# Patient Record
Sex: FEMALE | Race: WHITE | NOT HISPANIC OR LATINO | Employment: OTHER | URBAN - METROPOLITAN AREA
[De-identification: names, ages, dates, MRNs, and addresses within clinical notes are randomized per-mention and may not be internally consistent; named-entity substitution may affect disease eponyms.]

---

## 2019-09-04 ENCOUNTER — TELEPHONE (OUTPATIENT)
Dept: NEUROLOGY | Facility: CLINIC | Age: 64
End: 2019-09-04

## 2020-02-04 ENCOUNTER — TELEPHONE (OUTPATIENT)
Dept: NEUROLOGY | Facility: CLINIC | Age: 65
End: 2020-02-04

## 2020-02-04 ENCOUNTER — OFFICE VISIT (OUTPATIENT)
Dept: NEUROLOGY | Facility: CLINIC | Age: 65
End: 2020-02-04
Payer: MEDICARE

## 2020-02-04 VITALS
DIASTOLIC BLOOD PRESSURE: 63 MMHG | HEART RATE: 92 BPM | HEIGHT: 65 IN | SYSTOLIC BLOOD PRESSURE: 129 MMHG | BODY MASS INDEX: 29.85 KG/M2 | WEIGHT: 179.2 LBS

## 2020-02-04 DIAGNOSIS — E55.9 VITAMIN D DEFICIENCY: ICD-10-CM

## 2020-02-04 DIAGNOSIS — M54.50 CHRONIC MIDLINE LOW BACK PAIN, UNSPECIFIED WHETHER SCIATICA PRESENT: ICD-10-CM

## 2020-02-04 DIAGNOSIS — G43.709 CHRONIC MIGRAINE WITHOUT AURA WITHOUT STATUS MIGRAINOSUS, NOT INTRACTABLE: Primary | ICD-10-CM

## 2020-02-04 DIAGNOSIS — G24.3 CERVICAL DYSTONIA: ICD-10-CM

## 2020-02-04 DIAGNOSIS — G89.29 CHRONIC MIDLINE LOW BACK PAIN, UNSPECIFIED WHETHER SCIATICA PRESENT: ICD-10-CM

## 2020-02-04 DIAGNOSIS — G44.40 MEDICATION OVERUSE HEADACHE: ICD-10-CM

## 2020-02-04 DIAGNOSIS — M54.2 CERVICALGIA: ICD-10-CM

## 2020-02-04 PROBLEM — F41.8 DEPRESSION WITH ANXIETY: Status: ACTIVE | Noted: 2020-02-04

## 2020-02-04 PROBLEM — M54.81 BILATERAL OCCIPITAL NEURALGIA: Status: ACTIVE | Noted: 2020-02-04

## 2020-02-04 PROCEDURE — 99205 OFFICE O/P NEW HI 60 MIN: CPT | Performed by: PSYCHIATRY & NEUROLOGY

## 2020-02-04 RX ORDER — METFORMIN HYDROCHLORIDE 500 MG/1
500 TABLET, EXTENDED RELEASE ORAL 2 TIMES DAILY
COMMUNITY
Start: 2019-12-27 | End: 2021-10-25

## 2020-02-04 RX ORDER — DOCUSATE SODIUM 100 MG/1
100 CAPSULE, LIQUID FILLED ORAL 2 TIMES DAILY
COMMUNITY

## 2020-02-04 RX ORDER — POLYETHYLENE GLYCOL 3350 17 G/17G
17 POWDER, FOR SOLUTION ORAL
COMMUNITY
Start: 2018-01-31

## 2020-02-04 RX ORDER — POTASSIUM CHLORIDE 20 MEQ/1
1 TABLET, EXTENDED RELEASE ORAL DAILY
COMMUNITY
Start: 2020-02-04

## 2020-02-04 RX ORDER — CHOLECALCIFEROL (VITAMIN D3) 125 MCG
5 CAPSULE ORAL
COMMUNITY

## 2020-02-04 RX ORDER — NAPROXEN SODIUM 220 MG
220 TABLET ORAL AS NEEDED
COMMUNITY

## 2020-02-04 RX ORDER — BUSPIRONE HYDROCHLORIDE 15 MG/1
15 TABLET ORAL 2 TIMES DAILY
COMMUNITY
Start: 2019-12-27

## 2020-02-04 RX ORDER — BUTALBITAL/ASPIRIN/CAFFEINE 50-325-40
1 CAPSULE ORAL EVERY 4 HOURS PRN
COMMUNITY
Start: 2020-01-27 | End: 2020-10-16 | Stop reason: ALTCHOICE

## 2020-02-04 RX ORDER — TIZANIDINE 4 MG/1
.5-1 TABLET ORAL
COMMUNITY
Start: 2018-01-31

## 2020-02-04 RX ORDER — ACETAMINOPHEN 500 MG
500 TABLET ORAL AS NEEDED
COMMUNITY

## 2020-02-04 RX ORDER — DULOXETIN HYDROCHLORIDE 30 MG/1
30 CAPSULE, DELAYED RELEASE ORAL EVERY MORNING
COMMUNITY
Start: 2019-08-15 | End: 2020-10-16

## 2020-02-04 RX ORDER — DIPHENOXYLATE HYDROCHLORIDE AND ATROPINE SULFATE 2.5; .025 MG/1; MG/1
1 TABLET ORAL DAILY
COMMUNITY

## 2020-02-04 RX ORDER — CLONAZEPAM 0.5 MG/1
1 TABLET ORAL 3 TIMES DAILY
COMMUNITY
Start: 2019-12-22

## 2020-02-04 RX ORDER — ROSUVASTATIN CALCIUM 20 MG/1
20 TABLET, COATED ORAL DAILY
COMMUNITY
Start: 2019-12-27

## 2020-02-04 RX ORDER — PHENOL 1.4 %
600 AEROSOL, SPRAY (ML) MUCOUS MEMBRANE 2 TIMES DAILY
COMMUNITY

## 2020-02-04 RX ORDER — LAMOTRIGINE 200 MG/1
200 TABLET ORAL DAILY
COMMUNITY
Start: 2019-07-30

## 2020-02-04 RX ORDER — VALSARTAN AND HYDROCHLOROTHIAZIDE 160; 25 MG/1; MG/1
1 TABLET ORAL DAILY
COMMUNITY
Start: 2019-12-05

## 2020-02-04 RX ORDER — OXYCODONE HYDROCHLORIDE 10 MG/1
1 TABLET ORAL 2 TIMES DAILY
COMMUNITY
Start: 2020-01-22

## 2020-02-04 RX ORDER — ASPIRIN 81 MG/1
81 TABLET ORAL DAILY
COMMUNITY

## 2020-02-04 NOTE — TELEPHONE ENCOUNTER
Please schedule new start Botox  This will be stock  Please let me know once the apt is scheduled so I can attach the referral  Thank you!     Lore

## 2020-02-04 NOTE — PROGRESS NOTES
[facility]  PATIENT:  Ilana Tian  MRN:  [id number]  :  1955  DATE OF SERVICE:  2020      Assessment/Plan:        Problem List Items Addressed This Visit        Cardiovascular and Mediastinum    Headache, chronic migraine without aura - Primary    Relevant Medications    DULoxetine (CYMBALTA) 30 mg delayed release capsule    DULoxetine HCl 60 MG CSDR    lamoTRIgine (LaMICtal) 200 MG tablet    clonazePAM (KlonoPIN) 0 5 mg tablet    aspirin (ECOTRIN LOW STRENGTH) 81 mg EC tablet    butalbital-acetaminophen-caffeine-codeine (FIORICET WITH CODEINE) -42-30 MG per capsule    oxyCODONE (ROXICODONE) 10 MG TABS    naproxen sodium (ALEVE) 220 MG tablet    tiZANidine (ZANAFLEX) 4 mg tablet    acetaminophen (TYLENOL) 500 mg tablet    magnesium oxide (MAG-OX) 400 mg    Riboflavin (VITAMIN B-2) 100 MG TABS    Erenumab-aooe 140 MG/ML SOAJ    Other Relevant Orders    Vitamin B12    ECG 12 lead    MRI brain with and without contrast    BUN    Creatinine, serum       Nervous and Auditory    Cervical dystonia    Relevant Medications    lamoTRIgine (LaMICtal) 200 MG tablet    clonazePAM (KlonoPIN) 0 5 mg tablet    tiZANidine (ZANAFLEX) 4 mg tablet       Other    Cervicalgia    Medication overuse headache    Lumbago      Other Visit Diagnoses     Vitamin D deficiency        Relevant Orders    Vitamin D 25 hydroxy         MI/ stroke prevention: Aspirin 81 mg     Anxiety /depression: BuSpar 15 mg twice a day   -Klonopin 20 mEq 90 tabs   -Lamictal 200 mg 1 tab daily     Insomnia: Melatonin 5 mg at bedtime daily     Chronic pain/ fibromyalgia:  -  oxycodone 10 mg 3 times a day as needed     Arthritis: Naproxen 220 mg 2 times a day with meals     GERD:  Prilosec 20 mg daily    Cervicalgia with cervical dystonia:   Will apply for Botox  Continue tizanidine 4 mg at bedtime daily  Consider physical therapy    Chronic migraine headaches/Occipital Neurology   Medication overuse headaches:   - Medication overuse headache [facility]) and analgesic overuse can negate the effectiveness of headache preventive measures  Avoid medications with narcotics, barbiturates, or caffeine in them as these can cause rebound headaches after very few doses and can interfere with other headache medicine efficacy  Taking  any acute/abortive over the counter medication or prescription drugs for more than 2-3 days a week can cause medication overuse headache  Preventive therapy for headaches:   -Over-the-counter supplements: to decrease intensity and frequency of migraines  - Magnesium Oxide 400mg a day  If any diarrhea or upset stomach, decrease dose  as tolerated  (oral magnesium oxide may be an effective preventive strategy for people with migraine  Some theories about how it works include the idea that magnesium can help to prevent waves of cortical spreading depression and aura  Magnesium, in theory, also reduces the release of inflammatory or activating chemicals that can cause migraine)  - Vitamin B2 200 mg twice a day  May cause the urine to turn yellow which is normal for B 2 to do and is not a sign that you are dehydrated  (may be an effective preventive medication in some people with migraine)  - neck pain/back pain:Tizanidine 4 mg at bedtime  -   Hypertension:   Valsartan 160/hydrochlorothiazide 25 mg 1 tablet daily  - will send out aimovig 140mg monthly to use  Abortive therapy for headaches:   -  Patient gets Fioricet with codeine 60 capsules every 2 months -  Patient is going to try to slowly wean off of this  Work up:   - EKG  - lab:  B12, vitamin-D,   - MRI of the head with and without contrast      Headache management instructions  - When patient has a moderate to severe headache, they should seek rest, initiate relaxation and apply cold compresses to the head  - Maintain regular sleep schedule  Adults need at least 7-8 hours of uninterrupted a night     - Limit over the counter medications such as Tylenol, Ibuprofen, Aleve, Excedrin  (No more than 2- 3 times a week or max 10 a month)  - Maintain headache diary  Free KEYSHA for a smart phone, which can be used is "Migraine krishna"  - Limit caffeine to 1-2 cups 8 to 16 oz a day or less  - Avoid dietary trigger  (aged cheese, peanuts, MSG, aspartame and nitrates)  - Patient is to have regular frequent meals to prevent headache onset  - Please drink at least 64 ounces of water a day to help remain hydrated  Please call with any questions or concerns   Office number is [phone] Prescription Drug Monitoring Program report was reviewed and was appropriate   01/27/2020  1   01/27/2020  Gshyii-Tozm-Qojagommoik-Codein  60 00 10 Ca Mye   [id number]   Pen (0300)   0  27 00 [place]   01/22/2020  1   01/22/2020  Oxycodone Hcl 10 MG Tablet  90 00 30 Da Boz   [id number]   Pen (0617)   0  45 00 [place]   12/23/2019  1   12/18/2019  Oxycodone Hcl 10 MG Tablet  90 00 30 Da Boz   [id number]   Pen (7595)   0  45 00 [place]   12/22/2019  1   11/12/2019  Clonazepam 0 5 MG Tablet  90 00 30 Ca Mye   [id number]   Pen (5779)   1   [place]   12/05/2019  1   10/09/2019  Xlhwdf-Haiq-Eetzjyejeab-Codein  60 00 10 Ca Mye   [id number]   Pen (5779)   1  27 00 [place]   11/24/2019  1   11/21/2019  Oxycodone Hcl 10 MG Tablet  90 00 30 Co Koc   [id number]   Pen (5779)   0  45 00 [place]   11/12/2019  1   11/12/2019  Clonazepam 0 5 MG Tablet  90 00 30 Ca Mye   [id number]   Pen (5779)   0   [place]   10/24/2019  1   10/22/2019  Oxycodone Hcl 10 MG Tablet  90 00 30 Co Koc   [id number]   Pen (2630)   0  45 00 [place]   10/09/2019  1   10/09/2019  Pqhklk-Mpxz-Tvlzcnvdlzi-Codein  60 00 10 Ca Mye   [id number]   Pen (5779)   0  27 00 [place]   09/27/2019  1   08/07/2019  Clonazepam 0 5 MG Tablet  90 00 30 Mi [facility]   [id number]   Pen (8594)   1   [place]   09/24/2019  1   09/24/2019  Oxycodone Hcl 10 MG Tablet  90 00 30 Co Ko   [id number]   Pen (6496)   0  45 00 [place]   08/27/2019  1   08/20/2019  Oxycodone Hcl 10 MG Tablet  90 00 30 Co [facility]   [id number]   Pen (5648)   0  45 00 [place]   08/09/2019  1   06/06/2019  Rwwqhw-Qbze-Jqrahmxiilx-Codein  60 00 10 Ca Mye   [id number]   Pen (5779)   1  27 00 [place]   08/07/2019  1   08/07/2019  Clonazepam 0 5 MG Tablet  90 00 30 Mi Za   [id number]   Pen (5779)   0   [place]   07/29/2019  1   07/24/2019  Oxycodone Hcl 10 MG Tablet  90 00 30 Co Ko   [id number]   Pen (5779)   0  45 00 [place]   07/01/2019  1   05/30/2019  Clonazepam 0 5 MG Tablet  90 00 30 Ca Mye   [id number]   Pen (5779)   1   [place]   06/27/2019  1   06/25/2019  Oxycodone Hcl 10 MG Tablet  90 00 30 [facility]   [id number]   Pen (5779)   0  45 00 [place]   06/06/2019  1   06/06/2019  Uyhbwt-Ukxm-Vcwbedjdrjw-Codein  60 00 10 Ca Mye   [id number]   Pen (5779)   0  27 00 [place]   05/30/2019  1   05/30/2019  Clonazepam 0 5 MG Tablet  90 00 30 Ca Mye   [id number]   Pen (5779)   0   [place]   05/29/2019  1   05/29/2019  Oxycodone Hcl 10 MG Tablet  90 00 30 Co Ko   [id number]   Thr (0477)   0  45 00 [place]   05/01/2019  1   05/01/2019  Oxycodone Hcl 10 MG Tablet  90 00 30 Co Koc   [id number]   Thr (0477)   0  45 00 [place]   04/13/2019  1   03/09/2019  Clonazepam 0 5 MG Tablet  90 00 30 Ca Mye   [id number]   Pen (5779)   0   [place]   04/10/2019  1   04/10/2019  Ulaxvr-Fvga-Bwxewlwoyta-Codein  60 00 15 Ca Mye   [id number]   Pen (5779)   0  18 00 [place]   03/28/2019  1   03/28/2019  Oxycodone Hcl 10 MG Tablet  90 00 30 Do Gug   [id number]   Pen (7902)   0  45 00 [place]   03/09/2019  1   03/09/2019  Clonazepam 0 5 MG Tablet  90 00 30 Ca Mye   [id number]   Pen (8102)   0   [place]   03/01/2019  1   03/01/2019  Oxycodone Hcl 10 MG Tablet  90 00 30 Co [facility]   [id number]   Thr (0846)   0 45 00 [place]   02/10/2019  1   12/13/2018  Wejnzz-Qzek-Dayoaqfqovc-Codein  60 00 10 Mi Liliamg   [id number]   Pen (9876)   1  27 00 [place]   01/31/2019  1   01/31/2019  Oxycodone Hcl 10 MG Tablet  90 00 23 Co Ko   [id number]   Pen (9127)   0  58 70 [place]   01/15/2019  1   12/07/2018  Clonazepam 0 5 MG Tablet  90 00 30 Ca Mye   [id number]   Pen (5779)   1   [place]   01/03/2019  1   01/02/2019  Oxycodone Hcl 10 MG Tablet  120 00 30 Co Koc   [id number]   Thr (0477)   0  60 00 [place]         History of Present Illness: We had the pleasure of evaluating Reggie Johnston in neurological consultation today for headaches  As you know,  she is a 71 y o  right handed  female  Patient's typically seen at [facility] for most of her care  She is here today for evaluation of her headaches  Medical history review:  Qtc:  Will order today  Last time pt had colonoscopy: 2008  Tobacco use:   Quit in 1986   - controlled type 2 diabetes   - hypertension  - hyperlipidemia  -on Crestor  - Vitamin-D deficiency  -  Congenital prolapsed rectum  -  arthritis  - chronic neck and back pain - Dr Tirso Brasher and [address] [place] [address] in [facility]   -  Fibromyalgia  -  Skin cancer ( squamous cell on the face)    Mood:    nervous breakdown in 2006  Depression:  Yes, moderate major depression disorder  Anxiety:  Yes generalized anxiety   Seeing a psychiatrist/ How often? [place]    Seeing at therapist/ how often? Has seen a therapist since 89  Cervicalgia /cervical dystonia:   Patient has had neck pain for many years now  States that she has not noticed decrease in range-of-motion along with pain in bilateral occipital region that radiates up her head  She has been told that she has occipital neuralgia  She has had Botox for this with no benefit  But has not had Botox for cervical dystonia        -Currently on tizanidine 4 mg at bedtime and has tried cyclobenzaprine in the past     Pain intensity:2/10 neck pain   This time   Frequency: Daily neck pain   Intensity:  2 to 7/10 varies throughout the day    Chronic migraine headache/medication overuse headaches:  Any family history of migraines? Mother, sister  Any family history of aneurysms? No     Have you seen someone else for headaches or pain? Currently follows with [address] and [facility]       Headaches started at what age? 14 y/o with onset of menses  Worsened in 1988 after whiplash injury, fell while rollerskating  2006 father passed, worsened anxiety and depression and headaches     What is your current pain level? 0/10 headache       How often do the headaches occur? Mild headaches: few times a month  Moderate to severe headaches: 25 x's/month     Are you ever headache free? Right now, which is unusual for her  Sometimes can be headache free for 2 days      Aura/Warning and how long does it last? None       What time of the day do the headaches start? Mild headaches: any time   Moderate to severe headaches: AM  Wakes up with them     How long do the headaches last?   Mild headaches: 1 hour   Moderate to severe headaches: 4-8 hours     Where is your headache located? Mild headaches: bilateral occipital region, shoots to frontal and temporal region  Pressure pain behind bilateral eyes   Moderate to severe headaches: bilateral occipital region, shoots to frontal and temporal region  Pressure pain behind bilateral eyes and into maxillary sinuses     Describe your usual headache? Mild headaches: tight band  Moderate to severe headaches: shooting, stabbing, electrical, pressure, throbbing pain     What is the intensity of pain?    Mild headaches: 4/10  Moderate to severe headaches: 7-10/10      Associated symptoms:   - Photophobia     Phonophobia      Osmophobia  - Nasal congestion/rhinorrhea    - Stiff or sore neck   - Puffy eyes  - tingling from occipital region radiating towards front  - Insomnia  - Prefer to be in a cool, quiet, dark room     Number of days missed per month because of headaches:  Work (or school) days: Stopped working in 2006  Social or Family activities: at least 4 times per month     Headache are worse if the patient: looking down worsened neck pain and then headaches start  Headache triggers: Cigarette smoke, fumes, fireplace smoke  What time of the year do headaches occur more frequently? Unsure  Cloudy and rainy weather worsens headaches     Have you had trigger point injection performed and how often? No   Have you had Botox injection performed and how often? Yes, once in 2014  Worsened her tingling so stopped  Have you had epidural injections or transforaminal injections performed? Cervical, lumbar steroid injections      Alternative therapies used in the past for headaches? physical therapy, acupuncture  Have you used CBD or THC for your headaches and how often? No   How many caffeine products to drink a day? 8oz coffee a day   How much water to drink a day? Eight 8oz glasses     Are you current pregnant or planning on getting pregnant? No      What medications do you take or have you taken for your headaches? Preventive therapy:  ( patient lived in [place] and saw a neurologist there medications tried their)  -  Vitamin-D, melatonin,  -  Botox x1   - Aimovig 140 mg injection  -  Topiramate, Depakote, gabapentin ( tremors), Lyrica (tremor)  -  Tizanidine 4 mg, cyclobenzaprine  -  Valsartan her in 60 mg, metoprolol, propanolol  -  Klonopin 0 5 mg,  -  BuSpar 15 mg twice a day, Cymbalta 30 mg, amitriptyline  Abortive Therapy:   -  Fioricet with codeine (takes daily  Takes twice a day 3-4 x's per month), oxycodone,   - Sumatriptan   -             Sleep Habit:  Is your sleep restful? Yes     Do you wake up with headaches? Yes      How many hours do you actually sleep? 8-10  What time do you go to bed at night?  11pm  What time do you wake up in am? 9am  How often do you get up at night? Few times      Do you snore while asleep? Yes  Was unable to do sleep study in past due to migraine  Have you been told that you stop breathing during sleeping? No   Do you wake up tired in the morning? Sometimes   Do you take frequent naps during the day? No   Do you have jaw pain? No   Do you grind/clench your teeth at night? No   Do you have restless leg syndrome? No   Do you have nightmare or sleep walk? No     Brain images: Yes they have been done [facility] last 1 was in 2013  MRI brain:  - No acute ischemia or hemorrhage  Ventricles and sulci are mildly prominent consistent with parenchymal volume loss  Hyperintense T2 signal is noted in the mara, stable from the prior study  Few scattered nonspecific foci of hyperintensity T2 signal is seen in the periventricular subcortical white matter    No evidence of mass    Past Medical History:   Diagnosis Date    Anxiety     Arthritis     Cancer ([facility] [address] )     Skin    Depression     Diabetes [facility]     GERD (gastroesophageal reflux disease)     Hyperlipidemia     Hypertension     Migraine        Patient Active Problem List   Diagnosis    Headache, chronic migraine without aura    Cervicalgia    Cervical dystonia    Medication overuse headache    Depression with anxiety    Lumbago       Medications:      Current Outpatient Medications   Medication Sig Dispense Refill    acetaminophen (TYLENOL) 500 mg tablet Take 500 mg by mouth as needed for mild pain      aspirin (ECOTRIN LOW STRENGTH) 81 mg EC tablet Take 81 mg by mouth daily      B COMPLEX VITAMINS PO Take 1 tablet by mouth daily      bisacodyl (DULCOLAX) 5 mg EC tablet Take 5 mg by mouth as needed      busPIRone (BUSPAR) 15 mg tablet Take 15 mg by mouth 2 (two) times a day      butalbital-acetaminophen-caffeine-codeine (FIORICET WITH CODEINE) -04-30 MG per capsule Take 1 capsule by mouth every 4 (four) hours as needed      calcium carbonate (OS-MIKE) 600 MG tablet Take 600 mg by mouth 2 (two) times a day      Cholecalciferol 50 MCG (2000 UT) CAPS Take 1 capsule by mouth daily      clonazePAM (KlonoPIN) 0 5 mg tablet Take 1 tablet by mouth 3 (three) times a day      docusate sodium (COLACE) 100 mg capsule Take 100 mg by mouth 2 (two) times a day      DULoxetine (CYMBALTA) 30 mg delayed release capsule Take 30 mg by mouth every morning      DULoxetine HCl 60 MG CSDR Take 60 mg by mouth daily at bedtime      esomeprazole (NexIUM) 20 mg capsule Take 20 mg by mouth 2 (two) times a day      lamoTRIgine (LaMICtal) 200 MG tablet Take 200 mg by mouth daily      Melatonin 5 MG TABS Take 5 mg by mouth daily at bedtime      metFORMIN (GLUCOPHAGE-XR) 500 mg 24 hr tablet Take 500 mg by mouth 2 (two) times a day      multivitamin (THERAGRAN) TABS Take 1 tablet by mouth daily      naproxen sodium (ALEVE) 220 MG tablet Take 220 mg by mouth as needed      oxyCODONE (ROXICODONE) 10 MG TABS Take 1 tablet by mouth 3 (three) times a day      polyethylene glycol (MIRALAX) 17 g packet Take 17 g by mouth daily at bedtime      potassium chloride (KLOR-CON M20) 20 mEq tablet Take 1 tablet by mouth daily      rosuvastatin (CRESTOR) 20 MG tablet Take 20 mg by mouth daily      tiZANidine (ZANAFLEX) 4 mg tablet Take 0 5-1 tablets by mouth daily at bedtime as needed      valsartan-hydrochlorothiazide (DIOVAN-HCT) 160-25 MG per tablet Take 1 tablet by mouth daily      Erenumab-aooe 140 MG/ML SOAJ 1 injection subcutaneously use monthly 1 pen 11    magnesium oxide (MAG-OX) 400 mg Take 1 tablet (400 mg total) by mouth 2 (two) times a day 60 tablet 3    Riboflavin (VITAMIN B-2) 100 MG TABS 2 in the morning and 2 at bedtime 120 tablet 3     No current facility-administered medications for this visit           Allergies:    No Known Allergies    Family History:     Family History   Problem Relation Age of Onset    Allergies Mother     Migraines Mother     Anxiety disorder Mother    [facility] Cancer Father         Bladder    Migraines Sister     Multiple sclerosis Brother     Colon cancer Maternal Grandmother     Heart attack Maternal Grandfather     Peripheral vascular disease Paternal Grandmother     Diabetes Paternal Grandmother     Heart attack Paternal Grandfather    [facility] Migraines Brother         Rare   [facility] Migraines Son         Rare       Social History:     Social History     Socioeconomic History    Marital status: /Civil Union     Spouse name: Not on file    Number of children: Not on file    Years of education: Not on file    Highest education level: Not on file   Occupational History    Not on file   Social Needs    Financial resource strain: Not on file    Food insecurity:     Worry: Not on file     Inability: Not on file    Transportation needs:     Medical: Not on file     Non-medical: Not on file   Tobacco Use    Smoking status: Former Smoker     Last attempt to quit:      Years since quittin 1    Smokeless tobacco: Never Used   Substance and Sexual Activity    Alcohol use: Not Currently    Drug use: Never    Sexual activity: Not on file   Lifestyle    Physical activity:     Days per week: Not on file     Minutes per session: Not on file    Stress: Not on file   Relationships    Social connections:     Talks on phone: Not on file     Gets together: Not on file     Attends Hinduism service: Not on file     Active member of club or organization: Not on file     Attends meetings of clubs or organizations: Not on file     Relationship status: Not on file    Intimate partner violence:     Fear of current or ex partner: Not on file     Emotionally abused: Not on file     Physically abused: Not on file     Forced sexual activity: Not on file   Other Topics Concern    Not on file   Social History Narrative    Not on file         Objective:   Physical Exam:                                                                   Vitals:               /63 (BP Location: Left arm, Patient Position: Sitting, Cuff Size: Standard)   Pulse 92   Ht 5' 5" (1 651 m)   Wt 81 3 kg (179 lb 3 2 oz)   BMI 29 82 kg/m²   BP Readings from Last 3 Encounters:   02/04/20 129/63     Pulse Readings from Last 3 Encounters:   02/04/20 92              CONSTITUTIONAL: Well developed, well nourished, well groomed  No dysmorphic features  Eyes:  PERRLA, EOM normal      Neck:   decreased range of motion, head tilt to the left     HEENT:  Normocephalic atraumatic  No meningismus  Oropharynx is clear and moist  No oral mucosal lesions  Chest:  Respirations regular and unlabored  Cardiovascular:  Distal extremities warm without palpable edema or tenderness, no observed significant swelling  Musculoskeletal:  Full range of motion  Skin:  warm and dry   Psychiatric:  Normal behavior and appropriate affect        Neurological Examination:     Mental status/cognitive function: Orientated to time, place and person  Cranial Nerves: 2 to 12 intact    Motor Exam:    5/5 right upper extremity  5/5 left upper extremity  5/5 right lower extremity  5/5 left lower extremity    Sensory:   - grossly intact light touch in all extremities  Reflexes:   2/4 right upper extremity  2/4 left upper extremity   right knee 1/4, right ankle 0/4   Left knee 2/4, left ankle 2/4  No clonus noted    Coordination:   - Finger to nose intact bilaterally  - No tremor noted    Gait:  Wide-based gait, difficulty with tandem gait, positive Romberg      Review of Systems:   Review of Systems  Review of Systems   Constitutional: Negative  HENT: Positive for sinus pressure  Eyes: Negative  Respiratory: Negative  Cardiovascular: Negative  Gastrointestinal: Negative  Endocrine: Negative  Genitourinary: Negative  Musculoskeletal: Positive for back pain, myalgias and neck pain  Joint Pain   Pain While Walking    Skin: Negative  Allergic/Immunologic: Negative      Neurological: Positive for headaches  Snoring  Memory Problems   Tingling   Balance Problems/Clumsiness (Occasionally)   Hematological: Negative  Psychiatric/Behavioral: Positive for sleep disturbance (Trouble falling asleep )  The patient is nervous/anxious  Depression       I have spent 60 minutes with Patient  today in which greater than 50% of this time was spent in counseling/coordination of care regarding Diagnostic results, Prognosis, Risks and benefits of tx options, Intructions for management, Patient and family education, Importance of tx compliance, Risk factor reductions, Impressions and Plan of care as above        Author:  Ezio Kraft MD 2/4/2020 4:22 PM

## 2020-02-04 NOTE — PATIENT INSTRUCTIONS
MI/ stroke prevention: Aspirin 81 mg     Anxiety /depression: BuSpar 15 mg twice a day   -Klonopin 20 mEq 90 tabs   -Lamictal 200 mg 1 tab daily     Insomnia: Melatonin 5 mg at bedtime daily     Chronic pain/ fibromyalgia:  -  oxycodone 10 mg 3 times a day as needed     Arthritis: Naproxen 220 mg 2 times a day with meals     GERD:  Prilosec 20 mg daily    Cervicalgia with cervical dystonia: Will apply for Botox  Continue tizanidine 4 mg at bedtime daily  Consider physical therapy    Chronic migraine headaches/Occipital Neurology   Medication overuse headaches:   - Medication overuse headache [facility]) and analgesic overuse can negate the effectiveness of headache preventive measures  Avoid medications with narcotics, barbiturates, or caffeine in them as these can cause rebound headaches after very few doses and can interfere with other headache medicine efficacy  Taking  any acute/abortive over the counter medication or prescription drugs for more than 2-3 days a week can cause medication overuse headache  Preventive therapy for headaches:   -Over-the-counter supplements: to decrease intensity and frequency of migraines  - Magnesium Oxide 400mg a day  If any diarrhea or upset stomach, decrease dose  as tolerated  (oral magnesium oxide may be an effective preventive strategy for people with migraine  Some theories about how it works include the idea that magnesium can help to prevent waves of cortical spreading depression and aura  Magnesium, in theory, also reduces the release of inflammatory or activating chemicals that can cause migraine)  - Vitamin B2 200 mg twice a day  May cause the urine to turn yellow which is normal for B 2 to do and is not a sign that you are dehydrated   (may be an effective preventive medication in some people with migraine)  - neck pain/back pain:Tizanidine 4 mg at bedtime  -   Hypertension:   Valsartan 160/hydrochlorothiazide 25 mg 1 tablet daily  - will send out aimovig 140mg monthly to use  Abortive therapy for headaches:   -  Patient gets Fioricet with codeine 60 capsules every 2 months -  Patient is going to try to slowly wean off of this  Work up:   - EKG  - lab:  B12, vitamin-D,   - MRI of the head with and without contrast      Headache management instructions  - When patient has a moderate to severe headache, they should seek rest, initiate relaxation and apply cold compresses to the head  - Maintain regular sleep schedule  Adults need at least 7-8 hours of uninterrupted a night  - Limit over the counter medications such as Tylenol, Ibuprofen, Aleve, Excedrin  (No more than 2- 3 times a week or max 10 a month)  - Maintain headache diary  Free TJ for a smart phone, which can be used is "Migraine krishna"  - Limit caffeine to 1-2 cups 8 to 16 oz a day or less  - Avoid dietary trigger  (aged cheese, peanuts, MSG, aspartame and nitrates)  - Patient is to have regular frequent meals to prevent headache onset  - Please drink at least 64 ounces of water a day to help remain hydrated  Cognitive behavioral therapy (CBT) is a common type of talk therapy (psychotherapy) were you work with a psychotherapist or therapist   CBT helps you become aware of inaccurate or negative thinking so you can view challenging situations more clearly and respond to them in a more effective way  CBT can be a very helpful tool ? either alone or in combination with other therapies ? in treating mental health disorders (depression, post-traumatic stress disorder (PTSD) or an eating disorder) and chronic pain  CBT can be an effective tool to help anyone learn how to better manage stressful life situations and pain  In some cases, CBT is most effective when it's combined with other treatments, such as antidepressants or other medications    You can start yourself by down loading the tj: [employer]      Mindfulness/Meditation for Treating Migraine  Many people believe that stress is a major trigger for their migraine  This is where mindfulness and meditation can come into play, as they have been known to help reduce migraine severity, duration and acute pain medication use  It may also help to relieve stress and anxiety while improving feelings of well being  Biofeedback involves becoming more aware of the changes that occur in the body and learning how to exert control over generally involuntary functions  Biofeedback allows you to see your vitals in real-time and learn how to stabilize them on your own  There is great evidence that biofeedback can reduce the frequency, intensity, and duration of migraine and tension-type headache  When you're stressed, you may notice elevated heart rates, tightened muscles, and sweating  During biofeedback, you can see these changes on a monitor, then a therapist teaches you exercises to help manage these changes  Shanon Frost for Treating Migraine  The kind of mind/body therapy that yoga can provide may help create relief from migraine  Keeping up with yoga consistently can reduce headache frequency, intensity and duration, so it's important to practice regularly if you plan to use it as a complementary migraine treatment  However, certain types of yoga such as hot yoga may be uncomfortable for people with migraine  Others, such as restorative yoga, may be tolerable even for a patient with chronic migraine  Mary Socks can also have a similar benefit for patients with migraine  Specifically, it can help improve balance, which can be very useful for those with vestibular symptoms or vestibular migraine  Acupuncture for Treating Migraine  A traditional [facility] medicine, acupuncture is reported to increase the release of serotonin, dopamine and other chemicals that may help to treat chronic pain, and can be helpful in preventing episodic migraine  There are, however, conflicting results on studies in acupuncture as a treatment for migraine      Basic neck exercises for daily use:    - Neck pathology and poor posture, with straightening of the normal cervical lordosis, can cause headaches  Tightening of the neck muscles can irritate the nerves in the occipital region of the head and cause or worsen head pain  Thus neck strengthening and relaxation exercises, can help improve this particular pain  It is importance to have good posture for improving shoulder, neck, and head pain  - Here are some exercises which should take 5 minutes:     1  Standing, drop your head to one side while continuing to look ahead  Hold for 10 seconds then swap sides  Repeat twice more each side  To increase the stretch, drop the opposite shoulder  2  Standing again, lower your chin to your chest, hold for 10 and then look up to the ceiling and hold for 10  Repeat twice more  3  Next, standing straight again, look over your right shoulder and hold firm for 10 seconds, then over your left shoulder for 10  Repeat this 3 times  4  Finally, while sitting upright, bring your head forward and hold for 10, then all the way back and hold for 10  If this simple exercise does not help improve the posture, we will consider formal physical therapy in the future  Importance of Healthy Sleep:  Behavioral sleep changes can promote restful, regular sleep and reduce headache  Simple changes like establishing consistent sleep and wake-up times, as well as getting between 7 and 8 hours of sleep a day, can make a world of difference  Experts also recommend avoiding substances that impair sleep, like caffeine, nicotine and alcohol, and also suggest winding down before bed to prevent sleep problems  To read more go to https://americanmigrainefoundation  org/resource-library/sleep/    Exercising for migraineurs:  Regular exercise can reduce the frequency and intensity of headaches and migraines  When one exercises, the body releases endorphins, which are the bodys natural painkillers   Exercise reduces stress and helps individuals to sleep at night  Exercising at least 30 to 40 minutes 3 times a week is sufficient for most patients  When exercising, follow this plan to prevent headaches:  - First, stay hydrated before, during, and after exercise  - Second part of the exercise plan is to eat sufficient food about an hour and a half before you exercise  Exercise causes ones blood sugar level to decrease, and it is important to have a source of energy    - Final part of the exercise plan is to warm-up  Do not jump into sudden, vigorous exercise if that triggers a headache or migraine  To read more go to https://americanmigrainefoundation  org/resource-library/effects-of-exercise-on-headaches-and-migraines/     Please call with any questions or concerns   Office number is [phone]

## 2020-02-04 NOTE — TELEPHONE ENCOUNTER
----- Message from Irish Peters MD sent at 2/4/2020  4:02 PM EST -----  200 Units needed for Cervical dystonia   thx

## 2020-02-04 NOTE — PROGRESS NOTES
Headaches:   Any family history of migraines? Mother, sister  Any family history of aneurysms? No     Have you seen someone else for headaches or pain? Currently follows with [facility]       Headaches started at what age? 14 y/o with onset of menses  Worsened in 1988 after whiplash injury, fell while rollerskating  2006 father passed, worsened anxiety and depression and headaches     What is your current pain level? 0/10 headache  2/10 neck pain      How often do the headaches occur? Mild headaches: few times a month  Moderate to severe headaches: 25 x's/month     Are you ever headache free? Right now, which is unusual for her  Sometimes can be headache free for 2 days      Aura/Warning and how long does it last? None       What time of the day do the headaches start? Mild headaches: any time   Moderate to severe headaches: AM  Wakes up with them     How long do the headaches last?   Mild headaches: 1 hour   Moderate to severe headaches: 4-8 hours     Where is your headache located? Mild headaches: bilateral occipital region, shoots to frontal and temporal region  Pressure pain behind bilateral eyes   Moderate to severe headaches: bilateral occipital region, shoots to frontal and temporal region  Pressure pain behind bilateral eyes and into maxillary sinuses     Describe your usual headache? Mild headaches: tight band  Moderate to severe headaches: shooting, stabbing, electrical, pressure, throbbing pain     What is the intensity of pain?    Mild headaches: 4/10  Moderate to severe headaches: 7-10/10      Associated symptoms:   - Photophobia     Phonophobia      Osmophobia  - Nasal congestion/rhinorrhea    - Stiff or sore neck   - Puffy eyes  - tingling from occipital region radiating towards front  - Insomnia  - Prefer to be in a cool, quiet, dark room     Number of days missed per month because of headaches:  Work (or school) days: Stopped working in 2006  Social or Family activities: at least 4 times per month     Headache are worse if the patient: looking down worsened neck pain and then headaches start  Headache triggers: Cigarette smoke, fumes, fireplace smoke  What time of the year do headaches occur more frequently? Unsure  Cloudy and rainy weather worsens headaches     Have you had trigger point injection performed and how often? No   Have you had Botox injection performed and how often? Yes, once in 2014  Worsened her tingling so stopped  Have you had epidural injections or transforaminal injections performed? Cervical, lumbar steroid injections      Alternative therapies used in the past for headaches? physical therapy, acupuncture  Have you used CBD or THC for your headaches and how often? No   How many caffeine products to drink a day? 8oz coffee a day   How much water to drink a day? Eight 8oz glasses     Are you current pregnant or planning on getting pregnant? No      What medications do you take or have you taken for your headaches? Preventive therapy:   -  Vitamin-D, melatonin,     -  Tizanidine 4 mg,  -  Valsartan her in 60 mg  -  Klonopin 0 5 mg,  -  BuSpar 15 mg twice a day, Cymbalta 30 mg    Abortive Therapy:   -  Fioricet with codeine (takes daily  Takes twice a day 3-4 x's per month), oxycodone, Sumatriptan   -                Sleep Habit:  Is your sleep restful? Yes     Do you wake up with headaches? Yes      How many hours do you actually sleep? 8-10  What time do you go to bed at night? 11pm  What time do you wake up in am? 9am  How often do you get up at night? Few times      Do you snore while asleep? Yes  Was unable to do sleep study in past due to migraine  Have you been told that you stop breathing during sleeping? No   Do you wake up tired in the morning? Sometimes   Do you take frequent naps during the day? No   Do you have jaw pain? No   Do you grind/clench your teeth at night? No   Do you have restless leg syndrome? No   Do you have nightmare or sleep walk?  No

## 2020-02-04 NOTE — PROGRESS NOTES
Review of Systems   Constitutional: Negative  HENT: Positive for sinus pressure  Eyes: Negative  Respiratory: Negative  Cardiovascular: Negative  Gastrointestinal: Negative  Endocrine: Negative  Genitourinary: Negative  Musculoskeletal: Positive for back pain, myalgias and neck pain  Joint Pain   Pain While Walking    Skin: Negative  Allergic/Immunologic: Negative  Neurological: Positive for headaches  Snoring  Memory Problems   Tingling   Balance Problems/Clumsiness (Occasionally)   Hematological: Negative  Psychiatric/Behavioral: Positive for sleep disturbance (Trouble falling asleep )  The patient is nervous/anxious           Depression

## 2020-02-06 ENCOUNTER — TELEPHONE (OUTPATIENT)
Dept: NEUROLOGY | Facility: CLINIC | Age: 65
End: 2020-02-06

## 2020-02-06 NOTE — TELEPHONE ENCOUNTER
Patient called in and I scheduled her for 04/22/20 at 08:15am with Dr Yash Esparza in the [facility] Location

## 2020-02-06 NOTE — TELEPHONE ENCOUNTER
Patient called back and I scheduled her for 03/30/20 at 02:30pm with Lucia De Santiago in the    I also scheduled her for 05/27/20 at 02:00pm with Dr Yeimy Gong

## 2020-02-06 NOTE — TELEPHONE ENCOUNTER
----- Message from Gisell Jaimes sent at 2/4/2020  4:32 PM EST -----  Patient needs 6 week follow up with Cruz Hammond (prefers afternoon) and then 3 months with Dr Elsie Meza

## 2020-02-06 NOTE — TELEPHONE ENCOUNTER
Voicemail left for patient requesting call back to schedule 6 week follow up with Nain Rocha, new start Botox appt and then 3 month follow up with Dr Katie Corea

## 2020-02-11 ENCOUNTER — TELEPHONE (OUTPATIENT)
Dept: NEUROLOGY | Facility: CLINIC | Age: 65
End: 2020-02-11

## 2020-02-11 NOTE — TELEPHONE ENCOUNTER
Patient calling to let us know that Jose Swanson requires a PA  PA submitted and approved on [facility]  Pharmacy and patient notified

## 2020-03-13 ENCOUNTER — TELEPHONE (OUTPATIENT)
Dept: NEUROLOGY | Facility: CLINIC | Age: 65
End: 2020-03-13

## 2020-03-13 ENCOUNTER — CLINICAL SUPPORT (OUTPATIENT)
Dept: URGENT CARE | Facility: CLINIC | Age: 65
End: 2020-03-13
Payer: MEDICARE

## 2020-03-13 DIAGNOSIS — G43.709 CHRONIC MIGRAINE WITHOUT AURA WITHOUT STATUS MIGRAINOSUS, NOT INTRACTABLE: ICD-10-CM

## 2020-03-13 PROCEDURE — 93005 ELECTROCARDIOGRAM TRACING: CPT

## 2020-03-13 NOTE — TELEPHONE ENCOUNTER
Patient stopped into [facility] office to fill out EDUAR requesting info from [employer] in [place]  Requesting all records, MRI/CT/MRA's sent on disc to [facility] office    Scanned copy of request into media, attached to this encounter and faxed to [facility] [employer] [phone]

## 2020-03-16 LAB
ATRIAL RATE: 85 BPM
P AXIS: 1 DEGREES
PR INTERVAL: 144 MS
QRS AXIS: 5 DEGREES
QRSD INTERVAL: 74 MS
QT INTERVAL: 398 MS
QTC INTERVAL: 473 MS
T WAVE AXIS: -4 DEGREES
VENTRICULAR RATE: 85 BPM

## 2020-03-16 PROCEDURE — 93010 ELECTROCARDIOGRAM REPORT: CPT

## 2020-03-19 ENCOUNTER — TELEPHONE (OUTPATIENT)
Dept: NEUROLOGY | Facility: CLINIC | Age: 65
End: 2020-03-19

## 2020-03-19 ENCOUNTER — DOCUMENTATION (OUTPATIENT)
Dept: NEUROLOGY | Facility: CLINIC | Age: 65
End: 2020-03-19

## 2020-03-19 NOTE — TELEPHONE ENCOUNTER
Called and spoke with the patient- I confirmed that her Medicare is primary and [facility] is secondary  [facility]- spoke with Flora CABRERA I advised I was calling to see if prior authorization is required for Botox  She states they only handle hospital authorizations  I would need to contact [facility] at [phone] to check the out patient side  Called [employer] program- spoke with Lianet Chappell  I advised her I was calling to see if prior authorization is required for Botox  I provided her with the following codes: 61717,   She states no authorization is required for either code       Call reference #: [id number]

## 2020-03-19 NOTE — PROGRESS NOTES
Type Date User Summary Attachment   General 03/19/2020  4:09 PM Jerad Hdez care coordination  -   Note    Botox- no authorization needed   Please use our stock      Thank you,     Juanito Callaway

## 2020-03-26 ENCOUNTER — TELEPHONE (OUTPATIENT)
Dept: NEUROLOGY | Facility: CLINIC | Age: 65
End: 2020-03-26

## 2020-03-26 NOTE — TELEPHONE ENCOUNTER
Voicemail left for patient offering Virtual visit with Cameron Ferrari on 3/30/2020  If patient calls back please send call to receptionists at [facility] to assist patient, thank you

## 2020-03-26 NOTE — TELEPHONE ENCOUNTER
Received disk and reports from [facility] with patient's previous studies dated from 8/2008 until 5/12/2016  These were uploaded into [facility] on 3/20/2020

## 2020-03-27 NOTE — TELEPHONE ENCOUNTER
2nd attempt  Voicemail left for patient requesting call back ASAP to discuss virtual video visit on 3/30/2020

## 2020-03-27 NOTE — TELEPHONE ENCOUNTER
Patient did call back and cannot do video visit but is agreeable to do phone visit with Earline Jimenez

## 2020-03-30 ENCOUNTER — TELEMEDICINE (OUTPATIENT)
Dept: NEUROLOGY | Facility: CLINIC | Age: 65
End: 2020-03-30
Payer: MEDICARE

## 2020-03-30 DIAGNOSIS — G43.709 CHRONIC MIGRAINE WITHOUT AURA WITHOUT STATUS MIGRAINOSUS, NOT INTRACTABLE: Primary | ICD-10-CM

## 2020-03-30 PROCEDURE — 99443 PR PHYS/QHP TELEPHONE EVALUATION 21-30 MIN: CPT | Performed by: PHYSICIAN ASSISTANT

## 2020-03-30 NOTE — PROGRESS NOTES
Virtual Brief Visit    Problem List Items Addressed This Visit        Cardiovascular and Mediastinum    Headache, chronic migraine without aura - Primary     Over-the-counter supplements: to decrease intensity and frequency of migraines  - Magnesium Oxide 400mg a day  If any diarrhea or upset stomach, decrease dose  as tolerated  (oral magnesium oxide may be an effective preventive strategy for people with migraine  Some theories about how it works include the idea that magnesium can help to prevent waves of cortical spreading depression and aura  Magnesium, in theory, also reduces the release of inflammatory or activating chemicals that can cause migraine)  - Vitamin B2 200 mg twice a day  May cause the urine to turn yellow which is normal for B 2 to do and is not a sign that you are dehydrated  (may be an effective preventive medication in some people with migraine)  - neck pain/back pain: Tizanidine 4 mg at bedtime  - Hypertension:   Valsartan 160/hydrochlorothiazide 25 mg 1 tablet daily  - Will start Emgality 2 injections the first month then 1 injection every 30 days  Abortive therapy for headaches:   -  Patient gets Fioricet with codeine 60 capsules every 2 months -  Patient is going to try to slowly wean off of this                 Relevant Medications    Galcanezumab-gnlm 120 MG/ML SOAJ    Galcanezumab-gnlm 120 MG/ML SOAJ                Reason for visit is migraine    Encounter provider Homer Barnard PA-C    Provider located at [address] [place] [address] RD  [address] [address]      Recent Visits  Date Type Provider Dept   03/26/20 Telephone Elyssa   Showing recent visits within past 7 days and meeting all other requirements     Today's Visits  Date Type Provider Dept   03/30/20 Telemedicine Homer Barnard PA-C  [facility]   Showing today's visits and meeting all other requirements     Future Appointments  No visits were found meeting these conditions  Showing future appointments within next 150 days and meeting all other requirements        After connecting through telephone, the patient was identified by name and date of birth  Tiff Cazares was informed that this is a telemedicine visit and that the visit is being conducted through telephone  My office door was closed  No one else was in the room  She acknowledged consent and understanding of privacy and security of the video platform  The patient has agreed to participate and understands they can discontinue the visit at any time  Patient is aware this is a billable service  Subjective  Tiff Cazares is a 71 y  o right handed female  She is here today for evaluation of her headaches       Medical history review:  Qtc: 3/13/2020 473 ms  Last time pt had colonoscopy: 2008  Tobacco use:   Quit in 1986   - controlled type 2 diabetes   - hypertension  - hyperlipidemia  -on Crestor  - Vitamin-D deficiency  -  Congenital prolapsed rectum  -  arthritis  - chronic neck and back pain - Dr Cheri López and [address] [place] [address] in [facility]   -  Fibromyalgia  -  Skin cancer (squamous cell on the face)     Mood:   nervous breakdown in 2006  Depression:  Yes, moderate major depression disorder  Anxiety:  Yes generalized anxiety  Seeing a psychiatrist/ How often? [facility]   Seeing at therapist/ how often? Has seen a therapist since 89  Cervicalgia /cervical dystonia:   Patient has had neck pain for many years now  States that she has not noticed decrease in range-of-motion along with pain in bilateral occipital region that radiates up her head  She has been told that she has occipital neuralgia  She has had Botox for this with no benefit  But has not had Botox for cervical dystonia      -Currently on tizanidine 4 mg at bedtime and has tried cyclobenzaprine in the past      Pain intensity: 2/10 neck pain   This time   Frequency: Daily neck pain   Intensity:  2 to 7/10 varies throughout the day     Chronic migraine headache/medication overuse headaches:  What medications do you take or have you taken for your headaches? Patient did not start Jose Bile as she has chronic constipation and has had multiple episodes of fecal impaction  Therefore, she did not want to add the Aimovig due to this  Preventive therapy:  ( patient lived in [place] and saw a neurologist there medications tried their)  - Vitamin-D, melatonin,  - Botox x1   -Topiramate, Depakote, gabapentin (tremors), Lyrica (tremor)  - Tizanidine 4 mg, cyclobenzaprine  - Valsartan 60 mg, metoprolol, propanolol  - Klonopin 0 5 mg  - BuSpar 15 mg twice a day, Cymbalta 30 mg, amitriptyline  Abortive Therapy:   - Fioricet with codeine (takes daily  Takes twice a day 3-4 x's per month), oxycodone,   - Sumatriptan   Headache are worse if the patient: looking down worsened neck pain and then headaches start  Headache triggers: Cigarette smoke, fumes, fireplace smoke    Have you seen someone else for headaches or pain? Currently follows with [facility]       Headaches started at what age? 14 y/o with onset of menses  Worsened in 1988 after whiplash injury, fell while rollerskating  2006 father passed, worsened anxiety and depression and headaches     What is your current pain level? 4/10 headache was a 6/10 around 1:00 pm       How often do the headaches occur? Mild headaches:   Feb 2020- 5 March 2020- 9  Moderate to severe headaches:   Feb 2020- 14 March 2020- 9  Are you ever headache free? 5 days in Feb and 12 days March     Aura/Warning and how long does it last? None       What time of the day do the headaches start? Mild headaches: any time   Moderate to severe headaches: AM  Wakes up with them     How long do the headaches last?   Mild headaches: 3 hour-all day   Moderate to severe headaches: 4-8 hours     Where is your headache located?    Mild headaches: bilateral occipital region, shoots to frontal and temporal region  Pressure pain behind bilateral eyes   Moderate to severe headaches: bilateral occipital region, shoots to frontal and temporal region  Pressure pain behind bilateral eyes and into maxillary sinuses     Describe your usual headache? Mild headaches: tight band  Moderate to severe headaches: shooting, stabbing, electrical, pressure, throbbing pain     What is the intensity of pain? Mild headaches: 4/10  Moderate to severe headaches: 7-10/10      Associated symptoms:   - Photophobia     Phonophobia      Osmophobia  - Nasal congestion/rhinorrhea    - Stiff or sore neck   - Puffy eyes  - tingling from occipital region radiating towards front  - Insomnia  - Prefer to be in a cool, quiet, dark room     Number of days missed per month because of headaches:  Work (or school) days: Stopped working in 2006  Social or Family activities: misses a lot     What time of the year do headaches occur more frequently? Unsure  Cloudy and rainy weather worsens headaches  Have you had trigger point injection performed and how often? No   Have you had Botox injection performed and how often? Yes, once in 2014  Worsened her tingling so stopped  Have you had epidural injections or transforaminal injections performed? Cervical, lumbar steroid injections      Alternative therapies used in the past for headaches? physical therapy, acupuncture  Have you used CBD or THC for your headaches and how often? No   How many caffeine products to drink a day? 8oz coffee a day   How much water to drink a day? Eight 8oz glasses     Are you current pregnant or planning on getting pregnant? No       Sleep Habit:  Is your sleep restful? Yes     Do you wake up with headaches? Yes      How many hours do you actually sleep? 8-10  What time do you go to bed at night? 11pm  What time do you wake up in am? 9am  How often do you get up at night? Few times      Do you snore while asleep? Yes  Was unable to do sleep study in past due to migraine  Have you been told that you stop breathing during sleeping? No   Do you wake up tired in the morning? Sometimes   Do you take frequent naps during the day? No   Do you have jaw pain? No   Do you grind/clench your teeth at night? No   Do you have restless leg syndrome? No   Do you have nightmare or sleep walk? No      Brain images: Yes,  [facility], last 1 was in 2013  MRI brain:  - No acute ischemia or hemorrhage  Ventricles and sulci are mildly prominent consistent with parenchymal volume loss  Hyperintense T2 signal is noted in the mara, stable from the prior study  Few scattered nonspecific foci of hyperintensity T2 signal is seen in the periventricular subcortical white matter  No evidence of mass        Past Medical History:   Diagnosis Date    Anxiety     Arthritis     Cancer ([facility] [address] [facility] )     Skin    Depression     [facility]     GERD (gastroesophageal reflux disease)     Hyperlipidemia     Hypertension     Migraine        Past Surgical History:   Procedure Laterality Date    ANKLE SURGERY      CARPAL TUNNEL RELEASE Right     ROTATOR CUFF REPAIR         Current Outpatient Medications   Medication Sig Dispense Refill    acetaminophen (TYLENOL) 500 mg tablet Take 500 mg by mouth as needed for mild pain      aspirin (ECOTRIN LOW STRENGTH) 81 mg EC tablet Take 81 mg by mouth daily      B COMPLEX VITAMINS PO Take 1 tablet by mouth daily      bisacodyl (DULCOLAX) 5 mg EC tablet Take 5 mg by mouth as needed      busPIRone (BUSPAR) 15 mg tablet Take 15 mg by mouth 2 (two) times a day      butalbital-acetaminophen-caffeine-codeine (FIORICET WITH CODEINE) -69-30 MG per capsule Take 1 capsule by mouth every 4 (four) hours as needed      calcium carbonate (OS-MIKE) 600 MG tablet Take 600 mg by mouth 2 (two) times a day      Cholecalciferol 50 MCG (2000 UT) CAPS Take 1 capsule by mouth daily      clonazePAM (KlonoPIN) 0 5 mg tablet Take 1 tablet by mouth 3 (three) times a day      docusate sodium (COLACE) 100 mg capsule Take 100 mg by mouth 2 (two) times a day      DULoxetine (CYMBALTA) 30 mg delayed release capsule Take 30 mg by mouth every morning      DULoxetine HCl 60 MG CSDR Take 60 mg by mouth daily at bedtime      Erenumab-aooe 140 MG/ML SOAJ 1 injection subcutaneously use monthly 1 pen 11    esomeprazole (NexIUM) 20 mg capsule Take 20 mg by mouth 2 (two) times a day      Galcanezumab-gnlm 120 MG/ML SOAJ Inject 120 mg under the skin every 30 (thirty) days 1 pen 11    Galcanezumab-gnlm 120 MG/ML SOAJ Inject 240 mg under the skin once for 1 dose 2 pen 0    lamoTRIgine (LaMICtal) 200 MG tablet Take 200 mg by mouth daily      magnesium oxide (MAG-OX) 400 mg Take 1 tablet (400 mg total) by mouth 2 (two) times a day 60 tablet 3    Melatonin 5 MG TABS Take 5 mg by mouth daily at bedtime      metFORMIN (GLUCOPHAGE-XR) 500 mg 24 hr tablet Take 500 mg by mouth 2 (two) times a day      multivitamin (THERAGRAN) TABS Take 1 tablet by mouth daily      naproxen sodium (ALEVE) 220 MG tablet Take 220 mg by mouth as needed      oxyCODONE (ROXICODONE) 10 MG TABS Take 1 tablet by mouth 3 (three) times a day      polyethylene glycol (MIRALAX) 17 g packet Take 17 g by mouth daily at bedtime      potassium chloride (KLOR-CON M20) 20 mEq tablet Take 1 tablet by mouth daily      Riboflavin (VITAMIN B-2) 100 MG TABS 2 in the morning and 2 at bedtime 120 tablet 3    rosuvastatin (CRESTOR) 20 MG tablet Take 20 mg by mouth daily      tiZANidine (ZANAFLEX) 4 mg tablet Take 0 5-1 tablets by mouth daily at bedtime as needed      valsartan-hydrochlorothiazide (DIOVAN-HCT) 160-25 MG per tablet Take 1 tablet by mouth daily       No current facility-administered medications for this visit  No Known Allergies    Review of Systems   Constitutional: Negative  HENT: Negative  Eyes: Negative  Respiratory: Negative  Cardiovascular: Negative  Gastrointestinal: Positive for constipation  Endocrine: Negative  Genitourinary: Negative  Musculoskeletal: Negative  Skin: Negative  Allergic/Immunologic: Negative  Neurological: Positive for headaches  Hematological: Negative  Psychiatric/Behavioral: Positive for dysphoric mood  The patient is nervous/anxious  I spent 34 minutes with the patient during this visit

## 2020-03-30 NOTE — ASSESSMENT & PLAN NOTE
Over-the-counter supplements: to decrease intensity and frequency of migraines  - Magnesium Oxide 400mg a day  If any diarrhea or upset stomach, decrease dose  as tolerated  (oral magnesium oxide may be an effective preventive strategy for people with migraine  Some theories about how it works include the idea that magnesium can help to prevent waves of cortical spreading depression and aura  Magnesium, in theory, also reduces the release of inflammatory or activating chemicals that can cause migraine)  - Vitamin B2 200 mg twice a day  May cause the urine to turn yellow which is normal for B 2 to do and is not a sign that you are dehydrated  (may be an effective preventive medication in some people with migraine)  - neck pain/back pain: Tizanidine 4 mg at bedtime  - Hypertension:   Valsartan 160/hydrochlorothiazide 25 mg 1 tablet daily  - Will start Emgality 2 injections the first month then 1 injection every 30 days  Abortive therapy for headaches:   -  Patient gets Fioricet with codeine 60 capsules every 2 months -  Patient is going to try to slowly wean off of this

## 2020-03-30 NOTE — PATIENT INSTRUCTIONS
Medication overuse headaches:   - Medication overuse headache [facility]) and analgesic overuse can negate the effectiveness of headache preventive measures  Avoid medications with narcotics, barbiturates, or caffeine in them as these can cause rebound headaches after very few doses and can interfere with other headache medicine efficacy  Taking  any acute/abortive over the counter medication or prescription drugs for more than 2-3 days a week can cause medication overuse headache  Preventive therapy for headaches:   -Over-the-counter supplements: to decrease intensity and frequency of migraines  - Magnesium Oxide 400mg a day  If any diarrhea or upset stomach, decrease dose  as tolerated  (oral magnesium oxide may be an effective preventive strategy for people with migraine  Some theories about how it works include the idea that magnesium can help to prevent waves of cortical spreading depression and aura  Magnesium, in theory, also reduces the release of inflammatory or activating chemicals that can cause migraine)  - Vitamin B2 200 mg twice a day  May cause the urine to turn yellow which is normal for B 2 to do and is not a sign that you are dehydrated  (may be an effective preventive medication in some people with migraine)  - neck pain/back pain: Tizanidine 4 mg at bedtime  - Hypertension:   Valsartan 160/hydrochlorothiazide 25 mg 1 tablet daily  - Will start Emgality 2 injections the first month then 1 injection every 30 days  Abortive therapy for headaches:   -  Patient gets Fioricet with codeine 60 capsules every 2 months -  Patient is going to try to slowly wean off of this  Headache management instructions  - When patient has a moderate to severe headache, they should seek rest, initiate relaxation and apply cold compresses to the head  - Maintain regular sleep schedule  Adults need at least 7-8 hours of uninterrupted a night     - Limit over the counter medications such as Tylenol, Ibuprofen, Aleve, Excedrin  (No more than 2- 3 times a week or max 10 a month)  - Maintain headache diary  Free TJ for a smart phone, which can be used is "Migraine krishna"  - Limit caffeine to 1-2 cups 8 to 16 oz a day or less  - Avoid dietary trigger  (aged cheese, peanuts, MSG, aspartame and nitrates)  - Patient is to have regular frequent meals to prevent headache onset  - Please drink at least 64 ounces of water a day to help remain hydrated  Cognitive behavioral therapy (CBT) is a common type of talk therapy (psychotherapy) were you work with a psychotherapist or therapist   CBT helps you become aware of inaccurate or negative thinking so you can view challenging situations more clearly and respond to them in a more effective way  CBT can be a very helpful tool ? either alone or in combination with other therapies ? in treating mental health disorders (depression, post-traumatic stress disorder (PTSD) or an eating disorder) and chronic pain  CBT can be an effective tool to help anyone learn how to better manage stressful life situations and pain  In some cases, CBT is most effective when it's combined with other treatments, such as antidepressants or other medications  You can start yourself by down loading the tj: [employer]      Mindfulness/Meditation for Treating Migraine  Many people believe that stress is a major trigger for their migraine  This is where mindfulness and meditation can come into play, as they have been known to help reduce migraine severity, duration and acute pain medication use  It may also help to relieve stress and anxiety while improving feelings of well being  Biofeedback involves becoming more aware of the changes that occur in the body and learning how to exert control over generally involuntary functions  Biofeedback allows you to see your vitals in real-time and learn how to stabilize them on your own   There is great evidence that biofeedback can reduce the frequency, intensity, and duration of migraine and tension-type headache  When you're stressed, you may notice elevated heart rates, tightened muscles, and sweating  During biofeedback, you can see these changes on a monitor, then a therapist teaches you exercises to help manage these changes  [place] for Treating Migraine  The kind of mind/body therapy that yoga can provide may help create relief from migraine  Keeping up with yoga consistently can reduce headache frequency, intensity and duration, so it's important to practice regularly if you plan to use it as a complementary migraine treatment  However, certain types of yoga such as hot yoga may be uncomfortable for people with migraine  Others, such as restorative yoga, may be tolerable even for a patient with chronic migraine  Raenebossman [place] can also have a similar benefit for patients with migraine  Specifically, it can help improve balance, which can be very useful for those with vestibular symptoms or vestibular migraine  Acupuncture for Treating Migraine  A traditional [facility] medicine, acupuncture is reported to increase the release of serotonin, dopamine and other chemicals that may help to treat chronic pain, and can be helpful in preventing episodic migraine  There are, however, conflicting results on studies in acupuncture as a treatment for migraine  Basic neck exercises for daily use:    - Neck pathology and poor posture, with straightening of the normal cervical lordosis, can cause headaches  Tightening of the neck muscles can irritate the nerves in the occipital region of the head and cause or worsen head pain  Thus neck strengthening and relaxation exercises, can help improve this particular pain  It is importance to have good posture for improving shoulder, neck, and head pain  - Here are some exercises which should take 5 minutes:     1  Standing, drop your head to one side while continuing to look ahead  Hold for 10 seconds then swap sides   Repeat twice more each side  To increase the stretch, drop the opposite shoulder  2  Standing again, lower your chin to your chest, hold for 10 and then look up to the ceiling and hold for 10  Repeat twice more  3  Next, standing straight again, look over your right shoulder and hold firm for 10 seconds, then over your left shoulder for 10  Repeat this 3 times  4  Finally, while sitting upright, bring your head forward and hold for 10, then all the way back and hold for 10  If this simple exercise does not help improve the posture, we will consider formal physical therapy in the future  Importance of Healthy Sleep:  Behavioral sleep changes can promote restful, regular sleep and reduce headache  Simple changes like establishing consistent sleep and wake-up times, as well as getting between 7 and 8 hours of sleep a day, can make a world of difference  Experts also recommend avoiding substances that impair sleep, like caffeine, nicotine and alcohol, and also suggest winding down before bed to prevent sleep problems  To read more go to https://americanmigrainefoundation  org/resource-library/sleep/    Exercising for migraineurs:  Regular exercise can reduce the frequency and intensity of headaches and migraines  When one exercises, the body releases endorphins, which are the bodys natural painkillers  Exercise reduces stress and helps individuals to sleep at night  Exercising at least 30 to 40 minutes 3 times a week is sufficient for most patients  When exercising, follow this plan to prevent headaches:  - First, stay hydrated before, during, and after exercise  - Second part of the exercise plan is to eat sufficient food about an hour and a half before you exercise  Exercise causes ones blood sugar level to decrease, and it is important to have a source of energy    - Final part of the exercise plan is to warm-up  Do not jump into sudden, vigorous exercise if that triggers a headache or migraine  To read more go to https://americanmigrainefoundation  org/resource-library/effects-of-exercise-on-headaches-and-migraines/     Please call with any questions or concerns   Office number is [phone]

## 2020-04-01 ENCOUNTER — TELEPHONE (OUTPATIENT)
Dept: NEUROLOGY | Facility: CLINIC | Age: 65
End: 2020-04-01

## 2020-04-22 ENCOUNTER — TELEPHONE (OUTPATIENT)
Dept: NEUROLOGY | Facility: CLINIC | Age: 65
End: 2020-04-22

## 2020-04-28 DIAGNOSIS — G43.709 CHRONIC MIGRAINE WITHOUT AURA WITHOUT STATUS MIGRAINOSUS, NOT INTRACTABLE: ICD-10-CM

## 2020-04-28 RX ORDER — MAGNESIUM OXIDE 400 MG/1
TABLET ORAL
Qty: 180 TABLET | Refills: 1 | Status: SHIPPED | OUTPATIENT
Start: 2020-04-28 | End: 2020-10-16 | Stop reason: SDUPTHER

## 2020-05-14 ENCOUNTER — HOSPITAL ENCOUNTER (OUTPATIENT)
Dept: MRI IMAGING | Facility: HOSPITAL | Age: 65
Discharge: HOME | End: 2020-05-14
Attending: PSYCHIATRY & NEUROLOGY
Payer: MEDICARE

## 2020-05-14 DIAGNOSIS — G43.709 CHRONIC MIGRAINE WITHOUT AURA WITHOUT STATUS MIGRAINOSUS, NOT INTRACTABLE: ICD-10-CM

## 2020-05-14 PROCEDURE — A9585 GADOBUTROL INJECTION: HCPCS | Performed by: PSYCHIATRY & NEUROLOGY

## 2020-05-14 PROCEDURE — 70553 MRI BRAIN STEM W/O & W/DYE: CPT

## 2020-05-14 RX ADMIN — GADOBUTROL 8 ML: 604.72 INJECTION INTRAVENOUS at 15:51

## 2020-05-27 ENCOUNTER — PROCEDURE VISIT (OUTPATIENT)
Dept: NEUROLOGY | Facility: CLINIC | Age: 65
End: 2020-05-27
Payer: MEDICARE

## 2020-05-27 VITALS — DIASTOLIC BLOOD PRESSURE: 62 MMHG | TEMPERATURE: 97.2 F | HEART RATE: 83 BPM | SYSTOLIC BLOOD PRESSURE: 127 MMHG

## 2020-05-27 DIAGNOSIS — G24.3 CERVICAL DYSTONIA: Primary | ICD-10-CM

## 2020-05-27 PROCEDURE — 64616 CHEMODENERV MUSC NECK DYSTON: CPT | Performed by: PSYCHIATRY & NEUROLOGY

## 2020-06-22 ENCOUNTER — TELEPHONE (OUTPATIENT)
Dept: NEUROLOGY | Facility: CLINIC | Age: 65
End: 2020-06-22

## 2020-07-17 ENCOUNTER — DOCUMENTATION (OUTPATIENT)
Dept: NEUROLOGY | Facility: CLINIC | Age: 65
End: 2020-07-17

## 2020-07-17 NOTE — PROGRESS NOTES
Type Date User Summary Attachment   General 07/16/2020  3:08 PM Burnie Shauna care coordination  -   Note    Botox- no authorization needed   Please use our stock      Thank you,     Peyton Roberson

## 2020-07-17 NOTE — PROGRESS NOTES
Patient is scheduled with Dr Desean Chang on 8/26/2020 in the [facility] location

## 2020-08-03 LAB — HBA1C MFR BLD HPLC: 6 %

## 2020-08-26 ENCOUNTER — PROCEDURE VISIT (OUTPATIENT)
Dept: NEUROLOGY | Facility: CLINIC | Age: 65
End: 2020-08-26
Payer: MEDICARE

## 2020-08-26 VITALS — DIASTOLIC BLOOD PRESSURE: 72 MMHG | SYSTOLIC BLOOD PRESSURE: 116 MMHG | HEART RATE: 81 BPM | TEMPERATURE: 97.2 F

## 2020-08-26 DIAGNOSIS — G43.709 CHRONIC MIGRAINE WITHOUT AURA WITHOUT STATUS MIGRAINOSUS, NOT INTRACTABLE: ICD-10-CM

## 2020-08-26 DIAGNOSIS — G24.3 CERVICAL DYSTONIA: Primary | ICD-10-CM

## 2020-08-26 PROCEDURE — 64616 CHEMODENERV MUSC NECK DYSTON: CPT | Performed by: PSYCHIATRY & NEUROLOGY

## 2020-08-26 RX ORDER — RIMEGEPANT SULFATE 75 MG/75MG
TABLET, ORALLY DISINTEGRATING ORAL
Qty: 8 TABLET | Refills: 0 | Status: SHIPPED | OUTPATIENT
Start: 2020-08-26 | End: 2020-10-16 | Stop reason: SDUPTHER

## 2020-08-26 NOTE — PROGRESS NOTES
Chemodenervation    Date/Time: 8/26/2020 2:23 PM  Performed by: Eric Bourgeois MD  Authorized by: Eric Bourgeois MD     Pre-procedure details:     Preparation: Patient was prepped and draped in usual sterile fashion      Prepped With: Alcohol    Anesthesia (see MAR for exact dosages): Anesthesia method:  None  Procedure details:     Position:  Supine  Botox:     Botox Type:  Type A    Brand:  Botox    mL's of Botulinum Toxin:  200    Needle Gauge:  30 G 2 5 inch  Total Units:     Total units used:  200    Total units discarded:  0  Post-procedure details:     Chemodenervation:  Neck, excluding muscles of the larynx    Neck Muscle Location[de-identified]  Bilateral neck muscle    Patient tolerance of procedure:   Tolerated well, no immediate complications      Botox Procedures: cervical dystonia    Indications: spasmodic torticollis       Injection Location:     Head / Face:  R superior cervical paraspinal, L , L frontalis, R frontalis, R , L inferior occipitalis, R inferior occipitalis, L temporalis, R temporalis, L superior trapezius, R superior trapezius, procerus and L superior cervical paraspinal, L sternocleidomastoid, L splenius cervicis, L semispinalis capitis and L horizontal trapezius       L  injection amount:  5 unit(s)    R  injection amount:  5 unit(s)    Procerus injection amount:  5 unit(s)      Left orbicularis oculi:  2 5 units   Right orbicularis oculi:  2 5 units       L lateral frontalis:  5 unit(s)    R lateral frontalis:  5 unit(s)    L medial frontalis:  5 unit(s)    R medial frontalis:  5 unit(s)       L temporalis injection amount:  20 unit(s)    R temporalis injection amount:  20 unit(s)        L inferior occipitalis injection amount:  15 unit(s)    R inferior occipitalis injection amount:  15 unit(s)       L superior cervical paraspinal injection amount:  10 unit(s)    R superior cervical paraspinal injection amount:  10 unit(s)       L superior trapezius injection amount:  15 unit(s)    R superior trapezius injection amount:  15 unit(s)        L sternocleidomastoid injection amount:  10 unit(s)    R sternocleidomastoid injection amount:  10 unit(s)       L splenius cervicis injection amount:  10 unit(s)    R splenius cervicis injection amount:  10 unit(s)     Total units used:  200 units  Total units wasted:  0 units     Post-procedure details:   Patient tolerance of procedure:  Tolerated well, no immediate complications

## 2020-10-02 ENCOUNTER — TELEPHONE (OUTPATIENT)
Dept: NEUROLOGY | Facility: CLINIC | Age: 65
End: 2020-10-02

## 2020-10-16 ENCOUNTER — TRANSCRIBE ORDERS (OUTPATIENT)
Dept: RADIOLOGY | Facility: CLINIC | Age: 65
End: 2020-10-16

## 2020-10-16 ENCOUNTER — APPOINTMENT (OUTPATIENT)
Dept: RADIOLOGY | Facility: CLINIC | Age: 65
End: 2020-10-16
Payer: MEDICARE

## 2020-10-16 ENCOUNTER — OFFICE VISIT (OUTPATIENT)
Dept: NEUROLOGY | Facility: CLINIC | Age: 65
End: 2020-10-16
Payer: MEDICARE

## 2020-10-16 VITALS
HEART RATE: 78 BPM | WEIGHT: 172 LBS | SYSTOLIC BLOOD PRESSURE: 125 MMHG | HEIGHT: 65 IN | DIASTOLIC BLOOD PRESSURE: 60 MMHG | BODY MASS INDEX: 28.66 KG/M2 | TEMPERATURE: 97.2 F

## 2020-10-16 DIAGNOSIS — G43.709 CHRONIC MIGRAINE WITHOUT AURA WITHOUT STATUS MIGRAINOSUS, NOT INTRACTABLE: ICD-10-CM

## 2020-10-16 DIAGNOSIS — M17.12 OSTEOARTHRITIS OF LEFT KNEE, UNSPECIFIED OSTEOARTHRITIS TYPE: ICD-10-CM

## 2020-10-16 DIAGNOSIS — M17.12 OSTEOARTHRITIS OF LEFT KNEE, UNSPECIFIED OSTEOARTHRITIS TYPE: Primary | ICD-10-CM

## 2020-10-16 PROCEDURE — 73562 X-RAY EXAM OF KNEE 3: CPT

## 2020-10-16 PROCEDURE — 99214 OFFICE O/P EST MOD 30 MIN: CPT | Performed by: PSYCHIATRY & NEUROLOGY

## 2020-10-16 RX ORDER — MAGNESIUM OXIDE 400 MG/1
1 TABLET ORAL 2 TIMES DAILY
Qty: 180 TABLET | Refills: 1 | Status: SHIPPED | OUTPATIENT
Start: 2020-10-16 | End: 2020-10-21

## 2020-10-16 RX ORDER — RIMEGEPANT SULFATE 75 MG/75MG
TABLET, ORALLY DISINTEGRATING ORAL
Qty: 8 TABLET | Refills: 0 | Status: SHIPPED | OUTPATIENT
Start: 2020-10-16 | End: 2020-12-04 | Stop reason: SDUPTHER

## 2020-10-19 DIAGNOSIS — G43.709 CHRONIC MIGRAINE WITHOUT AURA WITHOUT STATUS MIGRAINOSUS, NOT INTRACTABLE: ICD-10-CM

## 2020-10-21 RX ORDER — MAGNESIUM OXIDE 400 MG/1
TABLET ORAL
Qty: 180 TABLET | Refills: 1 | Status: SHIPPED | OUTPATIENT
Start: 2020-10-21 | End: 2021-07-21

## 2020-10-23 ENCOUNTER — DOCUMENTATION (OUTPATIENT)
Dept: NEUROLOGY | Facility: CLINIC | Age: 65
End: 2020-10-23

## 2020-10-27 ENCOUNTER — TELEPHONE (OUTPATIENT)
Dept: NEUROLOGY | Facility: CLINIC | Age: 65
End: 2020-10-27

## 2020-11-25 ENCOUNTER — TELEPHONE (OUTPATIENT)
Dept: NEUROLOGY | Facility: CLINIC | Age: 65
End: 2020-11-25

## 2020-12-04 DIAGNOSIS — G43.709 CHRONIC MIGRAINE WITHOUT AURA WITHOUT STATUS MIGRAINOSUS, NOT INTRACTABLE: ICD-10-CM

## 2020-12-07 ENCOUNTER — TELEPHONE (OUTPATIENT)
Dept: NEUROLOGY | Facility: CLINIC | Age: 65
End: 2020-12-07

## 2020-12-07 RX ORDER — RIMEGEPANT SULFATE 75 MG/75MG
TABLET, ORALLY DISINTEGRATING ORAL
Qty: 8 TABLET | Refills: 4 | Status: SHIPPED | OUTPATIENT
Start: 2020-12-07 | End: 2021-06-01

## 2020-12-09 ENCOUNTER — PROCEDURE VISIT (OUTPATIENT)
Dept: NEUROLOGY | Facility: CLINIC | Age: 65
End: 2020-12-09
Payer: MEDICARE

## 2020-12-09 VITALS — SYSTOLIC BLOOD PRESSURE: 133 MMHG | DIASTOLIC BLOOD PRESSURE: 75 MMHG | HEART RATE: 81 BPM | TEMPERATURE: 97.5 F

## 2020-12-09 DIAGNOSIS — G24.3 CERVICAL DYSTONIA: Primary | ICD-10-CM

## 2020-12-09 DIAGNOSIS — G43.709 CHRONIC MIGRAINE WITHOUT AURA WITHOUT STATUS MIGRAINOSUS, NOT INTRACTABLE: ICD-10-CM

## 2020-12-09 PROCEDURE — 64616 CHEMODENERV MUSC NECK DYSTON: CPT | Performed by: PSYCHIATRY & NEUROLOGY

## 2021-01-11 ENCOUNTER — TRANSCRIBE ORDERS (OUTPATIENT)
Dept: ADMINISTRATIVE | Facility: HOSPITAL | Age: 66
End: 2021-01-11

## 2021-01-11 ENCOUNTER — HOSPITAL ENCOUNTER (OUTPATIENT)
Dept: RADIOLOGY | Facility: HOSPITAL | Age: 66
Discharge: HOME | End: 2021-01-11
Payer: MEDICARE

## 2021-01-11 DIAGNOSIS — M16.11 UNILATERAL PRIMARY OSTEOARTHRITIS, RIGHT HIP: Primary | ICD-10-CM

## 2021-01-11 DIAGNOSIS — M16.11 UNILATERAL PRIMARY OSTEOARTHRITIS, RIGHT HIP: ICD-10-CM

## 2021-01-11 PROCEDURE — 73502 X-RAY EXAM HIP UNI 2-3 VIEWS: CPT

## 2021-01-22 ENCOUNTER — DOCUMENTATION (OUTPATIENT)
Dept: NEUROLOGY | Facility: CLINIC | Age: 66
End: 2021-01-22

## 2021-01-22 NOTE — PROGRESS NOTES
Type Date User Summary Attachment   General 01/21/2021  3:26 PM Asya Hill care coordination  -   Note    Botox- no authorization needed   Please use our stock      Thank you     Yun Meza

## 2021-02-24 ENCOUNTER — TELEPHONE (OUTPATIENT)
Dept: NEUROLOGY | Facility: CLINIC | Age: 66
End: 2021-02-24

## 2021-02-24 NOTE — TELEPHONE ENCOUNTER
Called pt to confirm upcoming apt in 2 weeks on 3/10/21 with Dr Evelyn ALYM on patients cell to call office    if any new/worsening symptoms to report? Asked pt if they are unable to keep apt or interested in virtual apt to please call office, also advised of no show fee  Advised we will call closer to day of apt for COVID screening

## 2021-03-05 ENCOUNTER — TELEPHONE (OUTPATIENT)
Dept: NEUROLOGY | Facility: CLINIC | Age: 66
End: 2021-03-05

## 2021-03-05 NOTE — TELEPHONE ENCOUNTER
Patient called in requesting to cancel her 03/10/21 Botox appointment due to another appointment her  is going to have with his oncologist  I will find a spot to reschedule the patient and give a call back

## 2021-03-18 ENCOUNTER — TELEPHONE (OUTPATIENT)
Dept: NEUROLOGY | Facility: CLINIC | Age: 66
End: 2021-03-18

## 2021-03-18 ENCOUNTER — PROCEDURE VISIT (OUTPATIENT)
Dept: NEUROLOGY | Facility: CLINIC | Age: 66
End: 2021-03-18
Payer: MEDICARE

## 2021-03-18 VITALS — TEMPERATURE: 97.4 F | DIASTOLIC BLOOD PRESSURE: 77 MMHG | HEART RATE: 88 BPM | SYSTOLIC BLOOD PRESSURE: 143 MMHG

## 2021-03-18 DIAGNOSIS — G24.3 CERVICAL DYSTONIA: Primary | ICD-10-CM

## 2021-03-18 PROCEDURE — 64616 CHEMODENERV MUSC NECK DYSTON: CPT | Performed by: PHYSICIAN ASSISTANT

## 2021-03-18 NOTE — TELEPHONE ENCOUNTER
At check our Ezra Atkinson wanted to be sure we were aware her authorization for her Nurtec must be taken care of by [id number] for refills

## 2021-03-18 NOTE — TELEPHONE ENCOUNTER
Patient called and left a message to schedule Botox appointment attaching Tiajuana Tommy please return patient's call

## 2021-03-18 NOTE — PROGRESS NOTES
Universal Protocol   Consent: Verbal consent obtained  Written consent obtained  Risks and benefits: risks, benefits and alternatives were discussed  Consent given by: patient  Time out: Immediately prior to procedure a "time out" was called to verify the correct patient, procedure, equipment, support staff and site/side marked as required  Patient understanding: patient states understanding of the procedure being performed  Patient consent: the patient's understanding of the procedure matches consent given  Procedure consent: procedure consent matches procedure scheduled  Relevant documents: relevant documents present and verified  Patient identity confirmed: verbally with patient        Chemodenervation     Date/Time 3/18/2021 1:23 PM     Performed by  Loraine Domingo PA-C     Authorized by Loraine Domingo PA-C        Pre-procedure details      Preparation: Patient was prepped and draped in usual sterile fashion      Prepped With: Alcohol     Anesthesia  (see MAR for exact dosages):      Anesthesia method:  None   Procedure details     Position:  Upright   Botox     Brand:  Botox    mL's of Botulinum Toxin:  200    Final Concentration per CC:  50 units    Needle Gauge:  30 G 2 5 inch   Procedures     Botox Procedures: cervical dystonia and chronic headache      Indications: spasmodic torticollis and migraines     Injection Location      Head / Face:  R superior cervical paraspinal, L superior cervical paraspinal, L , R , L medial occipitalis, R medial occipitalis, procerus, R temporalis, L temporalis, R orbicularis oculi and L orbicularis oculi    L  injection amount:  5 unit(s)    R  injection amount:  5 unit(s)    L temporalis injection amount:  20 unit(s)    R temporalis injection amount:  20 unit(s)    Procerus injection amount:  5 unit(s)    L orbicularis oculi injection amount:  2 5 unit(s)    R orbicularis oculi injection amount:  2 5 unit(s)    L medial occipitalis injection amount:  15 unit(s)    R medial occipitalis injection amount:  15 unit(s)    L superior cervical paraspinal injection amount:  10 unit(s)    R superior cervical paraspinal injection amount:  10 unit(s)    Cervical Dystonia / Salivary Gland:  L sternocleidomastoid, R sternocleidomastoid, L splenius cervicis and R splenius cervicis      L splenius cervicis injection amount:  10 unit(s)      R splenius cervicis injection amount:  10 unit(s)      L sternocleidomastoid injection amount:  10 unit(s)      R sternocleidomastoid injection amount:  10 unit(s)   Total Units     Total units used:  200    Total units discarded:  0   Post-procedure details      Chemodenervation:  Neck, excluding muscles of the larynx    Neck Muscle Location[de-identified]  Bilateral neck muscle    Patient tolerance of procedure:   Tolerated well, no immediate complications   Comments      All medically necessary

## 2021-05-10 ENCOUNTER — DOCUMENTATION (OUTPATIENT)
Dept: NEUROLOGY | Facility: CLINIC | Age: 66
End: 2021-05-10

## 2021-05-10 NOTE — PROGRESS NOTES
Type Date User Summary Attachment   General 05/10/2021 10:30 AM Michael Shauna care coordination  -   Note    Botox- no authorization needed   Please use our stock      Thank you,     Peyton Roberson

## 2021-05-31 DIAGNOSIS — G43.709 CHRONIC MIGRAINE WITHOUT AURA WITHOUT STATUS MIGRAINOSUS, NOT INTRACTABLE: ICD-10-CM

## 2021-06-01 RX ORDER — RIMEGEPANT SULFATE 75 MG/75MG
TABLET, ORALLY DISINTEGRATING ORAL
Qty: 8 TABLET | Refills: 4 | Status: SHIPPED | OUTPATIENT
Start: 2021-06-01

## 2021-07-21 DIAGNOSIS — G43.709 CHRONIC MIGRAINE WITHOUT AURA WITHOUT STATUS MIGRAINOSUS, NOT INTRACTABLE: ICD-10-CM

## 2021-07-21 RX ORDER — MAGNESIUM OXIDE 400 MG/1
TABLET ORAL
Qty: 180 TABLET | Refills: 1 | Status: SHIPPED | OUTPATIENT
Start: 2021-07-21

## 2021-07-27 ENCOUNTER — TELEPHONE (OUTPATIENT)
Dept: NEUROLOGY | Facility: CLINIC | Age: 66
End: 2021-07-27

## 2021-07-27 NOTE — TELEPHONE ENCOUNTER
Pt called and states that she missed june botox appt and is trying to rescheduling   Please contact her at [phone]  She will not be available between 1pm and 2pm today

## 2021-07-28 NOTE — TELEPHONE ENCOUNTER
Patient called back and I scheduled her Botox for this Friday 07/30/21 at 11:15am in the  with Keny Torres,   Please check that the referral is still attached to the appointment  Ruchi Regan,     [id number] [address]      Thank you

## 2021-07-30 ENCOUNTER — PROCEDURE VISIT (OUTPATIENT)
Dept: NEUROLOGY | Facility: CLINIC | Age: 66
End: 2021-07-30
Payer: MEDICARE

## 2021-07-30 VITALS — SYSTOLIC BLOOD PRESSURE: 123 MMHG | HEART RATE: 74 BPM | DIASTOLIC BLOOD PRESSURE: 66 MMHG | TEMPERATURE: 98 F

## 2021-07-30 DIAGNOSIS — G24.3 CERVICAL DYSTONIA: Primary | ICD-10-CM

## 2021-07-30 PROCEDURE — 64616 CHEMODENERV MUSC NECK DYSTON: CPT | Performed by: PHYSICIAN ASSISTANT

## 2021-07-30 NOTE — PROGRESS NOTES
Universal Protocol   Consent: Verbal consent obtained  Written consent obtained  Risks and benefits: risks, benefits and alternatives were discussed  Consent given by: patient  Time out: Immediately prior to procedure a "time out" was called to verify the correct patient, procedure, equipment, support staff and site/side marked as required  Patient understanding: patient states understanding of the procedure being performed  Patient consent: the patient's understanding of the procedure matches consent given  Procedure consent: procedure consent matches procedure scheduled  Relevant documents: relevant documents present and verified  Patient identity confirmed: verbally with patient        Chemodenervation     Date/Time 7/30/2021 11:22 AM     Performed by  Foster Arauz PA-C     Authorized by Foster Arauz PA-C        Pre-procedure details      Preparation: Patient was prepped and draped in usual sterile fashion      Prepped With: Alcohol     Anesthesia  (see MAR for exact dosages):      Anesthesia method:  None   Procedure details     Position:  Upright   Botox     Brand:  Botox    mL's of Botulinum Toxin:  200    Final Concentration per CC:  50 units    Needle Gauge:  30 G 2 5 inch   Procedures     Botox Procedures: cervical dystonia and chronic headache      Indications: spasmodic torticollis and migraines     Injection Location      Head / Face:  R superior cervical paraspinal, L superior cervical paraspinal, L , R , L medial occipitalis, R medial occipitalis, procerus, R temporalis, L temporalis, R frontalis and L frontalis    L  injection amount:  5 unit(s)    R  injection amount:  5 unit(s)    L lateral frontalis:  5 unit(s)    R lateral frontalis:  5 unit(s)    L medial frontalis:  5 unit(s)    R medial frontalis:  5 unit(s)    L temporalis injection amount:  20 unit(s)    R temporalis injection amount:  20 unit(s)    Procerus injection amount:  5 unit(s)    L medial occipitalis injection amount:  15 unit(s)    R medial occipitalis injection amount:  15 unit(s)    L superior cervical paraspinal injection amount:  10 unit(s)    R superior cervical paraspinal injection amount:  10 unit(s)    Cervical Dystonia / Salivary Gland:  L sternocleidomastoid, R sternocleidomastoid, R splenius cervicis and L splenius cervicis      L splenius cervicis injection amount:  10 unit(s)      R splenius cervicis injection amount:  10 unit(s)      L sternocleidomastoid injection amount:  10 unit(s)      R sternocleidomastoid injection amount:  10 unit(s)   Total Units     Total units used:  200    Total units discarded:  0   Post-procedure details      Chemodenervation:  Neck, excluding muscles of the larynx    Neck Muscle Location[de-identified]  Bilateral neck muscle    Patient tolerance of procedure:   Tolerated well, no immediate complications   Comments      2 5 units orbicularis oculi bilaterally  All medically necessary

## 2021-09-29 ENCOUNTER — APPOINTMENT (EMERGENCY)
Dept: RADIOLOGY | Facility: HOSPITAL | Age: 66
End: 2021-09-29
Payer: MEDICARE

## 2021-09-29 ENCOUNTER — APPOINTMENT (EMERGENCY)
Dept: CT IMAGING | Facility: HOSPITAL | Age: 66
End: 2021-09-29
Payer: MEDICARE

## 2021-09-29 ENCOUNTER — HOSPITAL ENCOUNTER (EMERGENCY)
Facility: HOSPITAL | Age: 66
Discharge: HOME | End: 2021-09-29
Attending: EMERGENCY MEDICINE | Admitting: EMERGENCY MEDICINE
Payer: MEDICARE

## 2021-09-29 VITALS
WEIGHT: 175.04 LBS | DIASTOLIC BLOOD PRESSURE: 59 MMHG | OXYGEN SATURATION: 98 % | HEART RATE: 90 BPM | SYSTOLIC BLOOD PRESSURE: 127 MMHG | RESPIRATION RATE: 20 BRPM | TEMPERATURE: 98.2 F | BODY MASS INDEX: 29.13 KG/M2

## 2021-09-29 DIAGNOSIS — E87.6 HYPOKALEMIA: ICD-10-CM

## 2021-09-29 DIAGNOSIS — S00.511A LIP ABRASION, INITIAL ENCOUNTER: ICD-10-CM

## 2021-09-29 DIAGNOSIS — S01.511A LIP LACERATION, INITIAL ENCOUNTER: ICD-10-CM

## 2021-09-29 DIAGNOSIS — M25.561 ACUTE PAIN OF RIGHT KNEE: ICD-10-CM

## 2021-09-29 DIAGNOSIS — T14.8XXA TRAUMATIC ECCHYMOSIS OF MULTIPLE SITES: ICD-10-CM
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
## 2021-09-29 LAB
ANION GAP SERPL CALCULATED.3IONS-SCNC: 5 MMOL/L (ref 4–13)
BASOPHILS # BLD AUTO: 0.06 THOUSANDS/ΜL (ref 0–0.1)
BASOPHILS NFR BLD AUTO: 1 % (ref 0–1)
BUN SERPL-MCNC: 15 MG/DL (ref 5–25)
CALCIUM SERPL-MCNC: 9.5 MG/DL (ref 8.3–10.1)
CHLORIDE SERPL-SCNC: 95 MMOL/L (ref 100–108)
CO2 SERPL-SCNC: 34 MMOL/L (ref 21–32)
CREAT SERPL-MCNC: 0.78 MG/DL (ref 0.6–1.3)
EOSINOPHIL # BLD AUTO: 0.19 THOUSAND/ΜL (ref 0–0.61)
EOSINOPHIL NFR BLD AUTO: 2 % (ref 0–6)
ERYTHROCYTE [DISTWIDTH] IN BLOOD BY AUTOMATED COUNT: 13.2 % (ref 11.6–15.1)
GFR SERPL CREATININE-BSD FRML MDRD: 79 ML/MIN/1.73SQ M
GLUCOSE SERPL-MCNC: 105 MG/DL (ref 65–140)
HCT VFR BLD AUTO: 42.9 % (ref 34.8–46.1)
HGB BLD-MCNC: 14.2 G/DL (ref 11.5–15.4)
IMM GRANULOCYTES # BLD AUTO: 0.03 THOUSAND/UL (ref 0–0.2)
IMM GRANULOCYTES NFR BLD AUTO: 0 % (ref 0–2)
LYMPHOCYTES # BLD AUTO: 2.19 THOUSANDS/ΜL (ref 0.6–4.47)
LYMPHOCYTES NFR BLD AUTO: 27 % (ref 14–44)
MCH RBC QN AUTO: 29.2 PG (ref 26.8–34.3)
MCHC RBC AUTO-ENTMCNC: 33.1 G/DL (ref 31.4–37.4)
MCV RBC AUTO: 88 FL (ref 82–98)
MONOCYTES # BLD AUTO: 0.96 THOUSAND/ΜL (ref 0.17–1.22)
MONOCYTES NFR BLD AUTO: 12 % (ref 4–12)
NEUTROPHILS # BLD AUTO: 4.75 THOUSANDS/ΜL (ref 1.85–7.62)
NEUTS SEG NFR BLD AUTO: 58 % (ref 43–75)
NRBC BLD AUTO-RTO: 0 /100 WBCS
PLATELET # BLD AUTO: 276 THOUSANDS/UL (ref 149–390)
PMV BLD AUTO: 9.1 FL (ref 8.9–12.7)
POTASSIUM SERPL-SCNC: 2.9 MMOL/L (ref 3.5–5.3)
RBC # BLD AUTO: 4.87 MILLION/UL (ref 3.81–5.12)
SODIUM SERPL-SCNC: 134 MMOL/L (ref 136–145)
WBC # BLD AUTO: 8.18 THOUSAND/UL (ref 4.31–10.16)

## 2021-09-29 PROCEDURE — 85025 COMPLETE CBC W/AUTO DIFF WBC: CPT | Performed by: EMERGENCY MEDICINE

## 2021-09-29 PROCEDURE — 96365 THER/PROPH/DIAG IV INF INIT: CPT

## 2021-09-29 PROCEDURE — 12011 RPR F/E/E/N/L/M 2.5 CM/<: CPT | Performed by: EMERGENCY MEDICINE

## 2021-09-29 PROCEDURE — 99285 EMERGENCY DEPT VISIT HI MDM: CPT

## 2021-09-29 PROCEDURE — 99285 EMERGENCY DEPT VISIT HI MDM: CPT | Performed by: EMERGENCY MEDICINE

## 2021-09-29 PROCEDURE — 80048 BASIC METABOLIC PNL TOTAL CA: CPT | Performed by: EMERGENCY MEDICINE

## 2021-09-29 PROCEDURE — 36415 COLL VENOUS BLD VENIPUNCTURE: CPT | Performed by: EMERGENCY MEDICINE

## 2021-09-29 PROCEDURE — 70486 CT MAXILLOFACIAL W/O DYE: CPT

## 2021-09-29 PROCEDURE — 71260 CT THORAX DX C+: CPT

## 2021-09-29 PROCEDURE — 73564 X-RAY EXAM KNEE 4 OR MORE: CPT

## 2021-09-29 PROCEDURE — 74177 CT ABD & PELVIS W/CONTRAST: CPT

## 2021-09-29 PROCEDURE — 96368 THER/DIAG CONCURRENT INF: CPT

## 2021-09-29 PROCEDURE — 70450 CT HEAD/BRAIN W/O DYE: CPT

## 2021-09-29 PROCEDURE — 71045 X-RAY EXAM CHEST 1 VIEW: CPT

## 2021-09-29 PROCEDURE — 72125 CT NECK SPINE W/O DYE: CPT

## 2021-09-29 PROCEDURE — 96366 THER/PROPH/DIAG IV INF ADDON: CPT

## 2021-09-29 PROCEDURE — 93005 ELECTROCARDIOGRAM TRACING: CPT

## 2021-09-29 RX ORDER — POTASSIUM CHLORIDE 20 MEQ/1
40 TABLET, EXTENDED RELEASE ORAL ONCE
Status: COMPLETED | OUTPATIENT
Start: 2021-09-29 | End: 2021-09-29

## 2021-09-29 RX ORDER — POTASSIUM CHLORIDE 14.9 MG/ML
20 INJECTION INTRAVENOUS ONCE
Status: COMPLETED | OUTPATIENT
Start: 2021-09-29 | End: 2021-09-29

## 2021-09-29 RX ORDER — LIDOCAINE HYDROCHLORIDE AND EPINEPHRINE 10; 10 MG/ML; UG/ML
5 INJECTION, SOLUTION INFILTRATION; PERINEURAL ONCE
Status: COMPLETED | OUTPATIENT
Start: 2021-09-29 | End: 2021-09-29

## 2021-09-29 RX ORDER — POTASSIUM CHLORIDE 14.9 MG/ML
20 INJECTION INTRAVENOUS ONCE
Status: DISCONTINUED | OUTPATIENT
Start: 2021-09-29 | End: 2021-09-29

## 2021-09-29 RX ORDER — POTASSIUM CHLORIDE 29.8 MG/ML
40 INJECTION INTRAVENOUS ONCE
Status: DISCONTINUED | OUTPATIENT
Start: 2021-09-29 | End: 2021-09-29

## 2021-09-29 RX ORDER — MAGNESIUM SULFATE HEPTAHYDRATE 40 MG/ML
2 INJECTION, SOLUTION INTRAVENOUS ONCE
Status: COMPLETED | OUTPATIENT
Start: 2021-09-29 | End: 2021-09-29

## 2021-09-29 RX ORDER — LIDOCAINE HYDROCHLORIDE 20 MG/ML
1 JELLY TOPICAL ONCE
Status: COMPLETED | OUTPATIENT
Start: 2021-09-29 | End: 2021-09-29

## 2021-09-29 RX ORDER — ACETAMINOPHEN 325 MG/1
975 TABLET ORAL ONCE
Status: COMPLETED | OUTPATIENT
Start: 2021-09-29 | End: 2021-09-29

## 2021-09-29 RX ADMIN — LIDOCAINE HYDROCHLORIDE,EPINEPHRINE BITARTRATE 5 ML: 10; .01 INJECTION, SOLUTION INFILTRATION; PERINEURAL at 20:35

## 2021-09-29 RX ADMIN — ACETAMINOPHEN 975 MG: 325 TABLET, FILM COATED ORAL at 21:41

## 2021-09-29 RX ADMIN — IOHEXOL 100 ML: 350 INJECTION, SOLUTION INTRAVENOUS at 20:25

## 2021-09-29 RX ADMIN — LIDOCAINE HYDROCHLORIDE 1 APPLICATION: 20 JELLY TOPICAL at 21:09

## 2021-09-29 RX ADMIN — MAGNESIUM SULFATE HEPTAHYDRATE 2 G: 40 INJECTION, SOLUTION INTRAVENOUS at 20:35

## 2021-09-29 RX ADMIN — POTASSIUM CHLORIDE 40 MEQ: 1500 TABLET, EXTENDED RELEASE ORAL at 20:35

## 2021-09-29 RX ADMIN — POTASSIUM CHLORIDE 20 MEQ: 14.9 INJECTION, SOLUTION INTRAVENOUS at 20:35

## 2021-09-30 LAB
ATRIAL RATE: 91 BPM
P AXIS: 68 DEGREES
PR INTERVAL: 146 MS
QRS AXIS: 59 DEGREES
QRSD INTERVAL: 78 MS
QT INTERVAL: 394 MS
QTC INTERVAL: 484 MS
T WAVE AXIS: 70 DEGREES
VENTRICULAR RATE: 91 BPM

## 2021-09-30 PROCEDURE — 93010 ELECTROCARDIOGRAM REPORT: CPT | Performed by: INTERNAL MEDICINE

## 2021-10-25 ENCOUNTER — OFFICE VISIT (OUTPATIENT)
Dept: NEUROLOGY | Facility: CLINIC | Age: 66
End: 2021-10-25
Payer: MEDICARE

## 2021-10-25 VITALS
SYSTOLIC BLOOD PRESSURE: 128 MMHG | DIASTOLIC BLOOD PRESSURE: 78 MMHG | BODY MASS INDEX: 30.01 KG/M2 | HEIGHT: 65 IN | WEIGHT: 180.1 LBS | HEART RATE: 82 BPM

## 2021-10-25 DIAGNOSIS — G43.709 CHRONIC MIGRAINE WITHOUT AURA WITHOUT STATUS MIGRAINOSUS, NOT INTRACTABLE: Primary | ICD-10-CM

## 2021-10-25 DIAGNOSIS — G43.709 CHRONIC MIGRAINE WITHOUT AURA WITHOUT STATUS MIGRAINOSUS, NOT INTRACTABLE: ICD-10-CM

## 2021-10-25 PROCEDURE — 99215 OFFICE O/P EST HI 40 MIN: CPT | Performed by: PHYSICIAN ASSISTANT

## 2021-10-25 RX ORDER — LORAZEPAM 0.5 MG/1
0.5 TABLET ORAL EVERY 6 HOURS PRN
COMMUNITY
Start: 2021-09-24

## 2021-10-26 RX ORDER — UBROGEPANT 100 MG/1
TABLET ORAL
Qty: 10 TABLET | Refills: 3 | OUTPATIENT
Start: 2021-10-26

## 2021-11-01 ENCOUNTER — TELEPHONE (OUTPATIENT)
Dept: NEUROLOGY | Facility: CLINIC | Age: 66
End: 2021-11-01

## 2021-11-08 ENCOUNTER — PROCEDURE VISIT (OUTPATIENT)
Dept: NEUROLOGY | Facility: CLINIC | Age: 66
End: 2021-11-08
Payer: MEDICARE

## 2021-11-08 VITALS — DIASTOLIC BLOOD PRESSURE: 76 MMHG | TEMPERATURE: 98 F | HEART RATE: 76 BPM | SYSTOLIC BLOOD PRESSURE: 116 MMHG

## 2021-11-08 DIAGNOSIS — G43.709 CHRONIC MIGRAINE WITHOUT AURA WITHOUT STATUS MIGRAINOSUS, NOT INTRACTABLE: Primary | ICD-10-CM

## 2021-11-08 DIAGNOSIS — G24.3 CERVICAL DYSTONIA: ICD-10-CM

## 2021-11-08 PROCEDURE — 64616 CHEMODENERV MUSC NECK DYSTON: CPT | Performed by: PHYSICIAN ASSISTANT

## 2021-12-21 ENCOUNTER — APPOINTMENT (EMERGENCY)
Dept: RADIOLOGY | Facility: HOSPITAL | Age: 66
End: 2021-12-21
Payer: MEDICARE

## 2021-12-21 ENCOUNTER — APPOINTMENT (EMERGENCY)
Dept: NON INVASIVE DIAGNOSTICS | Facility: HOSPITAL | Age: 66
End: 2021-12-21
Payer: MEDICARE

## 2021-12-21 ENCOUNTER — HOSPITAL ENCOUNTER (EMERGENCY)
Facility: HOSPITAL | Age: 66
Discharge: HOME | End: 2021-12-21
Attending: EMERGENCY MEDICINE | Admitting: EMERGENCY MEDICINE
Payer: MEDICARE

## 2021-12-21 VITALS
OXYGEN SATURATION: 98 % | DIASTOLIC BLOOD PRESSURE: 84 MMHG | TEMPERATURE: 97.2 F | HEART RATE: 91 BPM | BODY MASS INDEX: 30.05 KG/M2 | WEIGHT: 180.34 LBS | SYSTOLIC BLOOD PRESSURE: 138 MMHG | HEIGHT: 65 IN | RESPIRATION RATE: 20 BRPM

## 2021-12-21 DIAGNOSIS — M79.609 LIMB PAIN: ICD-10-CM

## 2021-12-21 DIAGNOSIS — M79.604 RIGHT LEG PAIN: Primary | ICD-10-CM

## 2021-12-21 DIAGNOSIS — M71.21 BAKER'S CYST OF KNEE, RIGHT: ICD-10-CM

## 2021-12-21 PROCEDURE — 99284 EMERGENCY DEPT VISIT MOD MDM: CPT | Performed by: PHYSICIAN ASSISTANT

## 2021-12-21 PROCEDURE — 73564 X-RAY EXAM KNEE 4 OR MORE: CPT

## 2021-12-21 PROCEDURE — 93971 EXTREMITY STUDY: CPT | Performed by: SURGERY

## 2021-12-21 PROCEDURE — 99284 EMERGENCY DEPT VISIT MOD MDM: CPT

## 2021-12-21 PROCEDURE — 93971 EXTREMITY STUDY: CPT

## 2021-12-28 ENCOUNTER — HOSPITAL ENCOUNTER (OUTPATIENT)
Dept: MRI IMAGING | Facility: HOSPITAL | Age: 66
Discharge: HOME | End: 2021-12-28
Payer: MEDICARE

## 2021-12-28 DIAGNOSIS — S80.01XA CONTUSION OF RIGHT KNEE, INITIAL ENCOUNTER: ICD-10-CM

## 2021-12-28 PROCEDURE — G1004 CDSM NDSC: HCPCS

## 2021-12-28 PROCEDURE — 73721 MRI JNT OF LWR EXTRE W/O DYE: CPT

## 2022-01-11 ENCOUNTER — APPOINTMENT (OUTPATIENT)
Dept: RADIOLOGY | Facility: CLINIC | Age: 67
End: 2022-01-11
Payer: MEDICARE

## 2022-01-11 ENCOUNTER — OFFICE VISIT (OUTPATIENT)
Dept: OBGYN CLINIC | Facility: CLINIC | Age: 67
End: 2022-01-11
Payer: MEDICARE

## 2022-01-11 VITALS
BODY MASS INDEX: 29.96 KG/M2 | WEIGHT: 179.8 LBS | DIASTOLIC BLOOD PRESSURE: 68 MMHG | SYSTOLIC BLOOD PRESSURE: 110 MMHG | HEIGHT: 65 IN

## 2022-01-11 DIAGNOSIS — M17.11 PRIMARY LOCALIZED OSTEOARTHRITIS OF RIGHT KNEE: Primary | ICD-10-CM

## 2022-01-11 DIAGNOSIS — M25.561 ACUTE PAIN OF RIGHT KNEE: ICD-10-CM

## 2022-01-11 PROCEDURE — 73564 X-RAY EXAM KNEE 4 OR MORE: CPT

## 2022-01-11 PROCEDURE — 99203 OFFICE O/P NEW LOW 30 MIN: CPT | Performed by: ORTHOPAEDIC SURGERY

## 2022-01-11 NOTE — ASSESSMENT & PLAN NOTE
Findings consistent with right knee osteoarthritis  Findings and treatment options were discussed with the patient  X-rays and MRI was reviewed with her  Prognosis was discussed  Discussed that her symptoms are related to the osteoarthritis in her knee  Discussed cortisone injection versus joint supplement injections to the right knee  She is already scheduled to have Synvisc injections with the pain management provider  She will proceed with these at this time  Recommend low-impact exercises such as stationary bicycle swimming  Continue ice to the knee needed  Discussed that she may need a total knee arthroplasty in the future if conservative treatment fails  Follow-up as needed if any problems arise  All patient's questions were answered to her satisfaction  This note is created using dictation transcription  It may contain typographical errors, grammatical errors, improperly dictated words, background noise and other errors

## 2022-01-11 NOTE — PROGRESS NOTES
Assessment:     1  Primary localized osteoarthritis of right knee        Plan:     Problem List Items Addressed This Visit        Musculoskeletal and Integument    Primary localized osteoarthritis of right knee - Primary     Findings consistent with right knee osteoarthritis  Findings and treatment options were discussed with the patient  X-rays and MRI was reviewed with her  Prognosis was discussed  Discussed that her symptoms are related to the osteoarthritis in her knee  Discussed cortisone injection versus joint supplement injections to the right knee  She is already scheduled to have Synvisc injections with the pain management provider  She will proceed with these at this time  Recommend low-impact exercises such as stationary bicycle swimming  Continue ice to the knee needed  Discussed that she may need a total knee arthroplasty in the future if conservative treatment fails  Follow-up as needed if any problems arise  All patient's questions were answered to her satisfaction  This note is created using dictation transcription  It may contain typographical errors, grammatical errors, improperly dictated words, background noise and other errors  Relevant Orders    XR knee 4+ vw right injury         Subjective:     Patient ID: Melvin Nolasco is a 78 y o  female  Chief Complaint: This is a 34-year-old white female who is here for evaluation of her right knee  Patient states she initially had a fall in September 2021  She fell in her garage directly on her right knee  She did have some swelling and pain at that time  She iced it  She is normally on Percocet 4 times a day prescribed by Pain Management  She saw her pain management provider in December who ordered an MRI of the right knee  One day later on December 21, 2021, she fell getting out of bed landing on her right knee once again  She states she had increased swelling of her right knee at that time    She normally uses a cane for assistance, and has been using it for years  MRI of the right knee was done on 21  She does have history of having a right knee cortisone injection by the pain management provider around 2 years ago that provided relief  She was also scheduled to have Synvisc injections with her pain management provider, but has held off since she was coming to see an orthopedic surgeon  She states she did have joint supplement injections that knee several years ago that did provide relief as well  Patient intake form reviewed  Allergy:  No Known Allergies  Medications:  all current active meds have been reviewed  Past Medical History:  Past Medical History:   Diagnosis Date    Anxiety     Arthritis     [facility]     Skin    Depression     [facility]     GERD (gastroesophageal reflux disease)     Hyperlipidemia     Hypertension     Migraine      Past Surgical History:  Past Surgical History:   Procedure Laterality Date    ANKLE SURGERY      CARPAL TUNNEL RELEASE Right     ROTATOR CUFF REPAIR       Family History:  Family History   Problem Relation Age of Onset    Allergies Mother     Migraines Mother     Anxiety disorder Mother     Cancer Father         Bladder    Migraines Sister     Multiple sclerosis Brother     Colon cancer Maternal Grandmother     Heart attack Maternal Grandfather     Peripheral vascular disease Paternal Grandmother     Diabetes Paternal Grandmother     Heart attack Paternal Grandfather    Ardeth Needs Migraines Brother         Rare    Migraines Son         Rare     Social History:  Social History     Substance and Sexual Activity   Alcohol Use Not Currently     Social History     Substance and Sexual Activity   Drug Use Never     Social History     Tobacco Use   Smoking Status Former Smoker    Quit date: 12    Years since quittin 0   Smokeless Tobacco Never Used     Review of Systems   Constitutional: Negative  HENT: Negative  Eyes: Negative      Respiratory: Negative  Cardiovascular: Negative  Gastrointestinal: Negative  Endocrine: Negative  Genitourinary: Negative  Musculoskeletal: Positive for arthralgias, gait problem (using a cane) and joint swelling  Skin: Negative  Allergic/Immunologic: Negative  Hematological: Negative  Psychiatric/Behavioral: Negative  Objective:  BP Readings from Last 1 Encounters:   01/11/22 110/68      Wt Readings from Last 1 Encounters:   01/11/22 81 6 kg (179 lb 12 8 oz)      BMI:   Estimated body mass index is 29 92 kg/m² as calculated from the following:    Height as of this encounter: 5' 5" (1 651 m)  Weight as of this encounter: 81 6 kg (179 lb 12 8 oz)  BSA:   Estimated body surface area is 1 89 meters squared as calculated from the following:    Height as of this encounter: 5' 5" (1 651 m)  Weight as of this encounter: 81 6 kg (179 lb 12 8 oz)  Physical Exam  Constitutional:       General: She is not in acute distress  Appearance: She is well-developed  HENT:      Head: Normocephalic  Eyes:      Conjunctiva/sclera: Conjunctivae normal       Pupils: Pupils are equal, round, and reactive to light  Pulmonary:      Effort: Pulmonary effort is normal  No respiratory distress  Musculoskeletal:      Right knee: Effusion (Trace) present  Instability Tests: Medial Silverio test negative and lateral Silverio test negative  Skin:     General: Skin is warm and dry  Neurological:      Mental Status: She is alert and oriented to person, place, and time  Psychiatric:         Behavior: Behavior normal        Right Knee Exam     Muscle Strength   The patient has normal right knee strength  Tenderness   The patient is experiencing tenderness in the lateral joint line (patellofemoral )  Range of Motion   The patient has normal right knee ROM      Tests   Silverio:  Medial - negative Lateral - negative  Varus: negative Valgus: negative  Patellar apprehension: negative    Other Erythema: absent  Scars: absent  Sensation: normal  Pulse: present  Swelling: mild  Effusion: effusion (Trace) present    Comments:  Patellofemoral crepitation with knee range of motion  valgus alignment            I have personally reviewed pertinent films in PACS and my interpretation is X-rays of the right knee reveal moderate tricompartmental osteoarthritis worst in the lateral and patellofemoral compartments  MRI of the right knee reveals moderate to severe osteoarthritis in the lateral and patellofemoral compartments  There are degenerative changes of the lateral meniscus and Baker cyst with effusion

## 2022-01-18 ENCOUNTER — TELEPHONE (OUTPATIENT)
Dept: OBGYN CLINIC | Facility: HOSPITAL | Age: 67
End: 2022-01-18

## 2022-01-18 NOTE — TELEPHONE ENCOUNTER
Andrew Nolasco is calling to ask that we fax the [address] and xray report from 01/11/2022 to her pain management doctor at [facility] [phone]    Faxed, as requested

## 2022-02-02 ENCOUNTER — TELEPHONE (OUTPATIENT)
Dept: NEUROLOGY | Facility: CLINIC | Age: 67
End: 2022-02-02

## 2022-02-02 NOTE — TELEPHONE ENCOUNTER
Patient called in stating she may need to cancel her 02/10/22 Botox due to her car having to be fixed  She is bringing it in earlier in the week, but is unaware if it will be fixed in time for the appointment  I let her know that we can keep the appointment there for now in case her car is done in time and that if she will not have her car to call in 02/10/22 to reschedule  Patient knows I will be out of the office that day and is okay being called back on 02/14/22 to reschedule

## 2022-02-09 NOTE — TELEPHONE ENCOUNTER
Called patient and she stated that unfortunately her car is still in the shop and will have to reschedule her Botox  I rescheduled her to Tuesday 02/15/22 at 01:30pm in the  with Brenda Garsia

## 2022-02-15 ENCOUNTER — PROCEDURE VISIT (OUTPATIENT)
Dept: NEUROLOGY | Facility: CLINIC | Age: 67
End: 2022-02-15
Payer: MEDICARE

## 2022-02-15 VITALS — DIASTOLIC BLOOD PRESSURE: 70 MMHG | HEART RATE: 96 BPM | TEMPERATURE: 97.8 F | SYSTOLIC BLOOD PRESSURE: 130 MMHG

## 2022-02-15 DIAGNOSIS — G24.3 CERVICAL DYSTONIA: ICD-10-CM

## 2022-02-15 DIAGNOSIS — G43.709 CHRONIC MIGRAINE WITHOUT AURA WITHOUT STATUS MIGRAINOSUS, NOT INTRACTABLE: Primary | ICD-10-CM

## 2022-02-15 PROCEDURE — 64616 CHEMODENERV MUSC NECK DYSTON: CPT | Performed by: PHYSICIAN ASSISTANT

## 2022-02-15 NOTE — PROGRESS NOTES
Universal Protocol   Consent: Verbal consent obtained  Written consent obtained  Risks and benefits: risks, benefits and alternatives were discussed  Consent given by: patient  Time out: Immediately prior to procedure a "time out" was called to verify the correct patient, procedure, equipment, support staff and site/side marked as required  Patient understanding: patient states understanding of the procedure being performed  Patient consent: the patient's understanding of the procedure matches consent given  Procedure consent: procedure consent matches procedure scheduled  Relevant documents: relevant documents present and verified  Patient identity confirmed: verbally with patient        Chemodenervation     Date/Time 2/15/2022 1:54 PM     Performed by  Esa Shannon PA-C     Authorized by Esa Shannon PA-C        Pre-procedure details      Prepped With: Alcohol     Anesthesia  (see MAR for exact dosages):      Anesthesia method:  None   Procedure details     Position:  Upright   Botox     Botox Type:  Type A    Brand:  Botox    mL's of Botulinum Toxin:  200    Final Concentration per CC:  50 units    Needle Gauge:  30 G 2 5 inch   Procedures     Botox Procedures: cervical dystonia and chronic headache      Indications: spasmodic torticollis and migraines     Injection Location      Head / Face:  L superior cervical paraspinal, R superior cervical paraspinal, L , R , L frontalis, R frontalis, L medial occipitalis, R medial occipitalis, L temporalis, R temporalis, R superior trapezius, L superior trapezius and procerus    L  injection amount:  5 unit(s)    R  injection amount:  5 unit(s)    L lateral frontalis:  5 unit(s)    R lateral frontalis:  5 unit(s)    L medial frontalis:  5 unit(s)    R medial frontalis:  5 unit(s)    L temporalis injection amount:  20 unit(s)    R temporalis injection amount:  20 unit(s)    Procerus injection amount:  5 unit(s)    L medial occipitalis injection amount:  15 unit(s)    R medial occipitalis injection amount:  15 unit(s)    L superior cervical paraspinal injection amount:  10 unit(s)    R superior cervical paraspinal injection amount:  10 unit(s)    L superior trapezius injection amount:  15 unit(s)    R superior trapezius injection amount:  15 unit(s)    Cervical Dystonia / Salivary Gland:  L splenius cervicis, R splenius cervicis, R sternocleidomastoid and L sternocleidomastoid      L splenius cervicis injection amount:  10 unit(s)      R splenius cervicis injection amount:  10 unit(s)      L sternocleidomastoid injection amount:  10 unit(s)      R sternocleidomastoid injection amount:  10 unit(s)   Total Units     Total units used:  200    Total units discarded:  0   Post-procedure details      Chemodenervation:  Neck, excluding muscles of the larynx    Neck Muscle Location[de-identified]  Bilateral neck muscle    Patient tolerance of procedure:   Tolerated well, no immediate complications   Comments      2 5 units orbicularis oculi bilaterally  All medically necessary

## 2022-04-18 ENCOUNTER — TELEPHONE (OUTPATIENT)
Dept: NEUROLOGY | Facility: CLINIC | Age: 67
End: 2022-04-18

## 2022-04-18 NOTE — TELEPHONE ENCOUNTER
Fax received requesting PA for nurtec  Initiated via [facility] with molina: Mary Amador    Approved 3/19/22-4/18/23  Spoke with the patient who stated she is no longer taking the nurtec and is taking the ubrelvy      Nurtec PA deleted in [facility]

## 2022-05-17 DIAGNOSIS — G43.709 CHRONIC MIGRAINE WITHOUT AURA WITHOUT STATUS MIGRAINOSUS, NOT INTRACTABLE: ICD-10-CM

## 2022-05-17 NOTE — TELEPHONE ENCOUNTER
Pt called and states that she has botox appt today at 2:30 but she won't be able to make it be she woke up sick, feeling nauseous and dizzy, It feels that her migraine is coming on or her sinuses  Botox appt cancelled per pt's request  She will call back when she is feeling better  She had 5 migraines in the last 2 weeks  Declined to answer any more questions as she is not feeling well       Requesting Gregor Rinne refill be sent to [address] [place] [address]           [phone] ok to leave detailed message

## 2022-05-19 ENCOUNTER — TELEPHONE (OUTPATIENT)
Dept: NEUROLOGY | Facility: CLINIC | Age: 67
End: 2022-05-19

## 2022-05-19 NOTE — TELEPHONE ENCOUNTER
Requested prior-authorization through patients secondary insurance [employer] for Botox-200 units, [address] [place] [address]  Received the following [employer] authorization status via fax:    Approved  Botox-200 units  PA-Case ID: [id number]  Valid- 4/13/2022 until 5/13/2023    Please use Stock

## 2022-05-23 ENCOUNTER — TELEPHONE (OUTPATIENT)
Dept: NEUROLOGY | Facility: CLINIC | Age: 67
End: 2022-05-23

## 2022-05-23 NOTE — TELEPHONE ENCOUNTER
pt left message that insurance does not cover ubrelvy and it is $900 and can't afford that, stats that she is not sure if PA is needed  [phone]    i called pharm and PA needed,  [id number]  n-feprx  [id number]  [id number]  [phone]    Attempted to complete Israel IRWIN on CMM  Smith: SAUL   Received message-  To initiate an authorization request for this medication, please contact [facility] Program (FEP) at [phone]  Called [phone] and spoke to mary kay  States that med is not covered and would need to complete Formulary exception form    He will fax formulary exception form to  office    Will await form

## 2022-05-23 NOTE — TELEPHONE ENCOUNTER
Form received and completed   Faxed to [facility] FEP program   Placed at  to be scanned into chart

## 2022-05-23 NOTE — TELEPHONE ENCOUNTER
Pt left vm stating that she had to cancel botox appt last week and looking to reschedule  Please contact pt to reschedule    [phone]

## 2022-05-25 NOTE — TELEPHONE ENCOUNTER
Called [employer] at [phone] and spoke to joey  He does not see that fax was received      Form faxed electronically through Epic to [phone]

## 2022-05-27 NOTE — TELEPHONE ENCOUNTER
Called patient and I rescheduled her Botox to 06/03/22 at 12:15pm        Jack Quiroga [place], [place]

## 2022-05-27 NOTE — TELEPHONE ENCOUNTER
Called patient to read her letters to her as they were sent 2 weeks ago and should certainly have been received by now. Patient had received her letter, but wanted explanation about potential ulcer and treatment plans. Writer explained pathology and actions.  All results were good.    PA form received and completed  Attempted to Fax along with last office note , fax did not go through      PA form scanned into chart and faxed electronically along with last office note

## 2022-05-27 NOTE — TELEPHONE ENCOUNTER
Called [facility] and spoke to Rafat Guerrier has meet the criteria and they faxed another PA form  I asked that he refax this to CV office  He will request that this be faxed     Will await PA form    I called pt and made her aware of above  Made her aware that we can call her with determination      [phone] to leave detailed message

## 2022-06-01 NOTE — TELEPHONE ENCOUNTER
Called [employer] at [phone] to check if PA form and office notes was received  Spoke ashwin Kay  Confirmed that PA form and office notes was received on 5/26/22 and has been approved through 5/31/2023  She will fax the approval letter to [phone]       Pt made aware

## 2022-06-03 ENCOUNTER — PROCEDURE VISIT (OUTPATIENT)
Dept: NEUROLOGY | Facility: CLINIC | Age: 67
End: 2022-06-03
Payer: MEDICARE

## 2022-06-03 VITALS — HEART RATE: 91 BPM | DIASTOLIC BLOOD PRESSURE: 67 MMHG | TEMPERATURE: 97.7 F | SYSTOLIC BLOOD PRESSURE: 116 MMHG

## 2022-06-03 DIAGNOSIS — G24.3 CERVICAL DYSTONIA: Primary | ICD-10-CM
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
## 2022-06-03 DIAGNOSIS — R26.89 BALANCE PROBLEMS: ICD-10-CM

## 2022-06-03 PROCEDURE — 64616 CHEMODENERV MUSC NECK DYSTON: CPT | Performed by: PHYSICIAN ASSISTANT

## 2022-06-03 NOTE — PATIENT INSTRUCTIONS
Botox Therapy  Important Information    Our goal is to make sure you fully understand how Botox Therapy treatment may benefit you and to help you understand how you can play an active role in your treatments and ongoing care  Please review the following information below  Call our office IMMEDIATELY @ [phone] and speak to one of our Botox Coordinators if you have a change in insurance  (a prior authorization is required and accurate information is vital)  Please call at least 24 hours in advance if you can't make your appointment  Appointments are scheduled every 91 days (this will be scheduled in advance before leaving the office)  You must allow for at least 2-3 treatments to determine if Botox is right for you  It may take a few weeks to see a response from treatment  No hair dye or scalp massage or treatment within 24 hours of treatment  We encourage you to use a headache diary or journal to document your headache frequency and severity  You can also utilize the Migraine Chavo tj (downloadable on [employer])  Sign up for the Botox Savings Program  (commercial insurance patients may qualify) Sign up at [employer] or call [phone] Option: 4  To get more information on Botox therapy for Chronic Migraines and see frequently asked questions, please visit [facility]  If you have any questions or concerns, please speak to one of our Botox Coordinators at [phone]  We look forward to servicing you!

## 2022-06-03 NOTE — PROGRESS NOTES
Universal Protocol   Consent: Verbal consent obtained  Written consent obtained  Risks and benefits: risks, benefits and alternatives were discussed  Consent given by: patient  Time out: Immediately prior to procedure a "time out" was called to verify the correct patient, procedure, equipment, support staff and site/side marked as required  Patient understanding: patient states understanding of the procedure being performed  Patient consent: the patient's understanding of the procedure matches consent given  Procedure consent: procedure consent matches procedure scheduled  Relevant documents: relevant documents present and verified  Patient identity confirmed: verbally with patient        Chemodenervation     Date/Time 6/3/2022 12:25 PM     Performed by  Lillie Baird PA-C     Authorized by Lillie Baird PA-C        Pre-procedure details      Prepped With: Alcohol     Anesthesia  (see MAR for exact dosages):      Anesthesia method:  None   Procedure details     Position:  Upright   Botox     Botox Type:  Type A    Brand:  Botox    mL's of Botulinum Toxin:  200    Final Concentration per CC:  50 units    Needle Gauge:  30 G 2 5 inch   Procedures     Botox Procedures: cervical dystonia and chronic headache      Indications: spasmodic torticollis and migraines     Injection Location      Head / Face:  L superior cervical paraspinal, R superior cervical paraspinal, L , R , L frontalis, R frontalis, L medial occipitalis, R medial occipitalis, L temporalis, R temporalis, R superior trapezius, L superior trapezius and procerus    L  injection amount:  5 unit(s)    R  injection amount:  5 unit(s)    L lateral frontalis:  5 unit(s)    R lateral frontalis:  5 unit(s)    L medial frontalis:  5 unit(s)    R medial frontalis:  5 unit(s)    L temporalis injection amount:  20 unit(s)    R temporalis injection amount:  20 unit(s)    Procerus injection amount:  5 unit(s)    L medial occipitalis injection amount:  15 unit(s)    R medial occipitalis injection amount:  15 unit(s)    L superior cervical paraspinal injection amount:  10 unit(s)    R superior cervical paraspinal injection amount:  10 unit(s)    L superior trapezius injection amount:  15 unit(s)    R superior trapezius injection amount:  15 unit(s)    Cervical Dystonia / Salivary Gland:  L splenius capitis, R splenius capitis, L sternocleidomastoid and R sternocleidomastoid      L splenius capitis injection amount:  10 unit(s)      R splenius capitis injection amount:  10 unit(s)      L sternocleidomastoid injection amount:  10 unit(s)      R sternocleidomastoid injection amount:  10 unit(s)   Total Units     Total units used:  200    Total units discarded:  0   Post-procedure details      Chemodenervation:  Neck, excluding muscles of the larynx    Neck Muscle Location[de-identified]  Bilateral neck muscle    Patient tolerance of procedure:   Tolerated well, no immediate complications   Comments      2 5 units orbicularis oculi bilaterally  All medically necessary

## 2022-09-07 ENCOUNTER — PROCEDURE VISIT (OUTPATIENT)
Dept: NEUROLOGY | Facility: CLINIC | Age: 67
End: 2022-09-07
Payer: MEDICARE

## 2022-09-07 ENCOUNTER — TELEPHONE (OUTPATIENT)
Dept: NEUROLOGY | Facility: CLINIC | Age: 67
End: 2022-09-07

## 2022-09-07 VITALS — TEMPERATURE: 97.3 F | SYSTOLIC BLOOD PRESSURE: 121 MMHG | DIASTOLIC BLOOD PRESSURE: 58 MMHG | HEART RATE: 68 BPM

## 2022-09-07 DIAGNOSIS — G24.3 CERVICAL DYSTONIA: Primary | ICD-10-CM

## 2022-09-07 DIAGNOSIS — F41.8 DEPRESSION WITH ANXIETY: Primary | ICD-10-CM

## 2022-09-07 PROCEDURE — 64616 CHEMODENERV MUSC NECK DYSTON: CPT | Performed by: PHYSICIAN ASSISTANT

## 2022-09-07 NOTE — PROGRESS NOTES
Universal Protocol   Consent: Verbal consent obtained  Written consent obtained  Risks and benefits: risks, benefits and alternatives were discussed  Consent given by: patient  Time out: Immediately prior to procedure a "time out" was called to verify the correct patient, procedure, equipment, support staff and site/side marked as required  Patient understanding: patient states understanding of the procedure being performed  Patient consent: the patient's understanding of the procedure matches consent given  Procedure consent: procedure consent matches procedure scheduled  Relevant documents: relevant documents present and verified  Patient identity confirmed: verbally with patient        Chemodenervation     Date/Time 9/7/2022 1:44 PM     Performed by  Naheed Aponte PA-C     Authorized by Naheed Aponte PA-C        Pre-procedure details      Prepped With: Alcohol     Anesthesia  (see MAR for exact dosages):      Anesthesia method:  None   Procedure details     Position:  Upright   Botox     Botox Type:  Type A    Brand:  Botox    mL's of Botulinum Toxin:  200    Final Concentration per CC:  50 units    Needle Gauge:  30 G 2 5 inch   Procedures     Botox Procedures: cervical dystonia and chronic headache      Indications: spasmodic torticollis and migraines     Injection Location      Head / Face:  L superior cervical paraspinal, R superior cervical paraspinal, L , R , L frontalis, R frontalis, L medial occipitalis, R medial occipitalis, L temporalis, R temporalis, R superior trapezius, L superior trapezius and procerus    L  injection amount:  5 unit(s)    R  injection amount:  5 unit(s)    L lateral frontalis:  5 unit(s)    R lateral frontalis:  5 unit(s)    L medial frontalis:  5 unit(s)    R medial frontalis:  5 unit(s)    L temporalis injection amount:  20 unit(s)    R temporalis injection amount:  20 unit(s)    Procerus injection amount:  5 unit(s)    L medial occipitalis injection amount:  15 unit(s)    R medial occipitalis injection amount:  15 unit(s)    L superior cervical paraspinal injection amount:  10 unit(s)    R superior cervical paraspinal injection amount:  10 unit(s)    L superior trapezius injection amount:  15 unit(s)    R superior trapezius injection amount:  15 unit(s)    Cervical Dystonia / Salivary Gland:  L splenius capitis, R splenius capitis, L sternocleidomastoid and R sternocleidomastoid      L splenius capitis injection amount:  10 unit(s)      R splenius capitis injection amount:  10 unit(s)      L sternocleidomastoid injection amount:  10 unit(s)      R sternocleidomastoid injection amount:  10 unit(s)   Total Units     Total units used:  200    Total units discarded:  0   Post-procedure details      Chemodenervation:  Neck, excluding muscles of the larynx    Neck Muscle Location[de-identified]  Bilateral neck muscle    Patient tolerance of procedure:   Tolerated well, no immediate complications   Comments      2 5 units orbicularis oculi bilaterally  All medically necessary

## 2022-09-07 NOTE — TELEPHONE ENCOUNTER
MSW phoned [phone] and lvm requesting a call back in regards to Paulette's referral to  team     MSW phoned home number [phone] phone kept ringing, no vm option  MSW phoned [facility] at [phone] and spoke with Ronit Suarez who informed that patient lives in the [facility] part of [place]  Please call [place] at [phone] and spoke with Rasheed Acosta who informed that everywhere she looks in google [place] is [place] to [facility] again  MSW phoned [facility] and spoke with Ronit Suarez and informed that Viktoriya Mcadams asked to call [facility] again as they should cover [place]  Ronit Suarez asked and informed to writer that address belongs to [facility]

## 2022-09-07 NOTE — TELEPHONE ENCOUNTER
Patient came to the office today more depressed than usual  Has a prolonged history of anxiety and depression but has worsened recently  Does have SI but no plan and states not actively attempting or planning to attempt  She did agree to call 911 if that changed in any manner and did promise she was not actively attempting and did not have a current plan  However she would like to see if an inpatient facility would accept her insurance as her PCP is doing a medication changes which is making her feel worse  I did discuss going to ER or to our 24 hour mental health facility but she declined at this time  Also discussed crisis but she didn't feel that was necessary  Can you please see what inpatient facility will accept her insurance and expedite an admission for her? She knows I am messaging you as well  Thanks in advance

## 2022-09-07 NOTE — TELEPHONE ENCOUNTER
MSW phoned patient who informed that she does not have any SI neither a plan  She informed that she will not harm herself as she believes that if she would do that then she will not go to [facility]  Pt reported that internal medicine from [facility] adjusted her [facility] meds about a week ago  A week ago she call them to inform of her symptoms of mood changes and crying  Internal medicine told her that meds should take 1 to 2 weeks in order for her to start feeling better  MSW encouraged pt to call today internal med to inform about her current moods    -Pt confirmed that she will call internal medicine today  She also has an office visit with internal medicine on Monday      -Pt reports that she does not have a psychiatrist neither a therapist since 2018  No family in the area and  passed away a year ago  She is agreeable for a referral to be placed to [facility] for med management and counseling  MSW will ask Pito Dhillon PA-C for this      -Pt was agreeable for someone from Crisis intervention to follow up with her  -MSW encouraged the patient to visit the ER for an evaluation  Pt reported that she does not want to go at this time but if she gets worse she will either drive to the ER or call 911 for a Psych eval      MSW phoned [facility] at [phone] and spoke with Dolly Johnson and made her aware pt lives in [facility]  Informed pt has anxiety and depression dx that has worsen  Pt and this writer have a plan but also ask for them to also follow up with pt  They placed the referral and the mobile outreach [facility] team will follow up with pt as well

## 2022-09-08 NOTE — TELEPHONE ENCOUNTER
WILIAM phoned [facility] at [phone] [phone]- no answer and lvm to client registration team    lvm  Stat orders placed requesting appt asap   Please call back

## 2022-09-09 ENCOUNTER — TELEPHONE (OUTPATIENT)
Dept: PSYCHIATRY | Facility: CLINIC | Age: 67
End: 2022-09-09

## 2022-09-09 NOTE — TELEPHONE ENCOUNTER
Behavorial Health Outpatient Intake Questions    Referred by:    Please advised interviewee that they need to answer all questions truthfully to allow for best care and any misrepresentations of information may affect their ability to be seen at this clinic   => Was this discussed? Yes     [facility] -     Presenting Problem (in patient's words): Are there any developmental disabilities? ? If yes, can they speak to you on the phone? If they are too limited to speak to you on phone, refer out No    Are you taking any psychiatric medications? Yes    => If yes, who prescribes? If yes, are they injectable medications? Does the patient have a language barrier or hearing impairment? No    Have you been treated at [facility] by a therapist or a doctor in the past? If yes, who? No    Has the patient been hospitalized for mental health? No   If yes, how long ago was last hospitalization and where was it? Do you actively use alcohol or marijuana or illegal substances? If yes, what and how much - refer out to Drug and alcohol treatment if use is excessive or daily use of illegal substances No    Do you have a community treatment team or ? No    Legal History-     Does the patient have any history of arrests, snf/retirement time, or DUIs? No  If Yes-  1) What types of charges? 2) When were they last incarcerated? 3) Are they currently on parole or probation? Minor Child-    Who has custody of the child? Is there a custody agreement? If there is a custody agreement remind parent that they must bring a copy to the first appt or they will not be seen  Intake Team, please check with provider before scheduling if flags come up such as:  - complex case  - legal history (other than DUI)  - communication barrier concerns are present  - if, in your judgment, this needs further review    ACCEPTED as a patient Yes  => Appointment Date: 09/29/2022  Referred Elsewhere? No    Name of Insurance Co:  Insurance ID#  [employer] #  If ins is primary or secondary  If patient is a minor, parents information such as Name, D  O B of guarantor

## 2022-09-09 NOTE — TELEPHONE ENCOUNTER
spoke to patient and she requested to call back from being overly tired   Pt has correct call back number

## 2022-09-12 NOTE — TELEPHONE ENCOUNTER
Late entry 9/9 [facility] will call pt to schedule med management appt with Psychiatrist, will be on hold for counseling

## 2022-09-13 NOTE — TELEPHONE ENCOUNTER
MSW phoned pt who reported that she has appt on 9/29 for Med management @[facility] 9am  She is on the waitlist for counseling and would like this writer to follow up with her after 9/29

## 2022-09-16 ENCOUNTER — TELEPHONE (OUTPATIENT)
Dept: PSYCHIATRY | Facility: CLINIC | Age: 67
End: 2022-09-16

## 2022-09-30 NOTE — TELEPHONE ENCOUNTER
MSW phoned [phone] and follow up regarding Psychiatry  appt was rescheduled for Monday  MSW offered to connect pt with counseling with [facility] but pt would like to wait after appt on Monday  MSW will follow up with pt on Wednesday

## 2022-10-03 ENCOUNTER — TELEPHONE (OUTPATIENT)
Dept: PSYCHIATRY | Facility: CLINIC | Age: 67
End: 2022-10-03

## 2022-10-04 NOTE — TELEPHONE ENCOUNTER
Pt called to confirm that she had the correct number for [facility]  Writer confirmed the number with pt and informed her to call them back to have that 10/10 apt rescheduled

## 2022-10-04 NOTE — TELEPHONE ENCOUNTER
Spoke to Patient in regards to STAT referral, Patient stated she was scheduled via PF but had to cancel the appointment and was r/s to 10/10, inform patient her appointment was canceled  Patient stated she spoke with someone yesterday to R/s, inform patient to call PF office to discuss and it could have been in Error

## 2022-10-05 NOTE — TELEPHONE ENCOUNTER
MSW phoned pt at [phone] who informed that psych appt was rescheduled for 10/10/2022  Pt will contact this writer for any questions or future social needs  MSW remains available

## 2022-10-10 ENCOUNTER — OFFICE VISIT (OUTPATIENT)
Dept: PSYCHIATRY | Facility: CLINIC | Age: 67
End: 2022-10-10
Payer: MEDICARE

## 2022-10-10 DIAGNOSIS — F50.89 OTHER DISORDER OF EATING: ICD-10-CM

## 2022-10-10 DIAGNOSIS — F41.1 GAD (GENERALIZED ANXIETY DISORDER): ICD-10-CM

## 2022-10-10 DIAGNOSIS — F33.9 MAJOR DEPRESSION, RECURRENT, CHRONIC (HCC): Primary | ICD-10-CM

## 2022-10-10 PROCEDURE — 90792 PSYCH DIAG EVAL W/MED SRVCS: CPT | Performed by: NURSE PRACTITIONER

## 2022-10-10 RX ORDER — IBUPROFEN 200 MG
200 TABLET ORAL EVERY 6 HOURS PRN
COMMUNITY

## 2022-10-10 RX ORDER — SERTRALINE HYDROCHLORIDE 25 MG/1
25 TABLET, FILM COATED ORAL
Qty: 30 TABLET | Refills: 0 | Status: SHIPPED | OUTPATIENT
Start: 2022-10-10

## 2022-10-10 RX ORDER — TURMERIC/TURMERIC EXT/PEPR EXT 900-100 MG
2 CAPSULE ORAL DAILY
COMMUNITY

## 2022-10-10 RX ORDER — BUSPIRONE HYDROCHLORIDE 10 MG/1
20 TABLET ORAL 2 TIMES DAILY
Qty: 120 TABLET | Refills: 0 | Status: SHIPPED | OUTPATIENT
Start: 2022-10-10

## 2022-10-10 RX ORDER — HYDROXYZINE HYDROCHLORIDE 25 MG/1
25 TABLET, FILM COATED ORAL
Qty: 30 TABLET | Refills: 0 | Status: SHIPPED | OUTPATIENT
Start: 2022-10-10
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
## 2022-10-11 ENCOUNTER — DOCUMENTATION (OUTPATIENT)
Dept: PSYCHIATRY | Facility: CLINIC | Age: 67
End: 2022-10-11

## 2022-10-14 PROBLEM — F33.9 MAJOR DEPRESSION, RECURRENT, CHRONIC (HCC): Status: ACTIVE | Noted: 2022-10-14

## 2022-10-14 PROBLEM — F50.9 EATING DISORDER: Status: ACTIVE | Noted: 2022-10-14

## 2022-10-28 ENCOUNTER — OFFICE VISIT (OUTPATIENT)
Dept: NEUROLOGY | Facility: CLINIC | Age: 67
End: 2022-10-28

## 2022-10-28 VITALS
BODY MASS INDEX: 28.99 KG/M2 | WEIGHT: 174 LBS | DIASTOLIC BLOOD PRESSURE: 60 MMHG | SYSTOLIC BLOOD PRESSURE: 129 MMHG | HEART RATE: 77 BPM | HEIGHT: 65 IN | TEMPERATURE: 97.4 F

## 2022-10-28 DIAGNOSIS — F33.9 MAJOR DEPRESSION, RECURRENT, CHRONIC (HCC): ICD-10-CM

## 2022-10-28 DIAGNOSIS — G43.709 CHRONIC MIGRAINE WITHOUT AURA WITHOUT STATUS MIGRAINOSUS, NOT INTRACTABLE: Primary | ICD-10-CM

## 2022-10-28 DIAGNOSIS — F50.89 OTHER DISORDER OF EATING: ICD-10-CM

## 2022-10-28 DIAGNOSIS — F41.8 DEPRESSION WITH ANXIETY: ICD-10-CM

## 2022-10-28 RX ORDER — SENNA PLUS 8.6 MG/1
1 TABLET ORAL DAILY
COMMUNITY

## 2022-10-28 RX ORDER — MELOXICAM 15 MG/1
15 TABLET ORAL DAILY PRN
COMMUNITY
Start: 2022-08-01

## 2022-10-28 RX ORDER — MAGNESIUM OXIDE 400 MG/1
1 TABLET ORAL 2 TIMES DAILY
Qty: 180 TABLET | Refills: 3 | Status: SHIPPED | OUTPATIENT
Start: 2022-10-28

## 2022-10-28 NOTE — ASSESSMENT & PLAN NOTE
Preventive therapy for headaches:   -Over-the-counter supplements: to decrease intensity and frequency of migraines  - Magnesium Oxide 400mg a day  If any diarrhea or upset stomach, decrease dose  as tolerated  (oral magnesium oxide may be an effective preventive strategy for people with migraine  Some theories about how it works include the idea that magnesium can help to prevent waves of cortical spreading depression and aura  Magnesium, in theory, also reduces the release of inflammatory or activating chemicals that can cause migraine)  - Vitamin B2 200 mg twice a day  May cause the urine to turn yellow which is normal for B 2 to do and is not a sign that you are dehydrated  (may be an effective preventive medication in some people with migraine)  - Continue botox every 3 months  Abortive therapy for headaches:   - At onset of migraine, take Ubrelvy 100 mg  May repeat in 2 hours if needed  Limit of 200 mg in 24 hours

## 2022-10-28 NOTE — PROGRESS NOTES
[facility]  PATIENT:  Deniz Hernandez  MRN:  [id number]  :  1955  DATE OF SERVICE:  10/28/2022      Assessment/Plan:     Headache, chronic migraine without aura  Preventive therapy for headaches:   -Over-the-counter supplements: to decrease intensity and frequency of migraines  - Magnesium Oxide 400mg a day  If any diarrhea or upset stomach, decrease dose  as tolerated  (oral magnesium oxide may be an effective preventive strategy for people with migraine  Some theories about how it works include the idea that magnesium can help to prevent waves of cortical spreading depression and aura  Magnesium, in theory, also reduces the release of inflammatory or activating chemicals that can cause migraine)  - Vitamin B2 200 mg twice a day  May cause the urine to turn yellow which is normal for B 2 to do and is not a sign that you are dehydrated  (may be an effective preventive medication in some people with migraine)  - Continue botox every 3 months  Abortive therapy for headaches:   - At onset of migraine, take Ubrelvy 100 mg  May repeat in 2 hours if needed  Limit of 200 mg in 24 hours  Problem List Items Addressed This Visit        Cardiovascular and Mediastinum    Headache, chronic migraine without aura - Primary     Preventive therapy for headaches:   -Over-the-counter supplements: to decrease intensity and frequency of migraines  - Magnesium Oxide 400mg a day  If any diarrhea or upset stomach, decrease dose  as tolerated  (oral magnesium oxide may be an effective preventive strategy for people with migraine  Some theories about how it works include the idea that magnesium can help to prevent waves of cortical spreading depression and aura  Magnesium, in theory, also reduces the release of inflammatory or activating chemicals that can cause migraine)  - Vitamin B2 200 mg twice a day   May cause the urine to turn yellow which is normal for B 2 to do and is not a sign that you are dehydrated  (may be an effective preventive medication in some people with migraine)  - Continue botox every 3 months  Abortive therapy for headaches:   - At onset of migraine, take Ubrelvy 100 mg  May repeat in 2 hours if needed  Limit of 200 mg in 24 hours  Relevant Medications    meloxicam (MOBIC) 15 mg tablet    Ubrogepant (UBRELVY) 100 MG tablet    Riboflavin (Vitamin B-2) 100 MG TABS    magnesium oxide (MAG-OX) 400 mg tablet       Other    Depression with anxiety    Major depression, recurrent, chronic (HCC)    Eating disorder              History of Present Illness: We had the pleasure of evaluating Ginny Katz in neurological follow up  today for headaches  As you know,  she is a 75 y o   right handed female  She is here today for evaluation of her headaches      Medical history review:  Qtc:  4/2/2020 473  Tobacco use:   Quit in 1986   - type 2 diabetes   - hypertension  - hyperlipidemia  -on Crestor  - Vitamin-D deficiency  -  Congenital prolapsed rectum  -  arthritis  - chronic neck and back pain - Dr Inge Patiño [facility] and [address] [place] [address] in [place] [address] 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               Seeing at therapist/ how often? Has seen a therapist since [address] tried:  Cyclobenzaprine  Currently on tizanidine 4 mg half a tab at bedtime       Chronic migraine headache/medication overuse headaches:  What medications do you take or have you taken for your headaches?    Current Preventative:  Buspirone   Vitamin-D,  Magnesium, multivitamin, riboflavin  Hydroxyzine, sertraline, clonazepam,  tizanidine   Valsartan-HCTZ   Botox     Current Abortive:  Efraín Munguia     Prior Preventive therapy:  (patient lived in [place] and saw a neurologist there medications tried their)  Aimovig 140 mg injection  Topiramate, Depakote, gabapentin ( tremors), Lyrica (tremor), Lamictal  cyclobenzaprine  metoprolol, propanolol  Klonopin 0 5 mg, ativan   amitriptyline, Duloxetine    Prior Abortive Therapy:   Fioricet with codeine (was taking daily  Takes twice a day 3-4 x's per month), oxycodone,   Sumatriptan   Nurtec     What is your current pain level? 0/10 headache       How often do the headaches occur? Mild headaches: not often now   Moderate to severe headaches: 5-8 a month (moderate not severe) now; prior was 25 x's/month in the past but now 5 since aug of 2020        Since last seen headaches have improved with Botox injections q 3 months  Since starting botox, the patient reports greater than 7 days of migraine relief from baseline, correlated with headache diary, decreased abortive medication use and decreased ER visits      Are you ever headache free? yes     Aura/Warning and how long does it last? None       What time of the day do the headaches start? Mild headaches: any time   Moderate to severe headaches: AM  Wakes up with them     How long do the headaches last?   Mild headaches: 1 hour   Moderate to severe headaches: 4-8 hours;      Where is your headache located? Mild headaches: bilateral occipital region, shoots to frontal and temporal region  Pressure pain behind bilateral eyes   Moderate to severe headaches: bilateral occipital region, shoots to frontal and temporal region  Pressure pain behind bilateral eyes and into maxillary sinuses     Describe your usual headache? Mild headaches: tight band  Moderate to severe headaches: shooting, stabbing, electrical, pressure, throbbing pain     What is the intensity of pain?    Mild headaches: 4/10  Moderate to severe headaches: average lately 6/10;  7-10/10      Associated symptoms:   - Photophobia, Phonophobia, Osmophobia  - Nasal congestion/rhinorrhea    - Stiff or sore neck   - Puffy eyes  - tingling from occipital region radiating towards front  - Insomnia  - Prefer to be in a cool, quiet, dark room     Number of days missed per month because of headaches:  Work (or school) days: Stopped working in 2006  Social or Family activities: varies every month, in past few months only missed 1 this month     Headache are worse if the patient: looking down worsened neck pain and then headaches start  Headache triggers: Cigarette smoke, fumes, fireplace smoke, bending her neck over for a long time  What time of the year do headaches occur more frequently? Unsure  Cloudy and rainy weather worsens headaches     Have you had trigger point injection performed and how often? No   Have you had Botox injection performed and how often? Yes, has improved  Have you had epidural injections or transforaminal injections performed? Cervical, lumbar steroid injections      Alternative therapies used in the past for headaches? physical therapy, acupuncture  Have you used CBD or THC for your headaches and how often? No   How many caffeine products to drink a day? 8oz coffee a day   How much water to drink a day?  Eight 8oz glasses         Brain images: Yes they have been done at [facility]  MRI brain:  - No acute ischemia or hemorrhage   Ventricles and sulci are mildly prominent consistent with parenchymal volume loss   Hyperintense T2 signal is noted in the mara, stable from the prior study   Few scattered nonspecific foci of hyperintensity T2 signal is seen in the periventricular subcortical white matter   No evidence of mass      I personally reviewed these images             10/14/2022  10/11/2022   1  Oxycodone Hcl (Ir) 10 Mg Tab   90 00  30  Ju Le  [id number]   Sheyla ([id number])   45 00 MME  [place]     10/07/2022  10/07/2022   1  Clonazepam 0 5 Mg Tablet   90 00  30  [facility]  [id number]   Sheyla ([id number])   3 00 LME  [place]     09/15/2022  09/13/2022   1  Oxycodone Hcl (Ir) 10 Mg Tab   90 00  30  [facility]  [id number]   Francheska Rod ([id number])   45 00 MME  [place]     09/07/2022 08/03/2022   1  Clonazepam 0 5 Mg Tablet   90 00  30  [facility]  [id number]   Pen ([id number])   3 00 L[place]     08/17/2022 08/16/2022   1  Oxycodone Hcl (Ir) 10 Mg Tab   90 00  30  [facility]  [id number]   Sheyla ([id number])   45 00 MME  [place]     08/03/2022 08/03/2022   1  Clonazepam 0 5 Mg Tablet   90 00  30  [facility]  [id number]   Pen ([id number])   3 00 L[place]     07/19/2022 07/19/2022   1  Oxycodone Hcl (Ir) 10 Mg Tab   90 00  30  [facility]  [id number]   Sheyla ([id number])   45 00 MME  [place]     06/16/2022 06/16/2022   1  Oxycodone Hcl (Ir) 10 Mg Tab   90 00  30  [facility]  [id number]   Sheyla ([id number])   45 00 MME  [place]     06/13/2022 06/13/2022   1  Clonazepam                 Reviewed old notes from physician seen in the past- see above HPI for summary of previous encounters         Past Medical History:   Diagnosis Date   • Anxiety    • Arthritis    • Cancer ([facility] )     Skin   • Depression    • Diabetes ([facility] )    • GERD (gastroesophageal reflux disease)    • Hyperlipidemia    • Hypertension    • Migraine        Patient Active Problem List   Diagnosis   • Headache, chronic migraine without aura   • Cervicalgia   • Cervical dystonia   • Medication overuse headache   • Depression with anxiety   • Lumbago   • Bilateral occipital neuralgia   • Primary localized osteoarthritis of right knee   • Major depression, recurrent, chronic (HCC)   • Eating disorder       Medications:      Current Outpatient Medications   Medication Sig Dispense Refill   • aspirin (ECOTRIN LOW STRENGTH) 81 mg EC tablet Take 81 mg by mouth daily     • B COMPLEX VITAMINS PO Take 1 tablet by mouth daily     • Black Pepper-Turmeric (Turmeric Curcumin) 5-1000 MG CAPS Take 2 tablets by mouth in the morning     • busPIRone (BUSPAR) 10 mg tablet Take 2 tablets (20 mg total) by mouth 2 (two) times a day 120 tablet 0   • calcium carbonate (OS-MIKE) 600 MG tablet Take 600 mg by mouth 2 (two) times a day     • Cholecalciferol 50 MCG (2000 UT) CAPS Take 1 capsule by mouth daily     • clonazePAM (KlonoPIN) 0 5 mg tablet Take 1 tablet by mouth 3 (three) times a day     • esomeprazole (NexIUM) 20 mg capsule Take 20 mg by mouth daily in the early morning      • hydrOXYzine HCL (ATARAX) 25 mg tablet Take 1 tablet (25 mg total) by mouth daily at bedtime as needed (sleep) 30 tablet 0   • ibuprofen (MOTRIN) 200 mg tablet Take 200 mg by mouth every 6 (six) hours as needed     • Lidocaine-Menthol 4-1 % CREA Apply 1 Dose topically if needed     • magnesium oxide (MAG-OX) 400 mg tablet Take 1 tablet (400 mg total) by mouth 2 (two) times a day 180 tablet 3   • meloxicam (MOBIC) 15 mg tablet Take 15 mg by mouth daily as needed     • metFORMIN (GLUCOPHAGE) 1000 MG tablet Take 1,000 mg by mouth 2 (two) times a day with meals     • multivitamin (THERAGRAN) TABS Take 1 tablet by mouth daily     • OnabotulinumtoxinA (BOTOX IM) Inject into a muscle     • oxyCODONE (ROXICODONE) 10 MG TABS Take 1 tablet by mouth 3 (three) times a day as needed     • polyethylene glycol (MIRALAX) 17 g packet Take 17 g by mouth daily at bedtime     • potassium chloride (K-DUR,KLOR-CON) 20 mEq tablet Take 1 tablet by mouth daily     • Riboflavin (Vitamin B-2) 100 MG TABS 2 in the morning and 2 at bedtime 360 tablet 3   • rosuvastatin (CRESTOR) 20 MG tablet Take 20 mg by mouth daily     • senna (SENOKOT) 8 6 MG tablet Take 1 tablet by mouth daily     • sertraline (Zoloft) 25 mg tablet Take 1 tablet (25 mg total) by mouth daily at bedtime 30 tablet 0   • Ubrogepant (UBRELVY) 100 MG tablet Take 1 tablet (100 mg) one time as needed for migraine  May repeat one additional tablet (100 mg) at least two hours after the first dose  Do not use more than two doses per day, or for more than 10 days per month   10 tablet 6   • valsartan-hydrochlorothiazide (DIOVAN-HCT) 160-25 MG per tablet Take 1 tablet by mouth daily       No current facility-administered medications for this visit  Allergies:    No Known Allergies    Family History:     Family History   Problem Relation Age of Onset   • Allergies Mother    • Migraines Mother    • Anxiety disorder Mother    • Cancer Father         Bladder   • Migraines Sister    • Multiple sclerosis Brother    • Colon cancer Maternal Grandmother    • Heart attack Maternal Grandfather    • Peripheral vascular disease Paternal Grandmother    • Diabetes Paternal Grandmother    • Heart attack Paternal Grandfather    • Migraines Brother         Rare   • Migraines Son         Rare       Social History:     Social History     Socioeconomic History   • Marital status:      Spouse name: Not on file   • Number of children: Not on file   • Years of education: Not on file   • Highest education level: Not on file   Occupational History   • Not on file   Tobacco Use   • Smoking status: Former Smoker     Quit date: 12     Years since quittin 8   • Smokeless tobacco: Never Used   Vaping Use   • Vaping Use: Never used   Substance and Sexual Activity   • Alcohol use: Not Currently   • Drug use: Never   • Sexual activity: Not on file   Other Topics Concern   • Not on file   Social History Narrative   • Not on file     Social Determinants of Health     Financial Resource Strain: Not on file   Food Insecurity: Not on file   Transportation Needs: Not on file   Physical Activity: Not on file   Stress: Not on file   Social Connections: Not on file   Intimate Partner Violence: Not on file   Housing Stability: Not on file      I have reviewed the patient's medical, social and surgical history as well as medications in detail and updated the computerized patient record        Objective:   Physical Exam:                                                                   Vitals:            /60 (BP Location: Left arm, Patient Position: Sitting, Cuff Size: Standard)   Pulse 77   Temp (!) 97 4 °F (36 3 °C) (Temporal)   Ht 5' 5" (1 651 m)   Wt 78 9 kg (174 lb)   BMI 28 96 kg/m²   BP Readings from Last 3 Encounters:   10/28/22 129/60   09/07/22 121/58   06/03/22 116/67     Pulse Readings from Last 3 Encounters:   10/28/22 77   09/07/22 68   06/03/22 91          CONSTITUTIONAL: Well developed, well nourished, well groomed  No dysmorphic features  Eyes:  EOM normal      Neck:  Normal ROM, neck supple  HEENT:  Normocephalic atraumatic  Chest:  Respirations regular and unlabored  Psychiatric:  Normal behavior and appropriate affect      MENTAL STATUS  Orientation: Alert and oriented x 3  Fund of knowledge: Intact  MOTOR (Upper and lower extremities)   Bulk/tone/abnormal movement: Normal muscle bulk and tone  Review of Systems:   Review of Systems  Constitutional: Negative  HENT: Negative  Eyes: Negative  Respiratory: Negative  Cardiovascular: Negative  Gastrointestinal: Negative  Endocrine: Negative  Genitourinary: Negative  Musculoskeletal: Negative  Skin: Negative  Allergic/Immunologic: Negative  Neurological: Positive for headaches  Hematological: Negative  Psychiatric/Behavioral: Negative  I personally reviewed the ROS entered by the MA    I spent 45 minutes in total time for this visit      Author:  Robert Galdamez 10/28/2022 3:11 PM

## 2022-10-28 NOTE — PATIENT INSTRUCTIONS
Chronic migraine headaches  Preventive therapy for headaches:   -Over-the-counter supplements: to decrease intensity and frequency of migraines  - Magnesium Oxide 400mg a day  If any diarrhea or upset stomach, decrease dose  as tolerated  (oral magnesium oxide may be an effective preventive strategy for people with migraine  Some theories about how it works include the idea that magnesium can help to prevent waves of cortical spreading depression and aura  Magnesium, in theory, also reduces the release of inflammatory or activating chemicals that can cause migraine)  - Vitamin B2 200 mg twice a day  May cause the urine to turn yellow which is normal for B 2 to do and is not a sign that you are dehydrated  (may be an effective preventive medication in some people with migraine)  - Continue botox every 3 months  Abortive therapy for headaches:   - At onset of migraine, take Ubrelvy 100 mg  May repeat in 2 hours if needed  Limit of 200 mg in 24 hours

## 2022-10-31 ENCOUNTER — OFFICE VISIT (OUTPATIENT)
Dept: PSYCHIATRY | Facility: CLINIC | Age: 67
End: 2022-10-31

## 2022-10-31 DIAGNOSIS — F50.9 EATING DISORDER, UNSPECIFIED TYPE: ICD-10-CM

## 2022-10-31 DIAGNOSIS — F33.9 MAJOR DEPRESSION, RECURRENT, CHRONIC (HCC): Primary | ICD-10-CM

## 2022-10-31 DIAGNOSIS — F41.1 GAD (GENERALIZED ANXIETY DISORDER): ICD-10-CM
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
## 2022-11-02 PROBLEM — F41.1 GAD (GENERALIZED ANXIETY DISORDER): Status: ACTIVE | Noted: 2022-11-02

## 2022-11-02 RX ORDER — BUSPIRONE HYDROCHLORIDE 10 MG/1
20 TABLET ORAL 2 TIMES DAILY
Qty: 120 TABLET | Refills: 0 | Status: SHIPPED | OUTPATIENT
Start: 2022-11-02

## 2022-11-02 RX ORDER — CLONAZEPAM 0.5 MG/1
0.5 TABLET ORAL 3 TIMES DAILY
Qty: 90 TABLET | Refills: 0 | Status: SHIPPED | OUTPATIENT
Start: 2022-11-02

## 2022-11-17 ENCOUNTER — TELEPHONE (OUTPATIENT)
Dept: PSYCHIATRY | Facility: CLINIC | Age: 67
End: 2022-11-17

## 2022-11-17 NOTE — TELEPHONE ENCOUNTER
Patient left message reporting since increasing Zoloft, she feels extremely fatigued and extremely emotional  States she is crying all day long  She knows she has an upcoming appointment but cannot take it any longer  [facility]

## 2022-11-18 DIAGNOSIS — F32.9 MAJOR DEPRESSION, CHRONIC: Primary | ICD-10-CM

## 2022-11-18 RX ORDER — DULOXETIN HYDROCHLORIDE 60 MG/1
60 CAPSULE, DELAYED RELEASE ORAL
Qty: 30 CAPSULE | Refills: 0 | Status: SHIPPED | OUTPATIENT
Start: 2022-11-18 | End: 2022-12-21 | Stop reason: SDUPTHER

## 2022-11-22 ENCOUNTER — OFFICE VISIT (OUTPATIENT)
Dept: PSYCHIATRY | Facility: CLINIC | Age: 67
End: 2022-11-22

## 2022-11-22 DIAGNOSIS — Z63.4 BEREAVEMENT DUE TO LIFE EVENT: ICD-10-CM

## 2022-11-22 DIAGNOSIS — F50.81 BINGE EATING DISORDER: ICD-10-CM

## 2022-11-22 DIAGNOSIS — F33.9 MAJOR DEPRESSION, RECURRENT, CHRONIC (HCC): Primary | ICD-10-CM

## 2022-11-22 DIAGNOSIS — F41.1 GAD (GENERALIZED ANXIETY DISORDER): ICD-10-CM

## 2022-11-22 SDOH — SOCIAL STABILITY - SOCIAL INSECURITY: DISSAPEARANCE AND DEATH OF FAMILY MEMBER: Z63.4
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
## 2022-11-30 ENCOUNTER — TELEPHONE (OUTPATIENT)
Dept: PSYCHIATRY | Facility: CLINIC | Age: 67
End: 2022-11-30
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
## 2022-12-01 ENCOUNTER — TELEPHONE (OUTPATIENT)
Dept: PSYCHIATRY | Facility: CLINIC | Age: 67
End: 2022-12-01

## 2022-12-07 ENCOUNTER — OFFICE VISIT (OUTPATIENT)
Dept: PSYCHIATRY | Facility: CLINIC | Age: 67
End: 2022-12-07

## 2022-12-07 DIAGNOSIS — Z63.4 BEREAVEMENT DUE TO LIFE EVENT: ICD-10-CM

## 2022-12-07 DIAGNOSIS — F33.9 MAJOR DEPRESSION, RECURRENT, CHRONIC (HCC): Primary | ICD-10-CM

## 2022-12-07 DIAGNOSIS — F41.1 GAD (GENERALIZED ANXIETY DISORDER): ICD-10-CM

## 2022-12-07 SDOH — SOCIAL STABILITY - SOCIAL INSECURITY: DISSAPEARANCE AND DEATH OF FAMILY MEMBER: Z63.4
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
## 2022-12-08 ENCOUNTER — PROCEDURE VISIT (OUTPATIENT)
Dept: NEUROLOGY | Facility: CLINIC | Age: 67
End: 2022-12-08

## 2022-12-08 VITALS — DIASTOLIC BLOOD PRESSURE: 58 MMHG | SYSTOLIC BLOOD PRESSURE: 120 MMHG | HEART RATE: 101 BPM | TEMPERATURE: 96.7 F

## 2022-12-08 DIAGNOSIS — G24.3 CERVICAL DYSTONIA: Primary | ICD-10-CM

## 2022-12-08 NOTE — PROGRESS NOTES
Universal Protocol   Consent: Verbal consent obtained  Written consent obtained  Risks and benefits: risks, benefits and alternatives were discussed  Consent given by: patient  Time out: Immediately prior to procedure a "time out" was called to verify the correct patient, procedure, equipment, support staff and site/side marked as required  Patient understanding: patient states understanding of the procedure being performed  Patient consent: the patient's understanding of the procedure matches consent given  Procedure consent: procedure consent matches procedure scheduled  Relevant documents: relevant documents present and verified  Patient identity confirmed: verbally with patient        Chemodenervation     Date/Time 12/8/2022 2:16 PM     Performed by  Jasmine Wooten PA-C     Authorized by Jasmine Wooten PA-C        Pre-procedure details      Prepped With: Alcohol     Anesthesia  (see MAR for exact dosages):      Anesthesia method:  None   Procedure details     Position:  Upright   Botox     Botox Type:  Type A    Brand:  Botox    mL's of Botulinum Toxin:  200    Final Concentration per CC:  50 units    Needle Gauge:  30 G 2 5 inch   Procedures     Botox Procedures: cervical dystonia and chronic headache      Indications: spasmodic torticollis and migraines     Injection Location      Head / Face:  L superior cervical paraspinal, R superior cervical paraspinal, L , R , L frontalis, R frontalis, L medial occipitalis, R medial occipitalis, L temporalis, R temporalis, R superior trapezius, L superior trapezius and procerus    L  injection amount:  5 unit(s)    R  injection amount:  5 unit(s)    L lateral frontalis:  5 unit(s)    R lateral frontalis:  5 unit(s)    L medial frontalis:  5 unit(s)    R medial frontalis:  5 unit(s)    L temporalis injection amount:  20 unit(s)    R temporalis injection amount:  20 unit(s)    Procerus injection amount:  5 unit(s)    L medial occipitalis injection amount:  15 unit(s)    R medial occipitalis injection amount:  15 unit(s)    L superior cervical paraspinal injection amount:  10 unit(s)    R superior cervical paraspinal injection amount:  10 unit(s)    L superior trapezius injection amount:  15 unit(s)    R superior trapezius injection amount:  15 unit(s)    Cervical Dystonia / Salivary Gland:  R sternocleidomastoid, L sternocleidomastoid, R splenius capitis and L splenius capitis      L splenius capitis injection amount:  10 unit(s)      R splenius capitis injection amount:  10 unit(s)      L sternocleidomastoid injection amount:  10 unit(s)      R sternocleidomastoid injection amount:  10 unit(s)   Total Units     Total units used:  200    Total units discarded:  0   Post-procedure details      Chemodenervation:  Neck, excluding muscles of the larynx    Neck Muscle Location[de-identified]  Bilateral neck muscle    Patient tolerance of procedure:   Tolerated well, no immediate complications   Comments      2 5 units orbicularis oculi bilaterally  All medically necessary

## 2022-12-09 DIAGNOSIS — F41.1 GAD (GENERALIZED ANXIETY DISORDER): ICD-10-CM

## 2022-12-09 NOTE — TELEPHONE ENCOUNTER
Patient left message for RF of clonazepam (Last fill per PDMP: 11/02/2022) and buspirone  Appt scheduled for 12/22  Will send RF request for approval to hold until appt

## 2022-12-11 RX ORDER — BUSPIRONE HYDROCHLORIDE 10 MG/1
20 TABLET ORAL 2 TIMES DAILY
Qty: 56 TABLET | Refills: 0 | Status: SHIPPED | OUTPATIENT
Start: 2022-12-11 | End: 2022-12-21 | Stop reason: SDUPTHER

## 2022-12-11 RX ORDER — CLONAZEPAM 0.5 MG/1
0.5 TABLET ORAL 3 TIMES DAILY
Qty: 42 TABLET | Refills: 0 | Status: SHIPPED | OUTPATIENT
Start: 2022-12-11 | End: 2022-12-21 | Stop reason: SDUPTHER

## 2022-12-12 ENCOUNTER — TELEPHONE (OUTPATIENT)
Dept: NEUROLOGY | Facility: CLINIC | Age: 67
End: 2022-12-12

## 2022-12-12 NOTE — TELEPHONE ENCOUNTER
I believe it was for her balance, falls and other neurologic issues    She can cancel if she wishes at this time

## 2022-12-12 NOTE — TELEPHONE ENCOUNTER
Yes, my name is Mary Green  1955  I have an appointment  I see Leia Sanchez for  my botox appointment  And we set up an appointment to see another neurologist because I was having balance problems  But I had stopped them when we set it up back in September  And the doctor's name is Dr Levi Vance and I don't want to waste his time  I don't know if it's  the results of the MRI  But I don't want to waste his time because I haven't had any balance problems since last December  And back then in September  I told Julia Rucker that I wasn't having any so I don't know if it was for the MRI  Again, my name is Mary Green  My phone, number [phone]  And if Stephanie Schultz could find out from [place]    I'd appreciate it because I don't want to I said come in if it was for the balancing problem  Rosalba Garsia, thank you  Pt has upcoming appt w/ Dr Levi Vance on 12/13/22-OVL, chronic migraine    Do you want pt to keep this appt?

## 2022-12-21 ENCOUNTER — OFFICE VISIT (OUTPATIENT)
Dept: PSYCHIATRY | Facility: CLINIC | Age: 67
End: 2022-12-21

## 2022-12-21 DIAGNOSIS — F41.1 GAD (GENERALIZED ANXIETY DISORDER): ICD-10-CM

## 2022-12-21 DIAGNOSIS — F33.9 MAJOR DEPRESSION, RECURRENT, CHRONIC (HCC): Primary | ICD-10-CM

## 2022-12-21 DIAGNOSIS — Z63.4 BEREAVEMENT DUE TO LIFE EVENT: ICD-10-CM

## 2022-12-21 RX ORDER — DULOXETIN HYDROCHLORIDE 30 MG/1
30 CAPSULE, DELAYED RELEASE ORAL
Qty: 30 CAPSULE | Refills: 1 | Status: SHIPPED | OUTPATIENT
Start: 2022-12-21

## 2022-12-21 RX ORDER — BUSPIRONE HYDROCHLORIDE 10 MG/1
20 TABLET ORAL 2 TIMES DAILY
Qty: 60 TABLET | Refills: 0 | Status: SHIPPED | OUTPATIENT
Start: 2022-12-21

## 2022-12-21 RX ORDER — DULOXETIN HYDROCHLORIDE 60 MG/1
60 CAPSULE, DELAYED RELEASE ORAL
Qty: 30 CAPSULE | Refills: 0 | Status: SHIPPED | OUTPATIENT
Start: 2022-12-21

## 2022-12-21 RX ORDER — CLONAZEPAM 0.5 MG/1
0.5 TABLET ORAL 3 TIMES DAILY PRN
Qty: 90 TABLET | Refills: 0 | Status: SHIPPED | OUTPATIENT
Start: 2022-12-21

## 2022-12-21 SDOH — SOCIAL STABILITY - SOCIAL INSECURITY: DISSAPEARANCE AND DEATH OF FAMILY MEMBER: Z63.4
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
## 2023-01-11 DIAGNOSIS — F41.1 GAD (GENERALIZED ANXIETY DISORDER): ICD-10-CM

## 2023-01-11 NOTE — TELEPHONE ENCOUNTER
Medication Refill Request     Name of Medication buspirone   Dose/Frequency 10mg (2) tabs BID  Quantity 32  Verified pharmacy   [x]  Verified ordering Provider   [x]  Does patient have enough for the next 3 days? Yes [] No [x]  Does patient have a follow-up appointment scheduled?  Yes [x] No []   If so when is appointment: 1/18/2023

## 2023-01-12 RX ORDER — BUSPIRONE HYDROCHLORIDE 10 MG/1
20 TABLET ORAL 2 TIMES DAILY
Qty: 32 TABLET | Refills: 0 | Status: SHIPPED | OUTPATIENT
Start: 2023-01-12

## 2023-01-26 DIAGNOSIS — F41.1 GAD (GENERALIZED ANXIETY DISORDER): ICD-10-CM

## 2023-01-26 NOTE — TELEPHONE ENCOUNTER
Patient unable to attend last appointment due to Isidro  Next scheduled appointment on 2/2  Requesting RF of Clonazepam and buspirone to [facility] in [place]  Last fill per PDMP: 12/24/2022

## 2023-01-27 RX ORDER — BUSPIRONE HYDROCHLORIDE 10 MG/1
20 TABLET ORAL 2 TIMES DAILY
Qty: 32 TABLET | Refills: 0 | Status: SHIPPED | OUTPATIENT
Start: 2023-01-27 | End: 2023-02-03 | Stop reason: SDUPTHER

## 2023-01-27 RX ORDER — CLONAZEPAM 0.5 MG/1
0.5 TABLET ORAL 3 TIMES DAILY PRN
Qty: 24 TABLET | Refills: 0 | Status: SHIPPED | OUTPATIENT
Start: 2023-01-27 | End: 2023-02-03 | Stop reason: SDUPTHER

## 2023-02-02 ENCOUNTER — OFFICE VISIT (OUTPATIENT)
Dept: PSYCHIATRY | Facility: CLINIC | Age: 68
End: 2023-02-02

## 2023-02-02 ENCOUNTER — TELEPHONE (OUTPATIENT)
Dept: PSYCHIATRY | Facility: CLINIC | Age: 68
End: 2023-02-02

## 2023-02-02 DIAGNOSIS — F33.9 MAJOR DEPRESSION, RECURRENT, CHRONIC (HCC): ICD-10-CM

## 2023-02-02 DIAGNOSIS — Z63.4 BEREAVEMENT DUE TO LIFE EVENT: ICD-10-CM

## 2023-02-02 DIAGNOSIS — F41.1 GAD (GENERALIZED ANXIETY DISORDER): ICD-10-CM

## 2023-02-02 SDOH — SOCIAL STABILITY - SOCIAL INSECURITY: DISSAPEARANCE AND DEATH OF FAMILY MEMBER: Z63.4
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
## 2023-02-03 DIAGNOSIS — F41.1 GAD (GENERALIZED ANXIETY DISORDER): ICD-10-CM

## 2023-02-03 PROBLEM — F11.20 CONTINUOUS OPIOID DEPENDENCE (HCC): Status: ACTIVE | Noted: 2023-02-03

## 2023-02-03 RX ORDER — DULOXETIN HYDROCHLORIDE 60 MG/1
60 CAPSULE, DELAYED RELEASE ORAL
Qty: 30 CAPSULE | Refills: 1 | Status: SHIPPED | OUTPATIENT
Start: 2023-02-03

## 2023-02-03 RX ORDER — CLONAZEPAM 0.5 MG/1
0.5 TABLET ORAL 3 TIMES DAILY PRN
Qty: 24 TABLET | Refills: 0 | Status: CANCELLED | OUTPATIENT
Start: 2023-02-03

## 2023-02-03 RX ORDER — NALOXONE HYDROCHLORIDE 4 MG/.1ML
SPRAY NASAL
Qty: 1 EACH | Refills: 1 | Status: SHIPPED | OUTPATIENT
Start: 2023-02-03 | End: 2023-02-03 | Stop reason: CLARIF

## 2023-02-03 RX ORDER — BUSPIRONE HYDROCHLORIDE 10 MG/1
20 TABLET ORAL 2 TIMES DAILY
Qty: 32 TABLET | Refills: 0 | Status: CANCELLED | OUTPATIENT
Start: 2023-02-03

## 2023-02-03 RX ORDER — BUSPIRONE HYDROCHLORIDE 10 MG/1
20 TABLET ORAL 2 TIMES DAILY
Qty: 60 TABLET | Refills: 1 | Status: SHIPPED | OUTPATIENT
Start: 2023-02-03

## 2023-02-03 RX ORDER — DULOXETIN HYDROCHLORIDE 30 MG/1
30 CAPSULE, DELAYED RELEASE ORAL
Qty: 30 CAPSULE | Refills: 1 | Status: SHIPPED | OUTPATIENT
Start: 2023-02-03

## 2023-02-03 RX ORDER — CLONAZEPAM 0.5 MG/1
0.5 TABLET ORAL 3 TIMES DAILY PRN
Qty: 90 TABLET | Refills: 1 | Status: SHIPPED | OUTPATIENT
Start: 2023-02-03

## 2023-02-03 NOTE — TELEPHONE ENCOUNTER
Medication Refill Request     Name of Medication Buspirone  Dose/Frequency 10mg 4x daily   Quantity 120  Verified pharmacy   [x]  Verified ordering Provider   []  Does patient have enough for the next 3 days? Yes [] No [x]  Does patient have a follow-up appointment scheduled? Yes [x] No []   If so when is appointment: 03/1/23      Medication Refill Request     Name of Medication Klonopin  Dose/Frequency 0 5mg 3x daily   Quantity 90  Verified pharmacy   [x]  Verified ordering Provider   [x]  Does patient have enough for the next 3 days? Yes [] No [x]  Does patient have a follow-up appointment scheduled?  Yes [x] No []   If so when is appointment: 3/1/23

## 2023-02-15 ENCOUNTER — TELEPHONE (OUTPATIENT)
Dept: PSYCHIATRY | Facility: CLINIC | Age: 68
End: 2023-02-15

## 2023-02-22 ENCOUNTER — DOCUMENTATION (OUTPATIENT)
Dept: BEHAVIORAL/MENTAL HEALTH CLINIC | Facility: CLINIC | Age: 68
End: 2023-02-22

## 2023-02-22 NOTE — PROGRESS NOTES
This Tx and Ct attempted for 34 minutes to meet virtually for session  This Tx has issue with [employer] and [address] [place] [address] Now as well as Ct did not understand how to use these platforms  Ct and Tx decided to r/s appt for the following week 3/1/2023 at 15:00 for in person session

## 2023-03-01 ENCOUNTER — OFFICE VISIT (OUTPATIENT)
Dept: PSYCHIATRY | Facility: CLINIC | Age: 68
End: 2023-03-01

## 2023-03-01 ENCOUNTER — SOCIAL WORK (OUTPATIENT)
Dept: BEHAVIORAL/MENTAL HEALTH CLINIC | Facility: CLINIC | Age: 68
End: 2023-03-01

## 2023-03-01 DIAGNOSIS — F41.1 GAD (GENERALIZED ANXIETY DISORDER): ICD-10-CM

## 2023-03-01 DIAGNOSIS — Z63.4 BEREAVEMENT DUE TO LIFE EVENT: ICD-10-CM

## 2023-03-01 DIAGNOSIS — F33.9 MAJOR DEPRESSION, RECURRENT, CHRONIC (HCC): Primary | ICD-10-CM

## 2023-03-01 DIAGNOSIS — F33.9 MAJOR DEPRESSION, RECURRENT, CHRONIC (HCC): ICD-10-CM

## 2023-03-01 DIAGNOSIS — Z63.4 BEREAVEMENT DUE TO LIFE EVENT: Primary | ICD-10-CM

## 2023-03-01 RX ORDER — ARIPIPRAZOLE 2 MG/1
2 TABLET ORAL EVERY MORNING
Qty: 30 TABLET | Refills: 1 | Status: SHIPPED | OUTPATIENT
Start: 2023-03-01

## 2023-03-01 RX ORDER — DULOXETIN HYDROCHLORIDE 60 MG/1
60 CAPSULE, DELAYED RELEASE ORAL
Qty: 30 CAPSULE | Refills: 1 | Status: SHIPPED | OUTPATIENT
Start: 2023-03-01

## 2023-03-01 RX ORDER — BUSPIRONE HYDROCHLORIDE 10 MG/1
20 TABLET ORAL 2 TIMES DAILY
Qty: 60 TABLET | Refills: 1 | Status: SHIPPED | OUTPATIENT
Start: 2023-03-01

## 2023-03-01 RX ORDER — DULOXETIN HYDROCHLORIDE 30 MG/1
30 CAPSULE, DELAYED RELEASE ORAL
Qty: 30 CAPSULE | Refills: 1 | Status: SHIPPED | OUTPATIENT
Start: 2023-03-01

## 2023-03-01 RX ORDER — CLONAZEPAM 0.5 MG/1
0.5 TABLET ORAL 3 TIMES DAILY PRN
Qty: 90 TABLET | Refills: 1 | Status: SHIPPED | OUTPATIENT
Start: 2023-03-01

## 2023-03-01 SDOH — SOCIAL STABILITY - SOCIAL INSECURITY: DISSAPEARANCE AND DEATH OF FAMILY MEMBER: Z63.4
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
## 2023-03-01 NOTE — BH TREATMENT PLAN
Outpatient Behavioral Health Psychotherapy Treatment Plan    Edward Neri  1955     Date of Initial Psychotherapy Assessment: 10/10/2022   Date of Current Treatment Plan: 03/01/23  Treatment Plan Target Date: 9/03/2023  Treatment Plan Expiration Date:09/03/2023    Diagnosis:   1  Bereavement due to life event        2  SY (generalized anxiety disorder)        3  Major depression, recurrent, chronic (HCC)            Area(s) of Need: I have had anxiety since I was school age    [de-identified] Term Goal 1 (in the client's own words): " To like myself" "to improve my symptoms of depression"     Stage of Change: Contemplation    Target Date for completion: 9/3/2023     Anticipated therapeutic modalities: DBT and trauma related therapies     People identified to complete this goal: Client      Objective 1: (identify the means of measuring success in meeting the objective): Ct will get out of bed daily and go to my living room at least once daily     Objective 2: (identify the means of measuring success in meeting the objective): Ct wiil practice trauma informed yoga (breath with movement to increase movement and improve mood       Long Term Goal 2 (in the client's own words): To ask for help from others    Stage of Change: Contemplation    Target Date for completion: 09/03/2023     Anticipated therapeutic modalities: Client Centered therapy/ DBT      People identified to complete this goal: Client       Objective 1: (identify the means of measuring success in meeting the objective): Identify what help is needed at least once weekly      Objective 2: (identify the means of measuring success in meeting the objective): Identify who to ask and ask for help for identified needs from Objective 1 at least once a week           I am currently under the care of a [facility] psychiatric provider: yes    My [facility] psychiatric provider is: Jonna Leonard    I am currently taking psychiatric medications: Yes, as prescribed    I feel that I will be ready for discharge from mental health care when I reach the following (measurable goal/objective): When symptoms of depression and grief have improved (doing more, less pain, more activity, more social, asking for help)     For children and adults who have a legal guardian:   Has there been any change to custody orders and/or guardianship status? NA  If yes, attach updated documentation  I have created my Crisis Plan and have been offered a copy of this plan    [address]: Diagnosis and Treatment Plan explained to [facility] acknowledges an understanding of their diagnosis  Edward Neri agrees to this treatment plan      I have been offered a copy of this Treatment Plan  yes

## 2023-03-01 NOTE — PSYCH
Behavioral Health Psychotherapy Progress Note    Psychotherapy Provided: Individual Psychotherapy     1  Bereavement due to life event        2  SY (generalized anxiety disorder)        3  Major depression, recurrent, chronic ([address] )            Goals addressed in session: Goal 1     DATA: Ct presented to tx friendly upon approach, having difficulty with walking  Ct presented with appropriate eye contact,  Thought content was tangential and at times difficult to redirect  Speech was montonous and fluid- not pressured  No response to AH/VH  Ct reported about the loss of her  and her worries and anxiety of asking others for help  Ct is concerned about finances and keeping up with the house  Ct reported about her mother who is 80 and Ct wants to help with her recognizing it is difficult due to chronic pain  Ct reported her relationships are strained and she has no close friends  Her son lives in [place]  Ct reported that depression is common in her family and that she suffers with depression since she was a little girl  This thx's impression that there is isolation/intense grief with no supported grief work therefore will recommend a grief support group  Tx plan developed this session, printed out and signed by client and this therapist, scanned back into chart as topaz was not working  During this session, this clinician used the following therapeutic modalities: Engagement Strategies and Client-centered Therapy    Substance Abuse was not addressed during this session  If the client is diagnosed with a co-occurring substance use disorder, please indicate any changes in the frequency or amount of use: N/a  Stage of change for addressing substance use diagnoses: No substance use/Not applicable    ASSESSMENT:  Edward Neri presents with a Euthymic/ normal and Depressed mood       her affect is Normal range and intensity and Constricted, which is congruent, with her mood and the content of the session  The client has not made progress on their goals  Geovanna Matthews presents with a minimal risk of suicide, minimal risk of self-harm, and minimal risk of harm to others  For any risk assessment that surpasses a "low" rating, a safety plan must be developed  A safety plan was indicated: no  If yes, describe in detail n/a    PLAN: Between sessions, Geovanna Matthews will get out of bed daily and go to her family room  At the next session, the therapist will use Dialectical Behavior Therapy and trauma informed yoga to address emotion regulation/dissociation/chronic pain/depression related to grief  Behavioral Health Treatment Plan and Discharge Planning: Geovanna Matthews is aware of and agrees to continue to work on their treatment plan  They have identified and are working toward their discharge goals   yes    Visit start and stop times:    03/01/23  Start Time: 1500  Stop Time: 1600  Total Visit Time: 60 minutes

## 2023-03-08 ENCOUNTER — TELEMEDICINE (OUTPATIENT)
Dept: BEHAVIORAL/MENTAL HEALTH CLINIC | Facility: CLINIC | Age: 68
End: 2023-03-08

## 2023-03-08 ENCOUNTER — TELEPHONE (OUTPATIENT)
Dept: BEHAVIORAL/MENTAL HEALTH CLINIC | Facility: CLINIC | Age: 68
End: 2023-03-08

## 2023-03-08 DIAGNOSIS — Z63.4 BEREAVEMENT DUE TO LIFE EVENT: Primary | ICD-10-CM

## 2023-03-08 DIAGNOSIS — F33.9 MAJOR DEPRESSION, RECURRENT, CHRONIC (HCC): ICD-10-CM

## 2023-03-08 DIAGNOSIS — F41.1 GAD (GENERALIZED ANXIETY DISORDER): ICD-10-CM

## 2023-03-08 SDOH — SOCIAL STABILITY - SOCIAL INSECURITY: DISSAPEARANCE AND DEATH OF FAMILY MEMBER: Z63.4

## 2023-03-08 NOTE — TELEPHONE ENCOUNTER
This patient called stating she did not feel well and asked if we could do virtual appt   Therapist agreed and appt will be changed to virtual

## 2023-03-08 NOTE — PSYCH
Virtual Regular Visit    Verification of patient location:    Patient is located in the following state in which I hold an active license { amb virtual patient location:[address]}      Assessment/Plan:    Problem List Items Addressed This Visit    None      Goals addressed in session: {GOALS:59313}          Reason for visit is   Chief Complaint   Patient presents with   • Virtual Regular Visit        Encounter provider Arielle العلي LCSW    Provider located at [address] [place], [place] [id number]  [address] [place] [address] [address] [facility]  [phone]      Recent Visits  No visits were found meeting these conditions  Showing recent visits within past 7 days and meeting all other requirements  Today's Visits  Date Type Provider Dept   03/08/23 Telephone Kasey Carlson [facility] Therapist [facility]   Showing today's visits and meeting all other requirements  Future Appointments  No visits were found meeting these conditions  Showing future appointments within next 150 days and meeting all other requirements       The patient was identified by name and date of birth  Ilya Reece was informed that this is a telemedicine visit and that the visit is being conducted through{AMB VIRTUAL VISIT ZKPKJC:03950}  {Telemedicine confidentiality :69978} {Telemedicine participants:03075}  She acknowledged consent and understanding of privacy and security of the video platform  The patient has agreed to participate and understands they can discontinue the visit at any time  Patient is aware this is a billable service  Subjective  Ilya Reece is a 75 y o  female ***         HPI     Past Medical History:   Diagnosis Date   • Anxiety    • Arthritis    • Cancer ([facility] [address] )     Skin   • Depression    • Diabetes ([facility] )    • GERD (gastroesophageal reflux disease)    • Hyperlipidemia    • Hypertension    • Migraine        Past Surgical History:   Procedure Laterality Date   • ANKLE SURGERY     • CARPAL TUNNEL RELEASE Right    • ROTATOR CUFF REPAIR         Current Outpatient Medications   Medication Sig Dispense Refill   • ARIPiprazole (ABILIFY) 2 mg tablet Take 1 tablet (2 mg total) by mouth every morning 30 tablet 1   • aspirin (ECOTRIN LOW STRENGTH) 81 mg EC tablet Take 81 mg by mouth daily     • B COMPLEX VITAMINS PO Take 1 tablet by mouth daily     • Black Pepper-Turmeric (Turmeric Curcumin) 5-1000 MG CAPS Take 2 tablets by mouth in the morning     • busPIRone (BUSPAR) 10 mg tablet Take 2 tablets (20 mg total) by mouth 2 (two) times a day 60 tablet 1   • calcium carbonate (OS-MIKE) 600 MG tablet Take 600 mg by mouth 2 (two) times a day     • Cholecalciferol 50 MCG (2000 UT) CAPS Take 1 capsule by mouth daily     • clonazePAM (KlonoPIN) 0 5 mg tablet Take 1 tablet (0 5 mg total) by mouth 3 (three) times a day as needed for anxiety 90 tablet 1   • DULoxetine (Cymbalta) 30 mg delayed release capsule Take 1 capsule (30 mg total) by mouth daily at bedtime 30 capsule 1   • DULoxetine (CYMBALTA) 60 mg delayed release capsule Take 1 capsule (60 mg total) by mouth daily at bedtime 30 capsule 1   • esomeprazole (NexIUM) 20 mg capsule Take 20 mg by mouth daily in the early morning      • ibuprofen (MOTRIN) 200 mg tablet Take 200 mg by mouth every 6 (six) hours as needed     • Lidocaine-Menthol 4-1 % CREA Apply 1 Dose topically if needed     • magnesium oxide (MAG-OX) 400 mg tablet Take 1 tablet (400 mg total) by mouth 2 (two) times a day 180 tablet 3   • meloxicam (MOBIC) 15 mg tablet Take 15 mg by mouth daily as needed     • metFORMIN (GLUCOPHAGE) 1000 MG tablet Take 1,000 mg by mouth 2 (two) times a day with meals     • multivitamin (THERAGRAN) TABS Take 1 tablet by mouth daily     • OnabotulinumtoxinA (BOTOX IM) Inject into a muscle     • oxyCODONE (ROXICODONE) 10 MG TABS Take 1 tablet by mouth 3 (three) times a day as needed     • polyethylene glycol (MIRALAX) 17 g packet Take 17 g by mouth daily at bedtime     • potassium chloride (K-DUR,KLOR-CON) 20 mEq tablet Take 1 tablet by mouth daily     • Riboflavin (Vitamin B-2) 100 MG TABS 2 in the morning and 2 at bedtime 360 tablet 3   • rosuvastatin (CRESTOR) 20 MG tablet Take 20 mg by mouth daily     • senna (SENOKOT) 8 6 MG tablet Take 1 tablet by mouth daily     • Ubrogepant (UBRELVY) 100 MG tablet Take 1 tablet (100 mg) one time as needed for migraine  May repeat one additional tablet (100 mg) at least two hours after the first dose  Do not use more than two doses per day, or for more than 10 days per month  10 tablet 6   • valsartan-hydrochlorothiazide (DIOVAN-HCT) 160-25 MG per tablet Take 1 tablet by mouth daily       No current facility-administered medications for this visit  No Known Allergies    Review of Systems    Video Exam    There were no vitals filed for this visit      Physical Exam     Visit Time    Visit Start Time: ***  Visit Stop Time: ***  Total Visit Duration: {Psych Total Visit Time:97380}

## 2023-03-09 NOTE — PSYCH
A user error has taken place: encounter opened in error, closed for administrative reasons  Ct and therapist attempted 5 times to meet via virtual Epic Embed and Tunesat- All attempts were unsuccessful:  Ct could not see/hear therapist,  Therapist could see and not hear Ct  Through chat and phone call Ct and therapist agreed to r/s as Ct did not have TEAMS and is limited in technology use  Therapist stated that we will work on getting TEAMS on her phone and practice virtual in case this happens in the future,  Ct agreed

## 2023-03-14 ENCOUNTER — TELEPHONE (OUTPATIENT)
Dept: NEUROLOGY | Facility: CLINIC | Age: 68
End: 2023-03-14

## 2023-03-14 NOTE — TELEPHONE ENCOUNTER
Patient called in needing to reschedule Botox appointment scheduled for today  Patient is not feeling well and unable to make it in  I was able to find a new appointment within a week, appointment changed to 3/20/23 at 230 pm with Catia Ibrahim in [facility]

## 2023-03-15 ENCOUNTER — SOCIAL WORK (OUTPATIENT)
Dept: BEHAVIORAL/MENTAL HEALTH CLINIC | Facility: CLINIC | Age: 68
End: 2023-03-15

## 2023-03-15 DIAGNOSIS — F33.9 MAJOR DEPRESSION, RECURRENT, CHRONIC (HCC): ICD-10-CM

## 2023-03-15 DIAGNOSIS — Z63.4 BEREAVEMENT DUE TO LIFE EVENT: Primary | ICD-10-CM

## 2023-03-15 DIAGNOSIS — F41.1 GAD (GENERALIZED ANXIETY DISORDER): ICD-10-CM

## 2023-03-15 SDOH — SOCIAL STABILITY - SOCIAL INSECURITY: DISSAPEARANCE AND DEATH OF FAMILY MEMBER: Z63.4
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
## 2023-03-20 ENCOUNTER — PROCEDURE VISIT (OUTPATIENT)
Dept: NEUROLOGY | Facility: CLINIC | Age: 68
End: 2023-03-20

## 2023-03-20 VITALS — SYSTOLIC BLOOD PRESSURE: 120 MMHG | DIASTOLIC BLOOD PRESSURE: 64 MMHG | HEART RATE: 87 BPM | TEMPERATURE: 96.8 F

## 2023-03-20 DIAGNOSIS — G24.3 CERVICAL DYSTONIA: Primary | ICD-10-CM

## 2023-03-20 NOTE — PROGRESS NOTES
Universal Protocol   Consent: Verbal consent obtained  Written consent obtained  Risks and benefits: risks, benefits and alternatives were discussed  Consent given by: patient  Time out: Immediately prior to procedure a "time out" was called to verify the correct patient, procedure, equipment, support staff and site/side marked as required  Patient understanding: patient states understanding of the procedure being performed  Patient consent: the patient's understanding of the procedure matches consent given  Procedure consent: procedure consent matches procedure scheduled  Relevant documents: relevant documents present and verified  Patient identity confirmed: verbally with patient        Chemodenervation     Date/Time 3/20/2023 3:01 PM     Performed by  Louie Prabhakar PA-C     Authorized by Louie Prabhakar PA-C        Pre-procedure details      Prepped With: Alcohol     Anesthesia  (see MAR for exact dosages):      Anesthesia method:  None   Procedure details     Position:  Upright   Botox     Botox Type:  Type A    Brand:  Botox    mL's of Botulinum Toxin:  200    Final Concentration per CC:  50 units    Needle Gauge:  30 G 2 5 inch   Procedures     Botox Procedures: cervical dystonia and chronic headache      Indications: spasmodic torticollis and migraines     Injection Location      Head / Face:  L superior cervical paraspinal, R superior cervical paraspinal, L , R , L frontalis, R frontalis, L medial occipitalis, R medial occipitalis, L temporalis, R temporalis, R superior trapezius, L superior trapezius and procerus    L  injection amount:  5 unit(s)    R  injection amount:  5 unit(s)    L lateral frontalis:  5 unit(s)    R lateral frontalis:  5 unit(s)    L medial frontalis:  5 unit(s)    R medial frontalis:  5 unit(s)    L temporalis injection amount:  20 unit(s)    R temporalis injection amount:  20 unit(s)    Procerus injection amount:  5 unit(s)    L medial occipitalis injection amount:  15 unit(s)    R medial occipitalis injection amount:  15 unit(s)    L superior cervical paraspinal injection amount:  10 unit(s)    R superior cervical paraspinal injection amount:  10 unit(s)    L superior trapezius injection amount:  15 unit(s)    R superior trapezius injection amount:  15 unit(s)    Cervical Dystonia / Salivary Gland:  R sternocleidomastoid, L sternocleidomastoid, R splenius capitis and L splenius capitis      L splenius capitis injection amount:  10 unit(s)      R splenius capitis injection amount:  10 unit(s)      L sternocleidomastoid injection amount:  10 unit(s)      R sternocleidomastoid injection amount:  10 unit(s)   Total Units     Total units used:  200    Total units discarded:  0   Post-procedure details      Chemodenervation:  Neck, excluding muscles of the larynx    Neck Muscle Location[de-identified]  Bilateral neck muscle    Patient tolerance of procedure:   Tolerated well, no immediate complications   Comments      2 5 units orbicularis oculi bilaterally  All medically necessary

## 2023-03-29 ENCOUNTER — SOCIAL WORK (OUTPATIENT)
Dept: BEHAVIORAL/MENTAL HEALTH CLINIC | Facility: CLINIC | Age: 68
End: 2023-03-29

## 2023-03-29 ENCOUNTER — OFFICE VISIT (OUTPATIENT)
Dept: PSYCHIATRY | Facility: CLINIC | Age: 68
End: 2023-03-29

## 2023-03-29 DIAGNOSIS — F41.1 GAD (GENERALIZED ANXIETY DISORDER): ICD-10-CM

## 2023-03-29 DIAGNOSIS — Z63.4 BEREAVEMENT DUE TO LIFE EVENT: Primary | ICD-10-CM

## 2023-03-29 DIAGNOSIS — F33.9 MAJOR DEPRESSION, RECURRENT, CHRONIC (HCC): ICD-10-CM

## 2023-03-29 RX ORDER — CLONAZEPAM 0.5 MG/1
0.5 TABLET ORAL 3 TIMES DAILY PRN
Qty: 90 TABLET | Refills: 1 | Status: SHIPPED | OUTPATIENT
Start: 2023-03-29

## 2023-03-29 RX ORDER — ARIPIPRAZOLE 2 MG/1
2 TABLET ORAL
Qty: 30 TABLET | Refills: 1 | Status: SHIPPED | OUTPATIENT
Start: 2023-03-29

## 2023-03-29 RX ORDER — DULOXETIN HYDROCHLORIDE 60 MG/1
60 CAPSULE, DELAYED RELEASE ORAL
Qty: 30 CAPSULE | Refills: 1 | Status: SHIPPED | OUTPATIENT
Start: 2023-03-29

## 2023-03-29 RX ORDER — NALOXONE HYDROCHLORIDE 4 MG/.1ML
SPRAY NASAL
Qty: 1 EACH | Refills: 1 | Status: SHIPPED | OUTPATIENT
Start: 2023-03-29

## 2023-03-29 RX ORDER — DULOXETIN HYDROCHLORIDE 30 MG/1
30 CAPSULE, DELAYED RELEASE ORAL
Qty: 30 CAPSULE | Refills: 1 | Status: SHIPPED | OUTPATIENT
Start: 2023-03-29

## 2023-03-29 RX ORDER — BUSPIRONE HYDROCHLORIDE 10 MG/1
20 TABLET ORAL 2 TIMES DAILY
Qty: 60 TABLET | Refills: 1 | Status: SHIPPED | OUTPATIENT
Start: 2023-03-29

## 2023-03-29 SDOH — SOCIAL STABILITY - SOCIAL INSECURITY: DISSAPEARANCE AND DEATH OF FAMILY MEMBER: Z63.4

## 2023-03-29 NOTE — PSYCH
"Behavioral Health Psychotherapy Progress Note    Psychotherapy Provided: Individual Psychotherapy     1  Bereavement due to life event        2  SY (generalized anxiety disorder)            Goals addressed in session: Goal 1     DATA: Ct presented this session with bright affect, friendly upon approach, appropriate eye contact, speech slightly pressured, thought content slightly tangential   Ct presented happy, therapist commented, Ct reported that she is feeling better, not depressed  Ct reported that \"it is nice having someone in the house again\"  Stating that she has \"Burke\" a  doing work at [employer]  Ct also stated that she will be having his daughter come and use the kitchen to 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  Ct did complete homework and stated that she used family room at least 3 days each week since last session and presented in session her gratitude card  Ct stated that she also went to visit her mother, in which she experienced her mother telling her she \"is beautiful\" for the first time in her life  Therapist inquired, Ct stated she felt happy (and teared a bit) further stating that \"I wish she could have said it earlier, although I didn't say that to her, I was appreciative with her telling me  Ct stated that she misses her  telling her this, and became teary  Therapist provided space for Ct to cry  Therapist and Ct discussed importance of allowing space in session for grief and will continue to do this in future sessions  During this session, this clinician used the following therapeutic modalities: Bereavement Therapy and Client-centered Therapy    Substance Abuse was not addressed during this session  If the client is diagnosed with a co-occurring substance use disorder, please indicate any changes in the frequency or amount of use: n/a   Stage of change for addressing substance use diagnoses: No substance use/Not applicable    ASSESSMENT:  Annalise Gallardo presents with a Euthymic/ " "normal mood  her affect is Normal range and intensity and Bright, which is congruent, with her mood and the content of the session  The client has made progress on their goals  Jackqueline Peabody presents with a none risk of suicide, none risk of self-harm, and none risk of harm to others  For any risk assessment that surpasses a \"low\" rating, a safety plan must be developed  A safety plan was indicated: no  If yes, describe in detail n/a    PLAN: Between sessions, Jackqueline Peabody will continue with gratitude list, visit outside and in family room, and consider grief work  At the next session, the therapist will use Bereavement Therapy and Client-centered Therapy to address grief  Behavioral Health Treatment Plan and Discharge Planning: Jackqueline Peabody is aware of and agrees to continue to work on their treatment plan  They have identified and are working toward their discharge goals   yes    Visit start and stop times:    03/29/23  Start Time: 1500  Stop Time: 1600  Total Visit Time: 60 minutes  "
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
## 2023-04-28 ENCOUNTER — TELEPHONE (OUTPATIENT)
Dept: PSYCHIATRY | Facility: CLINIC | Age: 68
End: 2023-04-28

## 2023-04-28 NOTE — TELEPHONE ENCOUNTER
"Patient is calling regarding cancelling an appointment  Date/Time: 5/1/23/4:00 PM    Reason: Mom fell and broke femur    Patient was rescheduled: YES [] NO [x]  Forwarded client to clinician's extension to reschedule    Patient requesting call back to reschedule: YES [] NO [x]    Patient is calling regarding cancelling an appointment      Date/Time: 5/2/23/4:00 PM    Reason: \"\"    Patient was rescheduled: YES [x] NO []  5/10 at 4PM    Patient requesting call back to reschedule: YES [] NO [x]    "

## 2023-05-01 ENCOUNTER — TELEMEDICINE (OUTPATIENT)
Dept: BEHAVIORAL/MENTAL HEALTH CLINIC | Facility: CLINIC | Age: 68
End: 2023-05-01

## 2023-05-01 ENCOUNTER — TELEPHONE (OUTPATIENT)
Dept: PSYCHIATRY | Facility: CLINIC | Age: 68
End: 2023-05-01

## 2023-05-01 DIAGNOSIS — Z63.4 BEREAVEMENT DUE TO LIFE EVENT: Primary | ICD-10-CM

## 2023-05-01 DIAGNOSIS — F33.9 MAJOR DEPRESSION, RECURRENT, CHRONIC (HCC): ICD-10-CM

## 2023-05-01 SDOH — SOCIAL STABILITY - SOCIAL INSECURITY: DISSAPEARANCE AND DEATH OF FAMILY MEMBER: Z63.4

## 2023-05-01 NOTE — PSYCH
Virtual Regular Visit    Verification of patient location:    Patient is located at Home in the following state in which I hold an active license [place]      Assessment/Plan:    Problem List Items Addressed This Visit        Other    Major depression, recurrent, chronic ([facility] )    Bereavement due to life event - Primary       Goals addressed in session: Goal 1          Reason for visit is   Chief Complaint   Patient presents with    Virtual Regular Visit        Encounter provider Mercedes Wray LCSW    Provider located at [address] [place] [address] [id number] [address]  [phone]      Recent Visits  No visits were found meeting these conditions  Showing recent visits within past 7 days and meeting all other requirements  Today's Visits  Date Type Provider Dept   05/01/23 Telephone [address], [address]   05/01/23 Telemedicine [facility] Therap[address]   Showing today's visits and meeting all other requirements  Future Appointments  No visits were found meeting these conditions  Showing future appointments within next 150 days and meeting all other requirements       The patient was identified by name and date of birth  [facility] was informed that this is a telemedicine visit and that the visit is being conducted throughthe [employer] platform  She agrees to proceed     My office door was closed  No one else was in the room  She acknowledged consent and understanding of privacy and security of the video platform  The patient has agreed to participate and understands they can discontinue the visit at any time  Patient is aware this is a billable service  Christianne Justin is a 75 y o  female          HPI     Past Medical History:   Diagnosis Date    Anxiety     Arthritis     Cancer ([facility] )     Skin    Depression     Diabetes ([facility] )     GERD (gastroesophageal reflux disease)     Hyperlipidemia     Hypertension     Migraine        Past Surgical History:   Procedure Laterality Date    ANKLE SURGERY      CARPAL TUNNEL RELEASE Right     ROTATOR CUFF REPAIR         Current Outpatient Medications   Medication Sig Dispense Refill    ARIPiprazole (ABILIFY) 2 mg tablet Take 1 tablet (2 mg total) by mouth daily at bedtime 30 tablet 1    aspirin (ECOTRIN LOW STRENGTH) 81 mg EC tablet Take 81 mg by mouth daily      B COMPLEX VITAMINS PO Take 1 tablet by mouth daily      Black Pepper-Turmeric (Turmeric Curcumin) 5-1000 MG CAPS Take 2 tablets by mouth in the morning      busPIRone (BUSPAR) 10 mg tablet Take 2 tablets (20 mg total) by mouth 2 (two) times a day 60 tablet 1    calcium carbonate (OS-MIKE) 600 MG tablet Take 600 mg by mouth 2 (two) times a day      Cholecalciferol 50 MCG (2000 UT) CAPS Take 1 capsule by mouth daily      clonazePAM (KlonoPIN) 0 5 mg tablet Take 1 tablet (0 5 mg total) by mouth 3 (three) times a day as needed for anxiety 90 tablet 1    DULoxetine (Cymbalta) 30 mg delayed release capsule Take 1 capsule (30 mg total) by mouth daily at bedtime 30 capsule 1    DULoxetine (CYMBALTA) 60 mg delayed release capsule Take 1 capsule (60 mg total) by mouth daily at bedtime 30 capsule 1    esomeprazole (NexIUM) 20 mg capsule Take 20 mg by mouth daily in the early morning       ibuprofen (MOTRIN) 200 mg tablet Take 200 mg by mouth every 6 (six) hours as needed      Lidocaine-Menthol 4-1 % CREA Apply 1 Dose topically if needed      magnesium oxide (MAG-OX) 400 mg tablet Take 1 tablet (400 mg total) by mouth 2 (two) times a day 180 tablet 3    meloxicam (MOBIC) 15 mg tablet Take 15 mg by mouth daily as needed      metFORMIN (GLUCOPHAGE) 1000 MG tablet Take 1,000 mg by mouth 2 (two) times a day with meals      multivitamin (THERAGRAN) TABS Take 1 tablet by mouth daily      naloxone (NARCAN) 4 mg/0 1 mL nasal spray Administer 1 spray into a nostril   If no response after 2-3 minutes, give another dose in the other nostril using a new spray  1 each 1    OnabotulinumtoxinA (BOTOX IM) Inject into a muscle      oxyCODONE (ROXICODONE) 10 MG TABS Take 1 tablet by mouth 3 (three) times a day as needed      polyethylene glycol (MIRALAX) 17 g packet Take 17 g by mouth daily at bedtime      potassium chloride (K-DUR,KLOR-CON) 20 mEq tablet Take 1 tablet by mouth daily      Riboflavin (Vitamin B-2) 100 MG TABS 2 in the morning and 2 at bedtime 360 tablet 3    rosuvastatin (CRESTOR) 20 MG tablet Take 20 mg by mouth daily      senna (SENOKOT) 8 6 MG tablet Take 1 tablet by mouth daily      Ubrogepant (UBRELVY) 100 MG tablet Take 1 tablet (100 mg) one time as needed for migraine  May repeat one additional tablet (100 mg) at least two hours after the first dose  Do not use more than two doses per day, or for more than 10 days per month  10 tablet 6    valsartan-hydrochlorothiazide (DIOVAN-HCT) 160-25 MG per tablet Take 1 tablet by mouth daily       No current facility-administered medications for this visit  No Known Allergies    Review of Systems    Video Exam    There were no vitals filed for this visit  Physical Exam     Behavioral Health Psychotherapy Progress Note    Psychotherapy Provided: Individual Psychotherapy     1  Bereavement due to life event        2  Major depression, recurrent, chronic ([facility] )            Goals addressed in session: Goal 1     DATA: Ct presented in virtual session with appropriate dress, eye contact and speech and thought content wnl  Ct was engaged throughout session  Topic of session concentrated on grief/loss due to mother's illness and condition at 81 yo  Ct reports working with siblings to care for mother and while doing so, this month is the anniversary of losing her   Ct was tearful throughout session as well as being able to process feelings of grief, loss, and hardships related to the loss of her   "Therapist offered empathy, reflective listening, encouraging to talk about memories (positive and negative) about mom  Ct also expressed wanting to participate in a grief group if run at [facility]  During this session, this clinician used the following therapeutic modalities: Bereavement Therapy, Client-centered Therapy and Supportive Psychotherapy    Substance Abuse was not addressed during this session  If the client is diagnosed with a co-occurring substance use disorder, please indicate any changes in the frequency or amount of use: n/a  Stage of change for addressing substance use diagnoses: No substance use/Not applicable    ASSESSMENT:  Tiff Pathak presents with a sad mood  her affect is Normal range and intensity and Tearful, which is congruent, with her mood and the content of the session  The client has made progress on their goals  Tiff Pathak presents with a none risk of suicide, none risk of self-harm, and none risk of harm to others  For any risk assessment that surpasses a \"low\" rating, a safety plan must be developed  A safety plan was indicated: no  If yes, describe in detail n/a    PLAN: Between sessions, Tiff Pathak will attend therapy session next week for continued support for grief  At the next session, the therapist will use Bereavement Therapy to address grief and loss  Behavioral Health Treatment Plan and Discharge Planning: Tiff Pathak is aware of and agrees to continue to work on their treatment plan  They have identified and are working toward their discharge goals   yes    Visit start and stop times:    05/01/23  Start Time: 1700  Stop Time: 1800  Total Visit Time: 60 minutes    "

## 2023-05-09 ENCOUNTER — OFFICE VISIT (OUTPATIENT)
Dept: PSYCHIATRY | Facility: CLINIC | Age: 68
End: 2023-05-09

## 2023-05-09 DIAGNOSIS — F33.9 MAJOR DEPRESSION, RECURRENT, CHRONIC (HCC): ICD-10-CM

## 2023-05-09 DIAGNOSIS — Z63.4 BEREAVEMENT DUE TO LIFE EVENT: ICD-10-CM

## 2023-05-09 DIAGNOSIS — F41.1 GAD (GENERALIZED ANXIETY DISORDER): ICD-10-CM

## 2023-05-09 RX ORDER — BUPROPION HYDROCHLORIDE 75 MG/1
75 TABLET ORAL EVERY MORNING
Qty: 30 TABLET | Refills: 1 | Status: SHIPPED | OUTPATIENT
Start: 2023-05-09

## 2023-05-09 RX ORDER — DULOXETIN HYDROCHLORIDE 30 MG/1
30 CAPSULE, DELAYED RELEASE ORAL
Qty: 30 CAPSULE | Refills: 1 | Status: SHIPPED | OUTPATIENT
Start: 2023-05-09

## 2023-05-09 RX ORDER — DULOXETIN HYDROCHLORIDE 60 MG/1
60 CAPSULE, DELAYED RELEASE ORAL
Qty: 30 CAPSULE | Refills: 1 | Status: SHIPPED | OUTPATIENT
Start: 2023-05-09

## 2023-05-09 SDOH — SOCIAL STABILITY - SOCIAL INSECURITY: DISSAPEARANCE AND DEATH OF FAMILY MEMBER: Z63.4
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
## 2023-05-10 DIAGNOSIS — F41.1 GAD (GENERALIZED ANXIETY DISORDER): ICD-10-CM

## 2023-05-10 DIAGNOSIS — F33.9 MAJOR DEPRESSION, RECURRENT, CHRONIC (HCC): ICD-10-CM

## 2023-05-10 RX ORDER — BUSPIRONE HYDROCHLORIDE 10 MG/1
20 TABLET ORAL 2 TIMES DAILY
Qty: 60 TABLET | Refills: 1 | Status: CANCELLED | OUTPATIENT
Start: 2023-05-10

## 2023-05-10 RX ORDER — CLONAZEPAM 0.5 MG/1
0.5 TABLET ORAL 3 TIMES DAILY PRN
Qty: 90 TABLET | Refills: 1 | Status: CANCELLED | OUTPATIENT
Start: 2023-05-10

## 2023-05-10 NOTE — TELEPHONE ENCOUNTER
medMedication Refill Request     Name of Medication Buspar   Dose/Frequency 10 [id number]   Quantity 120  Verified pharmacy   [x]  Verified ordering Provider   [x]  Does patient have enough for the next 3 days? Yes [x] No []  Does patient have a follow-up appointment scheduled? Yes [x] No []   If so when is appointment:5/23/23    Medication Refill Request     Name of Medication Klonopin  Dose/Frequency 0 5mg 3x daily   Quantity 90  Verified pharmacy   []  Verified ordering Provider   [x]  Does patient have enough for the next 3 days? Yes [x] No []  Does patient have a follow-up appointment scheduled?  Yes [x] No []   If so when is appointment: 05/23/23

## 2023-05-11 ENCOUNTER — TELEPHONE (OUTPATIENT)
Dept: PSYCHIATRY | Facility: CLINIC | Age: 68
End: 2023-05-11

## 2023-05-11 DIAGNOSIS — F41.1 GAD (GENERALIZED ANXIETY DISORDER): ICD-10-CM

## 2023-05-11 RX ORDER — BUSPIRONE HYDROCHLORIDE 10 MG/1
20 TABLET ORAL 2 TIMES DAILY
Qty: 60 TABLET | Refills: 1 | Status: SHIPPED | OUTPATIENT
Start: 2023-05-11

## 2023-05-11 NOTE — TELEPHONE ENCOUNTER
Patient is calling regarding cancelling an appointment      Date/Time: 5/11/23   Reason: Migraine    Patient was rescheduled: YES [] NO [x]  If yes, when was Patient reschedule for:   Patient requesting call back to reschedule: YES [x] NO []

## 2023-05-24 ENCOUNTER — TELEPHONE (OUTPATIENT)
Dept: PSYCHIATRY | Facility: CLINIC | Age: 68
End: 2023-05-24

## 2023-05-24 NOTE — TELEPHONE ENCOUNTER
Patient is calling regarding cancelling an appointment      Date/Time:5/24/23 @ 4 pm   Reason:Migraine    Patient was rescheduled: YES [] NO [x]  If yes, when was Patient reschedule for:     Patient requesting call back to reschedule: YES [] NO [x]

## 2023-05-31 ENCOUNTER — TELEMEDICINE (OUTPATIENT)
Dept: BEHAVIORAL/MENTAL HEALTH CLINIC | Facility: CLINIC | Age: 68
End: 2023-05-31

## 2023-05-31 DIAGNOSIS — F33.9 MAJOR DEPRESSION, RECURRENT, CHRONIC (HCC): ICD-10-CM

## 2023-05-31 DIAGNOSIS — Z63.4 BEREAVEMENT DUE TO LIFE EVENT: Primary | ICD-10-CM

## 2023-05-31 SDOH — SOCIAL STABILITY - SOCIAL INSECURITY: DISSAPEARANCE AND DEATH OF FAMILY MEMBER: Z63.4

## 2023-05-31 NOTE — PSYCH
Virtual Regular Visit    Verification of patient location:    Patient is located at Home in the following state in which I hold an active license [place]      Assessment/Plan:    Problem List Items Addressed This Visit        Other    Major depression, recurrent, chronic ([facility] [facility] )    Bereavement due to life event - Primary       Goals addressed in session: Goal 2          Reason for visit is   Chief Complaint   Patient presents with   • Virtual Regular Visit        Encounter provider Reji Gregory LCSW    Provider located at [address] [place] [address]  [phone]      Recent Visits  No visits were found meeting these conditions  Showing recent visits within past 7 days and meeting all other requirements  Today's Visits  Date Type Provider Dept   05/31/23 Telemedicine Kasey Argueta [facility] Therapist [facility]   Showing today's visits and meeting all other requirements  Future Appointments  No visits were found meeting these conditions  Showing future appointments within next 150 days and meeting all other requirements       The patient was identified by name and date of birth  Angie Hendricks was informed that this is a telemedicine visit and that the visit is being conducted throughthe [employer] platform  She agrees to proceed     My office door was closed  No one else was in the room  She acknowledged consent and understanding of privacy and security of the video platform  The patient has agreed to participate and understands they can discontinue the visit at any time  Patient is aware this is a billable service  Keanu Nunez is a 75 y o  female          HPI     Past Medical History:   Diagnosis Date   • Anxiety    • Arthritis    • Cancer ([facility] [address] )     Skin   • Depression    • [facility]    • GERD (gastroesophageal reflux disease)    • Hyperlipidemia    • Hypertension    • Migraine Past Surgical History:   Procedure Laterality Date   • ANKLE SURGERY     • CARPAL TUNNEL RELEASE Right    • ROTATOR CUFF REPAIR         Current Outpatient Medications   Medication Sig Dispense Refill   • busPIRone (BUSPAR) 10 mg tablet Take 2 tablets (20 mg total) by mouth 2 (two) times a day 60 tablet 1   • ARIPiprazole (ABILIFY) 2 mg tablet Take 1 tablet (2 mg total) by mouth daily at bedtime 30 tablet 1   • aspirin (ECOTRIN LOW STRENGTH) 81 mg EC tablet Take 81 mg by mouth daily     • B COMPLEX VITAMINS PO Take 1 tablet by mouth daily     • Black Pepper-Turmeric (Turmeric Curcumin) 5-1000 MG CAPS Take 2 tablets by mouth in the morning     • buPROPion (WELLBUTRIN) 75 mg tablet Take 1 tablet (75 mg total) by mouth every morning Pt stopped Abilify 30 tablet 1   • calcium carbonate (OS-MIKE) 600 MG tablet Take 600 mg by mouth 2 (two) times a day     • Cholecalciferol 50 MCG (2000 UT) CAPS Take 1 capsule by mouth daily     • clonazePAM (KlonoPIN) 0 5 mg tablet Take 1 tablet (0 5 mg total) by mouth 3 (three) times a day as needed for anxiety 90 tablet 1   • DULoxetine (Cymbalta) 30 mg delayed release capsule Take 1 capsule (30 mg total) by mouth daily at bedtime 30 capsule 1   • DULoxetine (CYMBALTA) 60 mg delayed release capsule Take 1 capsule (60 mg total) by mouth daily at bedtime 30 capsule 1   • esomeprazole (NexIUM) 20 mg capsule Take 20 mg by mouth daily in the early morning      • ibuprofen (MOTRIN) 200 mg tablet Take 200 mg by mouth every 6 (six) hours as needed     • Lidocaine-Menthol 4-1 % CREA Apply 1 Dose topically if needed     • magnesium oxide (MAG-OX) 400 mg tablet Take 1 tablet (400 mg total) by mouth 2 (two) times a day 180 tablet 3   • meloxicam (MOBIC) 15 mg tablet Take 15 mg by mouth daily as needed     • metFORMIN (GLUCOPHAGE) 1000 MG tablet Take 1,000 mg by mouth 2 (two) times a day with meals     • multivitamin (THERAGRAN) TABS Take 1 tablet by mouth daily     • naloxone (NARCAN) 4 mg/0 1 mL nasal spray Administer 1 spray into a nostril  If no response after 2-3 minutes, give another dose in the other nostril using a new spray  1 each 1   • OnabotulinumtoxinA (BOTOX IM) Inject into a muscle     • oxyCODONE (ROXICODONE) 10 MG TABS Take 1 tablet by mouth 3 (three) times a day as needed     • polyethylene glycol (MIRALAX) 17 g packet Take 17 g by mouth daily at bedtime     • potassium chloride (K-DUR,KLOR-CON) 20 mEq tablet Take 1 tablet by mouth daily     • Riboflavin (Vitamin B-2) 100 MG TABS 2 in the morning and 2 at bedtime 360 tablet 3   • rosuvastatin (CRESTOR) 20 MG tablet Take 20 mg by mouth daily     • senna (SENOKOT) 8 6 MG tablet Take 1 tablet by mouth daily     • Ubrogepant (UBRELVY) 100 MG tablet Take 1 tablet (100 mg) one time as needed for migraine  May repeat one additional tablet (100 mg) at least two hours after the first dose  Do not use more than two doses per day, or for more than 10 days per month  10 tablet 6   • valsartan-hydrochlorothiazide (DIOVAN-HCT) 160-25 MG per tablet Take 1 tablet by mouth daily       No current facility-administered medications for this visit  No Known Allergies    Review of Systems    Video Exam    There were no vitals filed for this visit  Physical Exam     Behavioral Health Psychotherapy Progress Note    Psychotherapy Provided: Individual Psychotherapy     1  Bereavement due to life event        2  Major depression, recurrent, chronic ([facility]            Goals addressed in session: Goal 2     DATA: Ct presented in depressed mood, crying, unkept, disheveled  Ct stated today was the first time she showered in a week  Ct also stated she has not cleaned her floors in 4 weeks  Ct reported concerning her mother and dementia care is taxing emotionally on Ct as Ct cannot be there often to help  Ct reports feeling helpless to her mother and siblings  Ct also reports feeling judged by sister due to conversations and tone of voice of sister  "Therapist inquired and Ct shared a story  This was not interpreted in the same way Ct heard her sister  Therapist was able to use reflective listening for Ct to realize her need for empathy in regards to hearing others during this time  Ct and therapist processed in session the grief/loss triggers of her mother and the madan affect of the loss of her  and father  Ct spent much time crying and therapist offered validation, reflective listening, non judgemental stance, and acceptance  Therapist also strongly urged Ct to continue in  Therapy at this time no matter how she is feeling  Ct agreed  Ct and therapist developed plan for Ct to engage with brother and helping him from [facility] as well as a list of \"to do's\" and achieving one to do each day, acceptance and no judgement  During this session, this clinician used the following therapeutic modalities: Bereavement Therapy and Client-centered Therapy    Substance Abuse was not addressed during this session  If the client is diagnosed with a co-occurring substance use disorder, please indicate any changes in the frequency or amount of use: n/a  Stage of change for addressing substance use diagnoses: No substance use/Not applicable    ASSESSMENT:  Ashlee Pleitez presents with a Depressed mood  her affect is Flat and Tearful, which is congruent, with her mood and the content of the session  The client has made progress on their goals  Ashlee Pleitez presents with a none risk of suicide, none risk of self-harm, and none risk of harm to others  For any risk assessment that surpasses a \"low\" rating, a safety plan must be developed  A safety plan was indicated: no  If yes, describe in detail n/a    PLAN: Between sessions, Ashlee Pleitez will create to do list and work on one goal each day   At the next session, the therapist will use Bereavement Therapy and Client-centered Therapy to address aging and health declining " mother/grief/loss  Behavioral Health Treatment Plan and Discharge Planning: Joy Rain is aware of and agrees to continue to work on their treatment plan  They have identified and are working toward their discharge goals   yes    Visit start and stop times:    05/31/23  Start Time: 9222  Stop Time: 1700  Total Visit Time: 55 minutes

## 2023-06-01 ENCOUNTER — TELEPHONE (OUTPATIENT)
Dept: NEUROLOGY | Facility: CLINIC | Age: 68
End: 2023-06-01

## 2023-06-01 NOTE — TELEPHONE ENCOUNTER
Received VM Transcription:     My name's Radha Farrell [birth year]  I am due to get my injections for my knee that helps lubricate the joints  They were going to schedule me this week and the doctor wants to make sure the botox won't interfere with the injection  So if you can give me a call back and let me know, then I can go ahead and schedule it  I also be needing a prior authorization for my Verita Gula which I wont be able to get a prescription to the 12th of this month   Thank you

## 2023-06-02 ENCOUNTER — TELEPHONE (OUTPATIENT)
Dept: NEUROLOGY | Facility: CLINIC | Age: 68
End: 2023-06-02

## 2023-06-02 NOTE — TELEPHONE ENCOUNTER
Trina IRWIN  on 23  [employer] program and spoke to [employer] that formulary exception form will need to be completed    She will fax form to CV office    formulary exception form completed and faxed to [employer] along with last office note    Form placed at  to be scanned into chart

## 2023-06-05 DIAGNOSIS — F41.1 GAD (GENERALIZED ANXIETY DISORDER): ICD-10-CM

## 2023-06-05 RX ORDER — CLONAZEPAM 0.5 MG/1
0.5 TABLET ORAL 3 TIMES DAILY PRN
Qty: 90 TABLET | Refills: 0 | Status: SHIPPED | OUTPATIENT
Start: 2023-06-05

## 2023-06-05 NOTE — TELEPHONE ENCOUNTER
Pt requested refill of the Clonazepam, said that she will run out today and hopes that you will send in this refill even though it's needed before she sees you

## 2023-06-07 ENCOUNTER — TELEMEDICINE (OUTPATIENT)
Dept: BEHAVIORAL/MENTAL HEALTH CLINIC | Facility: CLINIC | Age: 68
End: 2023-06-07
Payer: MEDICARE

## 2023-06-07 DIAGNOSIS — F41.1 GAD (GENERALIZED ANXIETY DISORDER): ICD-10-CM

## 2023-06-07 DIAGNOSIS — Z63.4 BEREAVEMENT DUE TO LIFE EVENT: Primary | ICD-10-CM

## 2023-06-07 PROCEDURE — 90837 PSYTX W PT 60 MINUTES: CPT | Performed by: SOCIAL WORKER

## 2023-06-07 SDOH — SOCIAL STABILITY - SOCIAL INSECURITY: DISSAPEARANCE AND DEATH OF FAMILY MEMBER: Z63.4

## 2023-06-07 NOTE — PSYCH
Virtual Regular Visit    Verification of patient location:    Patient is located at Home in the following state in which I hold an active license [place]      Assessment/Plan:    Problem List Items Addressed This Visit        Other    SY (generalized anxiety disorder)    Bereavement due to life event - Primary       Goals addressed in session: Goal 1 and Goal 2          Reason for visit is   Chief Complaint   Patient presents with   • Virtual Regular Visit        Encounter provider Bryan Keller LCSW    Provider located at [address] [place], [place] [id number]  [address] [place] [address]  [phone]      Recent Visits  Date Type Provider Dept   05/31/23 Telemedicine Kasey Stewart [facility] Therapist [facility]   Showing recent visits within past 7 days and meeting all other requirements  Today's Visits  Date Type Provider Dept   06/07/23 Telemedicine Kasey Stewart Therapist [facility]   Showing today's visits and meeting all other requirements  Future Appointments  No visits were found meeting these conditions  Showing future appointments within next 150 days and meeting all other requirements       The patient was identified by name and date of birth  Hyun Ice was informed that this is a telemedicine visit and [facility] the [facility]  She agrees to proceed     My office door was closed  No one else was in the room  She acknowledged consent and understanding of privacy and security of the video platform  The patient has agreed to participate and understands they can discontinue the visit at any time  Patient is aware this is a billable service  Fabian Rush is a 75 y o  female          HPI     Past Medical History:   Diagnosis Date   • Anxiety    • Arthritis    • Cancer ([facility] [address] [facility] )     Skin   • Depression    • [facility]    • GERD (gastroesophageal reflux disease)    • Hyperlipidemia    • Hypertension    • Migraine        Past Surgical History:   Procedure Laterality Date   • ANKLE SURGERY     • CARPAL TUNNEL RELEASE Right    • ROTATOR CUFF REPAIR         Current Outpatient Medications   Medication Sig Dispense Refill   • busPIRone (BUSPAR) 10 mg tablet Take 2 tablets (20 mg total) by mouth 2 (two) times a day 60 tablet 1   • ARIPiprazole (ABILIFY) 2 mg tablet Take 1 tablet (2 mg total) by mouth daily at bedtime 30 tablet 1   • aspirin (ECOTRIN LOW STRENGTH) 81 mg EC tablet Take 81 mg by mouth daily     • B COMPLEX VITAMINS PO Take 1 tablet by mouth daily     • Black Pepper-Turmeric (Turmeric Curcumin) 5-1000 MG CAPS Take 2 tablets by mouth in the morning     • buPROPion (WELLBUTRIN) 75 mg tablet Take 1 tablet (75 mg total) by mouth every morning Pt stopped Abilify 30 tablet 1   • calcium carbonate (OS-MIKE) 600 MG tablet Take 600 mg by mouth 2 (two) times a day     • Cholecalciferol 50 MCG (2000 UT) CAPS Take 1 capsule by mouth daily     • clonazePAM (KlonoPIN) 0 5 mg tablet Take 1 tablet (0 5 mg total) by mouth 3 (three) times a day as needed for anxiety 90 tablet 0   • DULoxetine (Cymbalta) 30 mg delayed release capsule Take 1 capsule (30 mg total) by mouth daily at bedtime 30 capsule 1   • DULoxetine (CYMBALTA) 60 mg delayed release capsule Take 1 capsule (60 mg total) by mouth daily at bedtime 30 capsule 1   • esomeprazole (NexIUM) 20 mg capsule Take 20 mg by mouth daily in the early morning      • ibuprofen (MOTRIN) 200 mg tablet Take 200 mg by mouth every 6 (six) hours as needed     • Lidocaine-Menthol 4-1 % CREA Apply 1 Dose topically if needed     • magnesium oxide (MAG-OX) 400 mg tablet Take 1 tablet (400 mg total) by mouth 2 (two) times a day 180 tablet 3   • meloxicam (MOBIC) 15 mg tablet Take 15 mg by mouth daily as needed     • metFORMIN (GLUCOPHAGE) 1000 MG tablet Take 1,000 mg by mouth 2 (two) times a day with meals     • multivitamin (THERAGRAN) TABS Take 1 tablet by mouth daily     • naloxone (NARCAN) 4 mg/0 1 mL nasal spray Administer 1 spray into a nostril  If no response after 2-3 minutes, give another dose in the other nostril using a new spray  1 each 1   • OnabotulinumtoxinA (BOTOX IM) Inject into a muscle     • oxyCODONE (ROXICODONE) 10 MG TABS Take 1 tablet by mouth 3 (three) times a day as needed     • polyethylene glycol (MIRALAX) 17 g packet Take 17 g by mouth daily at bedtime     • potassium chloride (K-DUR,KLOR-CON) 20 mEq tablet Take 1 tablet by mouth daily     • Riboflavin (Vitamin B-2) 100 MG TABS 2 in the morning and 2 at bedtime 360 tablet 3   • rosuvastatin (CRESTOR) 20 MG tablet Take 20 mg by mouth daily     • senna (SENOKOT) 8 6 MG tablet Take 1 tablet by mouth daily     • Ubrogepant (UBRELVY) 100 MG tablet Take 1 tablet (100 mg) one time as needed for migraine  May repeat one additional tablet (100 mg) at least two hours after the first dose  Do not use more than two doses per day, or for more than 10 days per month  10 tablet 6   • valsartan-hydrochlorothiazide (DIOVAN-HCT) 160-25 MG per tablet Take 1 tablet by mouth daily       No current facility-administered medications for this visit  No Known Allergies    Review of Systems    Video Exam    There were no vitals filed for this visit  Physical Exam   Behavioral Health Psychotherapy Progress Note    Psychotherapy Provided: Individual Psychotherapy     1  Bereavement due to life event        2  SY (generalized anxiety disorder)            Goals addressed in session: Goal 1 and Goal 2     DATA: Ct presented a bit brighter this session than last   Ct was appropriately dressed, glasses on, hair combed this session  Ct eye contact improved since last session, thought process/content improved as well wnl  Ct smiled upon approach and remained engaged throughout session   Ct spent time processing the grief/loss of  and father as her mother's condition has triggered feelings of "grief  Ct reported that her mother is going to be moving into memory care and is struggling with how to be of more help  As addressed last session, Ct will continue to check in with brother  Therapist supported Ct throughout session with validation that grief takes time and is different for everyone, therapist provided clarity with reflective listening and offered ways in which Ct can honor her feelings let them go  Ct cried during session and was able to use skills guided by therapist to manage any strong emotions that arose  During this session, this clinician used the following therapeutic modalities: Bereavement Therapy    Substance Abuse was not addressed during this session  If the client is diagnosed with a co-occurring substance use disorder, please indicate any changes in the frequency or amount of use: n/a  Stage of change for addressing substance use diagnoses: No substance use/Not applicable    ASSESSMENT:  Benton Stein presents with a Euthymic/ normal and Dysthymic mood  her affect is Normal range and intensity, which is congruent, with her mood and the content of the session  The client has made progress on their goals  Benton Stein presents with a none risk of suicide, none risk of self-harm, and none risk of harm to others  For any risk assessment that surpasses a \"low\" rating, a safety plan must be developed  A safety plan was indicated: no  If yes, describe in detail n/a    PLAN: Between sessions, Benton Stein will continue to use therapy as support for her grief and self care process  At the next session, the therapist will use Bereavement Therapy to address grief  Behavioral Health Treatment Plan and Discharge Planning: Benton Stein is aware of and agrees to continue to work on their treatment plan  They have identified and are working toward their discharge goals   yes    Visit start and stop times:    06/07/23  Start Time: 5770  Stop Time: 1500  Total Visit " Time: 55 minutes

## 2023-06-08 NOTE — TELEPHONE ENCOUNTER
Received fax from [employer] request for Arthur IRWIN form completed and faxed  Placed at  to be scanned into chart

## 2023-06-16 DIAGNOSIS — G43.709 CHRONIC MIGRAINE WITHOUT AURA WITHOUT STATUS MIGRAINOSUS, NOT INTRACTABLE: ICD-10-CM

## 2023-06-19 RX ORDER — UBROGEPANT 100 MG/1
TABLET ORAL
Qty: 10 TABLET | Refills: 6 | Status: SHIPPED | OUTPATIENT
Start: 2023-06-19

## 2023-06-21 ENCOUNTER — TELEMEDICINE (OUTPATIENT)
Dept: BEHAVIORAL/MENTAL HEALTH CLINIC | Facility: CLINIC | Age: 68
End: 2023-06-21
Payer: MEDICARE

## 2023-06-21 DIAGNOSIS — F41.1 GAD (GENERALIZED ANXIETY DISORDER): ICD-10-CM

## 2023-06-21 DIAGNOSIS — Z63.4 BEREAVEMENT DUE TO LIFE EVENT: ICD-10-CM

## 2023-06-21 DIAGNOSIS — F33.9 MAJOR DEPRESSION, RECURRENT, CHRONIC (HCC): Primary | ICD-10-CM

## 2023-06-21 PROCEDURE — 90837 PSYTX W PT 60 MINUTES: CPT | Performed by: SOCIAL WORKER

## 2023-06-21 SDOH — SOCIAL STABILITY - SOCIAL INSECURITY: DISSAPEARANCE AND DEATH OF FAMILY MEMBER: Z63.4

## 2023-06-21 NOTE — PSYCH
Virtual Regular Visit    Verification of patient location:    Patient is located at Home in the following state in which I hold an active license [place]      Assessment/Plan:    Problem List Items Addressed This Visit        Other    Major depression, recurrent, chronic ([facility] ) - Primary    SY (generalized anxiety disorder)    Bereavement due to life event       Goals addressed in session: Goal 1 and Goal 2          Reason for visit is   Chief Complaint   Patient presents with   • Virtual Regular Visit        Encounter provider Micheal Sun LCSW    Provider located at [address] [place] [address]  [phone]      Recent Visits  No visits were found meeting these conditions  Showing recent visits within past 7 days and meeting all other requirements  Today's Visits  Date Type Provider Dept   06/21/23 Telemedicine Kasey Wood 131 Therapist [facility]   Showing today's visits and meeting all other requirements  Future Appointments  No visits were found meeting these conditions  Showing future appointments within next 150 days and meeting all other requirements       The patient was identified by name and date of birth  Mary Esparza was informed that this is a telemedicine visit and that the [facility]  She agrees to proceed     My office door was closed  No one else was in the room  She acknowledged consent and understanding of privacy and security of the video platform  The patient has agreed to participate and understands they can discontinue the visit at any time  Patient is aware this is a billable service  Tricia Montoya is a 70 y o  female          HPI     Past Medical History:   Diagnosis Date   • Anxiety    • Arthritis    • Cancer ([facility] )     Skin   • Depression    • [facility]    • GERD (gastroesophageal reflux disease) • Hyperlipidemia    • Hypertension    • Migraine        Past Surgical History:   Procedure Laterality Date   • ANKLE SURGERY     • CARPAL TUNNEL RELEASE Right    • ROTATOR CUFF REPAIR         Current Outpatient Medications   Medication Sig Dispense Refill   • busPIRone (BUSPAR) 10 mg tablet Take 2 tablets (20 mg total) by mouth 2 (two) times a day 60 tablet 1   • ARIPiprazole (ABILIFY) 2 mg tablet Take 1 tablet (2 mg total) by mouth daily at bedtime 30 tablet 1   • aspirin (ECOTRIN LOW STRENGTH) 81 mg EC tablet Take 81 mg by mouth daily     • B COMPLEX VITAMINS PO Take 1 tablet by mouth daily     • Black Pepper-Turmeric (Turmeric Curcumin) 5-1000 MG CAPS Take 2 tablets by mouth in the morning     • buPROPion (WELLBUTRIN) 75 mg tablet Take 1 tablet (75 mg total) by mouth every morning Pt stopped Abilify 30 tablet 1   • calcium carbonate (OS-MIKE) 600 MG tablet Take 600 mg by mouth 2 (two) times a day     • Cholecalciferol 50 MCG (2000 UT) CAPS Take 1 capsule by mouth daily     • clonazePAM (KlonoPIN) 0 5 mg tablet Take 1 tablet (0 5 mg total) by mouth 3 (three) times a day as needed for anxiety 90 tablet 0   • DULoxetine (Cymbalta) 30 mg delayed release capsule Take 1 capsule (30 mg total) by mouth daily at bedtime 30 capsule 1   • DULoxetine (CYMBALTA) 60 mg delayed release capsule Take 1 capsule (60 mg total) by mouth daily at bedtime 30 capsule 1   • esomeprazole (NexIUM) 20 mg capsule Take 20 mg by mouth daily in the early morning      • ibuprofen (MOTRIN) 200 mg tablet Take 200 mg by mouth every 6 (six) hours as needed     • Lidocaine-Menthol 4-1 % CREA Apply 1 Dose topically if needed     • magnesium oxide (MAG-OX) 400 mg tablet Take 1 tablet (400 mg total) by mouth 2 (two) times a day 180 tablet 3   • meloxicam (MOBIC) 15 mg tablet Take 15 mg by mouth daily as needed     • metFORMIN (GLUCOPHAGE) 1000 MG tablet Take 1,000 mg by mouth 2 (two) times a day with meals     • multivitamin (THERAGRAN) TABS Take 1 tablet by mouth daily     • naloxone (NARCAN) 4 mg/0 1 mL nasal spray Administer 1 spray into a nostril  If no response after 2-3 minutes, give another dose in the other nostril using a new spray  1 each 1   • OnabotulinumtoxinA (BOTOX IM) Inject into a muscle     • oxyCODONE (ROXICODONE) 10 MG TABS Take 1 tablet by mouth 3 (three) times a day as needed     • polyethylene glycol (MIRALAX) 17 g packet Take 17 g by mouth daily at bedtime     • potassium chloride (K-DUR,KLOR-CON) 20 mEq tablet Take 1 tablet by mouth daily     • Riboflavin (Vitamin B-2) 100 MG TABS 2 in the morning and 2 at bedtime 360 tablet 3   • rosuvastatin (CRESTOR) 20 MG tablet Take 20 mg by mouth daily     • senna (SENOKOT) 8 6 MG tablet Take 1 tablet by mouth daily     • Ubrelvy 100 MG tablet TAKE 1 TABLET BY MOUTH ONE TIME AS NEEDED FOR MIGRAINE  MAY REPEAT WITH ONE ADDITIONAL TABLET AT LEAST 2 HOURS AFTER THE FIRST DOSE  DO NOT USE MORE THAN 2 DOSES PER DAY, OR FOR MORE THAN 10 DAYS PER MONTH 10 tablet 6   • valsartan-hydrochlorothiazide (DIOVAN-HCT) 160-25 MG per tablet Take 1 tablet by mouth daily       No current facility-administered medications for this visit  No Known Allergies    Review of Systems    Video Exam    There were no vitals filed for this visit  Physical Exam     Behavioral Health Psychotherapy Progress Note    Psychotherapy Provided: Individual Psychotherapy     1  Major depression, recurrent, chronic ([facility] )        2  SY (generalized anxiety disorder)        3  Bereavement due to life event            Goals addressed in session: Goal 1 and Goal 2     DATA: Ct presented in session teary  Eye contact appropriate, speech and thought content/process wnl  Ct expressed great concern for her mother as she fell again and fractured her hip  Therapist and Ct spent time processing grief and loss and how feelings surrounding loss are surfacing  Ct feels depressed and anxious as feelings of grief increase    Ct remains "guarded in many ways and avoids feeling  Therapist inquired and Ct maintains that she is still not prepared to let go  Ct spent remainder of the session speaking out of concern for herself as she \"thinks that her medication is causing w/drawal symptoms (oxycodone) she has been prescribed this for pain for the past 20 years, takes as prescribed and wakes up feeling w/d  Ct shows interest in wanting to stop the dependence and see if there is a correlation between dependence and depression and brain fog  Therapist determined that stage of change is preparation as Ct and therapist developed a list for Ct to call for inquiry in detox and perhaps [facility]  Ct will call to determine if they accept her insurance and what the program is like  Ct and therapist processed Ct's fear of detox and pain continued  Therapist provided non judgement and assistance in understanding dependence  Ct was engaged throughout session  During this session, this clinician used the following therapeutic modalities: Cognitive Processing Therapy and Supportive Psychotherapy    Substance Abuse was addressed during this session  If the client is diagnosed with a co-occurring substance use disorder, please indicate any changes in the frequency or amount of use: Ct reports daily use as prescribed and is noticing withdrawal  Stage of change for addressing substance use diagnoses: Preparation    ASSESSMENT:  Mckay Cervantes presents with a Euthymic/ normal and Depressed mood  her affect is Normal range and intensity and Tearful, which is congruent, with her mood and the content of the session  The client has made progress on their goals  Mckay Cervantes presents with a minimal risk of suicide, none risk of self-harm, and none risk of harm to others  For any risk assessment that surpasses a \"low\" rating, a safety plan must be developed      A safety plan was indicated: yes  If yes, describe " in detail: reviewed support people as listed on safety plan for support  PLAN: Between sessions, Meri Henson will continue to activate voice in asking for help and attending therapy weekly,  Ct will reach out to numbers provided to see if the treatment is a good match (Cody Conroy)Ct will also consider Grief Group offered at   At the next session, the therapist will use Bereavement Therapy, Cognitive Processing Therapy and Supportive Psychotherapy to address grief/loss  Behavioral Health Treatment Plan and Discharge Planning: Meri Henson is aware of and agrees to continue to work on their treatment plan  They have identified and are working toward their discharge goals   yes    Visit start and stop times:    06/21/23  Start Time: 1505  Stop Time: 1558  Total Visit Time: 53 minutes

## 2023-06-22 DIAGNOSIS — F41.1 GAD (GENERALIZED ANXIETY DISORDER): ICD-10-CM

## 2023-06-22 RX ORDER — BUSPIRONE HYDROCHLORIDE 10 MG/1
TABLET ORAL
Qty: 120 TABLET | Refills: 0 | Status: SHIPPED | OUTPATIENT
Start: 2023-06-22

## 2023-06-22 NOTE — TELEPHONE ENCOUNTER
Patient calling regarding buspirone script  Only written for a 2 week supply (#60) as patient is directed to take 2 bid which is causing patient to become confused in her supply  Requesting new script of buspar be sent to pharmacy

## 2023-06-29 ENCOUNTER — TELEMEDICINE (OUTPATIENT)
Dept: BEHAVIORAL/MENTAL HEALTH CLINIC | Facility: CLINIC | Age: 68
End: 2023-06-29
Payer: MEDICARE

## 2023-06-29 DIAGNOSIS — F33.9 MAJOR DEPRESSION, RECURRENT, CHRONIC (HCC): ICD-10-CM

## 2023-06-29 DIAGNOSIS — Z63.4 BEREAVEMENT DUE TO LIFE EVENT: ICD-10-CM

## 2023-06-29 DIAGNOSIS — F41.1 GAD (GENERALIZED ANXIETY DISORDER): Primary | ICD-10-CM

## 2023-06-29 PROCEDURE — 90834 PSYTX W PT 45 MINUTES: CPT | Performed by: SOCIAL WORKER

## 2023-06-29 SDOH — SOCIAL STABILITY - SOCIAL INSECURITY: DISSAPEARANCE AND DEATH OF FAMILY MEMBER: Z63.4

## 2023-06-29 NOTE — PSYCH
Virtual Regular Visit    Verification of patient location:    Patient is located at Home in the following state in which I hold an active license [place]      Assessment/Plan:    Problem List Items Addressed This Visit        Other    Major depression, recurrent, chronic ([facility] [address] )    SY (generalized anxiety disorder) - Primary    Bereavement due to life event       Goals addressed in session: Goal 1 and Goal 2          Reason for visit is   Chief Complaint   Patient presents with   • Virtual Regular Visit        Encounter provider Griffin Hammond LCSW    Provider located at [address] [place] [address]  [phone]      Recent Visits  No visits were found meeting these conditions  Showing recent visits within past 7 days and meeting all other requirements  Today's Visits  Date Type Provider Dept   06/29/23 Telemedicine Kasey Valdez 131 Therapist [facility]   Showing today's visits and meeting all other requirements  Future Appointments  No visits were found meeting these conditions  Showing future appointments within next 150 days and meeting all other requirements       The patient was identified by name and date of birth  [place] Daniel was informed that this is a telemedicine visit and that the [facility]  She agrees to proceed     My office door was closed  No one else was in the room  She acknowledged consent and understanding of privacy and security of the video platform  The patient has agreed to participate and understands they can discontinue the visit at any time  Patient is aware this is a billable service  Óscar Diehl is a 70 y o  female          HPI     Past Medical History:   Diagnosis Date   • Anxiety    • Arthritis    • Cancer ([facility] )     Skin   • Depression    • [facility]    • GERD (gastroesophageal reflux disease) • Hyperlipidemia    • Hypertension    • Migraine        Past Surgical History:   Procedure Laterality Date   • ANKLE SURGERY     • CARPAL TUNNEL RELEASE Right    • ROTATOR CUFF REPAIR         Current Outpatient Medications   Medication Sig Dispense Refill   • ARIPiprazole (ABILIFY) 2 mg tablet Take 1 tablet (2 mg total) by mouth daily at bedtime 30 tablet 1   • aspirin (ECOTRIN LOW STRENGTH) 81 mg EC tablet Take 81 mg by mouth daily     • B COMPLEX VITAMINS PO Take 1 tablet by mouth daily     • Black Pepper-Turmeric (Turmeric Curcumin) 5-1000 MG CAPS Take 2 tablets by mouth in the morning     • buPROPion (WELLBUTRIN) 75 mg tablet Take 1 tablet (75 mg total) by mouth every morning Pt stopped Abilify 30 tablet 1   • busPIRone (BUSPAR) 10 mg tablet TAKE 2 TABLETS BY MOUTH TWO TIMES A  tablet 0   • calcium carbonate (OS-MIKE) 600 MG tablet Take 600 mg by mouth 2 (two) times a day     • Cholecalciferol 50 MCG (2000 UT) CAPS Take 1 capsule by mouth daily     • clonazePAM (KlonoPIN) 0 5 mg tablet Take 1 tablet (0 5 mg total) by mouth 3 (three) times a day as needed for anxiety 90 tablet 0   • DULoxetine (Cymbalta) 30 mg delayed release capsule Take 1 capsule (30 mg total) by mouth daily at bedtime 30 capsule 1   • DULoxetine (CYMBALTA) 60 mg delayed release capsule Take 1 capsule (60 mg total) by mouth daily at bedtime 30 capsule 1   • esomeprazole (NexIUM) 20 mg capsule Take 20 mg by mouth daily in the early morning      • ibuprofen (MOTRIN) 200 mg tablet Take 200 mg by mouth every 6 (six) hours as needed     • Lidocaine-Menthol 4-1 % CREA Apply 1 Dose topically if needed     • magnesium oxide (MAG-OX) 400 mg tablet Take 1 tablet (400 mg total) by mouth 2 (two) times a day 180 tablet 3   • meloxicam (MOBIC) 15 mg tablet Take 15 mg by mouth daily as needed     • metFORMIN (GLUCOPHAGE) 1000 MG tablet Take 1,000 mg by mouth 2 (two) times a day with meals     • multivitamin (THERAGRAN) TABS Take 1 tablet by mouth daily     • naloxone (NARCAN) 4 mg/0 1 mL nasal spray Administer 1 spray into a nostril  If no response after 2-3 minutes, give another dose in the other nostril using a new spray  1 each 1   • OnabotulinumtoxinA (BOTOX IM) Inject into a muscle     • oxyCODONE (ROXICODONE) 10 MG TABS Take 1 tablet by mouth 3 (three) times a day as needed     • polyethylene glycol (MIRALAX) 17 g packet Take 17 g by mouth daily at bedtime     • potassium chloride (K-DUR,KLOR-CON) 20 mEq tablet Take 1 tablet by mouth daily     • Riboflavin (Vitamin B-2) 100 MG TABS 2 in the morning and 2 at bedtime 360 tablet 3   • rosuvastatin (CRESTOR) 20 MG tablet Take 20 mg by mouth daily     • senna (SENOKOT) 8 6 MG tablet Take 1 tablet by mouth daily     • Ubrelvy 100 MG tablet TAKE 1 TABLET BY MOUTH ONE TIME AS NEEDED FOR MIGRAINE  MAY REPEAT WITH ONE ADDITIONAL TABLET AT LEAST 2 HOURS AFTER THE FIRST DOSE  DO NOT USE MORE THAN 2 DOSES PER DAY, OR FOR MORE THAN 10 DAYS PER MONTH 10 tablet 6   • valsartan-hydrochlorothiazide (DIOVAN-HCT) 160-25 MG per tablet Take 1 tablet by mouth daily       No current facility-administered medications for this visit  No Known Allergies    Review of Systems    Video Exam    There were no vitals filed for this visit  Physical Exam     Behavioral Health Psychotherapy Progress Note    Psychotherapy Provided: Individual Psychotherapy     1  SY (generalized anxiety disorder)        2  Bereavement due to life event        3  Major depression, recurrent, chronic ([facility] )            Goals addressed in session: Goal 1 and Goal 2     DATA: Ct presented on virtual appt well dressed and alert  Ct was in her dining room  There was difficulty with this therapist's camera and some time was needed prior to starting service  Ct presented with appropriate eye contact, thought process and content was wnl  Ct affect was brighter and thought content/process more abundant this session since last session    At times during "session Ct thought was tangential   Ct spoke over therapist at times as well  Ct and therapist spent some processing of the beginning stages of grief process and losing mother  Ct also reported that she went to pain management Dr and discussed \"going off the oxycodone\"  Pain Dr did not advise due to the emotional state of Ct as she is grieving mother  Pain Dr increased dosage for Ct  And informed Ct that if and when she decides to detox, Ct will spend one week at the hospital   Therapist and Ct processed this information and how Ct is affected by this  Ct remains in contemplation/preparation stage of change  During this session, this clinician used the following therapeutic modalities: Bereavement Therapy and Cognitive Processing Therapy    Substance Abuse was addressed during this session  If the client is diagnosed with a co-occurring substance use disorder, please indicate any changes in the frequency or amount of use: Ct is taking oxycodone which is prescribed by a pain management Dr, dose was increased  Stage of change for addressing substance use diagnoses: Preparation    ASSESSMENT:  Ashvin Mckinney presents with a Euthymic/ normal mood  her affect is Normal range and intensity, which is congruent, with her mood and the content of the session  The client has made progress on their goals  Ashvin Mckinney presents with a none risk of suicide, none risk of self-harm, and none risk of harm to others  For any risk assessment that surpasses a \"low\" rating, a safety plan must be developed  A safety plan was indicated: no  If yes, describe in detail n/a    PLAN: Between sessions, Ashvin Mckinney will continue to use skills to manage feelings of anxiety and depression as reviewed in session  Ct will continue to communicate effectively and as best possible with mother via virtual when available   At the next session, the therapist will use Bereavement Therapy and Cognitive Processing Therapy to " address grief process of mother who is in nursing home and 80 yo  Behavioral Health Treatment Plan and Discharge Planning: Hardwick Barrs is aware of and agrees to continue to work on their treatment plan  They have identified and are working toward their discharge goals   yes    Visit start and stop times:    06/29/23  Start Time: 1110  Stop Time: 1158  Total Visit Time: 48 minutes

## 2023-06-30 ENCOUNTER — TELEPHONE (OUTPATIENT)
Dept: NEUROLOGY | Facility: CLINIC | Age: 68
End: 2023-06-30

## 2023-06-30 NOTE — TELEPHONE ENCOUNTER
Patient called and would like to reschedule her botox appointment       Patient can be reached at [phone]

## 2023-07-05 ENCOUNTER — TELEPHONE (OUTPATIENT)
Dept: PSYCHIATRY | Facility: CLINIC | Age: 68
End: 2023-07-05

## 2023-07-05 DIAGNOSIS — F41.1 GAD (GENERALIZED ANXIETY DISORDER): ICD-10-CM

## 2023-07-05 RX ORDER — CLONAZEPAM 0.5 MG/1
0.5 TABLET ORAL 3 TIMES DAILY PRN
Qty: 90 TABLET | Refills: 0 | Status: SHIPPED | OUTPATIENT
Start: 2023-07-05

## 2023-07-05 NOTE — TELEPHONE ENCOUNTER
Received VM transcription from 7/3/23 (3:31 PM):    Hi, this is Antonia. I'm calling because I had an appointment for botox the week before last and unfortunately I could not make it. And the week just got away from me. I did call on Monday, the appointment line, and she did say she had to send it off to [address] [place] [address]. I'm calling to hopefully, you know, start soon the botox injections again. I know it's a lot of work you have to go through the whole year for the appointment. But I'm hoping that I can get an appointment in soon because I've had 4 really bad migraines in a row, but I know because I've been looking down a lot, reading, doing things and it's always when it's in my neck, I wake up with the worst headaches. The [facility] does take most of it away, but I do have to take a 2nd dose. Again, Antonia. 1955. [phone]. And I hope to hear from you soon. I don't know if you were off today also, but I hope I can get in To get the Botox started soon again. Thank you.  Bye.

## 2023-07-05 NOTE — TELEPHONE ENCOUNTER
Reached out to patient from Provider waitlist to see if patient can come in 7/6/23 for appointment at 1 instead of 7/27/23, LVM and have slot blocked.

## 2023-07-06 ENCOUNTER — TELEPHONE (OUTPATIENT)
Dept: NEUROLOGY | Facility: CLINIC | Age: 68
End: 2023-07-06

## 2023-07-06 NOTE — TELEPHONE ENCOUNTER
Contacted pt plan spoke to [facility] (REF# [id number]) That advised due to pt having primary medicare A & B any out of pocket cost for the patient with use of specialty pharmacy will be waived and covered at 100%    Pt will need to be rescheduled due to having to receive the BOTOX here in office for pt, pt will not use [facility] for appointment.

## 2023-07-06 NOTE — TELEPHONE ENCOUNTER
Pt pharm approval details in previous encounter. Do you authorize verbal/written approval for pt. Botox 200 UNITS  Qty. 1  DX.G24.3  Sig: Inject up to 200 UNITS I.M into the various sites in the head and neck once every three months for one year with  Refills: 3     If you agree to this order transcribed above please respond directly if you agree or not, so I may proceed further with authorization and for use of Verbal Order. Thank you.

## 2023-07-06 NOTE — TELEPHONE ENCOUNTER
Called and spoke to patient and made her aware of below. Patient verbalized understanding. Made her aware that [facility] will be reaching out to obtain consent to ship Botox once it is processed in their system. Re-scheduled FRANK 8/3.

## 2023-07-06 NOTE — TELEPHONE ENCOUNTER
Lianet Moreland, spoke to [place] that set up pt account, all information given; insurance information. Transferred to pharmacist Nae Chakraborty , verbal order given as authorized in previous encounter 7/6/2023 0904. Will await for order to process. Follow up as soon as able.

## 2023-07-06 NOTE — TELEPHONE ENCOUNTER
Contacted pt secondary plan BCBS FEP, spoke to Lesia that initiated pt auth as she advised this will be a pharmacy auth for pt for use at [address] [place] [address] for the . Approved   Botox 200 UNITS  Qty. 1  Auth#   Valid:7/6/2023-7/5/2024  Visits: 4  Please use [place]       Contacted pt Secondary plan medical department for a Buy and [address] authorization requirement. Spoke to [employer] (REF# [id number]) That advised due to pt primary plan medicare A & B, pt does not require and authorization for  & [id number]. Need to further contact pt plan due to pharmacy benefits and pharm auth approval to obtain botox from [place].

## 2023-07-06 NOTE — TELEPHONE ENCOUNTER
Patient is already scheduled for her Botox follow up next month. Will she be okay to be seen on Friday for her Botox.

## 2023-07-10 ENCOUNTER — TELEPHONE (OUTPATIENT)
Dept: NEUROLOGY | Facility: CLINIC | Age: 68
End: 2023-07-10

## 2023-07-10 NOTE — TELEPHONE ENCOUNTER
Received call from [facility] with [place], requesting pt insurance information. Faxed to number provided [phone].

## 2023-07-10 NOTE — TELEPHONE ENCOUNTER
Patient called in asking if there is any way she can get some Ubrelvy samples from our office. Her Botox has been moved to a different date and she is out of [place]. And per her pharmacy she can not fill until 7/18/23. I don't believe the office carries any samples of Ubrelvy?     # [phone]

## 2023-07-12 NOTE — TELEPHONE ENCOUNTER
Received call from [place] spoke to rep that advised pt Botox order is ready for delivery 7/25/2023. Botox 200 UNITS  [address]  Via: [employer]    Please advise if Botox does not arrive. Thank you.

## 2023-07-17 NOTE — TELEPHONE ENCOUNTER
Called and advised pt of the below. She verbalized understanding. States that the pharmacy was able to fill her [place] Done 5 days ago.

## 2023-07-21 DIAGNOSIS — F41.1 GAD (GENERALIZED ANXIETY DISORDER): ICD-10-CM

## 2023-07-21 NOTE — TELEPHONE ENCOUNTER
Medication Refill Request     Name of Medication Buspar   Dose/Frequency 10mg 2x2 daily   Quantity 120  Verified pharmacy   [x]  Verified ordering Provider   [x]  Does patient have enough for the next 3 days? Yes [] No [x]  Does patient have a follow-up appointment scheduled?  Yes [x] No []   If so when is appointment: 7/24/23

## 2023-07-24 ENCOUNTER — OFFICE VISIT (OUTPATIENT)
Dept: PSYCHIATRY | Facility: CLINIC | Age: 68
End: 2023-07-24
Payer: MEDICARE

## 2023-07-24 DIAGNOSIS — F33.9 MAJOR DEPRESSION, RECURRENT, CHRONIC (HCC): ICD-10-CM

## 2023-07-24 DIAGNOSIS — F41.1 GAD (GENERALIZED ANXIETY DISORDER): ICD-10-CM

## 2023-07-24 PROCEDURE — 99215 OFFICE O/P EST HI 40 MIN: CPT

## 2023-07-24 RX ORDER — BUPROPION HYDROCHLORIDE 150 MG/1
150 TABLET ORAL DAILY
Qty: 30 TABLET | Refills: 1 | Status: SHIPPED | OUTPATIENT
Start: 2023-07-24

## 2023-07-24 RX ORDER — DULOXETIN HYDROCHLORIDE 60 MG/1
60 CAPSULE, DELAYED RELEASE ORAL
Qty: 30 CAPSULE | Refills: 1 | Status: SHIPPED | OUTPATIENT
Start: 2023-07-24

## 2023-07-24 RX ORDER — BUSPIRONE HYDROCHLORIDE 10 MG/1
20 TABLET ORAL 2 TIMES DAILY
Qty: 120 TABLET | Refills: 0 | Status: SHIPPED | OUTPATIENT
Start: 2023-07-24 | End: 2023-07-24 | Stop reason: SDUPTHER

## 2023-07-24 RX ORDER — DULOXETIN HYDROCHLORIDE 30 MG/1
30 CAPSULE, DELAYED RELEASE ORAL
Qty: 30 CAPSULE | Refills: 1 | Status: SHIPPED | OUTPATIENT
Start: 2023-07-24

## 2023-07-24 RX ORDER — BUSPIRONE HYDROCHLORIDE 10 MG/1
20 TABLET ORAL 2 TIMES DAILY
Qty: 120 TABLET | Refills: 1 | Status: SHIPPED | OUTPATIENT
Start: 2023-07-24
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
## 2023-08-03 DIAGNOSIS — F41.1 GAD (GENERALIZED ANXIETY DISORDER): ICD-10-CM

## 2023-08-04 ENCOUNTER — TELEPHONE (OUTPATIENT)
Dept: PSYCHIATRY | Facility: CLINIC | Age: 68
End: 2023-08-04

## 2023-08-04 ENCOUNTER — TELEPHONE (OUTPATIENT)
Dept: NEUROLOGY | Facility: CLINIC | Age: 68
End: 2023-08-04

## 2023-08-04 RX ORDER — CLONAZEPAM 0.5 MG/1
0.5 TABLET ORAL 3 TIMES DAILY PRN
Qty: 90 TABLET | Refills: 0 | Status: SHIPPED | OUTPATIENT
Start: 2023-08-04

## 2023-08-04 NOTE — TELEPHONE ENCOUNTER
Recd vm 8/4 taken off 8/5   I am leaving this message for berto. This is [employer]. Loly Mendoza. I'm not gonna be able to make today's botox. I'm pretty sick with severe abdominal cramping and diarrhea and to leave a message to cancel the appointment. It was a 51 minute wait. So I don't know when you're going to get this call, but I  can't even get ready to come. I've been trying but the pains  getting worse. And  could possibly try to schedule me as soon as you can.     [phone]

## 2023-08-04 NOTE — TELEPHONE ENCOUNTER
Hello,     Patient has called back , she stated she was very sick yesterday and unable to come in for her Botox treatment on 8/03/2023. Re-scheduled to first available on 8/17/2023, [facility], Saqib, 2pm, Botox slot.     Thank you,     Reny Maldonado

## 2023-08-07 ENCOUNTER — TELEPHONE (OUTPATIENT)
Dept: BEHAVIORAL/MENTAL HEALTH CLINIC | Facility: CLINIC | Age: 68
End: 2023-08-07

## 2023-08-07 ENCOUNTER — HOSPITAL ENCOUNTER (EMERGENCY)
Facility: HOSPITAL | Age: 68
Discharge: HOME | End: 2023-08-07
Attending: EMERGENCY MEDICINE
Payer: MEDICARE

## 2023-08-07 VITALS
RESPIRATION RATE: 16 BRPM | DIASTOLIC BLOOD PRESSURE: 64 MMHG | OXYGEN SATURATION: 96 % | SYSTOLIC BLOOD PRESSURE: 129 MMHG | HEART RATE: 78 BPM | TEMPERATURE: 97.8 F

## 2023-08-07 DIAGNOSIS — I10 HYPERTENSION: ICD-10-CM

## 2023-08-07 DIAGNOSIS — F32.9 MAJOR DEPRESSION: Primary | ICD-10-CM

## 2023-08-07 DIAGNOSIS — R10.9 CHRONIC ABDOMINAL PAIN: ICD-10-CM

## 2023-08-07 DIAGNOSIS — G89.29 CHRONIC ABDOMINAL PAIN: ICD-10-CM

## 2023-08-07 DIAGNOSIS — E87.6 HYPOKALEMIA: ICD-10-CM

## 2023-08-07 LAB
ALBUMIN SERPL BCP-MCNC: 4.8 G/DL (ref 3.5–5)
ALP SERPL-CCNC: 57 U/L (ref 34–104)
ALT SERPL W P-5'-P-CCNC: 21 U/L (ref 7–52)
ANION GAP SERPL CALCULATED.3IONS-SCNC: 12 MMOL/L
AST SERPL W P-5'-P-CCNC: 20 U/L (ref 13–39)
BASOPHILS # BLD AUTO: 0.04 THOUSANDS/ÂΜL (ref 0–0.1)
BASOPHILS NFR BLD AUTO: 0 % (ref 0–1)
BILIRUB SERPL-MCNC: 0.39 MG/DL (ref 0.2–1)
BUN SERPL-MCNC: 18 MG/DL (ref 5–25)
CALCIUM SERPL-MCNC: 10.3 MG/DL (ref 8.4–10.2)
CHLORIDE SERPL-SCNC: 87 MMOL/L (ref 96–108)
CO2 SERPL-SCNC: 30 MMOL/L (ref 21–32)
CREAT SERPL-MCNC: 0.78 MG/DL (ref 0.6–1.3)
EOSINOPHIL # BLD AUTO: 0.05 THOUSAND/ÂΜL (ref 0–0.61)
EOSINOPHIL NFR BLD AUTO: 1 % (ref 0–6)
ERYTHROCYTE [DISTWIDTH] IN BLOOD BY AUTOMATED COUNT: 13.3 % (ref 11.6–15.1)
ETHANOL EXG-MCNC: 0 MG/DL
GFR SERPL CREATININE-BSD FRML MDRD: 77 ML/MIN/1.73SQ M
GLUCOSE SERPL-MCNC: 94 MG/DL (ref 65–140)
HCT VFR BLD AUTO: 41.5 % (ref 34.8–46.1)
HGB BLD-MCNC: 14.2 G/DL (ref 11.5–15.4)
IMM GRANULOCYTES # BLD AUTO: 0.05 THOUSAND/UL (ref 0–0.2)
IMM GRANULOCYTES NFR BLD AUTO: 1 % (ref 0–2)
LIPASE SERPL-CCNC: 22 U/L (ref 11–82)
LYMPHOCYTES # BLD AUTO: 1.65 THOUSANDS/ÂΜL (ref 0.6–4.47)
LYMPHOCYTES NFR BLD AUTO: 16 % (ref 14–44)
MCH RBC QN AUTO: 29.5 PG (ref 26.8–34.3)
MCHC RBC AUTO-ENTMCNC: 34.2 G/DL (ref 31.4–37.4)
MCV RBC AUTO: 86 FL (ref 82–98)
MONOCYTES # BLD AUTO: 0.78 THOUSAND/ÂΜL (ref 0.17–1.22)
MONOCYTES NFR BLD AUTO: 8 % (ref 4–12)
NEUTROPHILS # BLD AUTO: 7.59 THOUSANDS/ÂΜL (ref 1.85–7.62)
NEUTS SEG NFR BLD AUTO: 74 % (ref 43–75)
NRBC BLD AUTO-RTO: 0 /100 WBCS
PLATELET # BLD AUTO: 380 THOUSANDS/UL (ref 149–390)
PMV BLD AUTO: 9.6 FL (ref 8.9–12.7)
POTASSIUM SERPL-SCNC: 3.4 MMOL/L (ref 3.5–5.3)
PROT SERPL-MCNC: 7.7 G/DL (ref 6.4–8.4)
RBC # BLD AUTO: 4.82 MILLION/UL (ref 3.81–5.12)
SODIUM SERPL-SCNC: 129 MMOL/L (ref 135–147)
WBC # BLD AUTO: 10.16 THOUSAND/UL (ref 4.31–10.16)

## 2023-08-07 PROCEDURE — 99284 EMERGENCY DEPT VISIT MOD MDM: CPT

## 2023-08-07 PROCEDURE — 36415 COLL VENOUS BLD VENIPUNCTURE: CPT | Performed by: EMERGENCY MEDICINE

## 2023-08-07 PROCEDURE — 83690 ASSAY OF LIPASE: CPT | Performed by: EMERGENCY MEDICINE

## 2023-08-07 PROCEDURE — 80053 COMPREHEN METABOLIC PANEL: CPT | Performed by: EMERGENCY MEDICINE

## 2023-08-07 PROCEDURE — 82075 ASSAY OF BREATH ETHANOL: CPT | Performed by: EMERGENCY MEDICINE

## 2023-08-07 PROCEDURE — 85025 COMPLETE CBC W/AUTO DIFF WBC: CPT | Performed by: EMERGENCY MEDICINE

## 2023-08-07 RX ORDER — POTASSIUM CHLORIDE 20 MEQ/1
40 TABLET, EXTENDED RELEASE ORAL ONCE
Status: COMPLETED | OUTPATIENT
Start: 2023-08-07 | End: 2023-08-07

## 2023-08-07 RX ADMIN — POTASSIUM CHLORIDE 40 MEQ: 1500 TABLET, EXTENDED RELEASE ORAL at 20:52

## 2023-08-07 NOTE — ED PROVIDER NOTES
History  Chief Complaint   Patient presents with   • Depression     Pt to er with complaints of feeling depressed, wanting to be dead but has no plan. Patient's   1 year ago and she states that she "cant do it anymore" patient denies having any plan. Patient states that she has also been experiencing GI issues, which is not helping her depression. 27-year-old female presents for worsening depression with associated GI issues. The patient states that her   a year ago and she has been having worsening depression and increasing GI complaints and feels like she just cannot do it anymore. The patient has vague passive death wish without plan or true suicidal intent. The patient is complaining of over 6 months of GI issues with cramping. The patient states that she has an admittedly poor diet as she does not cook. The patient has had increased dosage of her chronic pain medication. Prior to Admission Medications   Prescriptions Last Dose Informant Patient Reported? Taking? B COMPLEX VITAMINS PO   Yes No   Sig: Take 1 tablet by mouth daily   Black Pepper-Turmeric (Turmeric Curcumin) 5-1000 MG CAPS   Yes No   Sig: Take 2 tablets by mouth in the morning   Cholecalciferol 50 MCG (2000 UT) CAPS   Yes No   Sig: Take 1 capsule by mouth daily   DULoxetine (CYMBALTA) 60 mg delayed release capsule   No No   Sig: Take 1 capsule (60 mg total) by mouth daily at bedtime   DULoxetine (Cymbalta) 30 mg delayed release capsule   No No   Sig: Take 1 capsule (30 mg total) by mouth daily at bedtime   Lidocaine-Menthol 4-1 % CREA   Yes No   Sig: Apply 1 Dose topically if needed   OnabotulinumtoxinA (BOTOX IM)   Yes No   Sig: Inject into a muscle   Riboflavin (Vitamin B-2) 100 MG TABS   No No   Si in the morning and 2 at bedtime   Ubrelvy 100 MG tablet   No No   Sig: TAKE 1 TABLET BY MOUTH ONE TIME AS NEEDED FOR MIGRAINE. MAY REPEAT WITH ONE ADDITIONAL TABLET AT LEAST 2 HOURS AFTER THE FIRST DOSE. DO NOT USE MORE THAN 2 DOSES PER DAY, OR FOR MORE THAN 10 DAYS PER MONTH   aspirin (ECOTRIN LOW STRENGTH) 81 mg EC tablet   Yes No   Sig: Take 81 mg by mouth daily   buPROPion (Wellbutrin XL) 150 mg 24 hr tablet   No No   Sig: Take 1 tablet (150 mg total) by mouth daily   busPIRone (BUSPAR) 10 mg tablet   No No   Sig: Take 2 tablets (20 mg total) by mouth 2 (two) times a day   calcium carbonate (OS-MKIE) 600 MG tablet   Yes No   Sig: Take 600 mg by mouth 2 (two) times a day   clonazePAM (KlonoPIN) 0.5 mg tablet   No No   Sig: TAKE 1 TABLET BY MOUTH THREE TIMES A DAY AS NEEDED FOR ANXIETY   esomeprazole (NexIUM) 20 mg capsule   Yes No   Sig: Take 20 mg by mouth daily in the early morning    ibuprofen (MOTRIN) 200 mg tablet   Yes No   Sig: Take 200 mg by mouth every 6 (six) hours as needed   magnesium oxide (MAG-OX) 400 mg tablet   No No   Sig: Take 1 tablet (400 mg total) by mouth 2 (two) times a day   meloxicam (MOBIC) 15 mg tablet   Yes No   Sig: Take 15 mg by mouth daily as needed   metFORMIN (GLUCOPHAGE) 1000 MG tablet   Yes No   Sig: Take 1,000 mg by mouth 2 (two) times a day with meals   multivitamin (THERAGRAN) TABS   Yes No   Sig: Take 1 tablet by mouth daily   naloxone (NARCAN) 4 mg/0.1 mL nasal spray   No No   Sig: Administer 1 spray into a nostril. If no response after 2-3 minutes, give another dose in the other nostril using a new spray.    oxyCODONE (ROXICODONE) 10 MG TABS   Yes No   Sig: Take 1 tablet by mouth 3 (three) times a day as needed   polyethylene glycol (MIRALAX) 17 g packet   Yes No   Sig: Take 17 g by mouth daily at bedtime   potassium chloride (K-DUR,KLOR-CON) 20 mEq tablet   Yes No   Sig: Take 1 tablet by mouth daily   rosuvastatin (CRESTOR) 20 MG tablet   Yes No   Sig: Take 20 mg by mouth daily   senna (SENOKOT) 8.6 MG tablet   Yes No   Sig: Take 1 tablet by mouth daily   valsartan-hydrochlorothiazide (DIOVAN-HCT) 160-25 MG per tablet   Yes No   Sig: Take 1 tablet by mouth daily Facility-Administered Medications: None       Past Medical History:   Diagnosis Date   • Anxiety    • Arthritis    • Cancer ([address])     Skin   • Depression    • Diabetes ([address])    • GERD (gastroesophageal reflux disease)    • Hyperlipidemia    • Hypertension    • Migraine        Past Surgical History:   Procedure Laterality Date   • ANKLE SURGERY     • CARPAL TUNNEL RELEASE Right    • ROTATOR CUFF REPAIR         Family History   Problem Relation Age of Onset   • Allergies Mother    • Migraines Mother    • Anxiety disorder Mother    • Cancer Father         Bladder   • Migraines Sister    • Multiple sclerosis Brother    • Colon cancer Maternal Grandmother    • Heart attack Maternal Grandfather    • Peripheral vascular disease Paternal Grandmother    • Diabetes Paternal Grandmother    • Heart attack Paternal Grandfather    • Migraines Brother         Rare   • Migraines Son         Rare     I have reviewed and agree with the history as documented. E-Cigarette/Vaping   • E-Cigarette Use Never User      E-Cigarette/Vaping Substances   • Nicotine No    • THC No    • CBD No    • Flavoring No    • Other No    • Unknown No      Social History     Tobacco Use   • Smoking status: Former     Types: Cigarettes     Quit date:      Years since quittin.6   • Smokeless tobacco: Never   Vaping Use   • Vaping Use: Never used   Substance Use Topics   • Alcohol use: Not Currently   • Drug use: Never       Review of Systems    Physical Exam  Physical Exam  Vitals and nursing note reviewed. Constitutional:       General: She is not in acute distress. Appearance: She is well-developed. HENT:      Head: Normocephalic and atraumatic. Right Ear: External ear normal.      Left Ear: External ear normal.   Eyes:      General: No scleral icterus. Conjunctiva/sclera: Conjunctivae normal.      Pupils: Pupils are equal, round, and reactive to light. Cardiovascular:      Rate and Rhythm: Normal rate and regular rhythm. Heart sounds: Normal heart sounds. Pulmonary:      Effort: Pulmonary effort is normal. No respiratory distress. Breath sounds: Normal breath sounds. Abdominal:      General: Bowel sounds are normal.      Palpations: Abdomen is soft. Tenderness: There is no abdominal tenderness. There is no guarding or rebound. Musculoskeletal:         General: Normal range of motion. Cervical back: Normal range of motion. Skin:     General: Skin is warm and dry. Findings: No rash. Neurological:      Mental Status: She is alert and oriented to person, place, and time. Psychiatric:         Mood and Affect: Mood is depressed. Thought Content: Thought content does not include homicidal or suicidal ideation.          Vital Signs  ED Triage Vitals [08/07/23 1728]   Temperature Pulse Respirations Blood Pressure SpO2   97.8 °F (36.6 °C) 89 18 120/66 96 %      Temp Source Heart Rate Source Patient Position - Orthostatic VS BP Location FiO2 (%)   Temporal Monitor Lying Left arm --      Pain Score       --           Vitals:    08/07/23 1728 08/07/23 1945   BP: 120/66 129/64   Pulse: 89 78   Patient Position - Orthostatic VS: Lying Lying         Visual Acuity      ED Medications  Medications   potassium chloride (K-DUR,KLOR-CON) CR tablet 40 mEq (40 mEq Oral Given 8/7/23 2052)       Diagnostic Studies  Results Reviewed     Procedure Component Value Units Date/Time    Lipase [[id number]]  (Normal) Collected: 08/07/23 1910    Lab Status: Final result Specimen: Blood Updated: 08/07/23 2022     Lipase 22 u/L     Comprehensive metabolic panel [[id number]]  (Abnormal) Collected: 08/07/23 1910    Lab Status: Final result Specimen: Blood Updated: 08/07/23 2022     Sodium 129 mmol/L      Potassium 3.4 mmol/L      Chloride 87 mmol/L      CO2 30 mmol/L      ANION GAP 12 mmol/L      BUN 18 mg/dL      Creatinine 0.78 mg/dL      Glucose 94 mg/dL      Calcium 10.3 mg/dL      AST 20 U/L      ALT 21 U/L      Alkaline Phosphatase 57 U/L      Total Protein 7.7 g/dL      Albumin 4.8 g/dL      Total Bilirubin 0.39 mg/dL      eGFR 77 ml/min/1.73sq m     Narrative:      [place] guidelines for Chronic Kidney Disease (CKD):   •  Stage 1 with normal or high GFR (GFR > 90 mL/min/1.73 square meters)  •  Stage 2 Mild CKD (GFR = 60-89 mL/min/1.73 square meters)  •  Stage 3A Moderate CKD (GFR = 45-59 mL/min/1.73 square meters)  •  Stage 3B Moderate CKD (GFR = 30-44 mL/min/1.73 square meters)  •  Stage 4 Severe CKD (GFR = 15-29 mL/min/1.73 square meters)  •  Stage 5 End Stage CKD (GFR <15 mL/min/1.73 square meters)  Note: GFR calculation is accurate only with a steady state creatinine    CBC and differential [[id number]] Collected: 08/07/23 1910    Lab Status: Final result Specimen: Blood Updated: 08/07/23 1945     WBC 10.16 Thousand/uL      RBC 4.82 Million/uL      Hemoglobin 14.2 g/dL      Hematocrit 41.5 %      MCV 86 fL      MCH 29.5 pg      MCHC 34.2 g/dL      RDW 13.3 %      MPV 9.6 fL      Platelets 722 Thousands/uL      nRBC 0 /100 WBCs      Neutrophils Relative 74 %      Immat GRANS % 1 %      Lymphocytes Relative 16 %      Monocytes Relative 8 %      Eosinophils Relative 1 %      Basophils Relative 0 %      Neutrophils Absolute 7.59 Thousands/µL      Immature Grans Absolute 0.05 Thousand/uL      Lymphocytes Absolute 1.65 Thousands/µL      Monocytes Absolute 0.78 Thousand/µL      Eosinophils Absolute 0.05 Thousand/µL      Basophils Absolute 0.04 Thousands/µL     POCT alcohol breath test [[id number]]  (Normal) Resulted: 08/07/23 1905    Lab Status: Final result Updated: 08/07/23 1905     EXTBreath Alcohol 0.00                 No orders to display              Procedures  Procedures         ED Course  ED Course as of 08/08/23 Miguelito Fleming Aug 07, 2023   2033 Sodium(!): 129  Patient on hydrochlorothiazide   2033 Discussed with Linda Perkins from crisis.  Patient without true SI, patient is exhausted from her chronic pain and lack of social support. Discussed social resources such as area on aging as well as follow up with her pain management specialist                               SBIRT 22yo+    Flowsheet Row Most Recent Value   Initial Alcohol Screen: US AUDIT-C     1. How often do you have a drink containing alcohol? 0 Filed at: 08/07/2023 1732   2. How many drinks containing alcohol do you have on a typical day you are drinking? 0 Filed at: 08/07/2023 1732   3a. Male UNDER 65: How often do you have five or more drinks on one occasion? 0 Filed at: 08/07/2023 1732   3b. FEMALE Any Age, or MALE 65+: How often do you have 4 or more drinks on one occassion? 0 Filed at: 08/07/2023 1732   Audit-C Score 0 Filed at: 08/07/2023 1732   SADIE: How many times in the past year have you. .. Used an illegal drug or used a prescription medication for non-medical reasons? Never Filed at: 08/07/2023 1732                    Medical Decision Making  Differential diagnosis: Chronic abdominal pain, irritable bowel syndrome, Crohn's, depression, side effects of chronic opioid use    Patient does not have any active suicidal homicidal ideation. There are no auditory or visual hallucinations. I discussed the case with crisis and we formulated a plan for discharge with outpatient resources. Patient also follow-up with her pain management team to discuss her chronic opioid use and how to taper off of it. Amount and/or Complexity of Data Reviewed  External Data Reviewed: notes. Details: Recent neurology notes reviewed  Labs: ordered. Decision-making details documented in ED Course. Risk  Prescription drug management.           Disposition  Final diagnoses:   Major depression   Hypokalemia   Hypertension   Chronic abdominal pain     Time reflects when diagnosis was documented in both MDM as applicable and the Disposition within this note     Time User Action Codes Description Comment    8/7/2023  7:50 PM Marilee Law Add [F32.9] Major depression     8/7/2023  8:33 PM Gunjan VEGA Add [E87.6] Hypokalemia     8/7/2023  8:52 PM Gunjan VEGA Add [I10] Hypertension     8/7/2023  8:57 PM Latha Holloway Add [R10.9,  G89.29] Chronic abdominal pain       ED Disposition     ED Disposition   Discharge    Condition   Stable    Date/Time   Mon Aug 7, 2023  8:51 PM    Comment              MD Documentation    Nobie Pretty Most Recent Value   Sending MD Dr Gunjan Pearl ,DO      Follow-up Information     Follow up With Specialties Details Why Contact Info Additional Information    [facility]  Schedule an appointment as soon as possible for a visit  to manage your pain medication      Jennifer Son, DO Internal Medicine Schedule an appointment as soon as possible for a visit  For further evaluation [address]  [address] Emergency Department Emergency Medicine Go to  If symptoms worsen [address] [facility] [address]  [address] [facility] Emergency Department, [address] [facility], [facility], [address] [facility],[address]    [address] [facility]   [address]  [phone] [address] [facility], [address] [place] [address] [address], [address]     [phone]          Discharge Medication List as of 8/7/2023  8:52 PM      CONTINUE these medications which have NOT CHANGED    Details   aspirin (ECOTRIN LOW STRENGTH) 81 mg EC tablet Take 81 mg by mouth daily, Historical Med      B COMPLEX VITAMINS PO Take 1 tablet by mouth daily, Historical Med      Black Pepper-Turmeric (Turmeric Curcumin) 5-1000 MG CAPS Take 2 tablets by mouth in the morning, Historical Med      buPROPion (Wellbutrin XL) 150 mg 24 hr tablet Take 1 tablet (150 mg total) by mouth daily, Starting Mon 7/24/2023, Normal busPIRone (BUSPAR) 10 mg tablet Take 2 tablets (20 mg total) by mouth 2 (two) times a day, Starting Mon 7/24/2023, Normal      calcium carbonate (OS-MIKE) 600 MG tablet Take 600 mg by mouth 2 (two) times a day, Historical Med      Cholecalciferol 50 MCG (2000 UT) CAPS Take 1 capsule by mouth daily, Historical Med      clonazePAM (KlonoPIN) 0.5 mg tablet TAKE 1 TABLET BY MOUTH THREE TIMES A DAY AS NEEDED FOR ANXIETY, Starting Fri 8/4/2023, Normal      !! DULoxetine (Cymbalta) 30 mg delayed release capsule Take 1 capsule (30 mg total) by mouth daily at bedtime, Starting Mon 7/24/2023, Normal      !! DULoxetine (CYMBALTA) 60 mg delayed release capsule Take 1 capsule (60 mg total) by mouth daily at bedtime, Starting Mon 7/24/2023, Normal      esomeprazole (NexIUM) 20 mg capsule Take 20 mg by mouth daily in the early morning , Historical Med      ibuprofen (MOTRIN) 200 mg tablet Take 200 mg by mouth every 6 (six) hours as needed, Historical Med      Lidocaine-Menthol 4-1 % CREA Apply 1 Dose topically if needed, Historical Med      magnesium oxide (MAG-OX) 400 mg tablet Take 1 tablet (400 mg total) by mouth 2 (two) times a day, Starting Fri 10/28/2022, Normal      meloxicam (MOBIC) 15 mg tablet Take 15 mg by mouth daily as needed, Starting Mon 8/1/2022, Historical Med      metFORMIN (GLUCOPHAGE) 1000 MG tablet Take 1,000 mg by mouth 2 (two) times a day with meals, Starting Mon 8/15/2022, Historical Med      multivitamin (THERAGRAN) TABS Take 1 tablet by mouth daily, Historical Med      naloxone (NARCAN) 4 mg/0.1 mL nasal spray Administer 1 spray into a nostril.  If no response after 2-3 minutes, give another dose in the other nostril using a new spray., Normal      OnabotulinumtoxinA (BOTOX IM) Inject into a muscle, Historical Med      oxyCODONE (ROXICODONE) 10 MG TABS Take 1 tablet by mouth 3 (three) times a day as needed, Starting Wed 1/22/2020, Historical Med      polyethylene glycol (MIRALAX) 17 g packet Take 17 g by mouth daily at bedtime, Starting Wed 1/31/2018, Historical Med      potassium chloride (K-DUR,KLOR-CON) 20 mEq tablet Take 1 tablet by mouth daily, Starting Tue 2/4/2020, Historical Med      Riboflavin (Vitamin B-2) 100 MG TABS 2 in the morning and 2 at bedtime, Normal      rosuvastatin (CRESTOR) 20 MG tablet Take 20 mg by mouth daily, Starting Fri 12/27/2019, Historical Med      senna (SENOKOT) 8.6 MG tablet Take 1 tablet by mouth daily, Historical Med      Ubrelvy 100 MG tablet TAKE 1 TABLET BY MOUTH ONE TIME AS NEEDED FOR MIGRAINE. MAY REPEAT WITH ONE ADDITIONAL TABLET AT LEAST 2 HOURS AFTER THE FIRST DOSE. DO NOT USE MORE THAN 2 DOSES PER DAY, OR FOR MORE THAN 10 DAYS PER MONTH, Normal      valsartan-hydrochlorothiazide (DIOVAN-HCT) 160-25 MG per tablet Take 1 tablet by mouth daily, Starting Thu 12/5/2019, Historical Med       !! - Potential duplicate medications found. Please discuss with provider.               PDMP Review       Value Time User    PDMP Reviewed  Yes 8/4/2023 10:11 PM An Malcolm, [address] [facility] Provider  Electronically Signed by           Abimbola Estes DO  08/08/23 0881

## 2023-08-07 NOTE — TELEPHONE ENCOUNTER
Therapist called Ct due to a request for a return call message. Ct in distress stated she has stomach cramping, and diarrea, nasea. Ct stated she wants to die, initially could not commit to not completing suicide and ambulance called to assist while therapist was on phone with Ct. Ambulance dispatched to client's home to take to take to [facility]. ETS arrived while this therapist was on the phone with Ct whose dysregulation increased, ETS from [facility], stated that they will take Ct to [facility]. Once Therapist knew Ct was safe with ETS, ETS and therapist hung up the phone.

## 2023-08-08 NOTE — ED NOTES
This writer discussed the patients current presentation and recommended discharge plan with . They agree with the patient being discharged at this time with information about [facility]anna GUILLERMO on Aging. The patient was Instructed to follow up with their Pain and Spine Providers. The patient declined to complete a safety plan however a blank plan was provided for future use. In addition, the patient was instructed to call local [facility] crisis, other crisis services, [facility] or to go to the nearest ER immediately if their situation changes at any time. This writer discussed discharge plans with the patient  who agrees with and understands the discharge plans.          SAFETY PLAN  Warning Signs (thoughts, images, mood, behavior, situations) of a potential crisis: declined      Coping Skills (what can I do to take my mind off the problem, or to keep myself safe): declined      Outside Support (who can I reach out to for support and help): declined        National Suicide Prevention Hotline:  [address] [place] [address] Drive [address]: [address]: [address] [place] [address] [address] [phone] - Crisis   [phone] - Peer [address] (1-9pm daily)  [phone] - Teen Support Talk Line (1-9pm daily)  [address] [place] 1815 [facility] [address] [place] [address] [address] [place] [address]) [phone] - 127 [facility]

## 2023-08-08 NOTE — DISCHARGE INSTRUCTIONS
This writer discussed the patients current presentation and recommended discharge plan with . They agree with the patient being discharged at this time with information about [facility]ICHARD CHAPMAN on Aging. The patient was Instructed to follow up with their Pain and Spine Providers. The patient declined to complete a safety plan however a blank plan was provided for future use. In addition, the patient was instructed to call local [facility] crisis, other crisis services, 911 or to go to the nearest ER immediately if their situation changes at any time. This writer discussed discharge plans with the patient  who agrees with and understands the discharge plans.             SAFETY PLAN  Warning Signs (thoughts, images, mood, behavior, situations) of a potential crisis: declined        Coping Skills (what can I do to take my mind off the problem, or to keep myself safe): declined        Outside Support (who can I reach out to for support and help): declined           National Suicide Prevention Hotline:  [address] [place] [address] Drive: [address]: [address] [place] [address] 1600 [facility] [address] [place] [address] [address] [place] [address] [phone] - Crisis   [phone] - Peer [address] (1-9pm daily)  [phone] - Teen Support Talk Line (1-9pm daily)  [address] [place] [address] 1815 [facility] [address] [place] [address] [address] [place] [address]) [phone] - 127 [facility]

## 2023-08-08 NOTE — ED NOTES
70 y.o Female Patient presented to the ED with worsening depression and increasing GI complaints and hopelessness. Pt stated is in a lot of pain and distress and wants to get off her pain medications. Pt feels the medication is a contributing factor to her depression and GI complaints. Pt stated she also has depression due to her  being  and her mother starting to show signs of dementia. Pt stated she feels bad because she cannot help her family with her mother due to her disability. Pt stated she needs help around the house and with obtaining nutritional  Foods to eat. Pt sates she has passive SI but does not what to end her life just the pain and discomfort. Pt denies HI and A/V hallucinations. Pt has had no previous inpatient treatment. Pt has outpatient treatment with the [facility]. Pt denies any substance and legal issues. Pt stated she does not want inpatient treatment. Pt is referred to follow up with her Pain and Spine Providers for her to get off her pain medications. Pt was given information for the [address] [place] [address] on Aging to seek support services. Pt will be discharged home.

## 2023-08-17 ENCOUNTER — PROCEDURE VISIT (OUTPATIENT)
Dept: NEUROLOGY | Facility: CLINIC | Age: 68
End: 2023-08-17

## 2023-08-17 VITALS — HEART RATE: 99 BPM | DIASTOLIC BLOOD PRESSURE: 64 MMHG | TEMPERATURE: 97.3 F | SYSTOLIC BLOOD PRESSURE: 110 MMHG

## 2023-08-17 DIAGNOSIS — G24.3 CERVICAL DYSTONIA: Primary | ICD-10-CM

## 2023-08-17 NOTE — PROGRESS NOTES
Universal Protocol   Consent: Verbal consent obtained. Written consent obtained. Risks and benefits: risks, benefits and alternatives were discussed  Consent given by: patient  Time out: Immediately prior to procedure a "time out" was called to verify the correct patient, procedure, equipment, support staff and site/side marked as required. Patient understanding: patient states understanding of the procedure being performed  Patient consent: the patient's understanding of the procedure matches consent given  Procedure consent: procedure consent matches procedure scheduled  Relevant documents: relevant documents present and verified  Patient identity confirmed: verbally with patient        Chemodenervation     Date/Time 8/17/2023 2:00 PM     Performed by  Randall Sewell PA-C   Authorized by Randall Sewell PA-C       Pre-procedure details      Prepped With: Alcohol     Anesthesia  (see MAR for exact dosages):      Anesthesia method:  None   Procedure details     Position:  Upright   Botox     Botox Type:  Type A    Brand:  Botox    mL's of Botulinum Toxin:  200    Final Concentration per CC:  50 units    Needle Gauge:  30 G 2.5 inch   Procedures     Botox Procedures: cervical dystonia and chronic headache      Indications: spasmodic torticollis and migraines     Injection Location      Head / Face:  L superior cervical paraspinal, R superior cervical paraspinal, L , R , L frontalis, R frontalis, L medial occipitalis, R medial occipitalis, L temporalis, R temporalis, R superior trapezius, L superior trapezius and procerus    L  injection amount:  5 unit(s)    R  injection amount:  5 unit(s)    L lateral frontalis:  5 unit(s)    R lateral frontalis:  5 unit(s)    L medial frontalis:  5 unit(s)    R medial frontalis:  5 unit(s)    L temporalis injection amount:  20 unit(s)    R temporalis injection amount:  20 unit(s)    Procerus injection amount:  5 unit(s)    L medial occipitalis injection amount:  15 unit(s)    R medial occipitalis injection amount:  15 unit(s)    L superior cervical paraspinal injection amount:  10 unit(s)    R superior cervical paraspinal injection amount:  15 unit(s)    L superior trapezius injection amount:  15 unit(s)    R superior trapezius injection amount:  15 unit(s)    Cervical Dystonia / Salivary Gland:  R splenius capitis, L splenius capitis, L sternocleidomastoid and R sternocleidomastoid      L splenius capitis injection amount:  7.5 unit(s)      R splenius capitis injection amount:  7.5 unit(s)      L sternocleidomastoid injection amount:  10 unit(s)      R sternocleidomastoid injection amount:  10 unit(s)   Total Units     Total units used:  200    Total units discarded:  0   Post-procedure details      Chemodenervation:  Neck, excluding muscles of the larynx    Neck Muscle Location[de-identified]  Bilateral neck muscle    Patient tolerance of procedure:   Tolerated well, no immediate complications   Comments      2.5 units orbicularis oculi bilaterally  All medically necessary

## 2023-08-21 ENCOUNTER — TELEMEDICINE (OUTPATIENT)
Dept: BEHAVIORAL/MENTAL HEALTH CLINIC | Facility: CLINIC | Age: 68
End: 2023-08-21
Payer: MEDICARE

## 2023-08-21 DIAGNOSIS — F33.9 MAJOR DEPRESSION, RECURRENT, CHRONIC (HCC): ICD-10-CM

## 2023-08-21 DIAGNOSIS — F41.1 GAD (GENERALIZED ANXIETY DISORDER): Primary | ICD-10-CM

## 2023-08-21 PROCEDURE — 90837 PSYTX W PT 60 MINUTES: CPT | Performed by: SOCIAL WORKER

## 2023-08-21 NOTE — BH TREATMENT PLAN
Outpatient Behavioral Health Psychotherapy Treatment Plan    Palomo Olivo  1955     Date of Initial Psychotherapy Assessment:10/10/2022  Date of Current Treatment Plan: 08/21/23  Treatment Plan Target Date: 02/21/2024  Treatment Plan Expiration Date: 02/21/2024    Diagnosis:   1. SY (generalized anxiety disorder)        2. Major depression, recurrent, chronic (HCC)            Area(s) of Need:  I have had anxiety since I was school age  Long Term Goal 1 (in the client's own words): " To like myself" "to improve my symptoms of depression and anxiety when there are extenuating stressors like home repairs and things Elicia Benjamin used to take care of"                  Stage of Change: Contemplation    Target Date for completion: TBD     Anticipated therapeutic modalities: DBT/CBT     People identified to complete this goal: Ct and therapist      Objective 1: (identify the means of measuring success in meeting the objective): Ct will attend therapy at least bi weekly to process feelings, learn coping skills, and gain knowledge about thoughts/feelings/actions      Objective 2: (identify the means of measuring success in meeting the objective): Utilize coping skills to manage feeilngs of anxiety and depression as learned in therapy at least 2 times a day and when needed. I am currently under the care of a [facility] psychiatric provider: yes    My [facility] psychiatric provider is: Wendy Macario    I am currently taking psychiatric medications: Yes, as prescribed    I feel that I will be ready for discharge from mental health care when I reach the following (measurable goal/objective): When Ct is managing symptoms independently at least 95% of the time. For children and adults who have a legal guardian:   Has there been any change to custody orders and/or guardianship status? NA. If yes, attach updated documentation.     I have created my Crisis Plan and have been offered a copy of this plan    [facility] Treatment Plan [facility]: Diagnosis and Treatment Plan explained to [address] acknowledges an understanding of their diagnosis. DeWitt Sav agrees to this treatment plan.     I have been offered a copy of this Treatment Plan. yes

## 2023-08-21 NOTE — BH CRISIS PLAN
Client Name: Misti Perry       Client YOB: 1955  : 1955    Treatment Team (include name and contact information):     Psychotherapist: Joey Conte    Psychiatrist: Christina Macario   Release of information completed: no    " n/a   Release of information completed: no    Other (Specify Role): n/a    Release of information completed: no    Other (Specify Role): n/a   Release of information completed: no    Healthcare Provider  Meri Angeles[address]  [phone]    Type of Plan   * Child plans (children 15 yo and younger) must be completed and signed by the child's legal guardian   * Plans for all individuals 15 yo and above must be signed by the client. Plan Type: adolescent/adult (15 and over) Update/Review      My Personal Strengths are (in the client's own words):  "Friendly, I keep going, I try"  The stressors and triggers that may put me at risk are:  mother's health deteriorating and in placement, death of Leartis Dec, my , everyday stressors, health issues and social difficulties    Coping skills I can use to keep myself calm and safe: Increased contact with professional supports and Other (describe) I can use skills learned in therapy    Coping skills/supports I can use to maintain abstinence from substance use:   family support and reach out for help from professionals    The people that provide me with help and support: (Include name, contact, and how they can help)   Support person #1: Nerissa Covarrubias ( my sister)    * Phone number: in cell phone    * How can they help me? She prays with me   Support person #2:Sister in Law Minoo Sims)    * Phone number: in cell phone    * How can they help me?  She supports me, just being there for me, being there to listen       In the past, the following has helped me in times of crisis:    Being in a quiet space, Taking medications, Talking to a professional on the telephone, Going into the hospital, Calling a friend, Calling a family member and Praying or meditating      If it is an emergency and you need immediate help, call 9-1-1    If there is a possibility of danger to yourself or others, call the following crisis hotline resources:     Adult Crisis Numbers  Suicide Prevention Hotline - Dial 9-8-8  [facility]: [address] [facility] Street: [address] [facility] 98: [address] [facility] [address]: [phone]  [address] [place] [address] Street: [address]: [address]: [address] [facility] [address]: [phone] (daytime). [phone] (after hours, weekends, holidays)     Child/Adolescent Crisis Numbers   [facility] WOMEN'S AND CHILDREN'S \A Chronology of [place] Hospitals\"": [address] [facility] [address]: [phone]   [place]: [phone]   [address] [place] [address] Street: [phone]    Please note: Some [facility] do not have a separate number for Child/Adolescent specific crisis. If your county is not listed under Child/Adolescent, please call the adult number for your county     National Talk to Text Line   [place] [facility] - [phone]    In the event your feelings become unmanageable, and you cannot reach your support system, you will call 911 immediately or go to the nearest hospital emergency room.

## 2023-08-21 NOTE — PSYCH
Virtual Regular Visit    Verification of patient location:    Patient is located at Home in the following state in which I hold an active license [place]      Assessment/Plan:    Problem List Items Addressed This Visit        Other    Major depression, recurrent, chronic ([address])    SY (generalized anxiety disorder) - Primary       Goals addressed in session: Goal 1 and Goal 2          Reason for visit is   Chief Complaint   Patient presents with   • Virtual Regular Visit        Encounter provider Alfonso Falcon LCSW    Provider located at [address] [place] [address]  [phone]      Recent Visits  No visits were found meeting these conditions. Showing recent visits within past 7 days and meeting all other requirements  Today's Visits  Date Type Provider Dept   08/21/23 Telemedicine Alfonso Falcon, [address] [facility] Therapist [facility]   Showing today's visits and meeting all other requirements  Future Appointments  No visits were found meeting these conditions. Showing future appointments within next 150 days and meeting all other requirements       The patient was identified by name and date of birth. Tawny Subramanian was informed that this is a telemedicine visit and [facility] She agrees to proceed. .  My office door was closed. No one else was in the room. She acknowledged consent and understanding of privacy and security of the video platform. The patient has agreed to participate and understands they can discontinue the visit at any time. Patient is aware this is a billable service. Yvonne Vanegas is a 70 y.o. female  .       HPI     Past Medical History:   Diagnosis Date   • Anxiety    • Arthritis    • Cancer ([address])     Skin   • Depression    • Diabetes ([address])    • GERD (gastroesophageal reflux disease)    • Hyperlipidemia    • Hypertension    • Migraine        Past Surgical History:   Procedure Laterality Date   • ANKLE SURGERY     • CARPAL TUNNEL RELEASE Right    • ROTATOR CUFF REPAIR         Current Outpatient Medications   Medication Sig Dispense Refill   • aspirin (ECOTRIN LOW STRENGTH) 81 mg EC tablet Take 81 mg by mouth daily     • B COMPLEX VITAMINS PO Take 1 tablet by mouth daily     • Black Pepper-Turmeric (Turmeric Curcumin) 5-1000 MG CAPS Take 2 tablets by mouth in the morning     • buPROPion (Wellbutrin XL) 150 mg 24 hr tablet Take 1 tablet (150 mg total) by mouth daily 30 tablet 1   • busPIRone (BUSPAR) 10 mg tablet Take 2 tablets (20 mg total) by mouth 2 (two) times a day 120 tablet 1   • calcium carbonate (OS-MIKE) 600 MG tablet Take 600 mg by mouth 2 (two) times a day     • Cholecalciferol 50 MCG (2000 UT) CAPS Take 1 capsule by mouth daily     • clonazePAM (KlonoPIN) 0.5 mg tablet TAKE 1 TABLET BY MOUTH THREE TIMES A DAY AS NEEDED FOR ANXIETY 90 tablet 0   • DULoxetine (Cymbalta) 30 mg delayed release capsule Take 1 capsule (30 mg total) by mouth daily at bedtime 30 capsule 1   • DULoxetine (CYMBALTA) 60 mg delayed release capsule Take 1 capsule (60 mg total) by mouth daily at bedtime 30 capsule 1   • esomeprazole (NexIUM) 20 mg capsule Take 20 mg by mouth daily in the early morning      • ibuprofen (MOTRIN) 200 mg tablet Take 200 mg by mouth every 6 (six) hours as needed     • Lidocaine-Menthol 4-1 % CREA Apply 1 Dose topically if needed     • magnesium oxide (MAG-OX) 400 mg tablet Take 1 tablet (400 mg total) by mouth 2 (two) times a day 180 tablet 3   • meloxicam (MOBIC) 15 mg tablet Take 15 mg by mouth daily as needed     • metFORMIN (GLUCOPHAGE) 1000 MG tablet Take 1,000 mg by mouth 2 (two) times a day with meals     • multivitamin (THERAGRAN) TABS Take 1 tablet by mouth daily     • naloxone (NARCAN) 4 mg/0.1 mL nasal spray Administer 1 spray into a nostril.  If no response after 2-3 minutes, give another dose in the other nostril using a new spray. 1 each 1   • OnabotulinumtoxinA (BOTOX IM) Inject into a muscle     • oxyCODONE (ROXICODONE) 10 MG TABS Take 1 tablet by mouth 3 (three) times a day as needed     • polyethylene glycol (MIRALAX) 17 g packet Take 17 g by mouth daily at bedtime     • potassium chloride (K-DUR,KLOR-CON) 20 mEq tablet Take 1 tablet by mouth daily     • Riboflavin (Vitamin B-2) 100 MG TABS 2 in the morning and 2 at bedtime 360 tablet 3   • rosuvastatin (CRESTOR) 20 MG tablet Take 20 mg by mouth daily     • senna (SENOKOT) 8.6 MG tablet Take 1 tablet by mouth daily     • Ubrelvy 100 MG tablet TAKE 1 TABLET BY MOUTH ONE TIME AS NEEDED FOR MIGRAINE. MAY REPEAT WITH ONE ADDITIONAL TABLET AT LEAST 2 HOURS AFTER THE FIRST DOSE. DO NOT USE MORE THAN 2 DOSES PER DAY, OR FOR MORE THAN 10 DAYS PER MONTH 10 tablet 6   • valsartan-hydrochlorothiazide (DIOVAN-HCT) 160-25 MG per tablet Take 1 tablet by mouth daily       No current facility-administered medications for this visit. No Known Allergies    Review of Systems    Video Exam    There were no vitals filed for this visit. Physical Exam     Behavioral Health Psychotherapy Progress Note    Psychotherapy Provided: Individual Psychotherapy     1. SY (generalized anxiety disorder)        2. Major depression, recurrent, chronic ([address])            Goals addressed in session: Goal 1 and Goal 2     DATA: Ct presented on virtual session well groomed and dressed. Ct eye contact was appropriate and speech and thought content/process wnl. Ct reported that she is feeling better. This is the first session since making a well check call and hospital visit. Ct states that "I am feeling better, I still have stomach cramping, not as bad as it was. I am eating like I have IBS, I really do not think it is the oxy."  Ct continues to report that she cut her oxycodone down to 2 1/2 pills a day from 3.   Ct reports that this is the only way to deal with her physical pain and is seeing her pain dr again soon. Ct expresses fear of cutting it out all together because of her pain. Ct is looking forward to a visit with her son, and her mother is settled into her placement. Ct maintains that she is unable to manage anxiety and depression when "out of the ordinary" events occur that her   used to take care of. Therapist therapeutically challenged Ct and client dismissed Ct's accomplishments. Therapist continued to utilized MI and encouraged Ct to understand the secondary losses may make it feel like we are unable to address out of our control stressors and we are still accomplishing a goal whether it looks the way our  partners accomplished it or not. Ct also stated that she could not wake up this morning for session. Therapist challenged Ct on this as well as Ct was in session and on time. Ct laughed stating "I guess you are right"  Psychoeducation on thinking patterns and checking facts will affect mood and behavior. Tx plan and Crisis plan updated this session and Ct was informed that this therapist will no longer be at [facility] as of 2023. Ct wishes to have a DON and referral to [facility]. During this session, this clinician used the following therapeutic modalities: Client-centered Therapy, Motivational Interviewing and Solution-Focused Therapy    Substance Abuse was addressed during this session. If the client is diagnosed with a co-occurring substance use disorder, please indicate any changes in the frequency or amount of use: Ct stated she is weaning self off of oxycodone and is down to 2 1/2 pills a day. Stage of change for addressing substance use diagnoses: Contemplation    ASSESSMENT:  Rosalba Chavarria presents with a Euthymic/ normal and Dysthymic mood. her affect is Normal range and intensity and Flat, which is congruent, with her mood and the content of the session. The client has not made progress on their goals.      Rosalba Chavarria presents with a minimal risk of suicide, minimal risk of self-harm, and none risk of harm to others. For any risk assessment that surpasses a "low" rating, a safety plan must be developed. A safety plan was indicated: no  If yes, describe in detail n/a    PLAN: Between sessions, Eric Wooten will attend next scheduled therapy appt. At the next session, the therapist will use Cognitive Behavioral Therapy and Solution-Focused Therapy to address symptoms of depression and helplessness and say goodbye. Behavioral Health Treatment Plan and Discharge Planning: Eric Wooten is aware of and agrees to continue to work on their treatment plan. They have identified and are working toward their discharge goals.  yes    Visit start and stop times:    08/21/23  Start Time: 1007  Stop Time: 1107  Total Visit Time: 60 minutes

## 2023-08-23 ENCOUNTER — TELEPHONE (OUTPATIENT)
Dept: NEUROLOGY | Facility: CLINIC | Age: 68
End: 2023-08-23

## 2023-08-23 NOTE — TELEPHONE ENCOUNTER
Patient needs to reschedule appointment due to sickness before Botox appointment in November. No availability found. Can a held slot be used?   Please assist.

## 2023-08-24 NOTE — TELEPHONE ENCOUNTER
8/23/23 at 1:27 pm    Hi,This is Mara Garcia, [birth year]. I'm calling for Sandi Bertrand, who works with [employer] and I have an appointment for tomorrow for a follow up with her. But I'm not going to be able to make it. So I do need to reschedule, i hope you get this message before the time of the appointment. As far as putting into [employer], for some reason I'm having problems getting into it as far as my password and it's whatever, it's frustrating. I'm going to try again, but I'm calling for Nedra Perez and please cancel my appointment with Evans Goel tomorrow at [phone] and then I will need to reschedule. Thank you.  [phone]    Chart reviewed   Faby De Santiago already cancelled 8/24/23 appt    Called [phone] and left a message on pt's answering machine letting her know that I received her message and will forward it to Sandi Bertrand.

## 2023-08-25 ENCOUNTER — TELEPHONE (OUTPATIENT)
Dept: PSYCHIATRY | Facility: CLINIC | Age: 68
End: 2023-08-25

## 2023-08-25 NOTE — TELEPHONE ENCOUNTER
Called patient back I scheduled her for 09/12/23 at 02:00pm in the [facility] with MEENAKSHI.

## 2023-09-01 DIAGNOSIS — F41.1 GAD (GENERALIZED ANXIETY DISORDER): ICD-10-CM

## 2023-09-01 RX ORDER — CLONAZEPAM 0.5 MG/1
0.5 TABLET ORAL 3 TIMES DAILY PRN
Qty: 90 TABLET | Refills: 0 | Status: SHIPPED | OUTPATIENT
Start: 2023-09-02

## 2023-09-01 NOTE — TELEPHONE ENCOUNTER
Pt calling as she only has a few days of meds left and will be needing a RF before her next appt on 9/12. Last fill per PDMP: 8/5.

## 2023-09-04 ENCOUNTER — TELEPHONE (OUTPATIENT)
Dept: OTHER | Facility: OTHER | Age: 68
End: 2023-09-04

## 2023-09-04 ENCOUNTER — DOCUMENTATION (OUTPATIENT)
Dept: BEHAVIORAL/MENTAL HEALTH CLINIC | Facility: CLINIC | Age: 68
End: 2023-09-04

## 2023-09-05 ENCOUNTER — RA CDI HCC (OUTPATIENT)
Dept: OTHER | Facility: HOSPITAL | Age: 68
End: 2023-09-05

## 2023-09-05 NOTE — PROGRESS NOTES
[facility]    Patient Name Adelaide Boggs     Date of Birth: 70 y.o. 1955      MRN: [id number]    Admission Date: 10/10/2022    Date of Transfer: September 4, 2023    Admission Diagnosis:     1. Generalized Anxiety Disorder  2. Bereavement    Current Diagnosis:     No diagnosis found. Reason for Admission: \[facility] [place] [facility]\"" presented for treatment due to depressive symptoms and anxiety symptoms. Primary complaints included ANXIETY SYMPTOMS: daily anxiety symptoms, worrying daily, anxiety in social situations, agoraphobia and OTHER PROBLEMS: prolonged opiod use for pain management. Progress in Treatment: \[facility]\"" was seen for Medication Management and Individual Couseling. During the course of treatment she sporadically attended sessions. Episodes of Higher Level of Care: Mobile Crisis was phoned and ED visit completed, Ct d/c home    Transfer request Initiated by: Psychiatrist: None Therapist: Sanya Byrd    Reason for Transfer Request: clinician leaving practice    Does this individual need a clinician with specialized training/expertise?: No    Is this client working with any other [facility] Providers/Therapists?  Psychiatrist: Isma Macario Therapist: Sanya Byrd    Other pertinent issues: bereavment    Are there any specific individuals who would be a “best fit” or who have already agreed to accept this transfer request?      Psychiatrist: Isma Macario  Therapist: Zhanna Muir's Replacement  Rationale: insurance    Attempts to maintain the current therapeutic relationship: Not Applicable    Transfer request routed to n/a for input and/or approval.     Comments from other involved providers and/or clinical coordinator: Not Applicable    Sanya Byrd, LCSW09/04/23

## 2023-09-06 ENCOUNTER — TELEMEDICINE (OUTPATIENT)
Dept: BEHAVIORAL/MENTAL HEALTH CLINIC | Facility: CLINIC | Age: 68
End: 2023-09-06
Payer: MEDICARE

## 2023-09-06 DIAGNOSIS — Z63.4 BEREAVEMENT DUE TO LIFE EVENT: ICD-10-CM

## 2023-09-06 DIAGNOSIS — F33.9 MAJOR DEPRESSION, RECURRENT, CHRONIC (HCC): Primary | ICD-10-CM

## 2023-09-06 DIAGNOSIS — F11.20 CONTINUOUS OPIOID DEPENDENCE (HCC): ICD-10-CM

## 2023-09-06 DIAGNOSIS — F41.1 GAD (GENERALIZED ANXIETY DISORDER): ICD-10-CM

## 2023-09-06 PROCEDURE — 90837 PSYTX W PT 60 MINUTES: CPT | Performed by: SOCIAL WORKER

## 2023-09-06 SDOH — SOCIAL STABILITY - SOCIAL INSECURITY: DISSAPEARANCE AND DEATH OF FAMILY MEMBER: Z63.4

## 2023-09-06 NOTE — PSYCH
Virtual Regular Visit    Verification of patient location:    Patient is located at Home in the following state in which I hold an active license [place]      Assessment/Plan:    Problem List Items Addressed This Visit        Other    Major depression, recurrent, chronic ([address]) - Primary    SY (generalized anxiety disorder)    Bereavement due to life event    Continuous opioid dependence ([address])       Goals addressed in session: Goal 1          Reason for visit is   Chief Complaint   Patient presents with   • Virtual Regular Visit        Encounter provider Nova Mathias LCSW    Provider located at [address] [place] [address]  [phone]      Recent Visits  No visits were found meeting these conditions. Showing recent visits within past 7 days and meeting all other requirements  Today's Visits  Date Type Provider Dept   09/06/23 Telemedicine Nova Mathias [address] [place] [address] Therapist [facility]   Showing today's visits and meeting all other requirements  Future Appointments  No visits were found meeting these conditions. Showing future appointments within next 150 days and meeting all other requirements       The patient was identified by name and date of birth. Nidia Carlson was informed that this is a telemedicine visit and [facility] She agrees to proceed. .  My office door was closed. No one else was in the room. She acknowledged consent and understanding of privacy and security of the video platform. The patient has agreed to participate and understands they can discontinue the visit at any time. Patient is aware this is a billable service. Ramona Garsia is a 70 y.o. female  .       HPI     Past Medical History:   Diagnosis Date   • Anxiety    • Arthritis    • Cancer ([address])     Skin   • Depression    • Diabetes ([address])    • GERD (gastroesophageal reflux disease)    • Hyperlipidemia    • Hypertension    • Migraine        Past Surgical History:   Procedure Laterality Date   • ANKLE SURGERY     • CARPAL TUNNEL RELEASE Right    • ROTATOR CUFF REPAIR         Current Outpatient Medications   Medication Sig Dispense Refill   • clonazePAM (KlonoPIN) 0.5 mg tablet Take 1 tablet (0.5 mg total) by mouth 3 (three) times a day as needed for anxiety Do not start before September 2, 2023. 90 tablet 0   • aspirin (ECOTRIN LOW STRENGTH) 81 mg EC tablet Take 81 mg by mouth daily     • B COMPLEX VITAMINS PO Take 1 tablet by mouth daily     • Black Pepper-Turmeric (Turmeric Curcumin) 5-1000 MG CAPS Take 2 tablets by mouth in the morning     • buPROPion (Wellbutrin XL) 150 mg 24 hr tablet Take 1 tablet (150 mg total) by mouth daily 30 tablet 1   • busPIRone (BUSPAR) 10 mg tablet Take 2 tablets (20 mg total) by mouth 2 (two) times a day 120 tablet 1   • calcium carbonate (OS-MIKE) 600 MG tablet Take 600 mg by mouth 2 (two) times a day     • Cholecalciferol 50 MCG (2000 UT) CAPS Take 1 capsule by mouth daily     • DULoxetine (Cymbalta) 30 mg delayed release capsule Take 1 capsule (30 mg total) by mouth daily at bedtime 30 capsule 1   • DULoxetine (CYMBALTA) 60 mg delayed release capsule Take 1 capsule (60 mg total) by mouth daily at bedtime 30 capsule 1   • esomeprazole (NexIUM) 20 mg capsule Take 20 mg by mouth daily in the early morning      • ibuprofen (MOTRIN) 200 mg tablet Take 200 mg by mouth every 6 (six) hours as needed     • Lidocaine-Menthol 4-1 % CREA Apply 1 Dose topically if needed     • magnesium oxide (MAG-OX) 400 mg tablet Take 1 tablet (400 mg total) by mouth 2 (two) times a day 180 tablet 3   • meloxicam (MOBIC) 15 mg tablet Take 15 mg by mouth daily as needed     • metFORMIN (GLUCOPHAGE) 1000 MG tablet Take 1,000 mg by mouth 2 (two) times a day with meals     • multivitamin (THERAGRAN) TABS Take 1 tablet by mouth daily     • naloxone (NARCAN) 4 mg/0.1 mL nasal spray Administer 1 spray into a nostril. If no response after 2-3 minutes, give another dose in the other nostril using a new spray. 1 each 1   • OnabotulinumtoxinA (BOTOX IM) Inject into a muscle     • oxyCODONE (ROXICODONE) 10 MG TABS Take 1 tablet by mouth 3 (three) times a day as needed     • polyethylene glycol (MIRALAX) 17 g packet Take 17 g by mouth daily at bedtime     • potassium chloride (K-DUR,KLOR-CON) 20 mEq tablet Take 1 tablet by mouth daily     • Riboflavin (Vitamin B-2) 100 MG TABS 2 in the morning and 2 at bedtime 360 tablet 3   • rosuvastatin (CRESTOR) 20 MG tablet Take 20 mg by mouth daily     • senna (SENOKOT) 8.6 MG tablet Take 1 tablet by mouth daily     • Ubrelvy 100 MG tablet TAKE 1 TABLET BY MOUTH ONE TIME AS NEEDED FOR MIGRAINE. MAY REPEAT WITH ONE ADDITIONAL TABLET AT LEAST 2 HOURS AFTER THE FIRST DOSE. DO NOT USE MORE THAN 2 DOSES PER DAY, OR FOR MORE THAN 10 DAYS PER MONTH 10 tablet 6   • valsartan-hydrochlorothiazide (DIOVAN-HCT) 160-25 MG per tablet Take 1 tablet by mouth daily       No current facility-administered medications for this visit. No Known Allergies    Review of Systems    Video Exam    There were no vitals filed for this visit. Physical Exam     Behavioral Health Psychotherapy Progress Note    Psychotherapy Provided: Individual Psychotherapy     1. Major depression, recurrent, chronic ([address])        2. SY (generalized anxiety disorder)        3. Bereavement due to life event        4. Continuous opioid dependence ([address])            Goals addressed in session: Goal 1     DATA: Ct presented in session appropriately dressed and well kept,  Eye contact was appropriate, speech and thought content is wnl. Ct reported that she had a "good visit" with her son and LEONOR. Ct reported that she participated in voluminous discussions and valued her son's and DIL's relationship, stating that they communicate and explore feelings and included me in this. Therapist inquired with Ct about how this affected Ct, Ct stated that "they give me hope to improve myself, do better, want to do better, there was so much love and their company was wonderful. I bought a car with their help, we visited family, talked every night in the kitchen about serious things and our feelings. It makes me want more."  Ct reported that her neck hurts from following and participating in conversations. Therapist and Ct did some chair yoga to assist with neck exercises with breath, pranyama, to assist in healing vagus nerve and improve movement with neck, mindfulness, and calming. Therapist and Ct spent last 10 minutes mindgapping about how far Ct has come and saying goodbye as this is our last visit. Ct will transfer to the person replacing me or to ABRAM. During this session, this clinician used the following therapeutic modalities: Client-centered Therapy    Substance Abuse was addressed during this session. If the client is diagnosed with a co-occurring substance use disorder, please indicate any changes in the frequency or amount of use: as prescribed. Stage of change for addressing substance use diagnoses: Pre-contemplation    ASSESSMENT:  Dc Rosado presents with a Euthymic/ normal mood. her affect is Normal range and intensity, which is congruent, with her mood and the content of the session. The client has made progress on their goals. Dc Rosado presents with a none risk of suicide, none risk of self-harm, and none risk of harm to others. For any risk assessment that surpasses a "low" rating, a safety plan must be developed. A safety plan was indicated: no  If yes, describe in detail n/a    PLAN: Between sessions, Dc Rosado will continue utilizing skills as learned in tx and await DON. At the next session, the therapist will use n/a to address n/a.     Behavioral Health Treatment Plan and Discharge Planning: Dc Rosado is aware of and agrees to continue to work on their treatment plan. They have identified and are working toward their discharge goals.  yes    Visit start and stop times:    09/06/23  Start Time: 1005  Stop Time: 1100  Total Visit Time: 55 minutes

## 2023-09-07 ENCOUNTER — OFFICE VISIT (OUTPATIENT)
Dept: FAMILY MEDICINE CLINIC | Facility: HOSPITAL | Age: 68
End: 2023-09-07
Payer: MEDICARE

## 2023-09-07 VITALS
HEART RATE: 105 BPM | SYSTOLIC BLOOD PRESSURE: 112 MMHG | OXYGEN SATURATION: 96 % | DIASTOLIC BLOOD PRESSURE: 68 MMHG | WEIGHT: 161.4 LBS | BODY MASS INDEX: 25.94 KG/M2 | HEIGHT: 66 IN

## 2023-09-07 DIAGNOSIS — R55 NEAR SYNCOPE: Primary | ICD-10-CM

## 2023-09-07 DIAGNOSIS — I10 ESSENTIAL (PRIMARY) HYPERTENSION: ICD-10-CM

## 2023-09-07 DIAGNOSIS — I10 HYPERTENSION, ESSENTIAL: ICD-10-CM

## 2023-09-07 DIAGNOSIS — F11.20 CONTINUOUS OPIOID DEPENDENCE (HCC): ICD-10-CM

## 2023-09-07 DIAGNOSIS — M17.11 PRIMARY LOCALIZED OSTEOARTHRITIS OF RIGHT KNEE: ICD-10-CM

## 2023-09-07 DIAGNOSIS — I10 PRIMARY HYPERTENSION: ICD-10-CM

## 2023-09-07 DIAGNOSIS — Z82.49 FAMILY HISTORY OF CARDIAC DISORDER IN FATHER: ICD-10-CM

## 2023-09-07 DIAGNOSIS — M54.2 CERVICALGIA: ICD-10-CM

## 2023-09-07 DIAGNOSIS — E11.69 TYPE 2 DIABETES MELLITUS WITH OTHER SPECIFIED COMPLICATION, WITHOUT LONG-TERM CURRENT USE OF INSULIN (HCC): ICD-10-CM

## 2023-09-07 DIAGNOSIS — E11.9 CONTROLLED TYPE 2 DIABETES MELLITUS WITHOUT COMPLICATION, WITHOUT LONG-TERM CURRENT USE OF INSULIN (HCC): ICD-10-CM

## 2023-09-07 PROBLEM — M47.817 FACET ARTHRITIS OF LUMBOSACRAL REGION: Status: ACTIVE | Noted: 2023-09-07

## 2023-09-07 PROBLEM — K44.9 HIATAL HERNIA: Status: ACTIVE | Noted: 2023-09-07

## 2023-09-07 PROBLEM — E78.00 HYPERCHOLESTEROLEMIA: Status: ACTIVE | Noted: 2023-09-07

## 2023-09-07 PROBLEM — K21.9 CHRONIC GERD: Status: ACTIVE | Noted: 2018-01-31

## 2023-09-07 PROBLEM — G47.09 OTHER INSOMNIA: Status: ACTIVE | Noted: 2020-08-31

## 2023-09-07 PROBLEM — K76.0 FATTY LIVER DISEASE, NONALCOHOLIC: Status: ACTIVE | Noted: 2022-11-09

## 2023-09-07 PROBLEM — K59.03 DRUG-INDUCED CONSTIPATION: Status: ACTIVE | Noted: 2023-09-07

## 2023-09-07 PROBLEM — F33.1 MODERATE EPISODE OF RECURRENT MAJOR DEPRESSIVE DISORDER (HCC): Status: ACTIVE | Noted: 2018-03-08

## 2023-09-07 PROBLEM — G89.29 CHRONIC NECK PAIN: Status: ACTIVE | Noted: 2018-01-31

## 2023-09-07 PROBLEM — M79.7 FIBROMYALGIA: Status: ACTIVE | Noted: 2018-01-31

## 2023-09-07 PROBLEM — G43.909 MIGRAINE WITHOUT STATUS MIGRAINOSUS, NOT INTRACTABLE: Status: ACTIVE | Noted: 2018-03-08

## 2023-09-07 PROCEDURE — 99204 OFFICE O/P NEW MOD 45 MIN: CPT | Performed by: INTERNAL MEDICINE

## 2023-09-07 RX ORDER — BACLOFEN 5 MG/1
TABLET ORAL
COMMUNITY

## 2023-09-07 RX ORDER — BISACODYL 5 MG/1
10 TABLET, DELAYED RELEASE ORAL DAILY PRN
COMMUNITY

## 2023-09-07 NOTE — ASSESSMENT & PLAN NOTE
Is tapering with   Her oxycodone- has chronic back pain after helping -  He  after 3 months dx of pancreatic cancer

## 2023-09-07 NOTE — PROGRESS NOTES
Assessment/Plan:     Diagnosis ICD-10-CM Associated Orders   1. Near syncope  R55 Echo complete w/ contrast if indicated     CBC and differential     Comprehensive metabolic panel     Lipid Panel with Direct LDL reflex     TSH, 3rd generation      2. Primary localized osteoarthritis of right knee  M17.11       3. Cervicalgia  M54.2       4. Controlled type 2 diabetes mellitus without complication, without long-term current use of insulin (HCC)  E11.9       5. Primary hypertension  I10       6. Hypertension, essential  I10 Echo complete w/ contrast if indicated      7. Family history of cardiac disorder in father  Z80.46 Echo complete w/ contrast if indicated      8. Continuous opioid dependence ([address])  F11.20       9. Essential (primary) hypertension  I10 Lipid Panel with Direct LDL reflex      10.  Type 2 diabetes mellitus with other specified complication, without long-term current use of insulin (HCC)  E11.69 TSH, 3rd generation          Problem List Items Addressed This Visit        Endocrine    Controlled type 2 diabetes mellitus, without long-term current use of insulin (HCC)       Cardiovascular and Mediastinum    Hypertension, essential    Relevant Orders    Echo complete w/ contrast if indicated       Musculoskeletal and Integument    Primary localized osteoarthritis of right knee       Other    Cervicalgia    Continuous opioid dependence (HCC)     Chronic pain- sees lon forrest in management in [place]        Other Visit Diagnoses     Near syncope    -  Primary    Relevant Orders    Echo complete w/ contrast if indicated    CBC and differential    Comprehensive metabolic panel    Lipid Panel with Direct LDL reflex    TSH, 3rd generation    Primary hypertension        Family history of cardiac disorder in father        Relevant Orders    Echo complete w/ contrast if indicated    Essential (primary) hypertension        Relevant Orders    Lipid Panel with Direct LDL reflex    Type 2 diabetes mellitus with other specified complication, without long-term current use of insulin ([address])        Relevant Orders    TSH, 3rd generation            Return in about 6 weeks (around 10/19/2023) for Annual Medicare Wellness. Subjective:    Patient ID: Clifford Wooten is a 70 y.o. female    New patient-   had been seen by PARISA prajapati in past but looking for care closer to home-     2 years a go of pancreatic cancer  Ongoing concerns-  1. Dizziness - feels like near syncope- when standing up- has spent more time in bed - now on wel butrrin for last month and now slightly more active- bp cuff at home   willhave her monitor and consdier c decreasing meds- discussed hydration status which she feels she is doing ok  Will get  Echo- will also  check labs  - on valsartan /hctz and potassium   2. Grief freaction- following with dr. Nilda Clancy- has no motivation- had worked as [employer] nurse  3. Sciatica issues- ongoing- sees pain management  4.knee pains- had gel injections recently- sees pain management-   Had falls  In past 2 years- using cane      The following portions of the patient's history were reviewed and updated as appropriate: allergies, current medications and problem list.     Review of Systems   Constitutional: Negative for chills and fever. Respiratory: Negative for cough and shortness of breath. Quit smoking in     Gastrointestinal: Positive for constipation. Musculoskeletal: Positive for arthralgias, gait problem and joint swelling. All other systems reviewed and are negative.         Objective:      Current Outpatient Medications:   •  aspirin (ECOTRIN LOW STRENGTH) 81 mg EC tablet, Take 81 mg by mouth daily, Disp: , Rfl:   •  B COMPLEX VITAMINS PO, Take 1 tablet by mouth daily, Disp: , Rfl:   •  Baclofen 5 MG TABS, Take by mouth Take 1-2 tabs TID prn, Disp: , Rfl:   •  bisacodyl 5 mg EC tablet, Take 10 mg by mouth daily as needed for constipation, Disp: , Rfl:   •  buPROPion (Wellbutrin XL) 150 mg 24 hr tablet, Take 1 tablet (150 mg total) by mouth daily, Disp: 30 tablet, Rfl: 1  •  busPIRone (BUSPAR) 10 mg tablet, Take 2 tablets (20 mg total) by mouth 2 (two) times a day, Disp: 120 tablet, Rfl: 1  •  calcium carbonate (OS-MIKE) 600 MG tablet, Take 600 mg by mouth 2 (two) times a day, Disp: , Rfl:   •  Cholecalciferol 50 MCG (2000 UT) CAPS, Take 1 capsule by mouth daily, Disp: , Rfl:   •  clonazePAM (KlonoPIN) 0.5 mg tablet, Take 1 tablet (0.5 mg total) by mouth 3 (three) times a day as needed for anxiety Do not start before September 2, 2023., Disp: 90 tablet, Rfl: 0  •  DULoxetine (Cymbalta) 30 mg delayed release capsule, Take 1 capsule (30 mg total) by mouth daily at bedtime, Disp: 30 capsule, Rfl: 1  •  DULoxetine (CYMBALTA) 60 mg delayed release capsule, Take 1 capsule (60 mg total) by mouth daily at bedtime, Disp: 30 capsule, Rfl: 1  •  esomeprazole (NexIUM) 20 mg capsule, Take 20 mg by mouth daily in the early morning , Disp: , Rfl:   •  ibuprofen (MOTRIN) 200 mg tablet, Take 200 mg by mouth every 6 (six) hours as needed, Disp: , Rfl:   •  magnesium oxide (MAG-OX) 400 mg tablet, Take 1 tablet (400 mg total) by mouth 2 (two) times a day, Disp: 180 tablet, Rfl: 3  •  metFORMIN (GLUCOPHAGE) 1000 MG tablet, Take 1,000 mg by mouth 2 (two) times a day with meals, Disp: , Rfl:   •  multivitamin (THERAGRAN) TABS, Take 1 tablet by mouth daily, Disp: , Rfl:   •  OnabotulinumtoxinA (BOTOX IM), Inject into a muscle, Disp: , Rfl:   •  oxyCODONE (ROXICODONE) 10 MG TABS, Take 1 tablet by mouth 4 (four) times a day, Disp: , Rfl:   •  polyethylene glycol (MIRALAX) 17 g packet, Take 17 g by mouth daily at bedtime, Disp: , Rfl:   •  potassium chloride (K-DUR,KLOR-CON) 20 mEq tablet, Take 1 tablet by mouth daily, Disp: , Rfl:   •  Riboflavin (Vitamin B-2) 100 MG TABS, 2 in the morning and 2 at bedtime, Disp: 360 tablet, Rfl: 3  •  rosuvastatin (CRESTOR) 20 MG tablet, Take 20 mg by mouth daily, Disp: , Rfl:   •  senna (SENOKOT) 8.6 MG tablet, Take 1 tablet by mouth daily, Disp: , Rfl:   •  Ubrelvy 100 MG tablet, TAKE 1 TABLET BY MOUTH ONE TIME AS NEEDED FOR MIGRAINE. MAY REPEAT WITH ONE ADDITIONAL TABLET AT LEAST 2 HOURS AFTER THE FIRST DOSE. DO NOT USE MORE THAN 2 DOSES PER DAY, OR FOR MORE THAN 10 DAYS PER MONTH, Disp: 10 tablet, Rfl: 6  •  valsartan-hydrochlorothiazide (DIOVAN-HCT) 160-25 MG per tablet, Take 1 tablet by mouth daily, Disp: , Rfl:   •  Lidocaine-Menthol 4-1 % CREA, Apply 1 Dose topically if needed (Patient not taking: Reported on 9/7/2023), Disp: , Rfl:   •  naloxone (NARCAN) 4 mg/0.1 mL nasal spray, Administer 1 spray into a nostril. If no response after 2-3 minutes, give another dose in the other nostril using a new spray. (Patient not taking: Reported on 9/7/2023), Disp: 1 each, Rfl: 1    Blood pressure 112/68, pulse 105, height 5' 5.5" (1.664 m), weight 73.2 kg (161 lb 6.4 oz), SpO2 96 %. Physical Exam  Vitals and nursing note reviewed. Constitutional:       General: She is not in acute distress. Appearance: She is well-developed. HENT:      Head: Normocephalic and atraumatic. Nose: No congestion. Eyes:      Conjunctiva/sclera: Conjunctivae normal.   Cardiovascular:      Rate and Rhythm: Normal rate and regular rhythm. Heart sounds: No murmur heard. Pulmonary:      Effort: Pulmonary effort is normal. No respiratory distress. Breath sounds: Normal breath sounds. Abdominal:      Palpations: Abdomen is soft. Tenderness: There is no abdominal tenderness. Musculoskeletal:         General: No swelling. Cervical back: Neck supple. Skin:     General: Skin is warm and dry. Capillary Refill: Capillary refill takes less than 2 seconds. Neurological:      Mental Status: She is alert.       Comments: Uses cane for  balance   Psychiatric:      Comments:  Good eye contact - no tearfulness

## 2023-09-12 ENCOUNTER — TELEPHONE (OUTPATIENT)
Dept: NEUROLOGY | Facility: CLINIC | Age: 68
End: 2023-09-12

## 2023-09-12 ENCOUNTER — OFFICE VISIT (OUTPATIENT)
Dept: PSYCHIATRY | Facility: CLINIC | Age: 68
End: 2023-09-12
Payer: MEDICARE

## 2023-09-12 DIAGNOSIS — F33.9 MAJOR DEPRESSION, RECURRENT, CHRONIC (HCC): ICD-10-CM

## 2023-09-12 DIAGNOSIS — F41.1 GAD (GENERALIZED ANXIETY DISORDER): ICD-10-CM

## 2023-09-12 PROCEDURE — 99214 OFFICE O/P EST MOD 30 MIN: CPT

## 2023-09-12 RX ORDER — DULOXETIN HYDROCHLORIDE 30 MG/1
30 CAPSULE, DELAYED RELEASE ORAL
Qty: 90 CAPSULE | Refills: 0 | Status: SHIPPED | OUTPATIENT
Start: 2023-09-12

## 2023-09-12 RX ORDER — BUSPIRONE HYDROCHLORIDE 10 MG/1
20 TABLET ORAL 2 TIMES DAILY
Qty: 360 TABLET | Refills: 0 | Status: SHIPPED | OUTPATIENT
Start: 2023-09-12

## 2023-09-12 RX ORDER — DULOXETIN HYDROCHLORIDE 60 MG/1
60 CAPSULE, DELAYED RELEASE ORAL
Qty: 90 CAPSULE | Refills: 0 | Status: SHIPPED | OUTPATIENT
Start: 2023-09-12

## 2023-09-12 RX ORDER — CLONAZEPAM 0.5 MG/1
0.5 TABLET ORAL 3 TIMES DAILY PRN
Qty: 90 TABLET | Refills: 2 | Status: SHIPPED | OUTPATIENT
Start: 2023-09-12

## 2023-09-12 RX ORDER — BUPROPION HYDROCHLORIDE 300 MG/1
300 TABLET ORAL DAILY
Qty: 90 TABLET | Refills: 0 | Status: SHIPPED | OUTPATIENT
Start: 2023-09-12
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
## 2023-09-12 NOTE — TELEPHONE ENCOUNTER
Patient called to cancel 2pm with [place] Ban 9/12/2023 due to extreme vertigo.   Patient will reschedule once she has holter monitor test.

## 2023-09-18 DIAGNOSIS — E78.00 HYPERCHOLESTEROLEMIA: ICD-10-CM

## 2023-09-18 DIAGNOSIS — I10 HYPERTENSION, ESSENTIAL: Primary | ICD-10-CM

## 2023-09-19 RX ORDER — ROSUVASTATIN CALCIUM 20 MG/1
20 TABLET, COATED ORAL DAILY
Qty: 90 TABLET | Refills: 3 | Status: SHIPPED | OUTPATIENT
Start: 2023-09-19

## 2023-09-19 RX ORDER — POTASSIUM CHLORIDE 20 MEQ/1
20 TABLET, EXTENDED RELEASE ORAL DAILY
Qty: 90 TABLET | Refills: 3 | Status: SHIPPED | OUTPATIENT
Start: 2023-09-19

## 2023-09-20 ENCOUNTER — HOSPITAL ENCOUNTER (OUTPATIENT)
Dept: NON INVASIVE DIAGNOSTICS | Age: 68
Discharge: HOME | End: 2023-09-20
Payer: MEDICARE

## 2023-09-20 DIAGNOSIS — R55 NEAR SYNCOPE: ICD-10-CM

## 2023-09-20 PROCEDURE — 93225 XTRNL ECG REC<48 HRS REC: CPT

## 2023-09-20 PROCEDURE — 93226 XTRNL ECG REC<48 HR SCAN A/R: CPT

## 2023-09-25 ENCOUNTER — HOSPITAL ENCOUNTER (OUTPATIENT)
Dept: NON INVASIVE DIAGNOSTICS | Age: 68
Discharge: HOME | End: 2023-09-25
Payer: MEDICARE

## 2023-09-25 VITALS
SYSTOLIC BLOOD PRESSURE: 112 MMHG | HEART RATE: 98 BPM | DIASTOLIC BLOOD PRESSURE: 68 MMHG | WEIGHT: 161 LBS | HEIGHT: 66 IN | BODY MASS INDEX: 25.88 KG/M2

## 2023-09-25 DIAGNOSIS — Z82.49 FAMILY HISTORY OF CARDIAC DISORDER IN FATHER: ICD-10-CM

## 2023-09-25 DIAGNOSIS — I10 HYPERTENSION, ESSENTIAL: ICD-10-CM

## 2023-09-25 DIAGNOSIS — R55 NEAR SYNCOPE: ICD-10-CM

## 2023-09-25 PROCEDURE — 93306 TTE W/DOPPLER COMPLETE: CPT

## 2023-09-25 PROCEDURE — 93356 MYOCRD STRAIN IMG SPCKL TRCK: CPT

## 2023-09-26 LAB
AORTIC ROOT: 3 CM
AORTIC VALVE MEAN VELOCITY: 8 M/S
APICAL FOUR CHAMBER EJECTION FRACTION: 64 %
AV LVOT MEAN GRADIENT: 2 MMHG
AV LVOT PEAK GRADIENT: 3 MMHG
AV MEAN GRADIENT: 3 MMHG
AV PEAK GRADIENT: 5 MMHG
AV VELOCITY RATIO: 0.75
DOP CALC AO PEAK VEL: 1.15 M/S
DOP CALC AO VTI: 20.19 CM
DOP CALC LVOT PEAK VEL VTI: 16.37 CM
DOP CALC LVOT PEAK VEL: 0.86 M/S
DOP CALC MV VTI: 15.29 CM
E WAVE DECELERATION TIME: 161 MS
FRACTIONAL SHORTENING: 36 % (ref 28–44)
GLOBAL LONGITUIDAL STRAIN: -19 %
INTERVENTRICULAR SEPTUM IN DIASTOLE (PARASTERNAL SHORT AXIS VIEW): 0.9 CM
INTERVENTRICULAR SEPTUM: 0.9 CM (ref 0.6–1.1)
LAAS-AP2: 11.6 CM2
LAAS-AP4: 11.7 CM2
LEFT ATRIUM SIZE: 3 CM
LEFT ATRIUM VOLUME (MOD BIPLANE): 27 ML
LEFT INTERNAL DIMENSION IN SYSTOLE: 2.7 CM (ref 2.1–4)
LEFT VENTRICLE DIASTOLIC VOLUME (MOD BIPLANE): 61 ML
LEFT VENTRICLE SYSTOLIC VOLUME (MOD BIPLANE): 21 ML
LEFT VENTRICULAR INTERNAL DIMENSION IN DIASTOLE: 4.2 CM (ref 3.5–6)
LEFT VENTRICULAR POSTERIOR WALL IN END DIASTOLE: 0.8 CM
LEFT VENTRICULAR STROKE VOLUME: 52 ML
LV EF: 66 %
LVSV (TEICH): 52 ML
MV E'TISSUE VEL-LAT: 6 CM/S
MV E'TISSUE VEL-SEP: 8 CM/S
MV MEAN GRADIENT: 1 MMHG
MV PEAK A VEL: 0.89 M/S
MV PEAK E VEL: 56 CM/S
MV PEAK GRADIENT: 3 MMHG
MV STENOSIS PRESSURE HALF TIME: 47 MS
MV VALVE AREA P 1/2 METHOD: 4.68 CM2
RA PRESSURE ESTIMATED: 3 MMHG
RV PSP: 27 MMHG
SL CV LEFT ATRIUM LENGTH A2C: 4 CM
SL CV LV EF: 60
SL CV PED ECHO LEFT VENTRICLE DIASTOLIC VOLUME (MOD BIPLANE) 2D: 79 ML
SL CV PED ECHO LEFT VENTRICLE SYSTOLIC VOLUME (MOD BIPLANE) 2D: 27 ML
TR MAX PG: 24 MMHG
TR PEAK VELOCITY: 2.5 M/S
TRICUSPID ANNULAR PLANE SYSTOLIC EXCURSION: 1.7 CM
TRICUSPID VALVE PEAK REGURGITATION VELOCITY: 2.47 M/S

## 2023-09-26 PROCEDURE — 93306 TTE W/DOPPLER COMPLETE: CPT | Performed by: INTERNAL MEDICINE

## 2023-09-26 PROCEDURE — 93356 MYOCRD STRAIN IMG SPCKL TRCK: CPT | Performed by: INTERNAL MEDICINE

## 2023-09-28 PROCEDURE — 93227 XTRNL ECG REC<48 HR R&I: CPT | Performed by: INTERNAL MEDICINE

## 2023-10-10 ENCOUNTER — TELEPHONE (OUTPATIENT)
Dept: NEUROLOGY | Facility: CLINIC | Age: 68
End: 2023-10-10

## 2023-10-10 NOTE — TELEPHONE ENCOUNTER
Called patient back and I scheduled her follow up for 11/02/23 at 02:30pm in the [place] Location with Paulette.

## 2023-10-10 NOTE — TELEPHONE ENCOUNTER
received vm from 10/9 at 2:15pm- Yes. Hi. I'd like to speak to marcy. This is Claudio green. I did miss my appointment, a follow up appointment with sudeep and I'm calling to reschedule it. I've called a few times. Well, every time I call, I have to wait at least an hour for somebody to get to me. And they may say 15 minutes or whatever. And I'm hoping if I do it this way, I'll get to talk to you and I can come in for the follow up. Ok, claudio green, [phone]. Am i'm calling regarding an appointment.  But I can't wait on the phone for an hour again or an hour and a half, which has been in the pot in the past. So Please when you get a chance, please give me a call to make up the appointment with sudeep, thank you. Lan.  ----------------------------------------------------  Marcy-can you assist with rescheduling

## 2023-10-16 ENCOUNTER — OFFICE VISIT (OUTPATIENT)
Dept: PSYCHIATRY | Facility: CLINIC | Age: 68
End: 2023-10-16
Payer: MEDICARE

## 2023-10-16 DIAGNOSIS — F33.9 MAJOR DEPRESSION, RECURRENT, CHRONIC (HCC): ICD-10-CM

## 2023-10-16 PROCEDURE — 99214 OFFICE O/P EST MOD 30 MIN: CPT

## 2023-10-16 RX ORDER — BUPROPION HYDROCHLORIDE 150 MG/1
150 TABLET ORAL DAILY
Qty: 30 TABLET | Refills: 1 | Status: SHIPPED | OUTPATIENT
Start: 2023-10-16
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
## 2023-11-02 ENCOUNTER — TELEMEDICINE (OUTPATIENT)
Dept: NEUROLOGY | Facility: CLINIC | Age: 68
End: 2023-11-02
Payer: MEDICARE

## 2023-11-02 DIAGNOSIS — R51.9 UNCONTROLLED MORNING HEADACHE: ICD-10-CM

## 2023-11-02 DIAGNOSIS — G47.33 OSA (OBSTRUCTIVE SLEEP APNEA): ICD-10-CM

## 2023-11-02 DIAGNOSIS — R93.89 ABNORMAL MRI: ICD-10-CM
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
## 2023-11-02 DIAGNOSIS — R51.9 WORSENING HEADACHES: ICD-10-CM

## 2023-11-02 DIAGNOSIS — G43.709 CHRONIC MIGRAINE WITHOUT AURA WITHOUT STATUS MIGRAINOSUS, NOT INTRACTABLE: Primary | ICD-10-CM

## 2023-11-02 DIAGNOSIS — M54.81 BILATERAL OCCIPITAL NEURALGIA: ICD-10-CM

## 2023-11-02 DIAGNOSIS — R06.83 SNORES: ICD-10-CM
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
## 2023-11-02 PROCEDURE — 99215 OFFICE O/P EST HI 40 MIN: CPT | Performed by: PHYSICIAN ASSISTANT

## 2023-11-02 NOTE — PATIENT INSTRUCTIONS
Chronic migraine headaches  Preventive therapy for headaches:   -Over-the-counter supplements: to decrease intensity and frequency of migraines  - Magnesium Oxide 400mg a day. If any diarrhea or upset stomach, decrease dose  as tolerated  (oral magnesium oxide may be an effective preventive strategy for people with migraine. Some theories about how it works include the idea that magnesium can help to prevent waves of cortical spreading depression and aura. Magnesium, in theory, also reduces the release of inflammatory or activating chemicals that can cause migraine)  - Vitamin B2 200 mg twice a day. May cause the urine to turn yellow which is normal for B 2 to do and is not a sign that you are dehydrated. (may be an effective preventive medication in some people with migraine)  - Continue botox every 3 months  Abortive therapy for headaches:   - At onset of migraine, take Ubrelvy 100 mg. May repeat in 2 hours if needed. Limit of 200 mg in 24 hours. Please call central scheduling to schedule your MRI and sleep referral [phone]  Get your blood work done 1 week before MRI  Home sleep study ordered    I am placing a referral to the sleep medicine specialist team to further evaluate your sleep issues.   Please call [phone] to schedule and I recommend you see one of the following providers:    Mary Rajput PA-C

## 2023-11-02 NOTE — PROGRESS NOTES
Virtual Regular Visit    Verification of patient location:    Patient is located at Home in the following state in which I hold an active license [place]      Assessment/Plan:    Problem List Items Addressed This Visit        Respiratory    JAMES (obstructive sleep apnea)     Patient had past inconclusive sleep study done as she was not able to sleep due to a migraine. Therefore we will proceed with home study. We did discuss that home studies can give false negative results or underestimate to severity of sleep apnea if found         Relevant Orders    Home Study    Ambulatory Referral to Sleep Medicine       Cardiovascular and Mediastinum    Headache, chronic migraine without aura - Primary     Preventive therapy for headaches:   -Over-the-counter supplements: to decrease intensity and frequency of migraines  - Magnesium Oxide 400mg a day. If any diarrhea or upset stomach, decrease dose  as tolerated  (oral magnesium oxide may be an effective preventive strategy for people with migraine. Some theories about how it works include the idea that magnesium can help to prevent waves of cortical spreading depression and aura. Magnesium, in theory, also reduces the release of inflammatory or activating chemicals that can cause migraine)  - Vitamin B2 200 mg twice a day. May cause the urine to turn yellow which is normal for B 2 to do and is not a sign that you are dehydrated. (may be an effective preventive medication in some people with migraine)  - Continue botox every 3 months  Abortive therapy for headaches:   - At onset of migraine, take Ubrelvy 100 mg. May repeat in 2 hours if needed. Limit of 200 mg in 24 hours.               Relevant Medications    Ubrogepant (Ubrelvy) 100 MG tablet       Nervous and Auditory    Bilateral occipital neuralgia    Relevant Medications    Ubrogepant (Ubrelvy) 100 MG tablet       Other    Worsening headaches    Relevant Orders    MRI brain with and without contrast    BUN    Creatinine, serum    Home Study    Ambulatory Referral to Sleep Medicine    Uncontrolled morning headache    Relevant Orders    MRI brain with and without contrast    Home Study    Snores    Relevant Orders    Ambulatory Referral to Sleep Medicine    Fall     Will repeat MRI due to MRIs in the past demonstrate parenchymal volume loss with prominent ventricles and sulci which if worsened, could be contributing to her symptoms with possible NPH or IIH. Therefore need to assess for worsening structural abnormalities and determine if any treatment, medications or surgery, would be necessary in the near future         Relevant Orders    MRI brain with and without contrast    Abnormal MRI     Patient has abnormal mri from 2020 and now with worsening and changing headaches, balance issues and falls need to repeat MRI to ensure no worsening in her pontine abnormalities and parenchymal volume loss which could be contributing to her falls, balance issues and other neurologic systems with increase in pressure or NPH. Relevant Orders    MRI brain with and without contrast            Reason for visit is   Chief Complaint   Patient presents with   • Migraine   • Virtual Regular Visit          Encounter provider Robert Fuentes PA-C    Provider located at [address] [place] [address] [facility]  [facility] [address] [facility]  [address] [place] [address]  [phone]      Recent Visits  No visits were found meeting these conditions. Showing recent visits within past 7 days and meeting all other requirements  Today's Visits  Date Type Provider Dept   11/02/23 Telemedicine Robert Fuentes PA-C [facility]   Showing today's visits and meeting all other requirements  Future Appointments  No visits were found meeting these conditions. Showing future appointments within next 150 days and meeting all other requirements       The patient was identified by name and date of birth.  Jerilyn Patricia was informed that this is a telemedicine visit and that the visit is being conducted through the [employer]. She agrees to proceed. .  My office door was closed. No one else was in the room. She acknowledged consent and understanding of privacy and security of the video platform. The patient has agreed to participate and understands they can discontinue the visit at any time. Patient is aware this is a billable service. Stacey Tran is a 70 y.o. female right handed  She is here today for evaluation of her headaches. Patient reports some worsening morning headaches over the past 1+ month where she is using her ubrlevy frequently. This is new from before. Patient also states she feels off balance and is using a walker at times to ensure she doesn't fall. Feels like her feet keep moving when she wants them to stop. Has had some vertigo but not as bad as in the past.  Mood is still very poor and doesn't want to live the way she is now. Having GI issues as well when she is seeing GI in a virtual visit later today. Medical history review:  Qtc:  4/2/2020 89  Tobacco use:   Quit in 1986   - type 2 diabetes   - hypertension  - hyperlipidemia  -on Crestor  - Vitamin-D deficiency  -  Congenital prolapsed rectum  -  arthritis  - chronic neck and back pain - Dr. Tessy Strickland and [address] [facility] [facility]   -  Fibromyalgia  -  Skin cancer ( squamous cell on the face)     Mood:    nervous breakdown in 2006  Depression:  Yes, moderate major depression disorder  Anxiety:  Yes generalized anxiety              Seeing a psychiatrist/ How often? [facility]               Seeing at therapist/ how often? Has seen a therapist since 200. Cervicalgia  Meds tried:  Cyclobenzaprine  tizanidine 4 mg half a tab at bedtime    baclofen     Chronic migraine headache/medication overuse headaches:  What medications do you take or have you taken for your headaches?    Current Preventative:  Buspirone   Vitamin-D,  Magnesium, multivitamin, riboflavin  Hydroxyzine, sertraline, clonazepam,   Valsartan-HCTZ   Botox     Current Abortive:  Ubrelvy     Prior Preventive therapy:  (patient lived in [place] and saw a neurologist there medications tried their)  Aimovig 140 mg injection  Topiramate, Depakote, gabapentin ( tremors), Lyrica (tremor), Lamictal  Cyclobenzaprine, tizanidine  metoprolol, propanolol  Klonopin 0.5 mg, ativan   amitriptyline, Duloxetine     Prior Abortive Therapy:   Fioricet with codeine (was taking daily. Takes twice a day 3-4 x's per month), oxycodone,   Sumatriptan   Nurtec      What is your current pain level? 0/10 headache. How often do the headaches occur? Mild headaches: 1-2 a week  Moderate to severe headaches: getting 15+ a month this changed a month ago, however was 5-8 a month (moderate not severe) now; prior was 25 x's/month in the past but now 5 since aug of 2020. Since last seen headaches have improved with Botox injections q 3 months. Since starting botox, the patient reports greater than 7 days of migraine relief from baseline, correlated with headache diary, decreased abortive medication use and decreased ER visits. Are you ever headache free? yes     Aura/Warning and how long does it last? None. What time of the day do the headaches start? Mild headaches: any time   Moderate to severe headaches: AM. Wakes up with them     How long do the headaches last?   Mild headaches: 1 hour   Moderate to severe headaches: 4-8 hours; Where is your headache located? Mild headaches: bilateral occipital region, shoots to frontal and temporal region. Pressure pain behind bilateral eyes   Moderate to severe headaches: bilateral occipital region, shoots to frontal and temporal region. Pressure pain behind bilateral eyes and into maxillary sinuses     Describe your usual headache?   Mild headaches: tight band  Moderate to severe headaches: shooting, stabbing, electrical, pressure, throbbing pain     What is the intensity of pain? Mild headaches: 4/10  Moderate to severe headaches: average lately 8-9/10;  7-10/10      Associated symptoms:   - Photophobia, Phonophobia, Osmophobia  - Nasal congestion/rhinorrhea    - Stiff or sore neck   - Puffy eyes  - tingling from occipital region radiating towards front  - Insomnia  - Prefer to be in a cool, quiet, dark room     Number of days missed per month because of headaches:  Work (or school) days: Stopped working in 2006  Social or Family activities: varies every month, in past few months only missed 1 this month     Headache are worse if the patient: looking down worsened neck pain and then headaches start  Headache triggers: Cigarette smoke, fumes, fireplace smoke, bending her neck over for a long time  What time of the year do headaches occur more frequently? Unsure. Cloudy and rainy weather worsens headaches     Have you had trigger point injection performed and how often? No   Have you had Botox injection performed and how often? Yes, has improved  Have you had epidural injections or transforaminal injections performed? Cervical, lumbar steroid injections      Alternative therapies used in the past for headaches? physical therapy, acupuncture  Have you used CBD or THC for your headaches and how often? No   How many caffeine products to drink a day? 8oz coffee a day   How much water to drink a day? Eight 8oz glasses         Brain images: Yes they have been done at [facility]  MRI brain:  - No acute ischemia or hemorrhage. Ventricles and sulci are mildly prominent consistent with parenchymal volume loss. Hyperintense T2 signal is noted in the mara, stable from the prior study. Few scattered nonspecific foci of hyperintensity T2 signal is seen in the periventricular subcortical white matter.   No evidence of mass    5/14/2020 MRI brain  Mild interval progression of the nonspecific cerebral white matter signal abnormality, presumably the sequela of chronic microangiopathy. Some of the small foci of white matter signal abnormality in the frontal lobes may be secondary to migraines. Moderate stable pontine signal abnormality also thought to be secondary to chronic microangiopathy. I personally reviewed these images.  .        Past Medical History:   Diagnosis Date   • Anxiety    • Arthritis    • Cancer ([address])     Skin   • Depression    • Diabetes ([address])    • GERD (gastroesophageal reflux disease)    • Hyperlipidemia    • Hypertension    • Migraine        Past Surgical History:   Procedure Laterality Date   • ANKLE SURGERY     • CARPAL TUNNEL RELEASE Right    • ROTATOR CUFF REPAIR         Current Outpatient Medications   Medication Sig Dispense Refill   • aspirin (ECOTRIN LOW STRENGTH) 81 mg EC tablet Take 81 mg by mouth daily     • B COMPLEX VITAMINS PO Take 1 tablet by mouth daily     • Baclofen 5 MG TABS Take by mouth Take 1-2 tabs TID prn     • bisacodyl 5 mg EC tablet Take 10 mg by mouth daily as needed for constipation     • buPROPion (Wellbutrin XL) 150 mg 24 hr tablet Take 1 tablet (150 mg total) by mouth daily 30 tablet 1   • busPIRone (BUSPAR) 10 mg tablet Take 2 tablets (20 mg total) by mouth 2 (two) times a day 360 tablet 0   • calcium carbonate (OS-MIKE) 600 MG tablet Take 600 mg by mouth 2 (two) times a day     • Cholecalciferol 50 MCG (2000 UT) CAPS Take 1 capsule by mouth daily     • clonazePAM (KlonoPIN) 0.5 mg tablet Take 1 tablet (0.5 mg total) by mouth 3 (three) times a day as needed for anxiety 90 tablet 2   • DULoxetine (Cymbalta) 30 mg delayed release capsule Take 1 capsule (30 mg total) by mouth daily at bedtime 90 capsule 0   • DULoxetine (CYMBALTA) 60 mg delayed release capsule Take 1 capsule (60 mg total) by mouth daily at bedtime 90 capsule 0   • esomeprazole (NexIUM) 20 mg capsule Take 20 mg by mouth daily in the early morning      • ibuprofen (MOTRIN) 200 mg tablet Take 200 mg by mouth every 6 (six) hours as needed     • Lidocaine-Menthol 4-1 % CREA Apply 1 Dose topically if needed Patch 5%     • magnesium oxide (MAG-OX) 400 mg tablet Take 1 tablet (400 mg total) by mouth 2 (two) times a day 180 tablet 3   • metFORMIN (GLUCOPHAGE) 1000 MG tablet Take 1,000 mg by mouth 2 (two) times a day with meals     • multivitamin (THERAGRAN) TABS Take 1 tablet by mouth daily     • OnabotulinumtoxinA (BOTOX IM) Inject into a muscle     • oxyCODONE (ROXICODONE) 10 MG TABS Take 2.5-3 tablets by mouth every 6 (six) hours as needed     • polyethylene glycol (MIRALAX) 17 g packet Take 17 g by mouth daily at bedtime     • potassium chloride (K-DUR,KLOR-CON) 20 mEq tablet Take 1 tablet (20 mEq total) by mouth daily 90 tablet 3   • Riboflavin (Vitamin B-2) 100 MG TABS 2 in the morning and 2 at bedtime 360 tablet 3   • rosuvastatin (CRESTOR) 20 MG tablet Take 1 tablet (20 mg total) by mouth daily 90 tablet 3   • senna (SENOKOT) 8.6 MG tablet Take 1 tablet by mouth daily     • Ubrogepant (Ubrelvy) 100 MG tablet Take 1 tablet (100 mg total) by mouth if needed (migraine) Take 1 tablet (100 mg) one time as needed for migraine. May repeat one additional tablet (100 mg) at least two hours after the first dose. Do not use more than two doses per day 16 tablet 6   • valsartan-hydrochlorothiazide (DIOVAN-HCT) 160-25 MG per tablet Take 1 tablet by mouth daily     • naloxone (NARCAN) 4 mg/0.1 mL nasal spray Administer 1 spray into a nostril. If no response after 2-3 minutes, give another dose in the other nostril using a new spray. (Patient not taking: Reported on 9/7/2023) 1 each 1     No current facility-administered medications for this visit. No Known Allergies   I have reviewed the patient's medical, social and surgical history as well as medications in detail and updated the computerized patient record. Review of Systems   Constitutional: Negative. HENT: Negative. Eyes: Negative. Respiratory: Negative. Cardiovascular: Negative. Gastrointestinal: Negative. Endocrine: Negative. Genitourinary: Negative. Musculoskeletal: Negative. Skin: Negative. Allergic/Immunologic: Negative. Neurological:  Positive for dizziness and headaches. Balance Problem    Hematological: Negative. Psychiatric/Behavioral: Negative. I personally reviewed and updated the ROS that was entered by the medical assistant      Video Exam    There were no vitals filed for this visit. Physical Exam   CONSTITUTIONAL: Well developed, well nourished, well groomed. No dysmorphic features. HEENT:  Normocephalic atraumatic. Chest:  Respirations regular and unlabored. Psychiatric:  Normal behavior and flat affect      MENTAL STATUS  Orientation: Alert and oriented x 3  Fund of knowledge: Intact. Visit Time  I have spent a total time of 48 minutes on 11/02/23 in caring for this patient including Diagnostic results, Prognosis, Risks and benefits of tx options, Instructions for management, Patient and family education, Importance of tx compliance, Risk factor reductions, Impressions, Counseling / Coordination of care, Documenting in the medical record, Reviewing / ordering tests, medicine, procedures  , and Obtaining or reviewing history  .

## 2023-11-02 NOTE — ASSESSMENT & PLAN NOTE
Preventive therapy for headaches:   -Over-the-counter supplements: to decrease intensity and frequency of migraines  - Magnesium Oxide 400mg a day. If any diarrhea or upset stomach, decrease dose  as tolerated  (oral magnesium oxide may be an effective preventive strategy for people with migraine. Some theories about how it works include the idea that magnesium can help to prevent waves of cortical spreading depression and aura. Magnesium, in theory, also reduces the release of inflammatory or activating chemicals that can cause migraine)  - Vitamin B2 200 mg twice a day. May cause the urine to turn yellow which is normal for B 2 to do and is not a sign that you are dehydrated. (may be an effective preventive medication in some people with migraine)  - Continue botox every 3 months  Abortive therapy for headaches:   - At onset of migraine, take Ubrelvy 100 mg. May repeat in 2 hours if needed. Limit of 200 mg in 24 hours.

## 2023-11-02 NOTE — ASSESSMENT & PLAN NOTE
Patient has abnormal mri from 2020 and now with worsening and changing headaches, balance issues and falls need to repeat MRI to ensure no worsening in her pontine abnormalities and parenchymal volume loss which could be contributing to her falls, balance issues and other neurologic systems with increase in pressure or NPH.

## 2023-11-02 NOTE — ASSESSMENT & PLAN NOTE
Will repeat MRI due to MRIs in the past demonstrate parenchymal volume loss with prominent ventricles and sulci which if worsened, could be contributing to her symptoms with possible NPH or IIH.   Therefore need to assess for worsening structural abnormalities and determine if any treatment, medications or surgery, would be necessary in the near future

## 2023-11-02 NOTE — ASSESSMENT & PLAN NOTE
Patient had past inconclusive sleep study done as she was not able to sleep due to a migraine. Therefore we will proceed with home study.   We did discuss that home studies can give false negative results or underestimate to severity of sleep apnea if found

## 2023-11-08 ENCOUNTER — HOSPITAL ENCOUNTER (INPATIENT)
Facility: HOSPITAL | Age: 68
LOS: 5 days | Discharge: NURSING, REHAB OR LONG-TERM CARE | DRG: 086 | End: 2023-11-13
Attending: STUDENT IN AN ORGANIZED HEALTH CARE EDUCATION/TRAINING PROGRAM | Admitting: STUDENT IN AN ORGANIZED HEALTH CARE EDUCATION/TRAINING PROGRAM
Payer: MEDICARE

## 2023-11-08 ENCOUNTER — HOSPITAL ENCOUNTER (EMERGENCY)
Facility: HOSPITAL | Age: 68
End: 2023-11-08
Attending: EMERGENCY MEDICINE | Admitting: EMERGENCY MEDICINE
Payer: MEDICARE

## 2023-11-08 ENCOUNTER — APPOINTMENT (EMERGENCY)
Dept: RADIOLOGY | Facility: HOSPITAL | Age: 68
End: 2023-11-08
Payer: MEDICARE

## 2023-11-08 ENCOUNTER — APPOINTMENT (EMERGENCY)
Dept: CT IMAGING | Facility: HOSPITAL | Age: 68
End: 2023-11-08
Attending: EMERGENCY MEDICINE
Payer: MEDICARE

## 2023-11-08 VITALS
OXYGEN SATURATION: 97 % | TEMPERATURE: 98.2 F | WEIGHT: 151 LBS | SYSTOLIC BLOOD PRESSURE: 101 MMHG | DIASTOLIC BLOOD PRESSURE: 49 MMHG | BODY MASS INDEX: 24.75 KG/M2 | RESPIRATION RATE: 16 BRPM | HEART RATE: 91 BPM

## 2023-11-08 DIAGNOSIS — S06.4XAA EPIDURAL HEMATOMA (HCC): Primary | ICD-10-CM
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
## 2023-11-08 DIAGNOSIS — Z63.4 BEREAVEMENT DUE TO LIFE EVENT: ICD-10-CM

## 2023-11-08 DIAGNOSIS — S06.4X9A: Primary | ICD-10-CM

## 2023-11-08 DIAGNOSIS — F33.9 MAJOR DEPRESSION, RECURRENT, CHRONIC (HCC): ICD-10-CM

## 2023-11-08 DIAGNOSIS — F41.1 GAD (GENERALIZED ANXIETY DISORDER): ICD-10-CM

## 2023-11-08 PROBLEM — S00.03XA SCALP HEMATOMA, INITIAL ENCOUNTER: Status: ACTIVE | Noted: 2023-11-08

## 2023-11-08 PROBLEM — S06.4X0A EPIDURAL HEMORRHAGE WITHOUT LOSS OF CONSCIOUSNESS (HCC): Status: ACTIVE | Noted: 2023-11-08

## 2023-11-08 LAB
ABO GROUP BLD: NORMAL
ALBUMIN SERPL BCP-MCNC: 4.6 G/DL (ref 3.5–5)
ALP SERPL-CCNC: 43 U/L (ref 34–104)
ALT SERPL W P-5'-P-CCNC: 16 U/L (ref 7–52)
ANION GAP SERPL CALCULATED.3IONS-SCNC: 10 MMOL/L
AST SERPL W P-5'-P-CCNC: 20 U/L (ref 13–39)
BASOPHILS # BLD AUTO: 0.06 THOUSANDS/ÂΜL (ref 0–0.1)
BASOPHILS NFR BLD AUTO: 1 % (ref 0–1)
BILIRUB SERPL-MCNC: 0.46 MG/DL (ref 0.2–1)
BLD GP AB SCN SERPL QL: NEGATIVE
BLD GP AB SCN SERPL QL: NEGATIVE
BUN SERPL-MCNC: 15 MG/DL (ref 5–25)
CALCIUM SERPL-MCNC: 10.9 MG/DL (ref 8.4–10.2)
CHLORIDE SERPL-SCNC: 95 MMOL/L (ref 96–108)
CO2 SERPL-SCNC: 29 MMOL/L (ref 21–32)
CREAT SERPL-MCNC: 1.15 MG/DL (ref 0.6–1.3)
EOSINOPHIL # BLD AUTO: 0.11 THOUSAND/ÂΜL (ref 0–0.61)
EOSINOPHIL NFR BLD AUTO: 2 % (ref 0–6)
ERYTHROCYTE [DISTWIDTH] IN BLOOD BY AUTOMATED COUNT: 13 % (ref 11.6–15.1)
GFR SERPL CREATININE-BSD FRML MDRD: 48 ML/MIN/1.73SQ M
GLUCOSE SERPL-MCNC: 138 MG/DL (ref 65–140)
HCT VFR BLD AUTO: 36.5 % (ref 34.8–46.1)
HGB BLD-MCNC: 12.1 G/DL (ref 11.5–15.4)
IMM GRANULOCYTES # BLD AUTO: 0.02 THOUSAND/UL (ref 0–0.2)
IMM GRANULOCYTES NFR BLD AUTO: 0 % (ref 0–2)
LYMPHOCYTES # BLD AUTO: 0.96 THOUSANDS/ÂΜL (ref 0.6–4.47)
LYMPHOCYTES NFR BLD AUTO: 13 % (ref 14–44)
MCH RBC QN AUTO: 30.2 PG (ref 26.8–34.3)
MCHC RBC AUTO-ENTMCNC: 33.2 G/DL (ref 31.4–37.4)
MCV RBC AUTO: 91 FL (ref 82–98)
MONOCYTES # BLD AUTO: 0.58 THOUSAND/ÂΜL (ref 0.17–1.22)
MONOCYTES NFR BLD AUTO: 8 % (ref 4–12)
NEUTROPHILS # BLD AUTO: 5.8 THOUSANDS/ÂΜL (ref 1.85–7.62)
NEUTS SEG NFR BLD AUTO: 76 % (ref 43–75)
NRBC BLD AUTO-RTO: 0 /100 WBCS
PLATELET # BLD AUTO: 328 THOUSANDS/UL (ref 149–390)
PMV BLD AUTO: 9.3 FL (ref 8.9–12.7)
POTASSIUM SERPL-SCNC: 3.5 MMOL/L (ref 3.5–5.3)
PROT SERPL-MCNC: 7.5 G/DL (ref 6.4–8.4)
RBC # BLD AUTO: 4.01 MILLION/UL (ref 3.81–5.12)
RH BLD: POSITIVE
SODIUM SERPL-SCNC: 134 MMOL/L (ref 135–147)
SPECIMEN EXPIRATION DATE: NORMAL
SPECIMEN EXPIRATION DATE: NORMAL
WBC # BLD AUTO: 7.53 THOUSAND/UL (ref 4.31–10.16)

## 2023-11-08 PROCEDURE — NC001 PR NO CHARGE: Performed by: STUDENT IN AN ORGANIZED HEALTH CARE EDUCATION/TRAINING PROGRAM

## 2023-11-08 PROCEDURE — 93005 ELECTROCARDIOGRAM TRACING: CPT

## 2023-11-08 PROCEDURE — 86900 BLOOD TYPING SEROLOGIC ABO: CPT | Performed by: EMERGENCY MEDICINE

## 2023-11-08 PROCEDURE — 71045 X-RAY EXAM CHEST 1 VIEW: CPT

## 2023-11-08 PROCEDURE — 86850 RBC ANTIBODY SCREEN: CPT | Performed by: EMERGENCY MEDICINE

## 2023-11-08 PROCEDURE — 99285 EMERGENCY DEPT VISIT HI MDM: CPT

## 2023-11-08 PROCEDURE — 86901 BLOOD TYPING SEROLOGIC RH(D): CPT | Performed by: EMERGENCY MEDICINE

## 2023-11-08 PROCEDURE — 70450 CT HEAD/BRAIN W/O DYE: CPT

## 2023-11-08 PROCEDURE — 99284 EMERGENCY DEPT VISIT MOD MDM: CPT

## 2023-11-08 PROCEDURE — 85025 COMPLETE CBC W/AUTO DIFF WBC: CPT | Performed by: EMERGENCY MEDICINE

## 2023-11-08 PROCEDURE — 71260 CT THORAX DX C+: CPT

## 2023-11-08 PROCEDURE — 72125 CT NECK SPINE W/O DYE: CPT

## 2023-11-08 PROCEDURE — 74177 CT ABD & PELVIS W/CONTRAST: CPT

## 2023-11-08 PROCEDURE — 99291 CRITICAL CARE FIRST HOUR: CPT | Performed by: STUDENT IN AN ORGANIZED HEALTH CARE EDUCATION/TRAINING PROGRAM

## 2023-11-08 PROCEDURE — 80053 COMPREHEN METABOLIC PANEL: CPT | Performed by: EMERGENCY MEDICINE

## 2023-11-08 PROCEDURE — 96365 THER/PROPH/DIAG IV INF INIT: CPT

## 2023-11-08 PROCEDURE — 36415 COLL VENOUS BLD VENIPUNCTURE: CPT | Performed by: EMERGENCY MEDICINE

## 2023-11-08 PROCEDURE — 96361 HYDRATE IV INFUSION ADD-ON: CPT

## 2023-11-08 RX ORDER — PRAVASTATIN SODIUM 40 MG
40 TABLET ORAL
Status: DISCONTINUED | OUTPATIENT
Start: 2023-11-09 | End: 2023-11-13 | Stop reason: HOSPADM

## 2023-11-08 RX ORDER — BISACODYL 5 MG/1
10 TABLET, DELAYED RELEASE ORAL DAILY PRN
Status: DISCONTINUED | OUTPATIENT
Start: 2023-11-08 | End: 2023-11-13 | Stop reason: HOSPADM

## 2023-11-08 RX ORDER — BUPROPION HYDROCHLORIDE 150 MG/1
150 TABLET ORAL DAILY
Status: DISCONTINUED | OUTPATIENT
Start: 2023-11-09 | End: 2023-11-13 | Stop reason: HOSPADM

## 2023-11-08 RX ORDER — BUSPIRONE HYDROCHLORIDE 10 MG/1
20 TABLET ORAL 2 TIMES DAILY
Status: DISCONTINUED | OUTPATIENT
Start: 2023-11-08 | End: 2023-11-13 | Stop reason: HOSPADM

## 2023-11-08 RX ORDER — AMOXICILLIN 250 MG
2 CAPSULE ORAL 2 TIMES DAILY
Status: DISCONTINUED | OUTPATIENT
Start: 2023-11-09 | End: 2023-11-08

## 2023-11-08 RX ORDER — ACETAMINOPHEN 325 MG/1
975 TABLET ORAL EVERY 8 HOURS PRN
Status: DISCONTINUED | OUTPATIENT
Start: 2023-11-08 | End: 2023-11-10

## 2023-11-08 RX ORDER — POTASSIUM CHLORIDE 20 MEQ/1
20 TABLET, EXTENDED RELEASE ORAL DAILY
Status: DISCONTINUED | OUTPATIENT
Start: 2023-11-09 | End: 2023-11-08

## 2023-11-08 RX ORDER — SENNOSIDES 8.6 MG
1 TABLET ORAL DAILY
Status: DISCONTINUED | OUTPATIENT
Start: 2023-11-09 | End: 2023-11-10

## 2023-11-08 RX ORDER — CHLORHEXIDINE GLUCONATE ORAL RINSE 1.2 MG/ML
15 SOLUTION DENTAL EVERY 12 HOURS SCHEDULED
Status: DISCONTINUED | OUTPATIENT
Start: 2023-11-08 | End: 2023-11-13 | Stop reason: HOSPADM

## 2023-11-08 RX ORDER — ACETAMINOPHEN 325 MG/1
650 TABLET ORAL ONCE
Status: DISCONTINUED | OUTPATIENT
Start: 2023-11-08 | End: 2023-11-08

## 2023-11-08 RX ORDER — ACETAMINOPHEN 325 MG/1
975 TABLET ORAL ONCE
Status: COMPLETED | OUTPATIENT
Start: 2023-11-08 | End: 2023-11-08

## 2023-11-08 RX ORDER — PANTOPRAZOLE SODIUM 20 MG/1
20 TABLET, DELAYED RELEASE ORAL
Status: DISCONTINUED | OUTPATIENT
Start: 2023-11-09 | End: 2023-11-13 | Stop reason: HOSPADM

## 2023-11-08 RX ORDER — OXYCODONE HYDROCHLORIDE 5 MG/1
5 TABLET ORAL EVERY 6 HOURS PRN
Status: DISCONTINUED | OUTPATIENT
Start: 2023-11-08 | End: 2023-11-08

## 2023-11-08 RX ORDER — BACLOFEN 10 MG/1
5 TABLET ORAL 3 TIMES DAILY PRN
Status: DISCONTINUED | OUTPATIENT
Start: 2023-11-08 | End: 2023-11-13 | Stop reason: HOSPADM

## 2023-11-08 RX ORDER — OXYCODONE HYDROCHLORIDE 10 MG/1
10 TABLET ORAL ONCE
Status: COMPLETED | OUTPATIENT
Start: 2023-11-08 | End: 2023-11-08

## 2023-11-08 RX ORDER — DULOXETIN HYDROCHLORIDE 60 MG/1
60 CAPSULE, DELAYED RELEASE ORAL
Status: DISCONTINUED | OUTPATIENT
Start: 2023-11-08 | End: 2023-11-13 | Stop reason: HOSPADM

## 2023-11-08 RX ADMIN — IOHEXOL 80 ML: 350 INJECTION, SOLUTION INTRAVENOUS at 14:27

## 2023-11-08 RX ADMIN — DESMOPRESSIN ACETATE 20.4 MCG: 4 SOLUTION INTRAVENOUS at 15:33

## 2023-11-08 RX ADMIN — BUSPIRONE HYDROCHLORIDE 20 MG: 10 TABLET ORAL at 21:59

## 2023-11-08 RX ADMIN — SODIUM CHLORIDE 1000 ML: 0.9 INJECTION, SOLUTION INTRAVENOUS at 14:16

## 2023-11-08 RX ADMIN — ACETAMINOPHEN 975 MG: 325 TABLET, FILM COATED ORAL at 14:17

## 2023-11-08 RX ADMIN — Medication 2.5 MG: at 21:58

## 2023-11-08 RX ADMIN — OXYCODONE HYDROCHLORIDE 10 MG: 10 TABLET ORAL at 16:11

## 2023-11-08 RX ADMIN — LEVETIRACETAM 1000 MG: 100 INJECTION, SOLUTION INTRAVENOUS at 21:59

## 2023-11-08 RX ADMIN — CHLORHEXIDINE GLUCONATE 15 ML: 1.2 SOLUTION ORAL at 22:07

## 2023-11-08 RX ADMIN — DULOXETINE HYDROCHLORIDE 60 MG: 60 CAPSULE, DELAYED RELEASE ORAL at 23:08

## 2023-11-08 RX ADMIN — ACETAMINOPHEN 975 MG: 325 TABLET, FILM COATED ORAL at 21:58

## 2023-11-08 SDOH — SOCIAL STABILITY - SOCIAL INSECURITY: DISSAPEARANCE AND DEATH OF FAMILY MEMBER: Z63.4

## 2023-11-08 NOTE — EMTALA/ACUTE CARE TRANSFER
[facility] EMERGENCY DEPARTMENT  [address] [facility] [address]  Dept: [phone]      EMTALA TRANSFER CONSENT    NAME Lenora Klinefelter DOB 1955                              MRN [id number]    I have been informed of my rights regarding examination, treatment, and transfer   by Dr. Hang Rosario MD    Benefits: Specialized equipment and/or services available at the receiving facility (Include comment)________________________    Risks: Potential for delay in receiving treatment, Potential deterioration of medical condition, Loss of IV, Increased discomfort during transfer, Possible worsening of condition or death during transfer      Consent for Transfer:  I acknowledge that my medical condition has been evaluated and explained to me by the emergency department physician or other qualified medical person and/or my attending physician, who has recommended that I be transferred to the service of  Accepting Physician: Radha Yap at [address] [place] [address] [address] Name, [facility] [address] : [facility]. The above potential benefits of such transfer, the potential risks associated with such transfer, and the probable risks of not being transferred have been explained to me, and I fully understand them. The doctor has explained that, in my case, the benefits of transfer outweigh the risks. I agree to be transferred. I authorize the performance of emergency medical procedures and treatments upon me in both transit and upon arrival at the receiving facility. Additionally, I authorize the release of any and all medical records to the receiving facility and request they be transported with me, if possible. I understand that the safest mode of transportation during a medical emergency is an ambulance and that the Hospital advocates the use of this mode of transport.  Risks of traveling to the receiving facility by car, including absence of medical control, life sustaining equipment, such as oxygen, and medical personnel has been explained to me and I fully understand them. (JESSICA CORRECT BOX BELOW)  [  ]  I consent to the stated transfer and to be transported by ambulance/helicopter. [  ]  I consent to the stated transfer, but refuse transportation by ambulance and accept full responsibility for my transportation by car. I understand the risks of non-ambulance transfers and I exonerate the Hospital and its staff from any deterioration in my condition that results from this refusal.    X___________________________________________    DATE  23  TIME________  Signature of patient or legally responsible individual signing on patient behalf           RELATIONSHIP TO PATIENT_________________________          Provider Certification    NAME Jerilyn Patricia                                         1955                              MRN [id number]    A medical screening exam was performed on the above named patient. Based on the examination:    Condition Necessitating Transfer The encounter diagnosis was Epidural hematoma ([address]).     Patient Condition: The patient has been stabilized such that within reasonable medical probability, no material deterioration of the patient condition or the condition of the unborn child(gasper) is likely to result from the transfer    Reason for Transfer: Level of Care needed not available at this facility    Transfer Requirements: [facility] available and qualified personnel available for treatment as acknowledged by    Agreed to accept transfer and to provide appropriate medical treatment as acknowledged by       [employer]  Appropriate medical records of the examination and treatment of the patient are provided at the time of transfer   [address] _______  Transfer will be performed by qualified personnel from    and appropriate transfer equipment as required, including the use of necessary and appropriate life support measures. Provider Certification: I have examined the patient and explained the following risks and benefits of being transferred/refusing transfer to the patient/family:  General risk, such as traffic hazards, adverse weather conditions, rough terrain or turbulence, possible failure of equipment (including vehicle or aircraft), or consequences of actions of persons outside the control of the transport personnel      Based on these reasonable risks and benefits to the patient and/or the unborn child(gasper), and based upon the information available at the time of the patient’s examination, I certify that the medical benefits reasonably to be expected from the provision of appropriate medical treatments at another medical facility outweigh the increasing risks, if any, to the individual’s medical condition, and in the case of labor to the unborn child, from effecting the transfer.     X____________________________________________ DATE 11/08/23        TIME_______      ORIGINAL - SEND TO MEDICAL RECORDS   COPY - SEND WITH PATIENT DURING TRANSFER

## 2023-11-08 NOTE — ED PROVIDER NOTES
Emergency Department Trauma Note  Pranav Brandon 70 y.o. female MRN: [id number]  Unit/Bed#: KSENIA POOL/KSENIA Encounter: [id number]      Trauma Alert: Trauma Acuity: Trauma Evaluation  Model of Arrival: Mode of Arrival: Direct from scene via    Trauma Team: Current Providers  Attending Provider: Kalpana Smith MD  Registered Nurse: Lis Covarrubias RN  Registered Nurse: Shonna Lundberg RN  Consultants:     None      History of Present Illness     Chief Complaint:   Chief Complaint   Patient presents with    Fall     Patient fell on Sunday night  " tripped and fell walking from kitchen in the dark", " headache and dizziness and nausea "     HPI:  Pranav Brandon is a 70 y.o. female who presents with . Mechanism:Details of Incident: patient fell on sunday night  walking in kitchen in the dark Injury Date: 11/05/23 Injury Time: 1830      Patient is a 79-year-old female without pertinent medical history who presents for evaluation of headache after a fall. She says on Saturday night she had mechanical fall falling forward hitting her left forehead on the ground. She believes she lost consciousness because the next and she knows she was on her couch. She believes she may have crawled to her couch but she does not remember exactly what happened. She describes a moderate constant dull achy left-sided headache since that time. Significant mild ecchymosis and swelling noted as well. Patient is on 81 mg aspirin which she has been taking up until yesterday. She denies neck pain chest pain dyspnea abdominal pain. Review of Systems   Constitutional:  Negative for fever. HENT:  Positive for facial swelling. Negative for sore throat. Respiratory:  Negative for shortness of breath. Cardiovascular:  Negative for chest pain. Gastrointestinal:  Negative for abdominal pain. Genitourinary:  Negative for dysuria. Musculoskeletal:  Negative for back pain. Skin:  Negative for rash.    Neurological:  Positive for headaches. Negative for light-headedness. Psychiatric/Behavioral:  Negative for agitation. All other systems reviewed and are negative.       Historical Information     Immunizations:   Immunization History   Administered Date(s) Administered    Fluzone Split Quad 0.5 mL 12/15/2017, 2018    INFLUENZA 12/15/2017, 2018, 2019    Pneumococcal Conjugate 13-Valent 2019    Tdap 2016       Past Medical History:   Diagnosis Date    Anxiety     Arthritis     Cancer ([address])     Skin    Depression     Diabetes ([address])     GERD (gastroesophageal reflux disease)     Hyperlipidemia     Hypertension     Migraine        Family History   Problem Relation Age of Onset    Mental illness Mother     Hyperlipidemia Mother     Hypertension Mother     Allergies Mother     Migraines Mother     Anxiety disorder Mother     Osteoporosis Mother     Hyperlipidemia Father     Heart disease Father     Cancer Father         Bladder    Migraines Sister     Hyperlipidemia Brother     Multiple sclerosis Brother     Hyperlipidemia Brother     Migraines Brother         Rare    Migraines Son         Rare    Colon cancer Maternal Grandmother     Heart attack Maternal Grandfather     Peripheral vascular disease Paternal Grandmother     Diabetes Paternal Grandmother     Asthma Paternal Grandfather     Diabetes Paternal Grandfather     Heart attack Paternal Grandfather      Past Surgical History:   Procedure Laterality Date    ANKLE SURGERY      CARPAL TUNNEL RELEASE Right     ROTATOR CUFF REPAIR       Social History     Tobacco Use    Smoking status: Former     Types: Cigarettes     Quit date:      Years since quittin.8    Smokeless tobacco: Never   Vaping Use    Vaping Use: Never used   Substance Use Topics    Alcohol use: Not Currently    Drug use: Never     E-Cigarette/Vaping    E-Cigarette Use Never User      E-Cigarette/Vaping Substances    Nicotine No     THC No     CBD No     Flavoring No     Other No Unknown No        Family History: non-contributory    Meds/Allergies   Prior to Admission Medications   Prescriptions Last Dose Informant Patient Reported? Taking? B COMPLEX VITAMINS PO   Yes No   Sig: Take 1 tablet by mouth daily   Baclofen 5 MG TABS   Yes No   Sig: Take by mouth Take 1-2 tabs TID prn   Cholecalciferol 50 MCG (2000 UT) CAPS   Yes No   Sig: Take 1 capsule by mouth daily   DULoxetine (CYMBALTA) 60 mg delayed release capsule   No No   Sig: Take 1 capsule (60 mg total) by mouth daily at bedtime   DULoxetine (Cymbalta) 30 mg delayed release capsule   No No   Sig: Take 1 capsule (30 mg total) by mouth daily at bedtime   Lidocaine-Menthol 4-1 % CREA   Yes No   Sig: Apply 1 Dose topically if needed Patch 5%   OnabotulinumtoxinA (BOTOX IM)   Yes No   Sig: Inject into a muscle   Riboflavin (Vitamin B-2) 100 MG TABS   No No   Si in the morning and 2 at bedtime   Ubrogepant (Ubrelvy) 100 MG tablet   No No   Sig: Take 1 tablet (100 mg total) by mouth if needed (migraine) Take 1 tablet (100 mg) one time as needed for migraine. May repeat one additional tablet (100 mg) at least two hours after the first dose.  Do not use more than two doses per day   aspirin (ECOTRIN LOW STRENGTH) 81 mg EC tablet   Yes No   Sig: Take 81 mg by mouth daily   bisacodyl 5 mg EC tablet   Yes No   Sig: Take 10 mg by mouth daily as needed for constipation   buPROPion (Wellbutrin XL) 150 mg 24 hr tablet   No No   Sig: Take 1 tablet (150 mg total) by mouth daily   busPIRone (BUSPAR) 10 mg tablet   No No   Sig: Take 2 tablets (20 mg total) by mouth 2 (two) times a day   calcium carbonate (OS-MIKE) 600 MG tablet   Yes No   Sig: Take 600 mg by mouth 2 (two) times a day   clonazePAM (KlonoPIN) 0.5 mg tablet   No No   Sig: Take 1 tablet (0.5 mg total) by mouth 3 (three) times a day as needed for anxiety   esomeprazole (NexIUM) 20 mg capsule   Yes No   Sig: Take 20 mg by mouth daily in the early morning    ibuprofen (MOTRIN) 200 mg tablet Yes No   Sig: Take 200 mg by mouth every 6 (six) hours as needed   magnesium oxide (MAG-OX) 400 mg tablet   No No   Sig: Take 1 tablet (400 mg total) by mouth 2 (two) times a day   metFORMIN (GLUCOPHAGE) 1000 MG tablet   Yes No   Sig: Take 1,000 mg by mouth 2 (two) times a day with meals   multivitamin (THERAGRAN) TABS   Yes No   Sig: Take 1 tablet by mouth daily   naloxone (NARCAN) 4 mg/0.1 mL nasal spray   No No   Sig: Administer 1 spray into a nostril. If no response after 2-3 minutes, give another dose in the other nostril using a new spray. Patient not taking: Reported on 9/7/2023   oxyCODONE (ROXICODONE) 10 MG TABS   Yes No   Sig: Take 2.5-3 tablets by mouth every 6 (six) hours as needed   polyethylene glycol (MIRALAX) 17 g packet   Yes No   Sig: Take 17 g by mouth daily at bedtime   potassium chloride (K-DUR,KLOR-CON) 20 mEq tablet   No No   Sig: Take 1 tablet (20 mEq total) by mouth daily   rosuvastatin (CRESTOR) 20 MG tablet   No No   Sig: Take 1 tablet (20 mg total) by mouth daily   senna (SENOKOT) 8.6 MG tablet   Yes No   Sig: Take 1 tablet by mouth daily   valsartan-hydrochlorothiazide (DIOVAN-HCT) 160-25 MG per tablet   Yes No   Sig: Take 1 tablet by mouth daily      Facility-Administered Medications: None       No Known Allergies    PHYSICAL EXAM    PE limited by: NA    Objective   Vitals:   First set: Temperature: 98.2 °F (36.8 °C) (11/08/23 1353)  Pulse: (!) 109 (11/08/23 1353)  Respirations: 20 (11/08/23 1353)  Blood Pressure: 93/53 (11/08/23 1353)  SpO2: 97 % (11/08/23 1353)    Primary Survey:   (A) Airway: Intact  (B) Breathing: Breath sounds  (C) Circulation: Pulses:   normal  (D) Disabliity:  GCS Total:  15  (E) Expose:  Completed    Secondary Survey: (Click on Physical Exam tab above)  Physical Exam  Vitals reviewed. Constitutional:       General: She is not in acute distress. Appearance: She is well-developed. HENT:      Head: Normocephalic.       Comments: Significant ecchymosis periorbitally bilaterally. Large left forehead hematoma with fading ecchymosis noted  Eyes:      Pupils: Pupils are equal, round, and reactive to light. Cardiovascular:      Rate and Rhythm: Normal rate and regular rhythm. Heart sounds: Normal heart sounds. Pulmonary:      Effort: Pulmonary effort is normal.      Breath sounds: Normal breath sounds. Abdominal:      General: Bowel sounds are normal. There is no distension. Palpations: Abdomen is soft. Tenderness: There is no abdominal tenderness. There is no guarding. Musculoskeletal:         General: No tenderness or deformity. Normal range of motion. Cervical back: Normal range of motion and neck supple. Skin:     General: Skin is warm and dry. Capillary Refill: Capillary refill takes less than 2 seconds. Comments: Ecchymosis in the left breast, no significant hematoma noted   Neurological:      Mental Status: She is alert and oriented to person, place, and time. Cranial Nerves: No cranial nerve deficit. Sensory: No sensory deficit. Comments: Alert oriented x3. GCS 15. Cranial nerves 2-12 intact. Strength 5/5 in all 4 extremities. Sensation intact throughout. Psychiatric:         Behavior: Behavior normal.         Thought Content: Thought content normal.         Judgment: Judgment normal.         Cervical spine cleared by clinical criteria?  No (imaging required)      Invasive Devices       Peripheral Intravenous Line  Duration             Peripheral IV 11/08/23 Right Antecubital <1 day                    Lab Results:   Results Reviewed       Procedure Component Value Units Date/Time    Comprehensive metabolic panel [[id number]]  (Abnormal) Collected: 11/08/23 1415    Lab Status: Final result Specimen: Blood from Arm, Right Updated: 11/08/23 1441     Sodium 134 mmol/L      Potassium 3.5 mmol/L      Chloride 95 mmol/L      CO2 29 mmol/L      ANION GAP 10 mmol/L      BUN 15 mg/dL      Creatinine 1.15 mg/dL      Glucose 138 mg/dL      Calcium 10.9 mg/dL      AST 20 U/L      ALT 16 U/L      Alkaline Phosphatase 43 U/L      Total Protein 7.5 g/dL      Albumin 4.6 g/dL      Total Bilirubin 0.46 mg/dL      eGFR 48 ml/min/1.73sq m     Narrative:      [facility] guidelines for Chronic Kidney Disease (CKD):     Stage 1 with normal or high GFR (GFR > 90 mL/min/1.73 square meters)    Stage 2 Mild CKD (GFR = 60-89 mL/min/1.73 square meters)    Stage 3A Moderate CKD (GFR = 45-59 mL/min/1.73 square meters)    Stage 3B Moderate CKD (GFR = 30-44 mL/min/1.73 square meters)    Stage 4 Severe CKD (GFR = 15-29 mL/min/1.73 square meters)    Stage 5 End Stage CKD (GFR <15 mL/min/1.73 square meters)  Note: GFR calculation is accurate only with a steady state creatinine    CBC and differential [[id number]]  (Abnormal) Collected: 11/08/23 1415    Lab Status: Final result Specimen: Blood from Arm, Right Updated: 11/08/23 1420     WBC 7.53 Thousand/uL      RBC 4.01 Million/uL      Hemoglobin 12.1 g/dL      Hematocrit 36.5 %      MCV 91 fL      MCH 30.2 pg      MCHC 33.2 g/dL      RDW 13.0 %      MPV 9.3 fL      Platelets 428 Thousands/uL      nRBC 0 /100 WBCs      Neutrophils Relative 76 %      Immat GRANS % 0 %      Lymphocytes Relative 13 %      Monocytes Relative 8 %      Eosinophils Relative 2 %      Basophils Relative 1 %      Neutrophils Absolute 5.80 Thousands/µL      Immature Grans Absolute 0.02 Thousand/uL      Lymphocytes Absolute 0.96 Thousands/µL      Monocytes Absolute 0.58 Thousand/µL      Eosinophils Absolute 0.11 Thousand/µL      Basophils Absolute 0.06 Thousands/µL                    Imaging Studies:   Direct to CT: No  XR Trauma chest portable   Final Result by Micky Webber MD (11/08 1512)   No acute cardiopulmonary findings.                   Workstation performed: [id number]         TRAUMA - CT head wo contrast   Final Result by Leslie Sharif MD (11/08 1506)      Small acute epidural hemorrhagic area in the left posterior temporal region with maximum thickness of 5.8 mm. .   Left frontal scalp hematoma. I personally discussed this study with Godwin Buck on 11/8/2023 3:05 PM.               Workstation performed: [id number]         TRAUMA - CT spine cervical wo contrast   Final Result by Ara Munroe MD (11/08 1505)      No cervical spine fracture or traumatic malalignment. I personally discussed this study with Godwin Buck on 11/8/2023 3:05 PM.            Workstation performed: [id number]         TRAUMA - CT chest abdomen pelvis w contrast   Final Result by Ara Munroe MD (11/08 1505)      No CT findings of trauma in the chest, abdomen or pelvis.          I personally discussed this study with Godwin Buck on 11/8/2023 3:05 PM.            Workstation performed: [id number]               Procedures  CriticalCare Time    Date/Time: 11/8/2023 6:55 PM    Performed by: Godwin Buck MD  Authorized by: Godwin Buck MD    Critical care provider statement:     Critical care time (minutes):  42    Critical care time was exclusive of:  Separately billable procedures and treating other patients and teaching time    Critical care was necessary to treat or prevent imminent or life-threatening deterioration of the following conditions:  CNS failure or compromise    Critical care was time spent personally by me on the following activities:  Obtaining history from patient or surrogate, development of treatment plan with patient or surrogate, blood draw for specimens, discussions with consultants, evaluation of patient's response to treatment, examination of patient, re-evaluation of patient's condition, ordering and review of radiographic studies, ordering and review of laboratory studies and ordering and performing treatments and interventions    I assumed direction of critical care for this patient from another provider in my specialty: no Comments:      Epidural hematoma           ED Course           Medical Decision Making  Patient is a 30-year-old female who presents for evaluation of headache after head trauma. Trauma evaluation called secondary to head trauma on aspirin. CT scans reviewed, showed left parietal epidural hematoma. Patient is GCS 15 neurologically intact. DDAVP given to reverse aspirin. Discussed with trauma surgery accepts the patient for transfer. Otherwise scans reviewed and unremarkable. Initially tachycardic and borderline hypotensive, improved with some IV fluids. Blood work also reviewed. Amount and/or Complexity of Data Reviewed  Labs: ordered. Radiology: ordered. Risk  OTC drugs. Prescription drug management. Disposition  [facility] Transfer: No  Final diagnoses:   Epidural hematoma ([address])     Time reflects when diagnosis was documented in both MDM as applicable and the Disposition within this note       Time User Action Codes Description Comment    11/8/2023  3:07 PM Aimee Martinez [S06. 4XAA] Epidural hematoma [facility])           ED Disposition       ED Disposition   Transfer to Another Facility-In Network    Condition   --    Date/Time   Wed Nov 8, 2023  3:07 PM    Comment   dAelaide Boggs should be transferred out to [facility] Documentation      Lora Cuello Most Recent Value   Patient Condition The patient has been stabilized such that within reasonable medical probability, no material deterioration of the patient condition or the condition of the unborn child(gasper) is likely to result from the transfer   Reason for Transfer Level of Care needed not available at this facility   Benefits of Transfer Specialized equipment and/or services available at the receiving facility (Include comment)________________________   Risks of Transfer Potential for delay in receiving treatment, Potential deterioration of medical condition, Loss of IV, Increased discomfort during transfer, Possible worsening of condition or death during transfer   Accepting Physician [address] Name, Sergei   Sending MD Martinez   Provider Certification General risk, such as traffic hazards, adverse weather conditions, rough terrain or turbulence, possible failure of equipment (including vehicle or aircraft), or consequences of actions of persons outside the control of the transport personnel          RN Documentation      Flowsheet Row Most [facility] Name, Ellencortes          Follow-up Information    None       Discharge Medication List as of 11/8/2023  5:07 PM        CONTINUE these medications which have NOT CHANGED    Details   aspirin (ECOTRIN LOW STRENGTH) 81 mg EC tablet Take 81 mg by mouth daily, Historical Med      B COMPLEX VITAMINS PO Take 1 tablet by mouth daily, Historical Med      Baclofen 5 MG TABS Take by mouth Take 1-2 tabs TID prn, Historical Med      bisacodyl 5 mg EC tablet Take 10 mg by mouth daily as needed for constipation, Historical Med      buPROPion (Wellbutrin XL) 150 mg 24 hr tablet Take 1 tablet (150 mg total) by mouth daily, Starting Mon 10/16/2023, Normal      busPIRone (BUSPAR) 10 mg tablet Take 2 tablets (20 mg total) by mouth 2 (two) times a day, Starting Tue 9/12/2023, Normal      calcium carbonate (OS-MIKE) 600 MG tablet Take 600 mg by mouth 2 (two) times a day, Historical Med      Cholecalciferol 50 MCG (2000 UT) CAPS Take 1 capsule by mouth daily, Historical Med      clonazePAM (KlonoPIN) 0.5 mg tablet Take 1 tablet (0.5 mg total) by mouth 3 (three) times a day as needed for anxiety, Starting Tue 9/12/2023, Normal      !! DULoxetine (Cymbalta) 30 mg delayed release capsule Take 1 capsule (30 mg total) by mouth daily at bedtime, Starting Tue 9/12/2023, Normal      !!  DULoxetine (CYMBALTA) 60 mg delayed release capsule Take 1 capsule (60 mg total) by mouth daily at bedtime, Starting Tue 9/12/2023, Normal      esomeprazole (NexIUM) 20 mg capsule Take 20 mg by mouth daily in the early morning , Historical Med      ibuprofen (MOTRIN) 200 mg tablet Take 200 mg by mouth every 6 (six) hours as needed, Historical Med      Lidocaine-Menthol 4-1 % CREA Apply 1 Dose topically if needed Patch 5%, Historical Med      magnesium oxide (MAG-OX) 400 mg tablet Take 1 tablet (400 mg total) by mouth 2 (two) times a day, Starting Fri 10/28/2022, Normal      metFORMIN (GLUCOPHAGE) 1000 MG tablet Take 1,000 mg by mouth 2 (two) times a day with meals, Starting Mon 8/15/2022, Historical Med      multivitamin (THERAGRAN) TABS Take 1 tablet by mouth daily, Historical Med      naloxone (NARCAN) 4 mg/0.1 mL nasal spray Administer 1 spray into a nostril. If no response after 2-3 minutes, give another dose in the other nostril using a new spray., Normal      OnabotulinumtoxinA (BOTOX IM) Inject into a muscle, Historical Med      oxyCODONE (ROXICODONE) 10 MG TABS Take 2.5-3 tablets by mouth every 6 (six) hours as needed, Starting Wed 1/22/2020, Historical Med      polyethylene glycol (MIRALAX) 17 g packet Take 17 g by mouth daily at bedtime, Starting Wed 1/31/2018, Historical Med      potassium chloride (K-DUR,KLOR-CON) 20 mEq tablet Take 1 tablet (20 mEq total) by mouth daily, Starting Tue 9/19/2023, Normal      Riboflavin (Vitamin B-2) 100 MG TABS 2 in the morning and 2 at bedtime, Normal      rosuvastatin (CRESTOR) 20 MG tablet Take 1 tablet (20 mg total) by mouth daily, Starting Tue 9/19/2023, Normal      senna (SENOKOT) 8.6 MG tablet Take 1 tablet by mouth daily, Historical Med      Ubrogepant (Ubrelvy) 100 MG tablet Take 1 tablet (100 mg total) by mouth if needed (migraine) Take 1 tablet (100 mg) one time as needed for migraine. May repeat one additional tablet (100 mg) at least two hours after the first dose.  Do not use more than two doses per day, Starting Thu 11/ 2/2023, Normal      valsartan-hydrochlorothiazide (DIOVAN-HCT) 160-25 MG per tablet Take 1 tablet by mouth daily, Starting Thu 12/5/2019, Historical Med       !! - Potential duplicate medications found. Please discuss with provider. No discharge procedures on file.     PDMP Review         Value Time User    PDMP Reviewed  Yes 10/16/2023  2:25 PM [facility], [address] [place] [address]            ED Provider  Electronically Signed by           Karolyn Hogue MD  11/08/23 1639

## 2023-11-08 NOTE — ED NOTES
Report given to [facility] OSVALDO Wahl, RN  11/08/23 3961

## 2023-11-09 ENCOUNTER — APPOINTMENT (INPATIENT)
Dept: RADIOLOGY | Facility: HOSPITAL | Age: 68
DRG: 086 | End: 2023-11-09
Payer: MEDICARE

## 2023-11-09 LAB
ANION GAP SERPL CALCULATED.3IONS-SCNC: 5 MMOL/L
ANION GAP SERPL CALCULATED.3IONS-SCNC: 9 MMOL/L
ATRIAL RATE: 95 BPM
BASOPHILS # BLD AUTO: 0.02 THOUSANDS/ÂΜL (ref 0–0.1)
BASOPHILS NFR BLD AUTO: 0 % (ref 0–1)
BUN SERPL-MCNC: 10 MG/DL (ref 5–25)
BUN SERPL-MCNC: 11 MG/DL (ref 5–25)
CALCIUM SERPL-MCNC: 8.2 MG/DL (ref 8.4–10.2)
CALCIUM SERPL-MCNC: 9.4 MG/DL (ref 8.4–10.2)
CHLORIDE SERPL-SCNC: 101 MMOL/L (ref 96–108)
CHLORIDE SERPL-SCNC: 93 MMOL/L (ref 96–108)
CO2 SERPL-SCNC: 28 MMOL/L (ref 21–32)
CO2 SERPL-SCNC: 32 MMOL/L (ref 21–32)
CREAT SERPL-MCNC: 0.69 MG/DL (ref 0.6–1.3)
CREAT SERPL-MCNC: 0.73 MG/DL (ref 0.6–1.3)
EOSINOPHIL # BLD AUTO: 0.12 THOUSAND/ÂΜL (ref 0–0.61)
EOSINOPHIL NFR BLD AUTO: 2 % (ref 0–6)
ERYTHROCYTE [DISTWIDTH] IN BLOOD BY AUTOMATED COUNT: 13.2 % (ref 11.6–15.1)
GFR SERPL CREATININE-BSD FRML MDRD: 84 ML/MIN/1.73SQ M
GFR SERPL CREATININE-BSD FRML MDRD: 89 ML/MIN/1.73SQ M
GLUCOSE SERPL-MCNC: 102 MG/DL (ref 65–140)
GLUCOSE SERPL-MCNC: 106 MG/DL (ref 65–140)
GLUCOSE SERPL-MCNC: 72 MG/DL (ref 65–140)
GLUCOSE SERPL-MCNC: 76 MG/DL (ref 65–140)
GLUCOSE SERPL-MCNC: 87 MG/DL (ref 65–140)
GLUCOSE SERPL-MCNC: 96 MG/DL (ref 65–140)
HCT VFR BLD AUTO: 28.2 % (ref 34.8–46.1)
HCT VFR BLD AUTO: 28.2 % (ref 34.8–46.1)
HGB BLD-MCNC: 9.5 G/DL (ref 11.5–15.4)
HGB BLD-MCNC: 9.9 G/DL (ref 11.5–15.4)
IMM GRANULOCYTES # BLD AUTO: 0.02 THOUSAND/UL (ref 0–0.2)
IMM GRANULOCYTES NFR BLD AUTO: 0 % (ref 0–2)
LYMPHOCYTES # BLD AUTO: 1.5 THOUSANDS/ÂΜL (ref 0.6–4.47)
LYMPHOCYTES NFR BLD AUTO: 23 % (ref 14–44)
MAGNESIUM SERPL-MCNC: 1.2 MG/DL (ref 1.9–2.7)
MAGNESIUM SERPL-MCNC: 2 MG/DL (ref 1.9–2.7)
MCH RBC QN AUTO: 31 PG (ref 26.8–34.3)
MCHC RBC AUTO-ENTMCNC: 33.7 G/DL (ref 31.4–37.4)
MCV RBC AUTO: 92 FL (ref 82–98)
MONOCYTES # BLD AUTO: 0.61 THOUSAND/ÂΜL (ref 0.17–1.22)
MONOCYTES NFR BLD AUTO: 9 % (ref 4–12)
NEUTROPHILS # BLD AUTO: 4.24 THOUSANDS/ÂΜL (ref 1.85–7.62)
NEUTS SEG NFR BLD AUTO: 66 % (ref 43–75)
NRBC BLD AUTO-RTO: 0 /100 WBCS
P AXIS: 78 DEGREES
PHOSPHATE SERPL-MCNC: 3.1 MG/DL (ref 2.3–4.1)
PLATELET # BLD AUTO: 241 THOUSANDS/UL (ref 149–390)
PMV BLD AUTO: 9.2 FL (ref 8.9–12.7)
POTASSIUM SERPL-SCNC: 2.8 MMOL/L (ref 3.5–5.3)
POTASSIUM SERPL-SCNC: 4 MMOL/L (ref 3.5–5.3)
PR INTERVAL: 140 MS
QRS AXIS: 69 DEGREES
QRSD INTERVAL: 84 MS
QT INTERVAL: 338 MS
QTC INTERVAL: 424 MS
RBC # BLD AUTO: 3.06 MILLION/UL (ref 3.81–5.12)
SODIUM SERPL-SCNC: 130 MMOL/L (ref 135–147)
SODIUM SERPL-SCNC: 138 MMOL/L (ref 135–147)
T WAVE AXIS: 42 DEGREES
VENTRICULAR RATE: 95 BPM
WBC # BLD AUTO: 6.51 THOUSAND/UL (ref 4.31–10.16)

## 2023-11-09 PROCEDURE — 85014 HEMATOCRIT: CPT

## 2023-11-09 PROCEDURE — 99223 1ST HOSP IP/OBS HIGH 75: CPT | Performed by: PHYSICIAN ASSISTANT

## 2023-11-09 PROCEDURE — 82948 REAGENT STRIP/BLOOD GLUCOSE: CPT

## 2023-11-09 PROCEDURE — 85018 HEMOGLOBIN: CPT

## 2023-11-09 PROCEDURE — 97163 PT EVAL HIGH COMPLEX 45 MIN: CPT

## 2023-11-09 PROCEDURE — 83735 ASSAY OF MAGNESIUM: CPT | Performed by: EMERGENCY MEDICINE

## 2023-11-09 PROCEDURE — 99222 1ST HOSP IP/OBS MODERATE 55: CPT | Performed by: NURSE PRACTITIONER

## 2023-11-09 PROCEDURE — 80048 BASIC METABOLIC PNL TOTAL CA: CPT

## 2023-11-09 PROCEDURE — G1004 CDSM NDSC: HCPCS

## 2023-11-09 PROCEDURE — 84100 ASSAY OF PHOSPHORUS: CPT | Performed by: EMERGENCY MEDICINE

## 2023-11-09 PROCEDURE — 97167 OT EVAL HIGH COMPLEX 60 MIN: CPT

## 2023-11-09 PROCEDURE — 93010 ELECTROCARDIOGRAM REPORT: CPT | Performed by: INTERNAL MEDICINE

## 2023-11-09 PROCEDURE — 80048 BASIC METABOLIC PNL TOTAL CA: CPT | Performed by: EMERGENCY MEDICINE

## 2023-11-09 PROCEDURE — 85025 COMPLETE CBC W/AUTO DIFF WBC: CPT | Performed by: EMERGENCY MEDICINE

## 2023-11-09 PROCEDURE — 99223 1ST HOSP IP/OBS HIGH 75: CPT | Performed by: PSYCHIATRY & NEUROLOGY

## 2023-11-09 PROCEDURE — 70450 CT HEAD/BRAIN W/O DYE: CPT

## 2023-11-09 PROCEDURE — 83735 ASSAY OF MAGNESIUM: CPT

## 2023-11-09 PROCEDURE — 99233 SBSQ HOSP IP/OBS HIGH 50: CPT | Performed by: SURGERY

## 2023-11-09 RX ORDER — ENOXAPARIN SODIUM 100 MG/ML
30 INJECTION SUBCUTANEOUS EVERY 12 HOURS SCHEDULED
Status: DISCONTINUED | OUTPATIENT
Start: 2023-11-09 | End: 2023-11-13 | Stop reason: HOSPADM

## 2023-11-09 RX ORDER — POTASSIUM CHLORIDE 14.9 MG/ML
20 INJECTION INTRAVENOUS ONCE
Status: DISCONTINUED | OUTPATIENT
Start: 2023-11-09 | End: 2023-11-09

## 2023-11-09 RX ORDER — LEVETIRACETAM 500 MG/1
500 TABLET ORAL EVERY 12 HOURS SCHEDULED
Status: DISCONTINUED | OUTPATIENT
Start: 2023-11-09 | End: 2023-11-13 | Stop reason: HOSPADM

## 2023-11-09 RX ORDER — INSULIN LISPRO 100 [IU]/ML
1-5 INJECTION, SOLUTION INTRAVENOUS; SUBCUTANEOUS
Status: DISCONTINUED | OUTPATIENT
Start: 2023-11-09 | End: 2023-11-13 | Stop reason: HOSPADM

## 2023-11-09 RX ORDER — SODIUM CHLORIDE, SODIUM GLUCONATE, SODIUM ACETATE, POTASSIUM CHLORIDE, MAGNESIUM CHLORIDE, SODIUM PHOSPHATE, DIBASIC, AND POTASSIUM PHOSPHATE .53; .5; .37; .037; .03; .012; .00082 G/100ML; G/100ML; G/100ML; G/100ML; G/100ML; G/100ML; G/100ML
500 INJECTION, SOLUTION INTRAVENOUS ONCE
Status: COMPLETED | OUTPATIENT
Start: 2023-11-09 | End: 2023-11-09

## 2023-11-09 RX ORDER — POTASSIUM CHLORIDE 20 MEQ/1
40 TABLET, EXTENDED RELEASE ORAL ONCE
Status: COMPLETED | OUTPATIENT
Start: 2023-11-09 | End: 2023-11-09

## 2023-11-09 RX ORDER — MAGNESIUM SULFATE HEPTAHYDRATE 40 MG/ML
2 INJECTION, SOLUTION INTRAVENOUS ONCE
Status: COMPLETED | OUTPATIENT
Start: 2023-11-09 | End: 2023-11-09

## 2023-11-09 RX ORDER — INSULIN LISPRO 100 [IU]/ML
1-5 INJECTION, SOLUTION INTRAVENOUS; SUBCUTANEOUS EVERY 6 HOURS SCHEDULED
Status: DISCONTINUED | OUTPATIENT
Start: 2023-11-09 | End: 2023-11-09

## 2023-11-09 RX ORDER — CLONAZEPAM 0.5 MG/1
0.25 TABLET ORAL 3 TIMES DAILY PRN
Status: DISCONTINUED | OUTPATIENT
Start: 2023-11-09 | End: 2023-11-13 | Stop reason: HOSPADM

## 2023-11-09 RX ORDER — CALCIUM GLUCONATE 20 MG/ML
2 INJECTION, SOLUTION INTRAVENOUS ONCE
Status: COMPLETED | OUTPATIENT
Start: 2023-11-09 | End: 2023-11-09

## 2023-11-09 RX ORDER — POTASSIUM CHLORIDE 20 MEQ/1
20 TABLET, EXTENDED RELEASE ORAL ONCE
Status: DISCONTINUED | OUTPATIENT
Start: 2023-11-09 | End: 2023-11-09

## 2023-11-09 RX ORDER — ONDANSETRON 2 MG/ML
4 INJECTION INTRAMUSCULAR; INTRAVENOUS EVERY 6 HOURS PRN
Status: DISCONTINUED | OUTPATIENT
Start: 2023-11-09 | End: 2023-11-13 | Stop reason: HOSPADM

## 2023-11-09 RX ORDER — POTASSIUM CHLORIDE 14.9 MG/ML
20 INJECTION INTRAVENOUS ONCE
Status: COMPLETED | OUTPATIENT
Start: 2023-11-09 | End: 2023-11-09

## 2023-11-09 RX ORDER — DULOXETIN HYDROCHLORIDE 30 MG/1
30 CAPSULE, DELAYED RELEASE ORAL DAILY
Status: DISCONTINUED | OUTPATIENT
Start: 2023-11-09 | End: 2023-11-13 | Stop reason: HOSPADM

## 2023-11-09 RX ADMIN — BUSPIRONE HYDROCHLORIDE 20 MG: 10 TABLET ORAL at 08:48

## 2023-11-09 RX ADMIN — CALCIUM GLUCONATE 2 G: 20 INJECTION, SOLUTION INTRAVENOUS at 07:46

## 2023-11-09 RX ADMIN — Medication 2.5 MG: at 04:58

## 2023-11-09 RX ADMIN — ACETAMINOPHEN 975 MG: 325 TABLET, FILM COATED ORAL at 17:29

## 2023-11-09 RX ADMIN — CLONAZEPAM 0.25 MG: 0.5 TABLET ORAL at 21:36

## 2023-11-09 RX ADMIN — BUSPIRONE HYDROCHLORIDE 20 MG: 10 TABLET ORAL at 17:29

## 2023-11-09 RX ADMIN — CHLORHEXIDINE GLUCONATE 15 ML: 1.2 SOLUTION ORAL at 09:55

## 2023-11-09 RX ADMIN — POTASSIUM CHLORIDE 40 MEQ: 1500 TABLET, EXTENDED RELEASE ORAL at 08:32

## 2023-11-09 RX ADMIN — POTASSIUM CHLORIDE 20 MEQ: 14.9 INJECTION, SOLUTION INTRAVENOUS at 07:53

## 2023-11-09 RX ADMIN — MAGNESIUM SULFATE HEPTAHYDRATE 2 G: 40 INJECTION, SOLUTION INTRAVENOUS at 07:32

## 2023-11-09 RX ADMIN — UBROGEPANT 100 MG: 100 TABLET ORAL at 04:08

## 2023-11-09 RX ADMIN — SENNOSIDES 8.6 MG: 8.6 TABLET, FILM COATED ORAL at 08:48

## 2023-11-09 RX ADMIN — BUPROPION HYDROCHLORIDE 150 MG: 150 TABLET, FILM COATED, EXTENDED RELEASE ORAL at 11:32

## 2023-11-09 RX ADMIN — LEVETIRACETAM 500 MG: 500 TABLET, FILM COATED ORAL at 08:48

## 2023-11-09 RX ADMIN — MAGNESIUM SULFATE HEPTAHYDRATE 2 G: 40 INJECTION, SOLUTION INTRAVENOUS at 09:39

## 2023-11-09 RX ADMIN — LEVETIRACETAM 500 MG: 500 TABLET, FILM COATED ORAL at 21:32

## 2023-11-09 RX ADMIN — Medication 2.5 MG: at 21:36

## 2023-11-09 RX ADMIN — Medication 2.5 MG: at 13:30

## 2023-11-09 RX ADMIN — BACLOFEN 5 MG: 10 TABLET ORAL at 17:29

## 2023-11-09 RX ADMIN — ACETAMINOPHEN 975 MG: 325 TABLET, FILM COATED ORAL at 08:47

## 2023-11-09 RX ADMIN — DULOXETINE HYDROCHLORIDE 60 MG: 60 CAPSULE, DELAYED RELEASE ORAL at 21:32

## 2023-11-09 RX ADMIN — PRAVASTATIN SODIUM 40 MG: 40 TABLET ORAL at 17:29

## 2023-11-09 RX ADMIN — CHLORHEXIDINE GLUCONATE 15 ML: 1.2 SOLUTION ORAL at 21:32

## 2023-11-09 RX ADMIN — PANTOPRAZOLE SODIUM 20 MG: 20 TABLET, DELAYED RELEASE ORAL at 05:03

## 2023-11-09 RX ADMIN — ENOXAPARIN SODIUM 30 MG: 30 INJECTION SUBCUTANEOUS at 09:55

## 2023-11-09 RX ADMIN — ENOXAPARIN SODIUM 30 MG: 30 INJECTION SUBCUTANEOUS at 21:32

## 2023-11-09 RX ADMIN — ONDANSETRON 4 MG: 2 INJECTION INTRAMUSCULAR; INTRAVENOUS at 11:51

## 2023-11-09 RX ADMIN — DULOXETINE HYDROCHLORIDE 30 MG: 30 CAPSULE, DELAYED RELEASE ORAL at 13:30

## 2023-11-09 RX ADMIN — POTASSIUM CHLORIDE 40 MEQ: 1500 TABLET, EXTENDED RELEASE ORAL at 12:38

## 2023-11-09 RX ADMIN — SODIUM CHLORIDE, SODIUM GLUCONATE, SODIUM ACETATE, POTASSIUM CHLORIDE, MAGNESIUM CHLORIDE, SODIUM PHOSPHATE, DIBASIC, AND POTASSIUM PHOSPHATE 500 ML: .53; .5; .37; .037; .03; .012; .00082 INJECTION, SOLUTION INTRAVENOUS at 00:58

## 2023-11-09 NOTE — PROGRESS NOTES
[address] [facility]  Progress Note: Critical Care  Name: Vesta Haider 70 y.o. female I MRN: [id number]  Unit/Bed#: ICU 09 I Date of Admission: 11/8/2023   Date of Service: 11/9/2023 I Hospital Day: 1    Assessment/Plan   Neuro:   Diagnosis: Epidural hematoma   Mechanical fall 11/4, presented to the ED for persistent HA   11/8 CTH: "acute epidural hemorrhagic area in the left posterior temporal region with maximum thickness of 5.8 mm. Left frontal scalp hematoma."  11/9 CTH: "Stable size and appearance of epidural hematoma overlying the left epidural region measuring 6 mm in thickness"  Q4 hr neurochecks   Seizure prophylaxis for 7 days (until 11/15): Keppra PO 500mg  Neurosurgery - signed off  Hold off asa for two weeks   Diagnosis: Depression  Will continue home meds: Wellbutrin, Buspar, Cymbalta  Multimodal pain control  Tylenol 975 Q8 PRN  One dose given for HA  Oxycodone 2.5 Q 6 hr PRN  Robaxin 5 mg TID PRN  Delirium precaution   Sleep - wake cycle  I-CAM     CV:   Diagnosis: hypertension, hypotension  Hx of HTN, with one episode of hypotension overnight  Normal tensive after 500 cc bolus of isolyte   Will continue to hold home meds due to hypotensive episode. Need to monitor blood pressure   Rec f/u with pocp to adjust dosing   Pt had hypotensive episode, responded to     Pulm:  Pulmonary toileting   Encourage I/S and deep breathing      GI:   Home meds: Protonix QD     :   No active      F/E/N:   No on IVF  Will monitor and replete PRN, Goal: K > 4, Phos >3, Mg >2  11/9; K 2.8, phos 3.1, Mg 1.2   Restart home potassium meds   Regular      Heme/Onc:   Diagnosis: Acute anemia:   Hgb 9.5 from 12.1   DVT ppx  SCD and lovenox      Endo:   Diagnosis: Diabetes type 2  Will hold metformin  SSI     ID:   No active     MSK/Skin:   OOB to chair, frequent turning     Disposition: Med/surge    ICU Core Measures     A: Assess, Prevent, and Manage Pain Has pain been assessed?  Yes  Need for changes to pain regimen? No   B: Both SAT/SAT  N/A   C: Choice of Sedation RASS Goal: 0 Alert and Calm  Need for changes to sedation or analgesia regimen? No   D: Delirium CAM-ICU: Negative   E: Early Mobility  Plan for early mobility? Yes   F: Family Engagement Plan for family engagement today? Yes         Prophylaxis:  VTE    Stress Ulcer  covered bypantoprazole (PROTONIX) EC tablet 20 mg [[id number]]          Subjective   HPI/24hr events: Pt resting in bed comfortably. Pt had hypotensive episode and was given bolus of 500 cc isolyte. Review of Systems   HENT:  Positive for facial swelling. Negative for sore throat. Respiratory:  Negative for shortness of breath. Cardiovascular:  Negative for chest pain. Gastrointestinal:  Negative for abdominal pain. Genitourinary:  Negative for dysuria. Musculoskeletal:  Negative for back pain. Skin:         contusion   Neurological:  Positive for headaches. Objective                            Vitals I/O      Most Recent Min/Max in 24hrs   Temp 98.2 °F (36.8 °C) Temp  Min: 98.2 °F (36.8 °C)  Max: 98.3 °F (36.8 °C)   Pulse 70 Pulse  Min: 70  Max: 109   Resp 16 Resp  Min: 12  Max: 22   /59 BP  Min: 80/44  Max: 121/77   O2 Sat 98 % SpO2  Min: 91 %  Max: 100 %      Intake/Output Summary (Last 24 hours) at 11/9/2023 0509  Last data filed at 11/9/2023 0349  Gross per 24 hour   Intake 630 ml   Output 300 ml   Net 330 ml         Diet NPO     Invasive Monitoring Physical exam    Physical Exam  Eyes:      Extraocular Movements: Extraocular movements intact. Skin:     General: Skin is warm. HENT:      Head: Raccoon eyes and contusion present. Nose: No congestion. Mouth/Throat:      Mouth: Mucous membranes are moist.   Neck:   no midline tenderness Cardiovascular:      Rate and Rhythm: Normal rate and regular rhythm. Heart sounds: Normal heart sounds. Musculoskeletal:         General: Normal range of motion.    Abdominal:      Palpations: Abdomen is soft. Constitutional:       General: She is not in acute distress. Appearance: She is well-developed. She is not ill-appearing or toxic-appearing. Pulmonary:      Effort: Pulmonary effort is normal.      Breath sounds: No wheezing. Psychiatric:         Behavior: Behavior is cooperative. Neurological:      General: No focal deficit present. Mental Status: She is alert. Sensory: No sensory deficit. Motor: Strength full and intact in all extremities. Vitals and nursing note reviewed. Diagnostic Studies      Imaging:  I have personally reviewed pertinent reports.        Medications:  Scheduled PRN   buPROPion, 150 mg, Daily  busPIRone, 20 mg, BID  chlorhexidine, 15 mL, Q12H RAHAT  DULoxetine, 60 mg, HS  insulin lispro, 1-5 Units, Q6H RAHAT  levETIRAcetam, 500 mg, Q12H RAHAT  pantoprazole, 20 mg, Early Morning  pravastatin, 40 mg, Daily With Dinner  senna, 1 tablet, Daily      acetaminophen, 975 mg, Q8H PRN  baclofen, 5 mg, TID PRN  bisacodyl, 10 mg, Daily PRN  oxyCODONE, 2.5 mg, Q6H PRN  Ubrogepant, 100 mg, PRN       Continuous          Labs:    CBC    Recent Labs     11/08/23  1415   WBC 7.53   HGB 12.1   HCT 36.5        BMP    Recent Labs     11/08/23  1415   SODIUM 134*   K 3.5   CL 95*   CO2 29   AGAP 10   BUN 15   CREATININE 1.15   CALCIUM 10.9*       Coags    No recent results     Additional Electrolytes  No recent results       Blood Gas    No recent results  No recent results LFTs  Recent Labs     11/08/23  1415   ALT 16   AST 20   ALKPHOS 43   ALB 4.6   TBILI 0.46       Infectious  No recent results  Glucose  Recent Labs     11/08/23  1415   GLUC 138                   Kaushal Casas, DO

## 2023-11-09 NOTE — PHYSICAL THERAPY NOTE
Physical Therapy Evaluation     Patient's Name: Nithya Young    Admitting Diagnosis  Traumatic epidural hematoma with loss of consciousness, initial encounter [facility]) [S06.4X9A]  Unspecified multiple injuries, initial encounter [T07. XXXA]    Problem List  Patient Active Problem List   Diagnosis    Headache, chronic migraine without aura    Cervicalgia    Cervical dystonia    Medication overuse headache    Depression with anxiety    Lumbago    Bilateral occipital neuralgia    Primary localized osteoarthritis of right knee    Major depression, recurrent, chronic (HCC)    Eating disorder    SY (generalized anxiety disorder)    Bereavement due to life event    Continuous opioid dependence ([address])    Facet arthritis of lumbosacral region    Migraine without status migrainosus, not intractable    Moderate episode of recurrent major depressive disorder (HCC)    Hypercholesterolemia    Other insomnia    Chronic GERD    Hiatal hernia    Controlled type 2 diabetes mellitus, without long-term current use of insulin (HCC)    Fatty liver disease, nonalcoholic    Hypertension, essential    Chronic neck pain    Fibromyalgia    Drug-induced constipation    Worsening headaches    Uncontrolled morning headache    Snores    JAMES (obstructive sleep apnea)    Fall    Abnormal MRI    Scalp hematoma, initial encounter    Epidural hemorrhage without loss of consciousness ([address])       Past Medical History  Past Medical History:   Diagnosis Date    Anxiety     Arthritis     Cancer ([address])     Skin    Depression     Diabetes ([address])     GERD (gastroesophageal reflux disease)     Hyperlipidemia     Hypertension     Migraine        Past Surgical History  Past Surgical History:   Procedure Laterality Date    ANKLE SURGERY      CARPAL TUNNEL RELEASE Right     ROTATOR CUFF REPAIR          11/09/23 0915   PT Last Visit   PT Visit Date 11/09/23   Note Type   Note type Evaluation   Pain Assessment   Pain Assessment Tool 0-10   Pain Score 8   Pain Location/Orientation Location: Head;Location: Back; Location: [facility]   Hospital Pain Intervention(s) Repositioned   Restrictions/Precautions   Weight Bearing Precautions Per Order No   Other Precautions Pain; Fall Risk;Telemetry;Multiple lines;WBS; Bed Alarm; Chair Alarm;Cognitive; Impulsive   Home Living   Type of [address] One level;Stairs to enter with rails  (2-3 STEPHY)   [place]   Prior Function   Level of [place] Independent with functional mobility   Lives With (S)  Alone   Receives Help From   (pt reports limited support)   Vocational Retired  (nurse)   General   Additional Pertinent History Pt reports ongoing depression since her spouse passed 2 years prior. Only support she has is a sister who lives over an hour away   Family/Caregiver Present No   Cognition   Orientation Level Oriented X4   Subjective   Subjective Pt willing and agreeable to PT session   RLE Assessment   RLE Assessment WFL   LLE Assessment   LLE Assessment WFL   Coordination   Movements are Fluid and Coordinated 0   Coordination and Movement Description slow and guarded with pain and dizziness   Bed Mobility   Supine to Sit 4  Minimal assistance   Additional items Assist x 1   Sit to Supine Unable to assess   Transfers   Sit to Stand 4  Minimal assistance   Additional items Assist x 1   Stand to Sit 4  Minimal assistance   Additional items Assist x 1   Ambulation/Elevation   Gait pattern Shuffling;Decreased foot clearance; Short stride; Ataxia; Excessively slow; Inconsistent isac; Forward Flexion   Gait Assistance 4  Minimal assist   Additional items Assist x 1   Assistive Device Rolling walker   Distance 85+42+93   Balance   Static Sitting Fair   Dynamic Sitting Fair -   Ambulatory Poor +   Endurance Deficit   Endurance Deficit Yes   Endurance Deficit Description limited by fatigue, weakness, pain, dizziness.    Activity Tolerance   Activity Tolerance Patient limited by fatigue;Patient limited by pain   Medical Staff Made Aware OT for D/C plannning   Nurse Made Aware yes, nsg gave clearance to work with pt, updated on pt progress   Assessment   Prognosis Good   Problem List Decreased strength;Decreased range of motion;Decreased endurance; Impaired balance;Decreased mobility;Pain;Decreased safety awareness   Assessment Pt is 70 y.o. female seen for PT evaluation s/p admit to [address] [place] [address] on 11/8/2023 w/ Epidural hemorrhage without loss of consciousness ([address] PT consulted to assess pt's functional mobility and d/c needs. Order placed for PT eval and tx, w/ up w/ A, activity as tolerated, and ambulate patient order. Comorbidities affecting pt's physical performance at time of assessment include:  has a past medical history of Anxiety, Arthritis, Cancer ([address]), Depression, Diabetes ([address]), GERD (gastroesophageal reflux disease), Hyperlipidemia, Hypertension, and Migraine. PTA, pt was requiring A for mobility, dependent for all mobility tasks, ambulates household distances, and retired. Personal factors affecting pt at time of IE include: inaccessible home environment, stairs to enter home, limited home support, positive fall history, decreased initiation and engagement, unable to perform physical activity, limited insight into impairments, inability to perform IADLs, and inability to perform ADLs. Please find objective findings from PT assessment regarding body systems outlined above with impairments and limitations including weakness, decreased ROM, impaired balance, decreased endurance, gait deviations, pain, decreased activity tolerance, decreased functional mobility tolerance, decreased safety awareness, impaired judgement, and fall risk. Pt required increased time to complete bed mobility with decreased strength and increased pain. Required hand placement instruction during transfers. Ambulated with slow guarded although steady gait. Pt noted limited resources to assist at home.  The following objective measures performed on IE also reveal limitations: The patient's AM-PAC Basic Mobility Inpatient Short Form Raw Score is 17, Standardized Score is 39.67. A standardized score less than 42.9 suggests the patient may benefit from discharge to post-acute rehabilitation services. Please also refer to the recommendation of the Physical Therapist for safe discharge planning. Pt's clinical presentation is currently unstable/unpredictable seen in pt's presentation of critical care monitoring. Pt to benefit from continued PT tx to address deficits as defined above and maximize level of functional independent mobility and consistency. From PT/mobility standpoint, recommendation at time of d/c would be post acute rehabilitation services pending progress in order to facilitate return to PLOF. Barriers to Discharge Decreased caregiver support; Inaccessible home environment   Goals   Patient Goals To feel better   STG Expiration Date 11/21/23   Short Term Goal #1 1. Complete bed mobility and transfers I to decrease need for caregiver in home. 2. Ambulate 300' I to complete household and community mobility without A. 3. Improve dynamic balance to good to decrease need for UE support during ambulation. 4. Be educated & demonstate 2-3 steps to be able to enter home without A. Plan   Treatment/Interventions OT; Spoke to case management;Spoke to nursing;Continued evaluation;Gait training;Bed mobility; Patient/family training; Endurance training;LE strengthening/ROM; Functional transfer training   PT Frequency 3-5x/wk   Discharge Recommendation   Rehab Resource Intensity Level, PT II (Moderate Resource Intensity)   Equipment Recommended   (pt reports having RW)   AM-PAC Basic Mobility Inpatient   Turning in Flat Bed Without Bedrails 3   Lying on Back to Sitting on Edge of Flat Bed Without Bedrails 3   Moving Bed to Chair 3   Standing Up From Chair Using Arms 3   Walk in Room 3   Climb 3-5 Stairs With Railing 2   Basic Mobility Inpatient Raw Score 17 Basic Mobility Standardized Score 39.67   Highest Level Of Mobility   -M Goal 5: Stand one or more mins   -HLM Achieved 7: Walk 25 feet or more           Dannie López, PT

## 2023-11-09 NOTE — CONSULTS
[address]  Consult  Name: Van Umana 70 y.o. female I MRN: [id number]  Unit/Bed#: ICU 09 I Date of Admission: 11/8/2023   Date of Service: 11/9/2023 I Hospital Day: 1    Inpatient consult to Neurosurgery  Consult performed by: Argelia Paz PA-C  Consult ordered by: Keri Montelongo MD          Assessment/Plan   Epidural hemorrhage without loss of consciousness [facility])  Assessment & Plan  Left intracranial epidural hematoma  S/p fall on 11/4/2023 due to dizziness, +head strike and LOC, was on asa 81mg as an outpatient for preventative measures    Imaging:  CT head wo contrast 11/9/2023: Stable size and appearance of epidural hematoma overlying the left epidural region measuring 6 mm in thickness. CT head wo contrast 11/8/2023: Small acute epidural hemorrhagic area in the left posterior temporal region with maximum thickness of 5.8 mm. Left frontal scalp hematoma. Plan:  Imaging reviewed, stable epidural hematoma noted without significant mass effect or midline shift. Exam grossly nonfocal.  Nonoperative management recommended, no surgery at this time. Repeat CT head STAT if GCS declines more than 2 points in 1 hour  SBP < 160  AED management per primary team  Hold all therapeutic doses of AC / AP therapy, given DDAVP for asa use, continue to hold this x 2 weeks until seen by NSX in the office  Continue to monitor neuro exam  Medical management and pain control per primary team  DVT ppx:  SCDs, okay for pharm dvt ppx  Mobilize as tolerated with assistance, PT / OT evaluation    Neurosurgery will sign off. Outpatient follow up in 2 weeks with repeat CT head. Please call with questions or concerns. Fall  Assessment & Plan  Imaging:  CT chest abdomen pelvis 11/8/2023: No CT findings of trauma in the chest, abdomen or pelvis. CT cervical spine 11/8/2023: No cervical spine fracture or traumatic malalignment.      Plan:  See plan above         History of Present Illness   HPI: Stacy Richey is a 70y.o. year old female with PMH including anxiety, depression, diabetes, HTN, HLD, migraines who presents s/p fall on 2023 with intracranial epidural hematoma. Patient states she had a fall at home, possibly due to dizziness, while carrying her food tray. She states she must have hit her head on the fireplace and somehow crawled to the couch although she does not quite remember how this all happened. She woke up the next day with bruising on her face but did not seek out medical attention until a few days later due to headaches and nausea and overall not feeling well. She takes baby asa for heart health. At baseline she has neck pain and spasms as well as migraines but no acute issues. Some mild facial pain around her left temporal region with bruising / hematoma noted. States she suffers from depression and really does not leave her house unless to get something to eat. She does not have much family close by. Uses a cane to ambulate for the most part. Lives alone;   2 years ago. No other complaints at the moment. Review of Systems   Constitutional:  Negative for activity change, chills and fever. HENT:  Negative for hearing loss, tinnitus and trouble swallowing. Eyes:  Negative for visual disturbance. Respiratory:  Negative for chest tightness and shortness of breath. Cardiovascular:  Negative for chest pain. Gastrointestinal:  Negative for abdominal pain, constipation, diarrhea, nausea and vomiting. Genitourinary:  Negative for difficulty urinating. Musculoskeletal:  Positive for neck pain (chronic, neck spasms). Negative for back pain. Skin:  Negative for wound. Facial bruising and pain   Allergic/Immunologic: Negative for environmental allergies and food allergies. Neurological:  Positive for dizziness and headaches. Negative for facial asymmetry, speech difficulty, weakness and numbness.    Hematological:  Does not bruise/bleed easily. Psychiatric/Behavioral:  Positive for confusion.         Historical Information   Past Medical History:   Diagnosis Date    Anxiety     Arthritis     Cancer ([address])     Skin    Depression     Diabetes ([address])     GERD (gastroesophageal reflux disease)     Hyperlipidemia     Hypertension     Migraine      Past Surgical History:   Procedure Laterality Date    ANKLE SURGERY      CARPAL TUNNEL RELEASE Right     ROTATOR CUFF REPAIR       Social History     Substance and Sexual Activity   Alcohol Use Not Currently     Social History     Substance and Sexual Activity   Drug Use Never     Social History     Tobacco Use   Smoking Status Former    Types: Cigarettes    Quit date:     Years since quittin.8   Smokeless Tobacco Never     Family History   Problem Relation Age of Onset    Mental illness Mother     Hyperlipidemia Mother     Hypertension Mother     Allergies Mother     Migraines Mother     Anxiety disorder Mother     Osteoporosis Mother     Hyperlipidemia Father     Heart disease Father     Cancer Father         Bladder    Migraines Sister     Hyperlipidemia Brother     Multiple sclerosis Brother     Hyperlipidemia Brother     Migraines Brother         Rare    Migraines Son         Rare    Colon cancer Maternal Grandmother     Heart attack Maternal Grandfather     Peripheral vascular disease Paternal Grandmother     Diabetes Paternal Grandmother     Asthma Paternal Grandfather     Diabetes Paternal Grandfather     Heart attack Paternal Grandfather        Meds/Allergies   all current active meds have been reviewed, current meds:   Current Facility-Administered Medications   Medication Dose Route Frequency    acetaminophen (TYLENOL) tablet 975 mg  975 mg Oral Q8H PRN    baclofen tablet 5 mg  5 mg Oral TID PRN    bisacodyl (DULCOLAX) EC tablet 10 mg  10 mg Oral Daily PRN    buPROPion (WELLBUTRIN XL) 24 hr tablet 150 mg  150 mg Oral Daily    busPIRone (BUSPAR) tablet 20 mg  20 mg Oral BID calcium gluconate 2 g in sodium chloride 0.9% 100 mL (premix)  2 g Intravenous Once    chlorhexidine (PERIDEX) 0.12 % oral rinse 15 mL  15 mL Mouth/Throat Q12H RAHAT    DULoxetine (CYMBALTA) delayed release capsule 60 mg  60 mg Oral HS    insulin lispro (HumaLOG) 100 units/mL subcutaneous injection 1-5 Units  1-5 Units Subcutaneous Q6H RAHAT    levETIRAcetam (KEPPRA) tablet 500 mg  500 mg Oral Q12H RAHAT    magnesium sulfate 2 g/50 mL IVPB (premix) 2 g  2 g Intravenous Once    Followed by    magnesium sulfate 2 g/50 mL IVPB (premix) 2 g  2 g Intravenous Once    oxyCODONE (ROXICODONE) split tablet 2.5 mg  2.5 mg Oral Q6H PRN    pantoprazole (PROTONIX) EC tablet 20 mg  20 mg Oral Early Morning    potassium chloride (K-DUR,KLOR-CON) CR tablet 40 mEq  40 mEq Oral Once    Followed by    potassium chloride (K-DUR,KLOR-CON) CR tablet 20 mEq  20 mEq Oral Once    potassium chloride 20 mEq IVPB (premix)  20 mEq Intravenous Once    Followed by    potassium chloride 20 mEq IVPB (premix)  20 mEq Intravenous Once    pravastatin (PRAVACHOL) tablet 40 mg  40 mg Oral Daily With Dinner    senna (SENOKOT) tablet 8.6 mg  1 tablet Oral Daily    Ubrogepant (UBRELVY) tablet 100 mg  100 mg Oral PRN   , and PTA meds:   Prior to Admission Medications   Prescriptions Last Dose Informant Patient Reported? Taking?    B COMPLEX VITAMINS PO   Yes No   Sig: Take 1 tablet by mouth daily   Baclofen 5 MG TABS   Yes No   Sig: Take by mouth Take 1-2 tabs TID prn   Cholecalciferol 50 MCG (2000 UT) CAPS   Yes No   Sig: Take 1 capsule by mouth daily   DULoxetine (CYMBALTA) 60 mg delayed release capsule   No No   Sig: Take 1 capsule (60 mg total) by mouth daily at bedtime   DULoxetine (Cymbalta) 30 mg delayed release capsule   No No   Sig: Take 1 capsule (30 mg total) by mouth daily at bedtime   Lidocaine-Menthol 4-1 % CREA   Yes No   Sig: Apply 1 Dose topically if needed Patch 5%   OnabotulinumtoxinA (BOTOX IM)   Yes No   Sig: Inject into a muscle Riboflavin (Vitamin B-2) 100 MG TABS   No No   Si in the morning and 2 at bedtime   Ubrogepant (Ubrelvy) 100 MG tablet   No No   Sig: Take 1 tablet (100 mg total) by mouth if needed (migraine) Take 1 tablet (100 mg) one time as needed for migraine. May repeat one additional tablet (100 mg) at least two hours after the first dose. Do not use more than two doses per day   aspirin (ECOTRIN LOW STRENGTH) 81 mg EC tablet   Yes No   Sig: Take 81 mg by mouth daily   bisacodyl 5 mg EC tablet   Yes No   Sig: Take 10 mg by mouth daily as needed for constipation   buPROPion (Wellbutrin XL) 150 mg 24 hr tablet   No No   Sig: Take 1 tablet (150 mg total) by mouth daily   busPIRone (BUSPAR) 10 mg tablet   No No   Sig: Take 2 tablets (20 mg total) by mouth 2 (two) times a day   calcium carbonate (OS-MIKE) 600 MG tablet   Yes No   Sig: Take 600 mg by mouth 2 (two) times a day   clonazePAM (KlonoPIN) 0.5 mg tablet   No No   Sig: Take 1 tablet (0.5 mg total) by mouth 3 (three) times a day as needed for anxiety   esomeprazole (NexIUM) 20 mg capsule   Yes No   Sig: Take 20 mg by mouth daily in the early morning    ibuprofen (MOTRIN) 200 mg tablet   Yes No   Sig: Take 200 mg by mouth every 6 (six) hours as needed   magnesium oxide (MAG-OX) 400 mg tablet   No No   Sig: Take 1 tablet (400 mg total) by mouth 2 (two) times a day   metFORMIN (GLUCOPHAGE) 1000 MG tablet   Yes No   Sig: Take 1,000 mg by mouth 2 (two) times a day with meals   multivitamin (THERAGRAN) TABS   Yes No   Sig: Take 1 tablet by mouth daily   naloxone (NARCAN) 4 mg/0.1 mL nasal spray   No No   Sig: Administer 1 spray into a nostril. If no response after 2-3 minutes, give another dose in the other nostril using a new spray.    Patient not taking: Reported on 2023   oxyCODONE (ROXICODONE) 10 MG TABS   Yes No   Sig: Take 2.5-3 tablets by mouth every 6 (six) hours as needed   polyethylene glycol (MIRALAX) 17 g packet   Yes No   Sig: Take 17 g by mouth daily at bedtime   potassium chloride (K-DUR,KLOR-CON) 20 mEq tablet   No No   Sig: Take 1 tablet (20 mEq total) by mouth daily   rosuvastatin (CRESTOR) 20 MG tablet   No No   Sig: Take 1 tablet (20 mg total) by mouth daily   senna (SENOKOT) 8.6 MG tablet   Yes No   Sig: Take 1 tablet by mouth daily   valsartan-hydrochlorothiazide (DIOVAN-HCT) 160-25 MG per tablet   Yes No   Sig: Take 1 tablet by mouth daily      Facility-Administered Medications: None     No Known Allergies    Objective   I/O         11/07 0701  11/08 0700 11/08 0701  11/09 0700 11/09 0701  11/10 0700    I.V. (mL/kg)  500 (7.4)     IV Piggyback  130     Total Intake(mL/kg)  630 (9.3)     Urine (mL/kg/hr)  300     Total Output  300     Net  +330                    Physical Exam  Constitutional:       General: She is awake. Appearance: Normal appearance. HENT:      Head:      Comments: [place] eyes bilaterally, left temporal hematoma noted which is TTP  Eyes:      Extraocular Movements: EOM normal.      Conjunctiva/sclera: Conjunctivae normal.      Comments: Periorbital bruising noted  Right more than left injected conjunctiva noted   Cardiovascular:      Rate and Rhythm: Normal rate. Pulmonary:      Effort: Pulmonary effort is normal. No respiratory distress. Skin:     General: Skin is warm and dry. Neurological:      Mental Status: She is alert and oriented to person, place, and time. Motor: Motor strength is normal.     Coordination: Finger-Nose-Finger Test normal.   Psychiatric:         Attention and Perception: Attention and perception normal.         Mood and Affect: Mood and affect normal.         Speech: Speech normal.         Behavior: Behavior normal. Behavior is cooperative. Thought Content: Thought content normal.         Cognition and Memory: Cognition normal. Memory is impaired (slightly amnestic to the events of the fall).          Judgment: Judgment normal.       Neurologic Exam     Mental Status   Oriented to person, place, and time. Follows 1 step commands. Attention: normal. Concentration: normal.   Speech: speech is normal   Level of consciousness: alert  Knowledge: good. Able to perform simple calculations. Able to repeat. Normal comprehension. Cranial Nerves     CN III, IV, VI   Extraocular motions are normal.   CN III: no CN III palsy  CN VI: no CN VI palsy  Nystagmus: none   Diplopia: none  Ophthalmoparesis: none  Upgaze: normal  Downgaze: normal  Conjugate gaze: present    CN V   Right facial sensation deficit: none  Left facial sensation deficit: none    CN VII   Right facial weakness: none  Left facial weakness: none    CN VIII   Hearing: intact    CN IX, X   CN IX normal.   CN X normal.     CN XI   Right trapezius strength: normal  Left trapezius strength: normal    CN XII   CN XII normal.     Motor Exam   Muscle bulk: normal  Overall muscle tone: normal  Right arm pronator drift: absent  Left arm pronator drift: absent    Strength   Strength 5/5 throughout. Sensory Exam   Light touch normal.     Gait, Coordination, and Reflexes     Coordination   Finger to nose coordination: normal    Tremor   Resting tremor: absent  Intention tremor: absent  Action tremor: absent    Reflexes   Right : 2+  Left : 2+  Right Cobb: absent  Left Cobb: absent  Right ankle clonus: absent  Left ankle clonus: absent      Vitals:Blood pressure 121/67, pulse 70, temperature 98.2 °F (36.8 °C), temperature source Oral, resp. rate 20, height 5' 6" (1.676 m), weight 68 kg (149 lb 14.6 oz), SpO2 96 %. ,Body mass index is 24.2 kg/m².      Lab Results:   Results from last 7 days   Lab Units 11/09/23  0459 11/08/23  1415   WBC Thousand/uL 6.51 7.53   HEMOGLOBIN g/dL 9.5* 12.1   HEMATOCRIT % 28.2* 36.5   PLATELETS Thousands/uL 241 328   NEUTROS PCT % 66 76*   MONOS PCT % 9 8   EOS PCT % 2 2     Results from last 7 days   Lab Units 11/09/23  0459 11/08/23  1415   POTASSIUM mmol/L 2.8* 3.5   CHLORIDE mmol/L 101 95*   CO2 mmol/L 28 29   BUN mg/dL 11 15   CREATININE mg/dL 0.73 1.15   CALCIUM mg/dL 8.2* 10.9*   ALK PHOS U/L  --  43   ALT U/L  --  16   AST U/L  --  20     Results from last 7 days   Lab Units 11/09/23  0459   MAGNESIUM mg/dL 1.2*     Results from last 7 days   Lab Units 11/09/23  0459   PHOSPHORUS mg/dL 3.1       Imaging Studies: I have personally reviewed pertinent reports. and I have personally reviewed pertinent films in PACS    CT head wo contrast    Result Date: 11/9/2023  Impression: Stable size and appearance of epidural hematoma overlying the left epidural region measuring 6 mm in thickness Workstation performed: [id number]     XR Trauma chest portable    Result Date: 11/8/2023  Impression: No acute cardiopulmonary findings. Workstation performed: [id number]     TRAUMA - CT head wo contrast    Result Date: 11/8/2023  Impression: Small acute epidural hemorrhagic area in the left posterior temporal region with maximum thickness of 5.8 mm. . Left frontal scalp hematoma. I personally discussed this study with Robert Sever on 11/8/2023 3:05 PM. Workstation performed: [id number]     TRAUMA - CT spine cervical wo contrast    Result Date: 11/8/2023  Impression: No cervical spine fracture or traumatic malalignment. I personally discussed this study with Robert Sever on 11/8/2023 3:05 PM. Workstation performed: [id number]     TRAUMA - CT chest abdomen pelvis w contrast    Result Date: 11/8/2023  Impression: No CT findings of trauma in the chest, abdomen or pelvis. I personally discussed this study with Robert Sever on 11/8/2023 3:05 PM. Workstation performed: [id number]      EKG, Pathology, and Other Studies: I have personally reviewed pertinent reports. VTE Prophylaxis: Sequential compression device Rafita José Luis)     Code Status: Level 1 - Full Code  Advance Directive and Living Will:      Power of :    POLST:      Counseling / Coordination of Care  I spent 20 minutes with the patient.

## 2023-11-09 NOTE — DISCHARGE INSTR - AVS FIRST PAGE
Discharge Instructions - Neurosurgery    Do not take any blood thinning medications (ie. No Advil. No motrin. No ibuprofen. No Aleve. No Aspirin. No fishoil. No heparin. No antiplatelet / no anticoagulation medication). Refrain from activity that increases chance of trauma to head or falls. Recommend you take fall precaution. No strenuous activity or sports. Return to hospital Emergency Room if you experience worsening / new headache, nausea/vomiting, speech/vision change, seizure, confusion / mental status change, weakness, or other neurological changes. Please follow up in 2 weeks with the Neurosurgical group with repeat CT head without contrast 2-3 days prior to your appointment. You may go to any [facility] facility to get your imaging done. Please call [phone] to schedule your imaging appointment.

## 2023-11-09 NOTE — ASSESSMENT & PLAN NOTE
Diagnosis: Epidural hematoma   Mechanical fall 5 days ago, presented to the ED for persistent HA today   CT: "acute epidural hemorrhagic area in the left posterior temporal region with maximum thickness of 5.8 mm.  Left frontal scalp hematoma."  Q4 hr neurochecks   Seizure prophylaxis for 7 days: Keppra 500mg  CTH stable   ASA reversal with DDAVP, continue to hold  No NSG intervention, signed off

## 2023-11-09 NOTE — ASSESSMENT & PLAN NOTE
Left intracranial epidural hematoma  S/p fall on 11/4/2023 due to dizziness, +head strike and LOC, was on asa 81mg as an outpatient for preventative measures    Imaging:  CT head wo contrast 11/9/2023: Stable size and appearance of epidural hematoma overlying the left epidural region measuring 6 mm in thickness. CT head wo contrast 11/8/2023: Small acute epidural hemorrhagic area in the left posterior temporal region with maximum thickness of 5.8 mm. Left frontal scalp hematoma. Plan:  Imaging reviewed, stable epidural hematoma noted without significant mass effect or midline shift. Exam grossly nonfocal.  Nonoperative management recommended, no surgery at this time. Repeat CT head STAT if GCS declines more than 2 points in 1 hour  SBP < 160  AED management per primary team  Hold all therapeutic doses of AC / AP therapy, given DDAVP for asa use, continue to hold this x 2 weeks until seen by NSX in the office  Continue to monitor neuro exam  Medical management and pain control per primary team  DVT ppx:  SCDs, okay for pharm dvt ppx  Mobilize as tolerated with assistance, PT / OT evaluation    Neurosurgery will sign off. Outpatient follow up in 2 weeks with repeat CT head. Please call with questions or concerns.

## 2023-11-09 NOTE — ASSESSMENT & PLAN NOTE
Holding home medications in the setting of normotension   Consider to continue to hold and follow up with PCP as she may not require her hydrochlorothiazide

## 2023-11-09 NOTE — H&P
[address] [facility]  H&P  Name: Quin Myers 70 y.o. female I MRN: [id number]  Unit/Bed#: ICU 09 I Date of Admission: 11/8/2023   Date of Service: 11/8/2023 I Hospital Day: 0      Assessment/Plan   Epidural hemorrhage without loss of consciousness [facility])  Assessment & Plan  Patient is status post fall on Saturday, noticed scalp hematoma and bilateral orbital hematomas on subsequent days  - CT head shows small acute epidural hemorrhagic area in the left posterior temporal region with maximum thickness of 5.8 mm  -Patient on Keppra prophylaxis  -Neurosurgery consult placed, appreciate recs    Scalp hematoma, initial encounter  Assessment & Plan  Patient presents with frontal scalp hematoma  - Minimal pain on palpation, fluctuant  -Patient has bilateral periorbital hematoma, reports it is expanding/worsening  -No vision changes  -Neurosurgery consult placed    [address]  Patient presents status post fall, reports episodes of vertigo for months prior to fall  - Patient reports fall due to disorientation from darkness  - Patient currently on walker, able to do all activities of daily living at baseline  - Patient currently lives alone at home  - Geriatrics consult placed    Major depression, recurrent, chronic ([address] )  Assessment & Plan  Patient reports being currently depressed, has been depressed since the death of her  2 years ago. Does not currently want to live anymore, denies suicidal ideation  - Patient being managed outpatient with psychiatry  - Psych consult placed  - Continue home Cymbalta meds      Trauma Alert: Other transfer    Model of Arrival: Transfer from [facility]     Trauma Team: Attending Puma Guardado and Residents Kasi  Consultants:     Neurosurgery: routine consult; Epic consult order placed;      History of Present Illness     Chief Complaint: Status post fall  Mechanism:Fall     HPI:    Quin Myers is a 70 y.o. female who presents secondary to fall on . She reports walking from the kitchen to her living room Saturday night with a tray full of food and turning off the light in her kitchen. She subsequently became disoriented and fell, hitting her head on the fireplace nearby. She does not know how long she was down for, but she denies having any loss of consciousness. She has loss of memory surrounding the incident. She next remembers herself on the couch later that night with a headache. Of note, she has had multiple episodes of vertigo over the past month. She reports being dizzy prior to fall, but the room was not spinning. Over the next 2 days, she noticed a scalp hematoma and bilateral orbital hematomas forming. She initially reported upper bucks, was transferred to [address] [place] [address] for further management. of note she is on daily aspirin. Of note she reports being incredibly depressed since the passing of her  2 years ago and not wanting to live anymore, but no active suicidal ideation or plan. She denies any nausea vomiting. Review of Systems   All other systems reviewed and are negative. 12-point, complete review of systems was reviewed and negative except as stated above.      Historical Information     Past Medical History:   Diagnosis Date    Anxiety     Arthritis     Cancer ([address] [facility])     Skin    Depression     Diabetes ([address] [facility])     GERD (gastroesophageal reflux disease)     Hyperlipidemia     Hypertension     Migraine      Past Surgical History:   Procedure Laterality Date    ANKLE SURGERY      CARPAL TUNNEL RELEASE Right     ROTATOR CUFF REPAIR          Social History     Tobacco Use    Smoking status: Former     Types: Cigarettes     Quit date:      Years since quittin.8    Smokeless tobacco: Never   Vaping Use    Vaping Use: Never used   Substance Use Topics    Alcohol use: Not Currently    Drug use: Never     Immunization History   Administered Date(s) Administered    Fluzone Split Quad 0.5 mL 12/15/2017, 09/14/2018    INFLUENZA 12/15/2017, 09/14/2018, 12/27/2019    Pneumococcal Conjugate 13-Valent 06/21/2019    Tdap 01/04/2016     Family History: Non-contributory    1. Before the illness or injury that brought you to the Emergency, did you need someone to help you on a regular basis? 0=No   2. Since the illness or injury that brought you to the Emergency, have you needed more help than usual to take care of yourself? 1=Yes   3. Have you been hospitalized for one or more nights during the past 6 months (excluding a stay in the Emergency Department)? 0=No   4. In general, do you see well? 0=Yes   5. In general, do you have serious problems with your memory? 0=No   6. Do you take more than three different medications everyday? 1=Yes   TOTAL   2     Did you order a geriatric consult if the score was 2 or greater?: yes     Meds/Allergies   all current active meds have been reviewed and current meds:   Current Facility-Administered Medications   Medication Dose Route Frequency    acetaminophen (TYLENOL) tablet 975 mg  975 mg Oral Q8H PRN    baclofen tablet 5 mg  5 mg Oral TID PRN    bisacodyl (DULCOLAX) EC tablet 10 mg  10 mg Oral Daily PRN    [START ON 11/9/2023] buPROPion (WELLBUTRIN XL) 24 hr tablet 150 mg  150 mg Oral Daily    busPIRone (BUSPAR) tablet 20 mg  20 mg Oral BID    chlorhexidine (PERIDEX) 0.12 % oral rinse 15 mL  15 mL Mouth/Throat Q12H RAHAT    DULoxetine (CYMBALTA) delayed release capsule 60 mg  60 mg Oral HS    [START ON 11/9/2023] levETIRAcetam (KEPPRA) 500 mg in sodium chloride 0.9 % 100 mL IVPB  500 mg Intravenous Q12H [facility]    oxyCODONE (ROXICODONE) split tablet 2.5 mg  2.5 mg Oral Q6H PRN    [START ON 11/9/2023] pantoprazole (PROTONIX) EC tablet 20 mg  20 mg Oral Early Morning    [START ON 11/9/2023] pravastatin (PRAVACHOL) tablet 40 mg  40 mg Oral Daily With Dinner    [START ON 11/9/2023] senna (SENOKOT) tablet 8.6 mg  1 tablet Oral Daily     Allergies have not been reviewed;   No Known Allergies    Objective   Initial Vitals:   Temperature: 98.2 °F (36.8 °C) (11/08/23 1757)  Pulse: 88 (11/08/23 1757)  Respirations: 16 (11/08/23 1757)  Blood Pressure: 93/52 (11/08/23 1757)    Primary Survey:   Airway:        Status: patent;        Pre-hospital Interventions: none        Hospital Interventions: none  Breathing:        Pre-hospital Interventions: none       Effort: normal       Right breath sounds: normal       Left breath sounds: normal  Circulation:        Rhythm: regular       Rate: regular   Right Pulses Left Pulses    R radial: 2+         L radial: 2+           Disability:        GCS: Eye: 4; Verbal: 5 Motor: 6 Total: 15       Right Pupil:       Left Pupil:     R Motor Strength L Motor Strength               Exposure:       Completed: Yes      Secondary Survey:  Physical Exam  Vitals reviewed. Constitutional:       Appearance: Normal appearance. HENT:      Head: Right periorbital erythema and left periorbital erythema present. Comments: Hematoma present on upper left side of face     Nose: Nose normal.   Eyes:      Extraocular Movements: Extraocular movements intact. Cardiovascular:      Rate and Rhythm: Normal rate and regular rhythm. Pulses: Normal pulses. Heart sounds: Normal heart sounds. Pulmonary:      Effort: Pulmonary effort is normal.      Breath sounds: Normal breath sounds. Abdominal:      Palpations: Abdomen is soft. Musculoskeletal:      Cervical back: Normal range of motion and neck supple. Skin:     General: Skin is warm and dry. Capillary Refill: Capillary refill takes less than 2 seconds. Neurological:      General: No focal deficit present. Mental Status: She is alert.    Psychiatric:         Mood and Affect: Mood normal.         Behavior: Behavior normal.         Invasive Devices       Peripheral Intravenous Line  Duration             Peripheral IV 11/08/23 Right Antecubital <1 day    Peripheral IV 11/08/23 Right;Ventral (anterior) Wrist <1 day                  Lab Results: I have personally reviewed all pertinent laboratory/test results from 11/08/23, including the preceding 24 hours. Recent Labs     11/08/23  1415   WBC 7.53   HGB 12.1   HCT 36.5      SODIUM 134*   K 3.5   CL 95*   CO2 29   BUN 15   CREATININE 1.15   GLUC 138   AST 20   ALT 16   ALB 4.6   TBILI 0.46   ALKPHOS 43       Imaging Results: I have personally reviewed pertinent images saved in PACS. CT scan findings (and other pertinent positive findings on images) were discussed with radiology. My interpretation of the images/reports are as follows:  Chest Xray(s): negative for acute findings   FAST exam(s): N/A   CT Scan(s): positive for acute findings: CT head positive for small acute epidural hemorrhage area 5.8 mm   Additional Xray(s): N/A     Other Studies: N/A    Code Status: Level 1 - Full Code  Advance Directive and Living Will:      Power of :    POLST:    I have spent 30 minutes with Patient  today in which greater than 50% of this time was spent in counseling/coordination of care regarding Reviewing / ordering tests, medicine, procedures  , Obtaining or reviewing history  , and Communicating with other healthcare professionals .

## 2023-11-09 NOTE — CONSULTS
[address] [place] [address]  Consult: Critical Care  Name: Quin Myers 70 y.o. female I MRN: [id number]  Unit/Bed#: ICU 09 I Date of Admission: 11/8/2023   Date of Service: 11/8/2023 I Hospital Day: 0      Consults  Assessment/Plan   Neuro:   Diagnosis: Epidural hematoma   Mechanical fall 5 days ago, presented to the ED for persistent HA today   CT: "acute epidural hemorrhagic area in the left posterior temporal region with maximum thickness of 5.8 mm. Left frontal scalp hematoma."  Q1 hr neurochecks   Seizure prophylaxis for 7 days: Keppra 500mg  Will Recheck CTH in the AM  Type and Screen   Diagnosis: Depression  Will continue home meds: Wellbutrin, Buspar, Cymbalta  Multimodal pain control  Tylenol 975 Q8 PRN  One dose given for HA  Oxycodone 2.5 Q 6 hr PRN  Robaxin 5 mg TID PRN  Delirium precaution   Sleep - wake cycle  I-CAM    CV:   Diagnosis: hypertension   Normal tensive right now, will hold home meds: Will restart tomorrow if continues to be stable     Pulm:  Pulmonary toileting   Encourage I/S and deep breathing     GI:   Prophylaxis: protonix QD    :   No active     F/E/N:   No on IVF  Will monitor and replete PRN, Goal: K > 4, Phos >3, Mg >2  NPO - can have sips with meds    Heme/Onc:   No active    Endo:   Diagnosis: Diabetes  Will hold metformin  SSI    ID:   No active    MSK/Skin:   OOB to chair, frequent turning     Disposition: Critical care     History of Present Illness     HPI: Quin Myers is a 70 y.o. on ASA who presents with epidural hematoma s/p mechanical fall 5 days ago in her home. Pt fell in her kitchen, unsure if LOC, was not sure how she got to the couch. Pt lives alone at home so she thinks she crawled to the couch but does not remembering crawling to the couch. DDAVP was given to reverse ASA in ED. GCS 15 neurologically intact. History obtained from chart review and the patient. Review of Systems   Constitutional:  Negative for fever.    HENT:  Positive for facial swelling. Negative for congestion and sore throat. Respiratory:  Negative for chest tightness and shortness of breath. Cardiovascular:  Negative for chest pain. Gastrointestinal:  Negative for abdominal distention. Neurological:  Positive for headaches. Historical Information   Past Medical History:  No date: Anxiety  No date: Arthritis  No date: Cancer [facility])      Comment:  Skin  No date: Depression  No date: Diabetes (HCC)  No date: GERD (gastroesophageal reflux disease)  No date: Hyperlipidemia  No date: Hypertension  No date: Migraine Past Surgical History:  No date: ANKLE SURGERY  No date: CARPAL TUNNEL RELEASE; Right  No date: ROTATOR CUFF REPAIR   Current Outpatient Medications   Medication Instructions    aspirin (ECOTRIN LOW STRENGTH) 81 mg, Oral, Daily    B COMPLEX VITAMINS PO 1 tablet, Oral, Daily    Baclofen 5 MG TABS Oral, Take 1-2 tabs TID prn     bisacodyl (BISACODYL) 10 mg, Oral, Daily PRN    buPROPion (WELLBUTRIN XL) 150 mg, Oral, Daily    busPIRone (BUSPAR) 20 mg, Oral, 2 times daily    calcium carbonate (OS-MIKE) 600 mg, Oral, 2 times daily    Cholecalciferol 50 MCG (2000 UT) CAPS 1 capsule, Oral, Daily    clonazePAM (KLONOPIN) 0.5 mg, Oral, 3 times daily PRN    DULoxetine (CYMBALTA) 30 mg, Oral, Daily at bedtime    DULoxetine (CYMBALTA) 60 mg, Oral, Daily at bedtime    esomeprazole (NEXIUM) 20 mg, Oral, Daily (early morning)    ibuprofen (MOTRIN) 200 mg, Oral, Every 6 hours PRN    Lidocaine-Menthol 4-1 % CREA 1 Dose, Apply externally, As needed, Patch 5%    magnesium oxide (MAG-OX) 400 mg, Oral, 2 times daily    metFORMIN (GLUCOPHAGE) 1,000 mg, Oral, 2 times daily with meals    multivitamin (THERAGRAN) TABS 1 tablet, Oral, Daily    naloxone (NARCAN) 4 mg/0.1 mL nasal spray Administer 1 spray into a nostril. If no response after 2-3 minutes, give another dose in the other nostril using a new spray.     OnabotulinumtoxinA (BOTOX IM) Intramuscular    oxyCODONE (ROXICODONE) 10 MG TABS 2.5-3 tablets, Oral, Every 6 hours PRN    polyethylene glycol (MIRALAX) 17 g, Oral, Daily at bedtime    potassium chloride (K-DUR,KLOR-CON) 20 mEq tablet 20 mEq, Oral, Daily    Riboflavin (Vitamin B-2) 100 MG TABS 2 in the morning and 2 at bedtime    rosuvastatin (CRESTOR) 20 mg, Oral, Daily    senna (SENOKOT) 8.6 MG tablet 1 tablet, Oral, Daily    Ubrogepant (UBRELVY) 100 mg, Oral, As needed, Take 1 tablet (100 mg) one time as needed for migraine. May repeat one additional tablet (100 mg) at least two hours after the first dose.  Do not use more than two doses per day    valsartan-hydrochlorothiazide (DIOVAN-HCT) 160-25 MG per tablet 1 tablet, Oral, Daily    No Known Allergies   Social History     Tobacco Use    Smoking status: Former     Types: Cigarettes     Quit date:      Years since quittin.8    Smokeless tobacco: Never   Vaping Use    Vaping Use: Never used   Substance Use Topics    Alcohol use: Not Currently    Drug use: Never    Family History   Problem Relation Age of Onset    Mental illness Mother     Hyperlipidemia Mother     Hypertension Mother     Allergies Mother     Migraines Mother     Anxiety disorder Mother     Osteoporosis Mother     Hyperlipidemia Father     Heart disease Father     Cancer Father         Bladder    Migraines Sister     Hyperlipidemia Brother     Multiple sclerosis Brother     Hyperlipidemia Brother     Migraines Brother         Rare    Migraines Son         Rare    Colon cancer Maternal Grandmother     Heart attack Maternal Grandfather     Peripheral vascular disease Paternal Grandmother     Diabetes Paternal Grandmother     Asthma Paternal Grandfather     Diabetes Paternal Grandfather     Heart attack Paternal Grandfather           Objective                            Vitals I/O      Most Recent Min/Max in 24hrs   Temp 98.3 °F (36.8 °C) Temp  Min: 98.2 °F (36.8 °C)  Max: 98.3 °F (36.8 °C)   Pulse 88 Pulse  Min: 80  Max: 109   Resp 20 Resp  Min: 12  Max: 21   BP 106/63 BP  Min: 93/46  Max: 116/66   O2 Sat 96 % SpO2  Min: 91 %  Max: 100 %    No intake or output data in the 24 hours ending 11/08/23 2219      Diet NPO     Invasive Monitoring Physical exam    Physical Exam  Eyes:      General: Neglect  Skin:     General: Skin is warm. HENT:      Head: Raccoon eyes (bilaterally) and contusion present. Cardiovascular:      Rate and Rhythm: Normal rate and regular rhythm. Heart sounds: Normal heart sounds. Musculoskeletal:         General: Normal range of motion. Abdominal:      Palpations: Abdomen is soft. Tenderness: There is no abdominal tenderness. Constitutional:       General: She is not in acute distress. Appearance: She is well-developed. She is not ill-appearing. Pulmonary:      Effort: Pulmonary effort is normal.   Neurological:      General: No focal deficit present. Mental Status: She is alert and oriented to person, place and time. Mental status is at baseline. She is calm. GCS: GCS eye subscore is 4. GCS verbal subscore is 5. GCS motor subscore is 6. Cranial Nerves: No facial asymmetry. Sensory: No sensory deficit. Motor: Strength full and intact in all extremities. Vitals and nursing note reviewed. Diagnostic Studies        Imaging:  I have personally reviewed pertinent reports.        Medications:  Scheduled PRN   [START ON 11/9/2023] buPROPion, 150 mg, Daily  busPIRone, 20 mg, BID  chlorhexidine, 15 mL, Q12H RAHAT  DULoxetine, 60 mg, HS  [START ON 11/9/2023] levETIRAcetam, 500 mg, Q12H [address]  [START ON 11/9/2023] pantoprazole, 20 mg, Early Morning  [START ON 11/9/2023] pravastatin, 40 mg, Daily With Dinner  [START ON 11/9/2023] senna, 1 tablet, Daily      acetaminophen, 975 mg, Q8H PRN  baclofen, 5 mg, TID PRN  bisacodyl, 10 mg, Daily PRN  oxyCODONE, 2.5 mg, Q6H PRN       Continuous          Labs:    CBC    Recent Labs     11/08/23  1415   WBC 7.53   HGB 12.1   HCT 36.5        BMP    Recent Labs 11/08/23  1415   SODIUM 134*   K 3.5   CL 95*   CO2 29   AGAP 10   BUN 15   CREATININE 1.15   CALCIUM 10.9*       Coags    No recent results     Additional Electrolytes  No recent results       Blood Gas    No recent results  No recent results LFTs  Recent Labs     11/08/23  1415   ALT 16   AST 20   ALKPHOS 43   ALB 4.6   TBILI 0.46       Infectious  No recent results  Glucose  Recent Labs     11/08/23  1415   GLUC 138                  Kaushal Casas, DO

## 2023-11-09 NOTE — OCCUPATIONAL THERAPY NOTE
Occupational Therapy Evaluation     Patient Name: Tawny Subramanian  [id number]'[id number] Date: 11/9/2023  Problem List  Principal Problem:    Epidural hemorrhage without loss of consciousness ([address])  Active Problems:    Major depression, recurrent, chronic ([address])    Hypertension, essential    Fall    Scalp hematoma, initial encounter    Past Medical History  Past Medical History:   Diagnosis Date    Anxiety     Arthritis     Cancer ([address])     Skin    Depression     Diabetes ([address])     GERD (gastroesophageal reflux disease)     Hyperlipidemia     Hypertension     Migraine      Past Surgical History  Past Surgical History:   Procedure Laterality Date    ANKLE SURGERY      CARPAL TUNNEL RELEASE Right     ROTATOR CUFF REPAIR               11/09/23 0920   OT Last Visit   OT Visit Date 11/09/23   Note Type   Note type Evaluation   Pain Assessment   Pain Score No Pain   Restrictions/Precautions   Weight Bearing Precautions Per Order No   Other Precautions Chair Alarm; Bed Alarm;Multiple lines;Telemetry; Fall Risk   Home Living   Type of [address] [place] [address] One level;Performs ADLs on one level;Stairs to enter with rails   Bathroom Shower/Tub Tub/shower unit   Bathroom Toilet Standard   Bathroom Accessibility Accessible   Home Equipment Walker;Cane   Additional Comments Pt lives in a one story home with 3 STEPHY, has RW and SPC report mostly using SPC but uses RW when needed. Prior Function   Level of [place] Independent with ADLs; Independent with functional mobility; Independent with IADLS   Lives With (S)  Alone   Receives Help From   (denies any local social supports)   IADLs Independent with driving; Independent with medication management; Independent with meal prep   Falls in the last 6 months 1 to 4  (1 - reason for current admission)   Vocational Retired   Lifestyle   Autonomy I with ADLS and IADLS +    Reciprocal Relationships Pt reports losing her  and since has been mostly isolated stating her closest family lives over an hour away   Service to Others retired LPN   Intrinsic Gratification Pt does not state at this time reports being mostly sedentary since losing her  and has trouble identifying anying of leisure at this time. Will continue to assess. Subjective   Subjective "I feel like I'm having tunnel vision"   ADL   Where Assessed Edge of bed   Eating Assistance 5  Supervision/Setup   Grooming Assistance 5  Supervision/Setup   UB Bathing Assistance 4  Minimal Assistance   LB Bathing Assistance 3  Moderate Assistance   [address] [facility] [place] [address] 4  Minimal [facility] 3  Moderate [address]  4  Minimal Assistance   Bed Mobility   Supine to Sit 4  Minimal assistance   Additional items Assist x 1; Increased time required;LE management   Additional Comments OOB at end of session   Transfers   Sit to Stand 4  Minimal assistance   Additional items Assist x 1   Stand to Sit 4  Minimal assistance   Additional items Assist x 1   Additional Comments RW   Functional Mobility   Functional Mobility 4  Minimal assistance   Additional Comments Pt reports feeling very unsteady and off balance   Additional items Rolling walker   Balance   Static Sitting Fair   Dynamic Sitting Fair -   Static Standing Poor +   Dynamic Standing Poor +   Ambulatory Poor +   Activity Tolerance   Activity Tolerance Patient limited by fatigue   Medical Staff Made Aware DPT, NSG aware   Nurse Made Aware yes, cleared for ZACHARIAH   RUE Assessment   RUE Assessment WFL   LUE Assessment   LUE Assessment WFL   Vision-Basic Assessment   Current Vision Wears glasses all the time   Vision - Complex Assessment   Acuity Able to read clock/calendar on wall without difficulty   Additional Comments Pt with significant bruising around BL eyes reports there was previously significant swelling as well stating "my eye looks like 2 plums" Pt reports feeling like she has tunnel vision however when peripherial vision tested no noted deficts with same. Reprots she sees floaters at baseline and beleives she has a cataract in on eye   Psychosocial   Psychosocial (WDL) X   Patient Behaviors/Mood Depressed   Cognition   Overall Cognitive Status WFL   Arousal/Participation Alert; Responsive; Cooperative   Attention Attends with cues to redirect   Orientation Level Oriented X4   Memory Decreased recall of precautions   Following Commands Follows one step commands with increased time or repetition   Comments Overall pleasant and cooeprative, flat affect. Discloses feeeling very depressed latley and has not been able to see a counselor since having to get a new one. Discussed with pt talking with pastoral care while in the hospital and she is open to same. Assessment   Limitation Decreased ADL status; Decreased UE strength;Decreased Safe judgement during ADL;Decreased endurance;Decreased self-care trans;Decreased high-level ADLs   Prognosis Good   Assessment Pt is a 70 y.o. female seen for OT evaluation s/p admit to [facility] on 11/8/2023 w/ Epidural hemorrhage without loss of consciousness ([address] Pt presents s/p fall a few days ago, per neuro sx no neurosurgical interventions planned at this time. Comorbidities affecting pt's functional performance at time of assessment include:  has a past medical history of Anxiety, Arthritis, Cancer ([address]), Depression, Diabetes ([address]), GERD (gastroesophageal reflux disease), Hyperlipidemia, Hypertension, and Migraine. Personal factors affecting pt at time of IE include:steps to enter environment, limited home support, difficulty performing ADLS, difficulty performing IADLS , limited insight into deficits, decreased initiation and engagement , and health management . Prior to admission, pt was I with ADLs and IADLs. Upon evaluation: Pt presents supine and is agreeable to OTIE, all vitals WNLs.  Pt requires overall Min A 2* the following deficits impacting occupational performance: weakness, decreased strength, decreased balance, decreased tolerance, impaired problem solving, decreased safety awareness, and decreased coping skills. Pt resting in chair at end of session with all needs in reach, alarm on, all lines in place and SCD's on. Pt to benefit from continued skilled OT tx while in the hospital to address deficits as defined above and maximize level of functional independence w ADL's and functional mobility. Occupational Performance areas to address include: grooming, bathing/shower, toilet hygiene, dressing, health maintenance, functional mobility, community mobility, and clothing management. The patient's raw score on the AM-PAC Daily Activity inpatient short form is  , standardized score is  , less than 39.4. Patients at this level are likely to benefit from discharge to post-acute rehabilitation services. Please refer to the recommendation of the Occupational Therapist for safe discharge planning. Goals   Patient Goals To feel better   LTG Time Frame 10-14   Long Term Goal #1 see below   Plan   Treatment Interventions ADL retraining;UE strengthening/ROM; Endurance training;Patient/family training;Equipment evaluation/education; Compensatory technique education;Continued evaluation; Energy conservation; Activityengagement   Goal Expiration Date 11/23/23   OT Frequency 3-5x/wk   Discharge Recommendation   Rehab Resource Intensity Level, OT I (Maximum Resource Intensity)   AM-PAC Daily Activity Inpatient   Lower Body Dressing 2   Bathing 2   Toileting 2   Upper Body Dressing 3   Grooming 3   Eating 4   Daily Activity Raw Score 16   Daily Activity Standardized Score (Calc for Raw Score >=11) 35.96   AM-PAC Applied Cognition Inpatient   Following a Speech/Presentation 3   Understanding Ordinary Conversation 4   Taking Medications 4   Remembering Where Things Are Placed or Put Away 4   Remembering List of 4-5 Errands 4   Taking Care of Complicated Tasks 3   Applied Cognition Raw Score 22   Applied Cognition Standardized Score 47.83 OT goals to be addressed in the next 14 days:    1) Pt will increase activity tolerance to G for 30 min txment sessions  2) Pt will complete UB/LB dressing/self care w/ mod I using adaptive device and DME as needed  3) Pt will complete bathing w/ Mod I w/ use of AE and DME as needed  4) Pt will complete toileting w/ mod I w/ G hygiene/thoroughness using DME as needed  5) Pt will improve functional transfers to Mod I on/off all surfaces using DME as needed w/ G balance/safety   6) Pt will improve functional mobility during ADL/IADL/leisure tasks to Mod I using DME as needed w/ G balance/safety   7) Pt will participate in simulated IADL management task with DME as needed to increase independence to  w/ G safety and endurance  8) Pt will demonstrate G carryover of pt/caregiver education and training as appropriate. 9) Pt will demonstrate 100% carryover of energy conservation techniques t/o functional I/ADL/leisure tasks w/o cues s/p skilled education  10) Pt will independently identify and utilize 2-3 coping strategies to increase positive affect and promote overall well-being.   11) Pt will engage in ongoing cognitive assessment w/ G participation to assist w/ safe d/c planning/recommendations

## 2023-11-09 NOTE — ASSESSMENT & PLAN NOTE
Patient presents with frontal scalp hematoma  - Minimal pain on palpation, fluctuant  -Patient has bilateral periorbital hematoma, reports it is expanding/worsening  -No vision changes  -Neurosurgery consult placed

## 2023-11-09 NOTE — ASSESSMENT & PLAN NOTE
Patient is status post fall on Saturday, noticed scalp hematoma and bilateral orbital hematomas on subsequent days  - CT head shows small acute epidural hemorrhagic area in the left posterior temporal region with maximum thickness of 5.8 mm  -Patient on Keppra prophylaxis  -Neurosurgery consult placed, melani recs

## 2023-11-09 NOTE — PLAN OF CARE
Problem: OCCUPATIONAL THERAPY ADULT  Goal: Performs self-care activities at highest level of function for planned discharge setting. See evaluation for individualized goals. Description: Treatment Interventions: ADL retraining, UE strengthening/ROM, Endurance training, Patient/family training, Equipment evaluation/education, Compensatory technique education, Continued evaluation, Energy conservation, Activityengagement          See flowsheet documentation for full assessment, interventions and recommendations. Note: Limitation: Decreased ADL status, Decreased UE strength, Decreased Safe judgement during ADL, Decreased endurance, Decreased self-care trans, Decreased high-level ADLs  Prognosis: Good  Assessment: Pt is a 70 y.o. female seen for OT evaluation s/p admit to hospitals on 11/8/2023 w/ Epidural hemorrhage without loss of consciousness ([address] Pt presents s/p fall a few days ago, per neuro sx no neurosurgical interventions planned at this time. Comorbidities affecting pt's functional performance at time of assessment include:  has a past medical history of Anxiety, Arthritis, Cancer ([address]), Depression, Diabetes ([address]), GERD (gastroesophageal reflux disease), Hyperlipidemia, Hypertension, and Migraine. Personal factors affecting pt at time of IE include:steps to enter environment, limited home support, difficulty performing ADLS, difficulty performing IADLS , limited insight into deficits, decreased initiation and engagement , and health management . Prior to admission, pt was I with ADLs and IADLs. Upon evaluation: Pt presents supine and is agreeable to OTIE, all vitals WNLs. Pt requires overall Min A 2* the following deficits impacting occupational performance: weakness, decreased strength, decreased balance, decreased tolerance, impaired problem solving, decreased safety awareness, and decreased coping skills.  Pt resting in chair at end of session with all needs in reach, alarm on, all lines in place and SCD's on. Pt to benefit from continued skilled OT tx while in the hospital to address deficits as defined above and maximize level of functional independence w ADL's and functional mobility. Occupational Performance areas to address include: grooming, bathing/shower, toilet hygiene, dressing, health maintenance, functional mobility, community mobility, and clothing management. The patient's raw score on the AM-PAC Daily Activity inpatient short form is  , standardized score is  , less than 39.4. Patients at this level are likely to benefit from discharge to post-acute rehabilitation services. Please refer to the recommendation of the Occupational Therapist for safe discharge planning.      Rehab Resource Intensity Level, OT: I (Maximum Resource Intensity)

## 2023-11-09 NOTE — ASSESSMENT & PLAN NOTE
Imaging:  CT chest abdomen pelvis 11/8/2023: No CT findings of trauma in the chest, abdomen or pelvis. CT cervical spine 11/8/2023: No cervical spine fracture or traumatic malalignment.      Plan:  See plan above

## 2023-11-09 NOTE — ASSESSMENT & PLAN NOTE
Patient presents status post fall, reports episodes of vertigo for months prior to fall  - Patient reports fall due to disorientation from darkness  - Patient currently on walker, able to do all activities of daily living at baseline  - Patient currently lives alone at home  - Geriatrics consult placed

## 2023-11-09 NOTE — QUICK NOTE
Transfer to [facility]. Report given to Trauma Service. Scalp hematoma, initial encounter  Assessment & Plan  No acute issues       Fall  Assessment & Plan  Fall possibly secondary to syncopal episode exacerbated by Antihypertensives   Injuries as above    Hypertension, essential  Assessment & Plan  Holding home medications in the setting of normotension   Consider to continue to hold and follow up with PCP as she may not require her hydrochlorothiazide     Major depression, recurrent, chronic ([address])  Assessment & Plan  Will continue home meds: Wellbutrin, Buspar, Cymbalta  Psych consult     * Epidural hemorrhage without loss of consciousness (HCC)  Assessment & Plan  Diagnosis: Epidural hematoma   Mechanical fall 5 days ago, presented to the ED for persistent HA today   CT: "acute epidural hemorrhagic area in the left posterior temporal region with maximum thickness of 5.8 mm.  Left frontal scalp hematoma."  Q4 hr neurochecks   Seizure prophylaxis for 7 days: Keppra 500mg  CTH stable   ASA reversal with DDAVP, continue to hold  No NSG intervention, signed off

## 2023-11-09 NOTE — PLAN OF CARE
Problem: COPING  Goal: Pt/Family able to verbalize concerns and demonstrate effective coping strategies  Description: INTERVENTIONS:  - Assist patient/family to identify coping skills, available support systems and cultural and spiritual values  - Provide emotional support, including active listening and acknowledgement of concerns of patient and caregivers  - Reduce environmental stimuli, as able  - Provide patient education  - Assess for spiritual pain/suffering and initiate spiritual care, including notification of Pastoral Care or kinsey based community as needed  - Assess effectiveness of coping strategies  Outcome: Progressing     Problem: COPING  Goal: Will report anxiety at manageable levels  Description: INTERVENTIONS:  - Administer medication as ordered  - Teach and encourage coping skills  - Provide emotional support  - Assess patient/family for anxiety and ability to cope  Outcome: Progressing

## 2023-11-09 NOTE — PLAN OF CARE
Problem: PHYSICAL THERAPY ADULT  Goal: Performs mobility at highest level of function for planned discharge setting. See evaluation for individualized goals. Description: Treatment/Interventions: OT, Spoke to case management, Spoke to nursing, Continued evaluation, Gait training, Bed mobility, Patient/family training, Endurance training, LE strengthening/ROM, Functional transfer training  Equipment Recommended:  (pt reports having RW)       See flowsheet documentation for full assessment, interventions and recommendations. Note: Prognosis: Good  Problem List: Decreased strength, Decreased range of motion, Decreased endurance, Impaired balance, Decreased mobility, Pain, Decreased safety awareness  Assessment: Pt is 70 y.o. female seen for PT evaluation s/p admit to [facility] on 11/8/2023 w/ Epidural hemorrhage without loss of consciousness ([address] PT consulted to assess pt's functional mobility and d/c needs. Order placed for PT eval and tx, w/ up w/ A, activity as tolerated, and ambulate patient order. Comorbidities affecting pt's physical performance at time of assessment include:  has a past medical history of Anxiety, Arthritis, Cancer ([address]), Depression, Diabetes ([address]), GERD (gastroesophageal reflux disease), Hyperlipidemia, Hypertension, and Migraine. PTA, pt was requiring A for mobility, dependent for all mobility tasks, ambulates household distances, and retired. Personal factors affecting pt at time of IE include: inaccessible home environment, stairs to enter home, limited home support, positive fall history, decreased initiation and engagement, unable to perform physical activity, limited insight into impairments, inability to perform IADLs, and inability to perform ADLs.  Please find objective findings from PT assessment regarding body systems outlined above with impairments and limitations including weakness, decreased ROM, impaired balance, decreased endurance, gait deviations, pain, decreased activity tolerance, decreased functional mobility tolerance, decreased safety awareness, impaired judgement, and fall risk. Pt required increased time to complete bed mobility with decreased strength and increased pain. Required hand placement instruction during transfers. Ambulated with slow guarded although steady gait. Pt noted limited resources to assist at home. The following objective measures performed on IE also reveal limitations: The patient's AM-PAC Basic Mobility Inpatient Short Form Raw Score is 17, Standardized Score is 39.67. A standardized score less than 42.9 suggests the patient may benefit from discharge to post-acute rehabilitation services. Please also refer to the recommendation of the Physical Therapist for safe discharge planning. Pt's clinical presentation is currently unstable/unpredictable seen in pt's presentation of critical care monitoring. Pt to benefit from continued PT tx to address deficits as defined above and maximize level of functional independent mobility and consistency. From PT/mobility standpoint, recommendation at time of d/c would be post acute rehabilitation services pending progress in order to facilitate return to PLOF. Barriers to Discharge: Decreased caregiver support, Inaccessible home environment     Rehab Resource Intensity Level, PT: II (Moderate Resource Intensity)    See flowsheet documentation for full assessment.

## 2023-11-09 NOTE — ASSESSMENT & PLAN NOTE
Patient reports being currently depressed, has been depressed since the death of her  2 years ago.   Does not currently want to live anymore, denies suicidal ideation  - Patient being managed outpatient with psychiatry  - Psych consult placed  - Continue home Cymbalta meds

## 2023-11-09 NOTE — CONSULTS
Consultation - [address] 70 y.o. female MRN: [id number]  Unit/Bed#: [address] [facility] [address] 602-01 Encounter: [id number]      Chief Complaint: "I would like to die"    History of Present Illness   Physician Requesting Consult: Karoline Rogers MD  Reason for Consult / Principal Problem: Patient states she does not want to live anymore diagnosis major depressive disorder" hua Santos is a 70 y.o. female with medical history of, HTN, depression, anxiety, hyperlipidemia, migraine, gastroesophageal reflux disease and T2DM presents first to [address] [place] [address] ED with traumatic fall on Saturday and worsening nausea/vomiting. Pt was then transferred to [facility] for further evaluation of epidural hemorrhage without loss of consciousness. She has been evaluated for suicidal thoughts; pt reports she has been depressed for over 20 years, but has had waxing/waning severity due to different life events that have occurred. Pt reports in 2006 her father passed away, and in 2021 her  passed away of pancreatic cancer, both of which significantly worsened her depression. Pt is established with a psychiatrist and a therapist, and has never been admitted to an inpatient psychiatric unit before. Pt is prescribes Wellbutrin, Buspar, Cymbalta, and PRN klonopin by her psychiatrist and takes all of these medications on a daily basis. On a day to day basis, pt reports she is unmotivated to get out of bed, and has not had the energy to leave the house to do anything including showering, dressing herself, or seeing anyone. Pt reports that for several months she has been having  suicidal ideations and reports that the only 2 things keeping her from acting on her thoughts are that she loves her son and does not want to leave him, and that she wants to get into heaven and she won't if she commits suicide. Pt lives alone since her  passed in 2021, and has no support system living close by.  Her brother and sister live a 1 hour drive away, and her son lives in [place]. Pt denies audio/visual hallucinations as well as homicidal ideation. Pt also denies using any alcohol, tobacco, or illicit drugs. She does have an opioid use disorder due to chronic prescribed oxycodone usage for back pain and unwillingness to wean off of it. Psychiatric Review Of Systems:  sleep: no  appetite changes: no  weight changes: no  energy/anergy: yes  interest/pleasure/anhedonia: no  somatic symptoms: no  anxiety/panic: no  vincent: no  guilty/hopeless: no  self injurious behavior/risky behavior: no    Historical Information   Past Psychiatric History:   None  Currently in treatment with a psychiatrist and a therapist at [facility]. Past Suicide attempts: None  Past Violent behavior: None  Past Psychiatric medication trial: Cymbalta, clonazepam, Wellbutrin, BuSpar, lorazepam, melatonin, Lamictal, Topamax, Depakote, gabapentin, amitriptyline    Substance Abuse History:  No drugs or alcohol history     I have assessed this patient for substance use within the past 12 months     History of IP/OP rehabilitation program: None  Smoking history: Former smoker  Family Psychiatric History: Mother have anxiety, also dementia, no other mental illness or substance abuse or suicide in the family    Social History  Education: college graduate  Learning Disabilities:  None  Marital history:   Living arrangement, social support:  She lives alone. Occupational History: retired  Functioning Relationships: good support system and good relationship with children.   Other Pertinent History:  No legal or  history    Traumatic History:   Abuse:  None  Other Traumatic Events:  None    Past Medical History:   Diagnosis Date    Anxiety     Arthritis     Cancer ([address])     Skin    Depression     Diabetes ([address])     GERD (gastroesophageal reflux disease)     Hyperlipidemia     Hypertension     Migraine        Medical Review Of Systems:  Review of Systems - Negative except neck pain, dizziness, headache, depression, facial bruising with pain, all other systems reviewed are negative    Meds/Allergies   current meds:   Current Facility-Administered Medications   Medication Dose Route Frequency    acetaminophen (TYLENOL) tablet 975 mg  975 mg Oral Q8H PRN    baclofen tablet 5 mg  5 mg Oral TID PRN    bisacodyl (DULCOLAX) EC tablet 10 mg  10 mg Oral Daily PRN    buPROPion (WELLBUTRIN XL) 24 hr tablet 150 mg  150 mg Oral Daily    busPIRone (BUSPAR) tablet 20 mg  20 mg Oral BID    chlorhexidine (PERIDEX) 0.12 % oral rinse 15 mL  15 mL Mouth/Throat Q12H RAHAT    clonazePAM (KlonoPIN) tablet 0.25 mg  0.25 mg Oral TID PRN    DULoxetine (CYMBALTA) delayed release capsule 30 mg  30 mg Oral Daily    DULoxetine (CYMBALTA) delayed release capsule 60 mg  60 mg Oral HS    enoxaparin (LOVENOX) subcutaneous injection 30 mg  30 mg Subcutaneous Q12H [address]    insulin lispro (HumaLOG) 100 units/mL subcutaneous injection 1-5 Units  1-5 Units Subcutaneous TID AC    levETIRAcetam (KEPPRA) tablet 500 mg  500 mg Oral Q12H RAHAT    ondansetron (ZOFRAN) injection 4 mg  4 mg Intravenous Q6H PRN    oxyCODONE (ROXICODONE) split tablet 2.5 mg  2.5 mg Oral Q6H PRN    pantoprazole (PROTONIX) EC tablet 20 mg  20 mg Oral Early Morning    pravastatin (PRAVACHOL) tablet 40 mg  40 mg Oral Daily With Dinner    senna (SENOKOT) tablet 8.6 mg  1 tablet Oral Daily    Ubrogepant (UBRELVY) tablet 100 mg  100 mg Oral PRN     No Known Allergies    Objective   Vital signs in last 24 hours:  Temp:  [98.2 °F (36.8 °C)-98.4 °F (36.9 °C)] 98.3 °F (36.8 °C)  HR:  [68-95] 74  Resp:  [12-22] 21  BP: ()/(44-77) 124/63      Intake/Output Summary (Last 24 hours) at 11/9/2023 1355  Last data filed at 11/9/2023 1156  Gross per 24 hour   Intake 930.41 ml   Output 300 ml   Net 630.41 ml       Mental Status Evaluation:  Appearance:  age appropriate and multiple bruises in the face   Behavior:  Cooperative   Speech:  normal pitch and normal volume   Mood:  depressed   Affect:  mood-congruent   Language: naming objects and repeating phrases   Thought Process:  goal directed   Associations: intact associations   Thought Content:  normal   Perceptual Disturbances: None   Risk Potential: Suicidal Ideations without plan, Homicidal Ideations none, and Potential for Aggression No   Sensorium:  person, place, time/date, and situation   Memory:  recent and remote memory grossly intact   Cognition:  recent and remote memory grossly intact   Consciousness:  alert and awake    Attention: attention span and concentration were age appropriate   Intellect: within normal limits   Fund of Knowledge: awareness of current events: Good, past history: Good, and vocabulary: Good   Insight:  fair   Judgment: fair   Muscle Strength and Tone: Within normal limits   Gait/Station: Unable to assess patient is in bed   Motor Activity: no abnormal movements     Lab Results:  I have personally reviewed all pertinent laboratory/tests results. Labs in last 72 hours:   Recent Labs     11/08/23  1415 11/09/23  0459   WBC 7.53 6.51   RBC 4.01 3.06*   HGB 12.1 9.5*   HCT 36.5 28.2*    241   RDW 13.0 13.2   NEUTROABS 5.80 4.24   SODIUM 134* 138   K 3.5 2.8*   CL 95* 101   CO2 29 28   BUN 15 11   CREATININE 1.15 0.73   GLUC 138 72   CALCIUM 10.9* 8.2*   AST 20  --    ALT 16  --    ALKPHOS 43  --    TP 7.5  --    ALB 4.6  --    TBILI 0.46  --      Recent Imaging Studies: Procedure: CT head wo contrast    Result Date: 11/9/2023  Narrative: CT BRAIN - WITHOUT CONTRAST INDICATION:   Epidural hematoma f/u bleed. COMPARISON: 11/8/2023. TECHNIQUE:  CT examination of the brain was performed. Multiplanar 2D reformatted images were created from the source data. Radiation dose length product (DLP) for this visit:  842 mGy-cm .   This examination, like all CT scans performed in the [facility], was performed utilizing techniques to minimize radiation dose exposure, including the use of iterative reconstruction and automated exposure control. IMAGE QUALITY:  Diagnostic. FINDINGS: PARENCHYMA:  No intracranial mass, mass effect or midline shift. No CT signs of acute infarction. Stable 16 mm crescentic hemorrhage overlying the left parietal region measuring 6 mm in thickness. . VENTRICLES AND EXTRA-AXIAL SPACES:  Normal for the patient's age. VISUALIZED ORBITS: Normal visualized orbits. PARANASAL SINUSES: Normal visualized paranasal sinuses. CALVARIUM AND EXTRACRANIAL SOFT TISSUES: Left frontal scalp hematoma. .     Impression: Stable size and appearance of epidural hematoma overlying the left epidural region measuring 6 mm in thickness Workstation performed: [id number]     Procedure: XR Trauma chest portable    Result Date: 11/8/2023  Narrative: CHEST INDICATION:   TRAUMA. COMPARISON: 9/29/2021 radiograph. Chest CT from earlier the same date. EXAM PERFORMED/VIEWS:  XR CHEST PORTABLE FINDINGS: Lung volumes are within normal limits. Lungs are clear. No effusion or pneumothorax. Heart, mediastinal and hilar structures are within normal limits. No acute osseous or soft tissue pathology. Right distal clavicular resection. Cholecystectomy clips. Impression: No acute cardiopulmonary findings. Workstation performed: [id number]     Procedure: TRAUMA - CT head wo contrast    Result Date: 11/8/2023  Narrative: CT BRAIN - WITHOUT CONTRAST INDICATION:   TRAUMA. COMPARISON: Compared with 9/29/2021 TECHNIQUE:  CT examination of the brain was performed. Multiplanar 2D reformatted images were created from the source data. Radiation dose length product (DLP) for this visit:  1003 mGy-cm . This examination, like all CT scans performed in the [facility], was performed utilizing techniques to minimize radiation dose exposure, including the use of iterative reconstruction and automated exposure control. IMAGE QUALITY:  Diagnostic.  FINDINGS: PARENCHYMA:  No intracranial mass, mass effect or midline shift. No CT signs of acute infarction. No acute parenchymal hemorrhage. VENTRICLES AND EXTRA-AXIAL SPACES: Focal area of hemorrhagic fluid in the pleural space measuring 5.8 mm in thickness with craniocaudad measurement of 2.8 cm in AP dimension of 2.3 cm. VISUALIZED ORBITS: Normal visualized orbits. PARANASAL SINUSES: Normal visualized paranasal sinuses. CALVARIUM AND EXTRACRANIAL SOFT TISSUES: Left frontal scalp hematoma. Impression: Small acute epidural hemorrhagic area in the left posterior temporal region with maximum thickness of 5.8 mm. . Left frontal scalp hematoma. I personally discussed this study with Edna Peters on 11/8/2023 3:05 PM. Workstation performed: [id number]     Procedure: TRAUMA - CT spine cervical wo contrast    Result Date: 11/8/2023  Narrative: CT CERVICAL SPINE - WITHOUT CONTRAST INDICATION:   TRAUMA. COMPARISON:  None. TECHNIQUE:  CT examination of the cervical spine was performed without intravenous contrast.  Contiguous axial images were obtained. Multiplanar 2D reformatted images were created from the source data. Radiation dose length product (DLP) for this visit:  374.7 mGy-cm . This examination, like all CT scans performed in the [facility], was performed utilizing techniques to minimize radiation dose exposure, including the use of iterative reconstruction and automated exposure control. IMAGE QUALITY:  Diagnostic. FINDINGS: ALIGNMENT:  Normal alignment of the cervical spine. No subluxation. VERTEBRAE:  No fracture. DEGENERATIVE CHANGES: Anterior osteophytes at C4-C5. PREVERTEBRAL AND PARASPINAL SOFT TISSUES: Unremarkable THORACIC INLET:  Normal.     Impression: No cervical spine fracture or traumatic malalignment.  I personally discussed this study with Edna Peters on 11/8/2023 3:05 PM. Workstation performed: [id number]     Procedure: TRAUMA - CT chest abdomen pelvis w contrast    Result Date: 11/8/2023  Narrative: CT CHEST, ABDOMEN AND PELVIS WITH IV CONTRAST INDICATION:   TRAUMA. COMPARISON:  None. TECHNIQUE: CT examination of the chest, abdomen and pelvis was performed. Multiplanar 2D reformatted images were created from the source data. This examination, like all CT scans performed in the [facility], was performed utilizing techniques to minimize radiation dose exposure, including the use of iterative reconstruction and automated exposure control. Radiation dose length product (DLP) for this visit:  988.64 mGy-cm IV Contrast:  80 mL of iohexol (OMNIPAQUE) Enteric Contrast: Enteric contrast was administered. FINDINGS: CHEST LUNGS:  Lungs are clear. There is no tracheal or endobronchial lesion. PLEURA:  Unremarkable. HEART/GREAT VESSELS: Heart is unremarkable for patient's age. No thoracic aortic aneurysm. MEDIASTINUM AND WHITLEY:  Unremarkable. CHEST WALL AND LOWER NECK:  Unremarkable. ABDOMEN LIVER/BILIARY TREE:  Unremarkable. GALLBLADDER:  Gallbladder is surgically absent. SPLEEN:  Unremarkable. PANCREAS:  Unremarkable. ADRENAL GLANDS:  Unremarkable. KIDNEYS/URETERS:  Unremarkable. No hydronephrosis. STOMACH AND BOWEL:  There is colonic diverticulosis without evidence of acute diverticulitis. APPENDIX:  No findings to suggest appendicitis. ABDOMINOPELVIC CAVITY:  No ascites. No pneumoperitoneum. No lymphadenopathy. VESSELS:  Unremarkable for patient's age. PELVIS REPRODUCTIVE ORGANS:  Unremarkable for patient's age. URINARY BLADDER:  Unremarkable. ABDOMINAL WALL/INGUINAL REGIONS:  Unremarkable. OSSEOUS STRUCTURES:  No acute fracture or destructive osseous lesion. Impression: No CT findings of trauma in the chest, abdomen or pelvis.  I personally discussed this study with Victorina Farmer on 11/8/2023 3:05 PM. Workstation performed: [id number]      Code Status: )Level 1 - Full Code    Assessment/Plan     Assessment:  Nithya Young is a 70 y.o. female with medical history of, HTN, depression, anxiety, hyperlipidemia, migraine, gastroesophageal reflux disease and T2DM presents first to [address] [place] [address] ED with traumatic fall on Saturday and worsening nausea/vomiting. Pt was then transferred to [facility] for further evaluation of epidural hemorrhage without loss of consciousness. She has been evaluated for suicidal thoughts; she stated that she has been depressed for the last 20 years, she has chronic suicidal ideation, she never have any suicidal intent. She stated that she would never hurt herself because she lost her son and she wants to go to heaven and meet her  and her father again. She does not have any hallucinations or paranoid thinking she does not have any history of manic episode. 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            Gracia Agudelo MD

## 2023-11-09 NOTE — CONSULTS
Consultation - Geriatrics   Stacy Richey 70 y.o. female MRN: [id number]  Unit/Bed#: ICU 09 Encounter: [id number]      Assessment/Plan    Ambulatory dysfunction  At risk for falls secondary to age, hx of fall, gait instability, polypharmacy, visual impairment  Fall precautions  PT/OT  Increased physical exercise, recommend balance and strengthening exercises  Rehab post hospitalization     Acute pain due to trauma  Geriatric pain protocol  Scheduled acetaminophen 975 mg po Q8  Oxycodone 2.5 mg po Q 4 prn moderate pain  Oxycodone 5 mg po Q 4 prn severe pain   Monitor for constipation  Lidoderm patch      Frailty  Clinical Frail Scale: 4- Vulnerable  Albumin 4.6, maintain protein in diet  PT/OT  Continue supportive care     Cognitive screening  No reported prior memory issues  Recommend check TSH and vitamin B12  CT head (11/8/23)   Keep physically, mentally and socially active     Delirium precautions  Baseline: alert and oriented x 3  Provide frequent redirection, reorientation, distraction techniques  Avoid deliriogenic medications such as tramadol, benzodiazepines, anticholinergics,  Benadryl  Treat pain, See geriatric pain protocol  Monitor for constipation and urinary retention  Encourage early and frequent moblization, OOB  Encourage Hydration/ Nutrition  Implement sleep hygiene, limit night time interuptions, group activities     Insomnia  Related to hospitalization  First line is behavioral therapy  Avoid sedative hypnotics such as benzodiazepines and benadryl  Encourage staying awake during the day  Encourage daytime activity, morning exercise  Decrease or eliminate day time naps  Avoid caffeine especially during late afternoon and evening hours  Establish a night time routine    Advanced Care Planning  Full code    Home medication review  [facility] pharmacy  Ubrelvy 100 mg po daily prn last refill 10/17/23   Bupropion  mg po daily, last refill 10/16/23  Crestor 20 mg po daily, last refill 9/19/23 # 90 days  KCL ER 20 meq po daily, last refill 9/19/23 # 90 days  Buspar 20 mg po BID, last refill 9/13/23 # 90 days  Clonazepam 0.5 mg po TID, last refill 10/9/23 # 30 days  Cymbalta 90 mg po QHS, last refill 10/9/23 # 90 days        10/17/2023 10/17/2023 1 Oxycodone Hcl (Ir) 10 Mg Tab 90.00 30 [facility] [id number] [facility] ([id number]) 0 45.00 MME Comm Ins [place]   10/09/2023 09/12/2023 1 Clonazepam 0.5 Mg Tablet 90.00 30 [facility] [id number] [place] ([id number]) 0 3.00 LME Comm Ins [place]   09/21/2023 09/21/2023 1 Oxycodone Hcl (Ir) 10 Mg Tab 90.00 23 Ta Dup [id number] [facility] ([id number]) 0 58.70 MME Comm Ins [place]   09/03/2023 09/01/2023 1 Clonazepam 0.5 Mg Tablet 90.00 30 [facility] [id number] Sheyla ([id number]) 0 3.00 LME Comm Ins [place]   08/30/2023 08/24/2023 1 Oxycodone Hcl (Ir) 10 Mg Tab 85.00 22 Ta Dup [id number] [facility] ([id number]) 0 57.95 MME Comm Ins [place]   08/05/2023 08/04/2023 1 Clonazepam 0.5 Mg Tablet 90.00 30 [facility]             History of Present Illness   Physician Requesting Consult: Imani Mills MD  Reason for Consult / Principal Problem: ISAR greater than 2, fall  Hx and PE limited by: NA  HPI: Galina Farias is a 70y.o. year old female who presents to [facility] with hematoma to scalp and bilateral eyes after a fall. She states she had a fall over the weekend. She was walking with a tray of food from her kitchen to the living room. She turned off the light in her kitchen, became dizzy and fell. Her head hit the fireplace when she fell. She cannot recall details of how long she was down. She remembers waking up on the couch with a headache. She has had increased dizziness over the past month, feeling dizzy before she fell. She has been seeing psychiatry as outpatient for depression since the death of her  2 years ago. Inpatient psychiatry is consulted because she states she does not want to live anymore. Geriatrics is consulted for ISAR greater than 2. She has anxiety, depression, GERD, HTN, hyperlipidemia, migraine. Prior to arrival she lives at home alone. She is independent with IADLs and ADLs. She has prior falls. She reports 20 pound weight loss. She states she has had abd pain, cramping and bloating associated with diarrhea and at times constipation. Her PCP recommended follow up with GI regard to symptoms. Inpatient consult to Gerontology  Consult performed by: FE Liang  Consult ordered by: Beka Posada MD        Review of Systems   Constitutional:  Negative for unexpected weight change. HENT:  Negative for hearing loss. Eyes:  Negative for visual disturbance. Respiratory:  Negative for cough. Gastrointestinal:  Negative for constipation. Genitourinary:  Negative for difficulty urinating. Musculoskeletal:  Negative for gait problem. Skin:  Negative for color change. Neurological:  Negative for dizziness. Psychiatric/Behavioral:  Negative for sleep disturbance.         Historical Information   Past Medical History:   Diagnosis Date    Anxiety     Arthritis     Cancer ([address] [facility])     Skin    Depression     Diabetes ([address])     GERD (gastroesophageal reflux disease)     Hyperlipidemia     Hypertension     Migraine      Past Surgical History:   Procedure Laterality Date    ANKLE SURGERY      CARPAL TUNNEL RELEASE Right     ROTATOR CUFF REPAIR       Social History   Social History     Substance and Sexual Activity   Alcohol Use Not Currently     Social History     Substance and Sexual Activity   Drug Use Never     Social History     Tobacco Use   Smoking Status Former    Types: Cigarettes    Quit date:     Years since quittin.8   Smokeless Tobacco Never         Family History:   Family History   Problem Relation Age of Onset    Mental illness Mother     Hyperlipidemia Mother     Hypertension Mother     Allergies Mother     Migraines Mother     Anxiety disorder Mother     Osteoporosis Mother     Hyperlipidemia Father     Heart disease Father     Cancer Father         Bladder    Migraines Sister     Hyperlipidemia Brother Multiple sclerosis Brother     Hyperlipidemia Brother     Migraines Brother         Rare    Migraines Son         Rare    Colon cancer Maternal Grandmother     Heart attack Maternal Grandfather     Peripheral vascular disease Paternal Grandmother     Diabetes Paternal Grandmother     Asthma Paternal Grandfather     Diabetes Paternal Grandfather     Heart attack Paternal Grandfather        Meds/Allergies   Current meds:   Current Facility-Administered Medications   Medication Dose Route Frequency    acetaminophen (TYLENOL) tablet 975 mg  975 mg Oral Q8H PRN    baclofen tablet 5 mg  5 mg Oral TID PRN    bisacodyl (DULCOLAX) EC tablet 10 mg  10 mg Oral Daily PRN    buPROPion (WELLBUTRIN XL) 24 hr tablet 150 mg  150 mg Oral Daily    busPIRone (BUSPAR) tablet 20 mg  20 mg Oral BID    chlorhexidine (PERIDEX) 0.12 % oral rinse 15 mL  15 mL Mouth/Throat Q12H RAHAT    clonazePAM (KlonoPIN) tablet 0.25 mg  0.25 mg Oral TID PRN    DULoxetine (CYMBALTA) delayed release capsule 30 mg  30 mg Oral Daily    DULoxetine (CYMBALTA) delayed release capsule 60 mg  60 mg Oral HS    enoxaparin (LOVENOX) subcutaneous injection 30 mg  30 mg Subcutaneous Q12H RAHAT    insulin lispro (HumaLOG) 100 units/mL subcutaneous injection 1-5 Units  1-5 Units Subcutaneous TID AC    levETIRAcetam (KEPPRA) tablet 500 mg  500 mg Oral Q12H RAHAT    ondansetron (ZOFRAN) injection 4 mg  4 mg Intravenous Q6H PRN    oxyCODONE (ROXICODONE) split tablet 2.5 mg  2.5 mg Oral Q6H PRN    pantoprazole (PROTONIX) EC tablet 20 mg  20 mg Oral Early Morning    pravastatin (PRAVACHOL) tablet 40 mg  40 mg Oral Daily With Dinner    senna (SENOKOT) tablet 8.6 mg  1 tablet Oral Daily    Ubrogepant (UBRELVY) tablet 100 mg  100 mg Oral PRN             No Known Allergies    Objective   Vitals: Blood pressure 124/63, pulse 74, temperature 98.3 °F (36.8 °C), temperature source Oral, resp. rate 21, height 5' 6" (1.676 m), weight 68 kg (149 lb 14.6 oz), SpO2 96 %. ,Body mass index is 24.2 kg/m². Physical Exam  Vitals and nursing note reviewed. HENT:      Nose: No congestion. Mouth/Throat:      Mouth: Mucous membranes are dry. Eyes:      General:         Right eye: No discharge. Left eye: No discharge. Cardiovascular:      Rate and Rhythm: Normal rate and regular rhythm. Pulses: Normal pulses. Pulmonary:      Effort: Pulmonary effort is normal.      Breath sounds: Normal breath sounds. Abdominal:      General: Bowel sounds are normal.      Palpations: Abdomen is soft. Musculoskeletal:         General: Normal range of motion. Cervical back: Normal range of motion. Skin:     General: Skin is warm. Findings: Bruising present. Neurological:      Mental Status: She is alert and oriented to person, place, and time. Mental status is at baseline. Psychiatric:         Mood and Affect: Mood normal.         Lab Results:   Results from last 7 days   Lab Units 11/09/23  0459   WBC Thousand/uL 6.51   HEMOGLOBIN g/dL 9.5*   HEMATOCRIT % 28.2*   PLATELETS Thousands/uL 241        Results from last 7 days   Lab Units 11/09/23  0459 11/08/23  1415   POTASSIUM mmol/L 2.8* 3.5   CHLORIDE mmol/L 101 95*   CO2 mmol/L 28 29   BUN mg/dL 11 15   CREATININE mg/dL 0.73 1.15   CALCIUM mg/dL 8.2* 10.9*   ALK PHOS U/L  --  43   ALT U/L  --  16   AST U/L  --  20       Imaging Studies: I have personally reviewed pertinent reports. EKG, Pathology, and Other Studies: I have personally reviewed pertinent reports.

## 2023-11-09 NOTE — PLAN OF CARE
Problem: Potential for Falls  Goal: Patient will remain free of falls  Description: INTERVENTIONS:  - Educate patient/family on patient safety including physical limitations  - Instruct patient to call for assistance with activity   - Consult OT/PT to assist with strengthening/mobility   - Keep Call bell within reach  - Keep bed low and locked with side rails adjusted as appropriate  - Keep care items and personal belongings within reach  - Initiate and maintain comfort rounds  - Make Fall Risk Sign visible to staff  - Apply yellow socks and bracelet for high fall risk patients  - Consider moving patient to room near nurses station  Outcome: Progressing     Problem: MOBILITY - ADULT  Goal: Maintain or return to baseline ADL function  Description: INTERVENTIONS:  -  Assess patient's ability to carry out ADLs; assess patient's baseline for ADL function and identify physical deficits which impact ability to perform ADLs (bathing, care of mouth/teeth, toileting, grooming, dressing, etc.)  - Assess/evaluate cause of self-care deficits   - Assess range of motion  - Assess patient's mobility; develop plan if impaired  - Assess patient's need for assistive devices and provide as appropriate  - Encourage maximum independence but intervene and supervise when necessary  - Involve family in performance of ADLs  - Assess for home care needs following discharge   - Consider OT consult to assist with ADL evaluation and planning for discharge  - Provide patient education as appropriate  Outcome: Progressing  Goal: Maintains/Returns to pre admission functional level  Description: INTERVENTIONS:  - Perform BMAT or MOVE assessment daily.   - Set and communicate daily mobility goal to care team and patient/family/caregiver.    - Collaborate with rehabilitation services on mobility goals if consulted  - Out of bed for toileting  - Record patient progress and toleration of activity level   Outcome: Progressing     Problem: PAIN - ADULT  Goal: Verbalizes/displays adequate comfort level or baseline comfort level  Description: Interventions:  - Encourage patient to monitor pain and request assistance  - Assess pain using appropriate pain scale  - Administer analgesics based on type and severity of pain and evaluate response  - Implement non-pharmacological measures as appropriate and evaluate response  - Consider cultural and social influences on pain and pain management  - Notify physician/advanced practitioner if interventions unsuccessful or patient reports new pain  Outcome: Progressing     Problem: INFECTION - ADULT  Goal: Absence or prevention of progression during hospitalization  Description: INTERVENTIONS:  - Assess and monitor for signs and symptoms of infection  - Monitor lab/diagnostic results  - Monitor all insertion sites, i.e. indwelling lines, tubes, and drains  - Monitor endotracheal if appropriate and nasal secretions for changes in amount and color  - Davis appropriate cooling/warming therapies per order  - Administer medications as ordered  - Instruct and encourage patient and family to use good hand hygiene technique  - Identify and instruct in appropriate isolation precautions for identified infection/condition  Outcome: Progressing  Goal: Absence of fever/infection during neutropenic period  Description: INTERVENTIONS:  - Monitor WBC    Outcome: Progressing     Problem: SAFETY ADULT  Goal: Patient will remain free of falls  Description: INTERVENTIONS:  - Educate patient/family on patient safety including physical limitations  - Instruct patient to call for assistance with activity   - Consult OT/PT to assist with strengthening/mobility   - Keep Call bell within reach  - Keep bed low and locked with side rails adjusted as appropriate  - Keep care items and personal belongings within reach  - Initiate and maintain comfort rounds  - Make Fall Risk Sign visible to staff  - Apply yellow socks and bracelet for high fall risk patients  - Consider moving patient to room near nurses station  Outcome: Progressing  Goal: Maintain or return to baseline ADL function  Description: INTERVENTIONS:  -  Assess patient's ability to carry out ADLs; assess patient's baseline for ADL function and identify physical deficits which impact ability to perform ADLs (bathing, care of mouth/teeth, toileting, grooming, dressing, etc.)  - Assess/evaluate cause of self-care deficits   - Assess range of motion  - Assess patient's mobility; develop plan if impaired  - Assess patient's need for assistive devices and provide as appropriate  - Encourage maximum independence but intervene and supervise when necessary  - Involve family in performance of ADLs  - Assess for home care needs following discharge   - Consider OT consult to assist with ADL evaluation and planning for discharge  - Provide patient education as appropriate  Outcome: Progressing  Goal: Maintains/Returns to pre admission functional level  Description: INTERVENTIONS:  - Perform BMAT or MOVE assessment daily.   - Set and communicate daily mobility goal to care team and patient/family/caregiver.    - Collaborate with rehabilitation services on mobility goals if consulted  - Out of bed for toileting  - Record patient progress and toleration of activity level   Outcome: Progressing     Problem: DISCHARGE PLANNING  Goal: Discharge to home or other facility with appropriate resources  Description: INTERVENTIONS:  - Identify barriers to discharge w/patient and caregiver  - Arrange for needed discharge resources and transportation as appropriate  - Identify discharge learning needs (meds, wound care, etc.)  - Arrange for interpretive services to assist at discharge as needed  - Refer to Case Management Department for coordinating discharge planning if the patient needs post-hospital services based on physician/advanced practitioner order or complex needs related to functional status, cognitive ability, or social support system  Outcome: Progressing     Problem: Knowledge Deficit  Goal: Patient/family/caregiver demonstrates understanding of disease process, treatment plan, medications, and discharge instructions  Description: Complete learning assessment and assess knowledge base.   Interventions:  - Provide teaching at level of understanding  - Provide teaching via preferred learning methods  Outcome: Progressing

## 2023-11-09 NOTE — PLAN OF CARE
Problem: Potential for Falls  Goal: Patient will remain free of falls  Description: INTERVENTIONS:  - Educate patient/family on patient safety including physical limitations  - Instruct patient to call for assistance with activity   - Consult OT/PT to assist with strengthening/mobility   - Keep Call bell within reach  - Keep bed low and locked with side rails adjusted as appropriate  - Keep care items and personal belongings within reach  - Initiate and maintain comfort rounds  - Make Fall Risk Sign visible to staff  - Offer Toileting every 2 Hours, in advance of need  - Initiate/Maintain Bed/Chair alarm  - Obtain necessary fall risk management equipment  - Apply yellow socks and bracelet for high fall risk patients  - Consider moving patient to room near nurses station  Outcome: Progressing     Problem: MOBILITY - ADULT  Goal: Maintain or return to baseline ADL function  Description: INTERVENTIONS:  -  Assess patient's ability to carry out ADLs; assess patient's baseline for ADL function and identify physical deficits which impact ability to perform ADLs (bathing, care of mouth/teeth, toileting, grooming, dressing, etc.)  - Assess/evaluate cause of self-care deficits   - Assess range of motion  - Assess patient's mobility; develop plan if impaired  - Assess patient's need for assistive devices and provide as appropriate  - Encourage maximum independence but intervene and supervise when necessary  - Involve family in performance of ADLs  - Assess for home care needs following discharge   - Consider OT consult to assist with ADL evaluation and planning for discharge  - Provide patient education as appropriate  Outcome: Progressing  Goal: Maintains/Returns to pre admission functional level  Description: INTERVENTIONS:  - Perform BMAT or MOVE assessment daily.   - Set and communicate daily mobility goal to care team and patient/family/caregiver.    - Collaborate with rehabilitation services on mobility goals if consulted  - Perform Range of Motion 6 times a day. - Reposition patient every 2 hours.   - Dangle patient 2 times a day  - Stand patient 2 times a day  - Ambulate patient 2 times a day  - Out of bed to chair 2 times a day   - Out of bed for meals 2 times a day  - Out of bed for toileting  - Record patient progress and toleration of activity level   Outcome: Progressing     Problem: PAIN - ADULT  Goal: Verbalizes/displays adequate comfort level or baseline comfort level  Description: Interventions:  - Encourage patient to monitor pain and request assistance  - Assess pain using appropriate pain scale  - Administer analgesics based on type and severity of pain and evaluate response  - Implement non-pharmacological measures as appropriate and evaluate response  - Consider cultural and social influences on pain and pain management  - Notify physician/advanced practitioner if interventions unsuccessful or patient reports new pain  Outcome: Progressing     Problem: INFECTION - ADULT  Goal: Absence or prevention of progression during hospitalization  Description: INTERVENTIONS:  - Assess and monitor for signs and symptoms of infection  - Monitor lab/diagnostic results  - Monitor all insertion sites, i.e. indwelling lines, tubes, and drains  - Monitor endotracheal if appropriate and nasal secretions for changes in amount and color  - Dexter appropriate cooling/warming therapies per order  - Administer medications as ordered  - Instruct and encourage patient and family to use good hand hygiene technique  - Identify and instruct in appropriate isolation precautions for identified infection/condition  Outcome: Progressing  Goal: Absence of fever/infection during neutropenic period  Description: INTERVENTIONS:  - Monitor WBC    Outcome: Progressing     Problem: SAFETY ADULT  Goal: Patient will remain free of falls  Description: INTERVENTIONS:  - Educate patient/family on patient safety including physical limitations  - Instruct patient to call for assistance with activity   - Consult OT/PT to assist with strengthening/mobility   - Keep Call bell within reach  - Keep bed low and locked with side rails adjusted as appropriate  - Keep care items and personal belongings within reach  - Initiate and maintain comfort rounds  - Make Fall Risk Sign visible to staff  - Offer Toileting every 2 Hours, in advance of need  - Initiate/Maintain Bed/Chair alarm  - Obtain necessary fall risk management equipment  - Apply yellow socks and bracelet for high fall risk patients  - Consider moving patient to room near nurses station  Outcome: Progressing  Goal: Maintain or return to baseline ADL function  Description: INTERVENTIONS:  -  Assess patient's ability to carry out ADLs; assess patient's baseline for ADL function and identify physical deficits which impact ability to perform ADLs (bathing, care of mouth/teeth, toileting, grooming, dressing, etc.)  - Assess/evaluate cause of self-care deficits   - Assess range of motion  - Assess patient's mobility; develop plan if impaired  - Assess patient's need for assistive devices and provide as appropriate  - Encourage maximum independence but intervene and supervise when necessary  - Involve family in performance of ADLs  - Assess for home care needs following discharge   - Consider OT consult to assist with ADL evaluation and planning for discharge  - Provide patient education as appropriate  Outcome: Progressing  Goal: Maintains/Returns to pre admission functional level  Description: INTERVENTIONS:  - Perform BMAT or MOVE assessment daily.   - Set and communicate daily mobility goal to care team and patient/family/caregiver. - Collaborate with rehabilitation services on mobility goals if consulted  - Perform Range of Motion 6 times a day. - Reposition patient every 2 hours.   - Dangle patient 2 times a day  - Stand patient 2 times a day  - Ambulate patient 2 times a day  - Out of bed to chair 2 times a day   - Out of bed for meals 2 times a day  - Out of bed for toileting  - Record patient progress and toleration of activity level   Outcome: Progressing     Problem: DISCHARGE PLANNING  Goal: Discharge to home or other facility with appropriate resources  Description: INTERVENTIONS:  - Identify barriers to discharge w/patient and caregiver  - Arrange for needed discharge resources and transportation as appropriate  - Identify discharge learning needs (meds, wound care, etc.)  - Arrange for interpretive services to assist at discharge as needed  - Refer to Case Management Department for coordinating discharge planning if the patient needs post-hospital services based on physician/advanced practitioner order or complex needs related to functional status, cognitive ability, or social support system  Outcome: Progressing     Problem: Knowledge Deficit  Goal: Patient/family/caregiver demonstrates understanding of disease process, treatment plan, medications, and discharge instructions  Description: Complete learning assessment and assess knowledge base. Interventions:  - Provide teaching at level of understanding  - Provide teaching via preferred learning methods  Outcome: Progressing     Problem: Nutrition/Hydration-ADULT  Goal: Nutrient/Hydration intake appropriate for improving, restoring or maintaining nutritional needs  Description: Monitor and assess patient's nutrition/hydration status for malnutrition. Collaborate with interdisciplinary team and initiate plan and interventions as ordered. Monitor patient's weight and dietary intake as ordered or per policy. Utilize nutrition screening tool and intervene as necessary. Determine patient's food preferences and provide high-protein, high-caloric foods as appropriate.      INTERVENTIONS:  - Monitor oral intake, urinary output, labs, and treatment plans  - Assess nutrition and hydration status and recommend course of action  - Evaluate amount of meals eaten  - Assist patient with eating if necessary   - Allow adequate time for meals  - Recommend/ encourage appropriate diets, oral nutritional supplements, and vitamin/mineral supplements  - Order, calculate, and assess calorie counts as needed  - Recommend, monitor, and adjust tube feedings and TPN/PPN based on assessed needs  - Assess need for intravenous fluids  - Provide specific nutrition/hydration education as appropriate  - Include patient/family/caregiver in decisions related to nutrition  Outcome: Progressing     Problem: NEUROSENSORY - ADULT  Goal: Achieves stable or improved neurological status  Description: INTERVENTIONS  - Monitor and report changes in neurological status  - Monitor vital signs such as temperature, blood pressure, glucose, and any other labs ordered   - Initiate measures to prevent increased intracranial pressure  - Monitor for seizure activity and implement precautions if appropriate      Outcome: Progressing  Goal: Remains free of injury related to seizures activity  Description: INTERVENTIONS  - Maintain airway, patient safety  and administer oxygen as ordered  - Monitor patient for seizure activity, document and report duration and description of seizure to physician/advanced practitioner  - If seizure occurs,  ensure patient safety during seizure  - Reorient patient post seizure  - Seizure pads on all 4 side rails  - Instruct patient/family to notify RN of any seizure activity including if an aura is experienced  - Instruct patient/family to call for assistance with activity based on nursing assessment  - Administer anti-seizure medications if ordered    Outcome: Progressing  Goal: Achieves maximal functionality and self care  Description: INTERVENTIONS  - Monitor swallowing and airway patency with patient fatigue and changes in neurological status  - Encourage and assist patient to increase activity and self care.    - Encourage visually impaired, hearing impaired and aphasic patients to use assistive/communication devices  Outcome: Progressing     Problem: CARDIOVASCULAR - ADULT  Goal: Maintains optimal cardiac output and hemodynamic stability  Description: INTERVENTIONS:  - Monitor I/O, vital signs and rhythm  - Monitor for S/S and trends of decreased cardiac output  - Administer and titrate ordered vasoactive medications to optimize hemodynamic stability  - Assess quality of pulses, skin color and temperature  - Assess for signs of decreased coronary artery perfusion  - Instruct patient to report change in severity of symptoms  Outcome: Progressing  Goal: Absence of cardiac dysrhythmias or at baseline rhythm  Description: INTERVENTIONS:  - Continuous cardiac monitoring, vital signs, obtain 12 lead EKG if ordered  - Administer antiarrhythmic and heart rate control medications as ordered  - Monitor electrolytes and administer replacement therapy as ordered  Outcome: Progressing     Problem: RESPIRATORY - ADULT  Goal: Achieves optimal ventilation and oxygenation  Description: INTERVENTIONS:  - Assess for changes in respiratory status  - Assess for changes in mentation and behavior  - Position to facilitate oxygenation and minimize respiratory effort  - Oxygen administered by appropriate delivery if ordered  - Initiate smoking cessation education as indicated  - Encourage broncho-pulmonary hygiene including cough, deep breathe, Incentive Spirometry  - Assess the need for suctioning and aspirate as needed  - Assess and instruct to report SOB or any respiratory difficulty  - Respiratory Therapy support as indicated  Outcome: Progressing     Problem: GASTROINTESTINAL - ADULT  Goal: Minimal or absence of nausea and/or vomiting  Description: INTERVENTIONS:  - Administer IV fluids if ordered to ensure adequate hydration  - Maintain NPO status until nausea and vomiting are resolved  - Nasogastric tube if ordered  - Administer ordered antiemetic medications as needed  - Provide nonpharmacologic comfort measures as appropriate  - Advance diet as tolerated, if ordered  - Consider nutrition services referral to assist patient with adequate nutrition and appropriate food choices  Outcome: Progressing  Goal: Maintains or returns to baseline bowel function  Description: INTERVENTIONS:  - Assess bowel function  - Encourage oral fluids to ensure adequate hydration  - Administer IV fluids if ordered to ensure adequate hydration  - Administer ordered medications as needed  - Encourage mobilization and activity  - Consider nutritional services referral to assist patient with adequate nutrition and appropriate food choices  Outcome: Progressing  Goal: Maintains adequate nutritional intake  Description: INTERVENTIONS:  - Monitor percentage of each meal consumed  - Identify factors contributing to decreased intake, treat as appropriate  - Assist with meals as needed  - Monitor I&O, weight, and lab values if indicated  - Obtain nutrition services referral as needed  Outcome: Progressing  Goal: Oral mucous membranes remain intact  Description: INTERVENTIONS  - Assess oral mucosa and hygiene practices  - Implement preventative oral hygiene regimen  - Implement oral medicated treatments as ordered  - Initiate Nutrition services referral as needed  Outcome: Progressing     Problem: GENITOURINARY - ADULT  Goal: Maintains or returns to baseline urinary function  Description: INTERVENTIONS:  - Assess urinary function  - Encourage oral fluids to ensure adequate hydration if ordered  - Administer IV fluids as ordered to ensure adequate hydration  - Administer ordered medications as needed  - Offer frequent toileting  - Follow urinary retention protocol if ordered  Outcome: Progressing  Goal: Absence of urinary retention  Description: INTERVENTIONS:  - Assess patient’s ability to void and empty bladder  - Monitor I/O  - Bladder scan as needed  - Discuss with physician/AP medications to alleviate retention as needed  - Discuss catheterization for long term situations as appropriate  Outcome: Progressing     Problem: METABOLIC, FLUID AND ELECTROLYTES - ADULT  Goal: Electrolytes maintained within normal limits  Description: INTERVENTIONS:  - Monitor labs and assess patient for signs and symptoms of electrolyte imbalances  - Administer electrolyte replacement as ordered  - Monitor response to electrolyte replacements, including repeat lab results as appropriate  - Instruct patient on fluid and nutrition as appropriate  Outcome: Progressing  Goal: Fluid balance maintained  Description: INTERVENTIONS:  - Monitor labs   - Monitor I/O and WT  - Instruct patient on fluid and nutrition as appropriate  - Assess for signs & symptoms of volume excess or deficit  Outcome: Progressing  Goal: Glucose maintained within target range  Description: INTERVENTIONS:  - Monitor Blood Glucose as ordered  - Assess for signs and symptoms of hyperglycemia and hypoglycemia  - Administer ordered medications to maintain glucose within target range  - Assess nutritional intake and initiate nutrition service referral as needed  Outcome: Progressing     Problem: SKIN/TISSUE INTEGRITY - ADULT  Goal: Skin Integrity remains intact(Skin Breakdown Prevention)  Description: Assess:  -Perform Marquis assessment every shift  -Clean and moisturize skin every shift  -Inspect skin when repositioning, toileting, and assisting with ADLS  -Assess under medical devices every shift  -Assess extremities for adequate circulation and sensation     Bed Management:  -Have minimal linens on bed & keep smooth, unwrinkled  -Change linens as needed when moist or perspiring  -Avoid sitting or lying in one position for more than 2 hours while in bed  -Keep HOB at 30 degrees     Toileting:  -Offer bedside commode  -Assess for incontinence every 2 hours  -Use incontinent care products after each incontinent episode     Activity:  -Mobilize patient 2 times a day  -Encourage activity and walks on unit  -Encourage or provide ROM exercises   -Turn and reposition patient every 2 Hours  -Use appropriate equipment to lift or move patient in bed  -Instruct/ Assist with weight shifting every 30 minutes when out of bed in chair  -Consider limitation of chair time 3 hour intervals    Skin Care:  -Avoid use of baby powder, tape, friction and shearing, hot water or constrictive clothing  -Relieve pressure over bony prominences   -Do not massage red bony areas    Next Steps:  -Teach patient strategies to minimize risks    -Consider consults to  interdisciplinary teams   Outcome: Progressing  Goal: Incision(s), wounds(s) or drain site(s) healing without S/S of infection  Description: INTERVENTIONS  - Assess and document dressing, incision, wound bed, drain sites and surrounding tissue  - Provide patient and family education  - Perform skin care/dressing changes every shift  Outcome: Progressing     Problem: HEMATOLOGIC - ADULT  Goal: Maintains hematologic stability  Description: INTERVENTIONS  - Assess for signs and symptoms of bleeding or hemorrhage  - Monitor labs  - Administer supportive blood products/factors as ordered and appropriate  Outcome: Progressing     Problem: MUSCULOSKELETAL - ADULT  Goal: Maintain or return mobility to safest level of function  Description: INTERVENTIONS:  - Assess patient's ability to carry out ADLs; assess patient's baseline for ADL function and identify physical deficits which impact ability to perform ADLs (bathing, care of mouth/teeth, toileting, grooming, dressing, etc.)  - Assess/evaluate cause of self-care deficits   - Assess range of motion  - Assess patient's mobility  - Assess patient's need for assistive devices and provide as appropriate  - Encourage maximum independence but intervene and supervise when necessary  - Involve family in performance of ADLs  - Assess for home care needs following discharge   - Consider OT consult to assist with ADL evaluation and planning for discharge  - Provide patient education as appropriate  Outcome: Progressing

## 2023-11-10 ENCOUNTER — TELEPHONE (OUTPATIENT)
Dept: NEUROSURGERY | Facility: CLINIC | Age: 68
End: 2023-11-10

## 2023-11-10 LAB
ANION GAP SERPL CALCULATED.3IONS-SCNC: -4 MMOL/L
BASOPHILS # BLD AUTO: 0.05 THOUSANDS/ÂΜL (ref 0–0.1)
BASOPHILS NFR BLD AUTO: 1 % (ref 0–1)
BUN SERPL-MCNC: 12 MG/DL (ref 5–25)
CALCIUM SERPL-MCNC: 9.1 MG/DL (ref 8.4–10.2)
CHLORIDE SERPL-SCNC: 92 MMOL/L (ref 96–108)
CO2 SERPL-SCNC: 42 MMOL/L (ref 21–32)
CREAT SERPL-MCNC: 0.94 MG/DL (ref 0.6–1.3)
EOSINOPHIL # BLD AUTO: 0.18 THOUSAND/ÂΜL (ref 0–0.61)
EOSINOPHIL NFR BLD AUTO: 2 % (ref 0–6)
ERYTHROCYTE [DISTWIDTH] IN BLOOD BY AUTOMATED COUNT: 12.9 % (ref 11.6–15.1)
GFR SERPL CREATININE-BSD FRML MDRD: 62 ML/MIN/1.73SQ M
GLUCOSE SERPL-MCNC: 120 MG/DL (ref 65–140)
GLUCOSE SERPL-MCNC: 178 MG/DL (ref 65–140)
GLUCOSE SERPL-MCNC: 87 MG/DL (ref 65–140)
GLUCOSE SERPL-MCNC: 91 MG/DL (ref 65–140)
GLUCOSE SERPL-MCNC: 93 MG/DL (ref 65–140)
HCT VFR BLD AUTO: 28.3 % (ref 34.8–46.1)
HGB BLD-MCNC: 9.5 G/DL (ref 11.5–15.4)
IMM GRANULOCYTES # BLD AUTO: 0.02 THOUSAND/UL (ref 0–0.2)
IMM GRANULOCYTES NFR BLD AUTO: 0 % (ref 0–2)
LYMPHOCYTES # BLD AUTO: 2.12 THOUSANDS/ÂΜL (ref 0.6–4.47)
LYMPHOCYTES NFR BLD AUTO: 27 % (ref 14–44)
MAGNESIUM SERPL-MCNC: 1.6 MG/DL (ref 1.9–2.7)
MCH RBC QN AUTO: 30.5 PG (ref 26.8–34.3)
MCHC RBC AUTO-ENTMCNC: 33.6 G/DL (ref 31.4–37.4)
MCV RBC AUTO: 91 FL (ref 82–98)
MONOCYTES # BLD AUTO: 0.84 THOUSAND/ÂΜL (ref 0.17–1.22)
MONOCYTES NFR BLD AUTO: 11 % (ref 4–12)
NEUTROPHILS # BLD AUTO: 4.54 THOUSANDS/ÂΜL (ref 1.85–7.62)
NEUTS SEG NFR BLD AUTO: 59 % (ref 43–75)
NRBC BLD AUTO-RTO: 0 /100 WBCS
PHOSPHATE SERPL-MCNC: 2.8 MG/DL (ref 2.3–4.1)
PLATELET # BLD AUTO: 258 THOUSANDS/UL (ref 149–390)
PMV BLD AUTO: 9.3 FL (ref 8.9–12.7)
POTASSIUM SERPL-SCNC: 3.8 MMOL/L (ref 3.5–5.3)
RBC # BLD AUTO: 3.11 MILLION/UL (ref 3.81–5.12)
SODIUM SERPL-SCNC: 130 MMOL/L (ref 135–147)
WBC # BLD AUTO: 7.75 THOUSAND/UL (ref 4.31–10.16)

## 2023-11-10 PROCEDURE — 83735 ASSAY OF MAGNESIUM: CPT

## 2023-11-10 PROCEDURE — 84100 ASSAY OF PHOSPHORUS: CPT

## 2023-11-10 PROCEDURE — 99232 SBSQ HOSP IP/OBS MODERATE 35: CPT | Performed by: PSYCHIATRY & NEUROLOGY

## 2023-11-10 PROCEDURE — 82948 REAGENT STRIP/BLOOD GLUCOSE: CPT

## 2023-11-10 PROCEDURE — 99232 SBSQ HOSP IP/OBS MODERATE 35: CPT | Performed by: SURGERY

## 2023-11-10 PROCEDURE — 97116 GAIT TRAINING THERAPY: CPT

## 2023-11-10 PROCEDURE — 97530 THERAPEUTIC ACTIVITIES: CPT

## 2023-11-10 PROCEDURE — 80048 BASIC METABOLIC PNL TOTAL CA: CPT

## 2023-11-10 PROCEDURE — 85025 COMPLETE CBC W/AUTO DIFF WBC: CPT

## 2023-11-10 RX ORDER — ACETAMINOPHEN 325 MG/1
975 TABLET ORAL EVERY 8 HOURS SCHEDULED
Status: DISCONTINUED | OUTPATIENT
Start: 2023-11-10 | End: 2023-11-13 | Stop reason: HOSPADM

## 2023-11-10 RX ORDER — OXYCODONE HYDROCHLORIDE 5 MG/1
5 TABLET ORAL EVERY 6 HOURS PRN
Status: DISCONTINUED | OUTPATIENT
Start: 2023-11-10 | End: 2023-11-13 | Stop reason: HOSPADM

## 2023-11-10 RX ORDER — POLYETHYLENE GLYCOL 3350 17 G/17G
17 POWDER, FOR SOLUTION ORAL DAILY
Status: DISCONTINUED | OUTPATIENT
Start: 2023-11-10 | End: 2023-11-13 | Stop reason: HOSPADM

## 2023-11-10 RX ORDER — AMOXICILLIN 250 MG
2 CAPSULE ORAL 2 TIMES DAILY
Status: DISCONTINUED | OUTPATIENT
Start: 2023-11-10 | End: 2023-11-13 | Stop reason: HOSPADM

## 2023-11-10 RX ADMIN — LEVETIRACETAM 500 MG: 500 TABLET, FILM COATED ORAL at 09:12

## 2023-11-10 RX ADMIN — ACETAMINOPHEN 975 MG: 325 TABLET, FILM COATED ORAL at 21:50

## 2023-11-10 RX ADMIN — DULOXETINE HYDROCHLORIDE 60 MG: 60 CAPSULE, DELAYED RELEASE ORAL at 21:50

## 2023-11-10 RX ADMIN — BACLOFEN 5 MG: 10 TABLET ORAL at 20:35

## 2023-11-10 RX ADMIN — LEVETIRACETAM 500 MG: 500 TABLET, FILM COATED ORAL at 20:30

## 2023-11-10 RX ADMIN — PANTOPRAZOLE SODIUM 20 MG: 20 TABLET, DELAYED RELEASE ORAL at 05:17

## 2023-11-10 RX ADMIN — INSULIN LISPRO 1 UNITS: 100 INJECTION, SOLUTION INTRAVENOUS; SUBCUTANEOUS at 11:41

## 2023-11-10 RX ADMIN — PRAVASTATIN SODIUM 40 MG: 40 TABLET ORAL at 16:51

## 2023-11-10 RX ADMIN — SENNOSIDES, DOCUSATE SODIUM 2 TABLET: 8.6; 5 TABLET ORAL at 11:52

## 2023-11-10 RX ADMIN — CLONAZEPAM 0.25 MG: 0.5 TABLET ORAL at 16:51

## 2023-11-10 RX ADMIN — BUSPIRONE HYDROCHLORIDE 20 MG: 10 TABLET ORAL at 16:51

## 2023-11-10 RX ADMIN — ACETAMINOPHEN 975 MG: 325 TABLET, FILM COATED ORAL at 05:17

## 2023-11-10 RX ADMIN — BUSPIRONE HYDROCHLORIDE 20 MG: 10 TABLET ORAL at 09:12

## 2023-11-10 RX ADMIN — ONDANSETRON 4 MG: 2 INJECTION INTRAMUSCULAR; INTRAVENOUS at 10:41

## 2023-11-10 RX ADMIN — OXYCODONE HYDROCHLORIDE 5 MG: 5 TABLET ORAL at 20:30

## 2023-11-10 RX ADMIN — ENOXAPARIN SODIUM 30 MG: 30 INJECTION SUBCUTANEOUS at 09:12

## 2023-11-10 RX ADMIN — Medication 2.5 MG: at 09:11

## 2023-11-10 RX ADMIN — Medication 2.5 MG: at 11:55

## 2023-11-10 RX ADMIN — UBROGEPANT 100 MG: 100 TABLET ORAL at 10:41

## 2023-11-10 RX ADMIN — ONDANSETRON 4 MG: 2 INJECTION INTRAMUSCULAR; INTRAVENOUS at 05:17

## 2023-11-10 RX ADMIN — ACETAMINOPHEN 975 MG: 325 TABLET, FILM COATED ORAL at 13:40

## 2023-11-10 RX ADMIN — CHLORHEXIDINE GLUCONATE 15 ML: 1.2 SOLUTION ORAL at 20:31

## 2023-11-10 RX ADMIN — DULOXETINE HYDROCHLORIDE 30 MG: 30 CAPSULE, DELAYED RELEASE ORAL at 09:12

## 2023-11-10 RX ADMIN — ENOXAPARIN SODIUM 30 MG: 30 INJECTION SUBCUTANEOUS at 20:30

## 2023-11-10 RX ADMIN — BUPROPION HYDROCHLORIDE 150 MG: 150 TABLET, FILM COATED, EXTENDED RELEASE ORAL at 09:12

## 2023-11-10 RX ADMIN — POLYETHYLENE GLYCOL 3350 17 G: 17 POWDER, FOR SOLUTION ORAL at 11:51

## 2023-11-10 RX ADMIN — CHLORHEXIDINE GLUCONATE 15 ML: 1.2 SOLUTION ORAL at 09:12

## 2023-11-10 RX ADMIN — SENNOSIDES 8.6 MG: 8.6 TABLET, FILM COATED ORAL at 09:12

## 2023-11-10 NOTE — ASSESSMENT & PLAN NOTE
Patient is status post fall on Saturday, noticed scalp hematoma and bilateral orbital hematomas on subsequent days  - Neuro exam: GCS 15, non-focal  - Continue neurologic checks: Every 4 hours. - Reversal agent administered: DDAVP  - CT scan of the head on 11/9 reviewed: stable  - Appreciate Neurosurgery evaluation and recommendations. - Complete 7 day course of Keppra for seizure prophylaxis  - Chemical DVT prophylaxis: Lovenox  - Hold all anticoagulants and anti platelet medications for 2 weeks and/or until cleared by Neurosurgery to resume.  - PT and OT (including cognitive evaluation) evaluation and treatment as indicated.

## 2023-11-10 NOTE — PLAN OF CARE
Problem: Potential for Falls  Goal: Patient will remain free of falls  Description: INTERVENTIONS:  - Educate patient/family on patient safety including physical limitations  - Instruct patient to call for assistance with activity   - Consult OT/PT to assist with strengthening/mobility   - Keep Call bell within reach  - Keep bed low and locked with side rails adjusted as appropriate  - Keep care items and personal belongings within reach  - Initiate and maintain comfort rounds  - Make Fall Risk Sign visible to staff  - Offer Toileting every  Hours, in advance of need  - Initiate/Maintain alarm  - Obtain necessary fall risk management equipment:   - Apply yellow socks and bracelet for high fall risk patients  - Consider moving patient to room near nurses station  Outcome: Progressing     Problem: MOBILITY - ADULT  Goal: Maintain or return to baseline ADL function  Description: INTERVENTIONS:  -  Assess patient's ability to carry out ADLs; assess patient's baseline for ADL function and identify physical deficits which impact ability to perform ADLs (bathing, care of mouth/teeth, toileting, grooming, dressing, etc.)  - Assess/evaluate cause of self-care deficits   - Assess range of motion  - Assess patient's mobility; develop plan if impaired  - Assess patient's need for assistive devices and provide as appropriate  - Encourage maximum independence but intervene and supervise when necessary  - Involve family in performance of ADLs  - Assess for home care needs following discharge   - Consider OT consult to assist with ADL evaluation and planning for discharge  - Provide patient education as appropriate  Outcome: Progressing  Goal: Maintains/Returns to pre admission functional level  Description: INTERVENTIONS:  - Perform BMAT or MOVE assessment daily.   - Set and communicate daily mobility goal to care team and patient/family/caregiver.    - Collaborate with rehabilitation services on mobility goals if consulted  - Perform Range of Motion  times a day. - Reposition patient every  hours.   - Dangle patient  times a day  - Stand patient  times a day  - Ambulate patient  times a day  - Out of bed to chair  times a day   - Out of bed for meals  times a day  - Out of bed for toileting  - Record patient progress and toleration of activity level   Outcome: Progressing     Problem: PAIN - ADULT  Goal: Verbalizes/displays adequate comfort level or baseline comfort level  Description: Interventions:  - Encourage patient to monitor pain and request assistance  - Assess pain using appropriate pain scale  - Administer analgesics based on type and severity of pain and evaluate response  - Implement non-pharmacological measures as appropriate and evaluate response  - Consider cultural and social influences on pain and pain management  - Notify physician/advanced practitioner if interventions unsuccessful or patient reports new pain  Outcome: Progressing     Problem: INFECTION - ADULT  Goal: Absence or prevention of progression during hospitalization  Description: INTERVENTIONS:  - Assess and monitor for signs and symptoms of infection  - Monitor lab/diagnostic results  - Monitor all insertion sites, i.e. indwelling lines, tubes, and drains  - Monitor endotracheal if appropriate and nasal secretions for changes in amount and color  - [place] appropriate cooling/warming therapies per order  - Administer medications as ordered  - Instruct and encourage patient and family to use good hand hygiene technique  - Identify and instruct in appropriate isolation precautions for identified infection/condition  Outcome: Progressing  Goal: Absence of fever/infection during neutropenic period  Description: INTERVENTIONS:  - Monitor WBC    Outcome: Progressing     Problem: SAFETY ADULT  Goal: Patient will remain free of falls  Description: INTERVENTIONS:  - Educate patient/family on patient safety including physical limitations  - Instruct patient to call for assistance with activity   - Consult OT/PT to assist with strengthening/mobility   - Keep Call bell within reach  - Keep bed low and locked with side rails adjusted as appropriate  - Keep care items and personal belongings within reach  - Initiate and maintain comfort rounds  - Make Fall Risk Sign visible to staff  - Offer Toileting every  Hours, in advance of need  - Initiate/Maintain alarm  - Obtain necessary fall risk management equipment:  - Apply yellow socks and bracelet for high fall risk patients  - Consider moving patient to room near nurses station  Outcome: Progressing  Goal: Maintain or return to baseline ADL function  Description: INTERVENTIONS:  -  Assess patient's ability to carry out ADLs; assess patient's baseline for ADL function and identify physical deficits which impact ability to perform ADLs (bathing, care of mouth/teeth, toileting, grooming, dressing, etc.)  - Assess/evaluate cause of self-care deficits   - Assess range of motion  - Assess patient's mobility; develop plan if impaired  - Assess patient's need for assistive devices and provide as appropriate  - Encourage maximum independence but intervene and supervise when necessary  - Involve family in performance of ADLs  - Assess for home care needs following discharge   - Consider OT consult to assist with ADL evaluation and planning for discharge  - Provide patient education as appropriate  Outcome: Progressing  Goal: Maintains/Returns to pre admission functional level  Description: INTERVENTIONS:  - Perform BMAT or MOVE assessment daily.   - Set and communicate daily mobility goal to care team and patient/family/caregiver. - Collaborate with rehabilitation services on mobility goals if consulted  - Perform Range of Motion  times a day. - Reposition patient every  hours.   - Dangle patient  times a day  - Stand patient  times a day  - Ambulate patient  times a day  - Out of bed to chair  times a day   - Out of bed for meals  times a day  - Out of bed for toileting  - Record patient progress and toleration of activity level   Outcome: Progressing     Problem: DISCHARGE PLANNING  Goal: Discharge to home or other facility with appropriate resources  Description: INTERVENTIONS:  - Identify barriers to discharge w/patient and caregiver  - Arrange for needed discharge resources and transportation as appropriate  - Identify discharge learning needs (meds, wound care, etc.)  - Arrange for interpretive services to assist at discharge as needed  - Refer to Case Management Department for coordinating discharge planning if the patient needs post-hospital services based on physician/advanced practitioner order or complex needs related to functional status, cognitive ability, or social support system  Outcome: Progressing     Problem: Knowledge Deficit  Goal: Patient/family/caregiver demonstrates understanding of disease process, treatment plan, medications, and discharge instructions  Description: Complete learning assessment and assess knowledge base. Interventions:  - Provide teaching at level of understanding  - Provide teaching via preferred learning methods  Outcome: Progressing     Problem: Nutrition/Hydration-ADULT  Goal: Nutrient/Hydration intake appropriate for improving, restoring or maintaining nutritional needs  Description: Monitor and assess patient's nutrition/hydration status for malnutrition. Collaborate with interdisciplinary team and initiate plan and interventions as ordered. Monitor patient's weight and dietary intake as ordered or per policy. Utilize nutrition screening tool and intervene as necessary. Determine patient's food preferences and provide high-protein, high-caloric foods as appropriate.      INTERVENTIONS:  - Monitor oral intake, urinary output, labs, and treatment plans  - Assess nutrition and hydration status and recommend course of action  - Evaluate amount of meals eaten  - Assist patient with eating if necessary   - Allow adequate time for meals  - Recommend/ encourage appropriate diets, oral nutritional supplements, and vitamin/mineral supplements  - Order, calculate, and assess calorie counts as needed  - Recommend, monitor, and adjust tube feedings and TPN/PPN based on assessed needs  - Assess need for intravenous fluids  - Provide specific nutrition/hydration education as appropriate  - Include patient/family/caregiver in decisions related to nutrition  Outcome: Progressing     Problem: NEUROSENSORY - ADULT  Goal: Achieves stable or improved neurological status  Description: INTERVENTIONS  - Monitor and report changes in neurological status  - Monitor vital signs such as temperature, blood pressure, glucose, and any other labs ordered   - Initiate measures to prevent increased intracranial pressure  - Monitor for seizure activity and implement precautions if appropriate      Outcome: Progressing  Goal: Remains free of injury related to seizures activity  Description: INTERVENTIONS  - Maintain airway, patient safety  and administer oxygen as ordered  - Monitor patient for seizure activity, document and report duration and description of seizure to physician/advanced practitioner  - If seizure occurs,  ensure patient safety during seizure  - Reorient patient post seizure  - Seizure pads on all 4 side rails  - Instruct patient/family to notify RN of any seizure activity including if an aura is experienced  - Instruct patient/family to call for assistance with activity based on nursing assessment  - Administer anti-seizure medications if ordered    Outcome: Progressing  Goal: Achieves maximal functionality and self care  Description: INTERVENTIONS  - Monitor swallowing and airway patency with patient fatigue and changes in neurological status  - Encourage and assist patient to increase activity and self care.    - Encourage visually impaired, hearing impaired and aphasic patients to use assistive/communication devices  Outcome: Progressing     Problem: CARDIOVASCULAR - ADULT  Goal: Maintains optimal cardiac output and hemodynamic stability  Description: INTERVENTIONS:  - Monitor I/O, vital signs and rhythm  - Monitor for S/S and trends of decreased cardiac output  - Administer and titrate ordered vasoactive medications to optimize hemodynamic stability  - Assess quality of pulses, skin color and temperature  - Assess for signs of decreased coronary artery perfusion  - Instruct patient to report change in severity of symptoms  Outcome: Progressing  Goal: Absence of cardiac dysrhythmias or at baseline rhythm  Description: INTERVENTIONS:  - Continuous cardiac monitoring, vital signs, obtain 12 lead EKG if ordered  - Administer antiarrhythmic and heart rate control medications as ordered  - Monitor electrolytes and administer replacement therapy as ordered  Outcome: Progressing     Problem: RESPIRATORY - ADULT  Goal: Achieves optimal ventilation and oxygenation  Description: INTERVENTIONS:  - Assess for changes in respiratory status  - Assess for changes in mentation and behavior  - Position to facilitate oxygenation and minimize respiratory effort  - Oxygen administered by appropriate delivery if ordered  - Initiate smoking cessation education as indicated  - Encourage broncho-pulmonary hygiene including cough, deep breathe, Incentive Spirometry  - Assess the need for suctioning and aspirate as needed  - Assess and instruct to report SOB or any respiratory difficulty  - Respiratory Therapy support as indicated  Outcome: Progressing     Problem: GASTROINTESTINAL - ADULT  Goal: Minimal or absence of nausea and/or vomiting  Description: INTERVENTIONS:  - Administer IV fluids if ordered to ensure adequate hydration  - Maintain NPO status until nausea and vomiting are resolved  - Nasogastric tube if ordered  - Administer ordered antiemetic medications as needed  - Provide nonpharmacologic comfort measures as appropriate  - Advance diet as tolerated, if ordered  - Consider nutrition services referral to assist patient with adequate nutrition and appropriate food choices  Outcome: Progressing  Goal: Maintains or returns to baseline bowel function  Description: INTERVENTIONS:  - Assess bowel function  - Encourage oral fluids to ensure adequate hydration  - Administer IV fluids if ordered to ensure adequate hydration  - Administer ordered medications as needed  - Encourage mobilization and activity  - Consider nutritional services referral to assist patient with adequate nutrition and appropriate food choices  Outcome: Progressing  Goal: Maintains adequate nutritional intake  Description: INTERVENTIONS:  - Monitor percentage of each meal consumed  - Identify factors contributing to decreased intake, treat as appropriate  - Assist with meals as needed  - Monitor I&O, weight, and lab values if indicated  - Obtain nutrition services referral as needed  Outcome: Progressing  Goal: Oral mucous membranes remain intact  Description: INTERVENTIONS  - Assess oral mucosa and hygiene practices  - Implement preventative oral hygiene regimen  - Implement oral medicated treatments as ordered  - Initiate Nutrition services referral as needed  Outcome: Progressing     Problem: GENITOURINARY - ADULT  Goal: Maintains or returns to baseline urinary function  Description: INTERVENTIONS:  - Assess urinary function  - Encourage oral fluids to ensure adequate hydration if ordered  - Administer IV fluids as ordered to ensure adequate hydration  - Administer ordered medications as needed  - Offer frequent toileting  - Follow urinary retention protocol if ordered  Outcome: Progressing  Goal: Absence of urinary retention  Description: INTERVENTIONS:  - Assess patient’s ability to void and empty bladder  - Monitor I/O  - Bladder scan as needed  - Discuss with physician/AP medications to alleviate retention as needed  - Discuss catheterization for long term situations as appropriate  Outcome: Progressing     Problem: METABOLIC, FLUID AND ELECTROLYTES - ADULT  Goal: Electrolytes maintained within normal limits  Description: INTERVENTIONS:  - Monitor labs and assess patient for signs and symptoms of electrolyte imbalances  - Administer electrolyte replacement as ordered  - Monitor response to electrolyte replacements, including repeat lab results as appropriate  - Instruct patient on fluid and nutrition as appropriate  Outcome: Progressing  Goal: Fluid balance maintained  Description: INTERVENTIONS:  - Monitor labs   - Monitor I/O and WT  - Instruct patient on fluid and nutrition as appropriate  - Assess for signs & symptoms of volume excess or deficit  Outcome: Progressing  Goal: Glucose maintained within target range  Description: INTERVENTIONS:  - Monitor Blood Glucose as ordered  - Assess for signs and symptoms of hyperglycemia and hypoglycemia  - Administer ordered medications to maintain glucose within target range  - Assess nutritional intake and initiate nutrition service referral as needed  Outcome: Progressing     Problem: SKIN/TISSUE INTEGRITY - ADULT  Goal: Skin Integrity remains intact(Skin Breakdown Prevention)  Description: Assess:  -Perform [place] assessment every   -Clean and moisturize skin every   -Inspect skin when repositioning, toileting, and assisting with ADLS  -Assess under medical devices such as  every   -Assess extremities for adequate circulation and sensation     Bed Management:  -Have minimal linens on bed & keep smooth, unwrinkled  -Change linens as needed when moist or perspiring  -Avoid sitting or lying in one position for more than  hours while in bed  -Keep HOB at degrees     Toileting:  -Offer bedside commode  -Assess for incontinence every   -Use incontinent care products after each incontinent episode such as     Activity:  -Mobilize patient  times a day  -Encourage activity and walks on unit  -Encourage or provide ROM exercises   -Turn and reposition patient every  Hours  -Use appropriate equipment to lift or move patient in bed  -Instruct/ Assist with weight shifting every  when out of bed in chair  -Consider limitation of chair time  hour intervals    Skin Care:  -Avoid use of baby powder, tape, friction and shearing, hot water or constrictive clothing  -Relieve pressure over bony prominences using   -Do not massage red bony areas    Next Steps:  -Teach patient strategies to minimize risks such as    -Consider consults to  interdisciplinary teams such as   Outcome: Progressing  Goal: Incision(s), wounds(s) or drain site(s) healing without S/S of infection  Description: INTERVENTIONS  - Assess and document dressing, incision, wound bed, drain sites and surrounding tissue  - Provide patient and family education  - Perform skin care/dressing changes every   Outcome: Progressing     Problem: HEMATOLOGIC - ADULT  Goal: Maintains hematologic stability  Description: INTERVENTIONS  - Assess for signs and symptoms of bleeding or hemorrhage  - Monitor labs  - Administer supportive blood products/factors as ordered and appropriate  Outcome: Progressing     Problem: COPING  Goal: Pt/Family able to verbalize concerns and demonstrate effective coping strategies  Description: INTERVENTIONS:  - Assist patient/family to identify coping skills, available support systems and cultural and spiritual values  - Provide emotional support, including active listening and acknowledgement of concerns of patient and caregivers  - Reduce environmental stimuli, as able  - Provide patient education  - Assess for spiritual pain/suffering and initiate spiritual care, including notification of Pastoral Care or kinsey based community as needed  - Assess effectiveness of coping strategies  Outcome: Progressing  Goal: Will report anxiety at manageable levels  Description: INTERVENTIONS:  - Administer medication as ordered  - Teach and encourage coping skills  - Provide emotional support  - Assess patient/family for anxiety and ability to cope  Outcome: Progressing

## 2023-11-10 NOTE — PHYSICAL THERAPY NOTE
Physical Therapy Progress Note     11/10/23 1220   PT Last Visit   PT Visit Date 11/10/23   Note Type   Note Type Treatment   Pain Assessment   Pain Assessment Tool FLACC   Pain Rating: FLACC (Rest) - Face 1   Pain Rating: FLACC (Rest) - Legs 0   Pain Rating: FLACC (Rest) - Activity 0   Pain Rating: FLACC (Rest) - Cry 1   Pain Rating: FLACC (Rest) - Consolability 0   Score: FLACC (Rest) 2   Pain Rating: FLACC (Activity) - Face 1   Pain Rating: FLACC (Activity) - Legs 1   Pain Rating: FLACC (Activity) - Activity 1   Pain Rating: FLACC (Activity) - Cry 1   Pain Rating: FLACC (Activity) - Consolability 1   Score: FLACC (Activity) 5   Restrictions/Precautions   Other Precautions Pain; Fall Risk   Subjective   Subjective Pt encountered supine in bed with HOB inclined. Reports improved sleep, but still bothered by headache & nausea overall. Pt concerned about having BM, which she reports is chonic problem at home as well. Pt is expressive about her clinical depression during session, expressing the same ideas to this PTA as she did in Neurophsych eval.  She finds comfort in helping others & feeling accomplished with finsihed tasks, but many times at home feels limited by feelings of hopelessness that keep her in bed prior to this injury. Bed Mobility   Supine to Sit 5  Supervision   Additional items Assist x 1   Sit to Supine 5  Supervision   Additional items Assist x 1   Transfers   Sit to Stand 5  Supervision   Additional items Assist x 1   Stand to Sit 5  Supervision   Additional items Assist x 1   Ambulation/Elevation   Gait pattern Short stride; Inconsistent isac;Decreased foot clearance; Improper Weight shift; Shuffling   Gait Assistance 5  Supervision   Additional items Assist x 1   Assistive Device Rolling walker   Distance 170', 130'   Stair Management Assistance 5  Supervision   Additional items Assist x 1   Stair Management Technique One rail L;Step to pattern; Sideways   Number of Stairs 3   Balance Static Sitting Fair   Static Standing Fair   Ambulatory Fair -   Activity Tolerance   Activity Tolerance Patient tolerated treatment well   Nurse Marty [address], RN   Assessment   Prognosis Good   Problem List Decreased strength;Decreased range of motion;Decreased endurance; Impaired balance;Decreased mobility;Pain;Decreased safety awareness   Assessment Pt demonstrated improved funcitonal endurance this session, performing transfers & community distance ambulation with use of RW without LOB while maintaining slow, yet appropriate pace. Does demonstrate x3 instances where she bumped RW into objects on L side, but these did not cause any overt LOB. Pt admits to dizziness with rapid head movements which is likely impairing visual scanning. Pt instructed to scan with eyes rather than whole head & neck if necessary at times, or take her time, stopping to do so safely. She negotiated steps as noted above wtihout incident, but demosntrated improved descent with BUE support on rail as opposed to unilateral support. Extensive discussion held with pt regarding current deficits and restrictions as result of head injury, as well as expected recovery pending her body's healing processes. Pt encouraged to maintain mobiilty during remainder of stay & upon return home so that she may coninue to care for herself & her home, with goal to reduce long term risk for falls & return to more active lifestyle that seemed to provide her with more emotional gratification. At this time, PT recommends follow up PT services to address balance, strength & endurance deficits to achieve these goals, but defer to OT & remainder of team as to discharge disposition where she can be afforded appropriate level of assist & care to safely progress to PLOF.    Goals   Patient Goals to feel better   STG Expiration Date 11/21/23   PT Treatment Day 1   Plan   Treatment/Interventions Functional transfer training;ALLIE strengthening/ROM; Elevations; Therapeutic exercise; Endurance training;Patient/family training;Equipment eval/education; Bed mobility;Gait training   Progress Progressing toward goals   PT Frequency 3-5x/wk   Discharge Recommendation   Rehab Resource Intensity Level, PT II (Moderate Resource Intensity)   Equipment Recommended   (pt owns DME)   AM-PAC Basic Mobility Inpatient   Turning in Flat Bed Without Bedrails 4   Lying on Back to Sitting on Edge of Flat Bed Without Bedrails 4   Moving Bed to Chair 4   Standing Up From Chair Using Arms 4   Walk in Room 4   Climb 3-5 Stairs With Railing 3   Basic Mobility Inpatient Raw Score 23   Basic Mobility Standardized Score 50.88   Highest Level Of Mobility   JH-HLM Goal 7: Walk 25 feet or more   JH-HLM Achieved 8: Walk 250 feet ot more       [place], PTA    An [facility] Basic Mobility Raw Score less than 17 suggests pt would benefit from post acute rehab. Please also refer to the recommendation of the Physical Therapist for safe discharge planning.

## 2023-11-10 NOTE — ASSESSMENT & PLAN NOTE
- Chronic history of migraines  - Pt reports headaches are more frequent since injury  - Continue home Ubrelvy in addition to home oxycodone 5 mg for severe pain  - Schedule tylenol  - PRN Oxy 2.5/5 mg for moderate to severe pain

## 2023-11-10 NOTE — ASSESSMENT & PLAN NOTE
- Hold home valsartan-HCTZ 160-25 mg indefinitely as syncope and collapse believed to be due to hypotension from antihypertensives  - Orthostatic BP completed and borderline negative  - Encourage PO intake

## 2023-11-10 NOTE — PROGRESS NOTES
Progress Note - [address] Leung 70 y.o. female MRN: [id number]  [facility] Encounter: [id number]        I came to see the patient for continuation of care, patient stated that she feels a little better today. She reports worsening of nausea and migraine, which is causing her to not have any appetite. Pt reports she is still able to eat some food, and has not had any emesis. She reports she slept better last night because she was not in the ICU anymore. As far as mood, pt is stable. She still reports feelings of hopelessness and feeling extremely overwhelmed about the idea of having to go back home, due to the amount of tasks waiting for her when she gets there. She says she cannot do it all, and thinks she will go back to her old habits of laying in bed all day. She did say that her sister will be coming to town for her this weekend, and does agree when prompted that her sister will be happy to help her with some of her house chores, as well as taking care of her while she is here. She does agree that this will lighten her load. By the end of the encounter, she does agree that she can take it one step at a time and handle all of the tasks before her, and with the continuation of outpatient therapy she thinks she can improve her depression. She reports fleeting suicidal ideation that has been there for 20 years, she denies any plan or intent and will not do anything because of her son and her Rastafarian. She denies any homicidal thoughts plan or intent she does not have any hallucinations or paranoid thinking.      Behavior over the last 24 hours:  improved  Sleep: normal  Appetite: normal  Medication side effects: No  ROS: nausea, headache, neck pain, and all other systems are negative    Mental Status Evaluation:  Appearance:  age appropriate and bruises around her face   Behavior:  Cooperative   Speech:  normal pitch and normal volume   Mood:  sad   Affect:  mood-congruent   Language: naming objects and repeating phrases   Thought Process:  goal directed   Associations: intact associations   Thought Content:  normal   Perceptual Disturbances: None   Risk Potential: Homicidal Ideations none, Potential for Aggression No, and she has chronic fleeting suicidal thoughts without any plan or intent and never have any attempt   Sensorium:  person, place, time/date, and situation   Memory:  recent and remote memory grossly intact   Cognition:  recent and remote memory grossly intact   Consciousness:  alert and awake    Attention: attention span and concentration were age appropriate   Intellect: within normal limits   Fund of Knowledge: awareness of current events: Fair, past history: Fair, and vocabulary: Fair   Insight:  fair   Judgment: fair   Muscle Strength and Tone: Within normal limits   Gait/Station: She has a walker   Motor Activity: no abnormal movements         Assessment/Plan  Aida Cantu is a 70 y.o. female with a history of hypertension, depression, anxiety, hyperlipidemia, migraine, gastroesophageal reflux disease and type 2 diabetes mellitus, who was transferred from [facility] ED. Traumatic fall for continuation of care at [facility]. She was evaluated for suicidal ideation. She is stated that she has chronic suicidal thoughts for many years, she never have any  attempt she does not have any plan or intent. She states that she love her son to much and she also want to go to heaven to meet her  when she . She does not have any psychotic symptoms or manic episode. She complies with her treatment. She has an supportive family.    Diagnosis:  Major depressive disorder recurrent severe without psychotic features    Recommended Treatment:     Continue medical management  Continue current psychotropic medication  This case have been discussed with the primary team  She will follow with his psychiatrist upon discharge  No other intervention at this time  I will sign off      Medications: current meds:   Current Facility-Administered Medications   Medication Dose Route Frequency    acetaminophen (TYLENOL) tablet 975 mg  975 mg Oral Q8H [address]    baclofen tablet 5 mg  5 mg Oral TID PRN    bisacodyl (DULCOLAX) EC tablet 10 mg  10 mg Oral Daily PRN    buPROPion (WELLBUTRIN XL) 24 hr tablet 150 mg  150 mg Oral Daily    busPIRone (BUSPAR) tablet 20 mg  20 mg Oral BID    chlorhexidine (PERIDEX) 0.12 % oral rinse 15 mL  15 mL Mouth/Throat Q12H RAHAT    clonazePAM (KlonoPIN) tablet 0.25 mg  0.25 mg Oral TID PRN    DULoxetine (CYMBALTA) delayed release capsule 30 mg  30 mg Oral Daily    DULoxetine (CYMBALTA) delayed release capsule 60 mg  60 mg Oral HS    enoxaparin (LOVENOX) subcutaneous injection 30 mg  30 mg Subcutaneous Q12H [address]    insulin lispro (HumaLOG) 100 units/mL subcutaneous injection 1-5 Units  1-5 Units Subcutaneous TID AC    levETIRAcetam (KEPPRA) tablet 500 mg  500 mg Oral Q12H RAHAT    ondansetron (ZOFRAN) injection 4 mg  4 mg Intravenous Q6H PRN    oxyCODONE (ROXICODONE) IR tablet 5 mg  5 mg Oral Q6H PRN    oxyCODONE (ROXICODONE) split tablet 2.5 mg  2.5 mg Oral Q6H PRN    oxyCODONE (ROXICODONE) split tablet 2.5 mg  2.5 mg Oral Once    pantoprazole (PROTONIX) EC tablet 20 mg  20 mg Oral Early Morning    polyethylene glycol (MIRALAX) packet 17 g  17 g Oral Daily    pravastatin (PRAVACHOL) tablet 40 mg  40 mg Oral Daily With Dinner    senna-docusate sodium (SENOKOT S) 8.6-50 mg per tablet 2 tablet  2 tablet Oral BID    Ubrogepant (UBRELVY) tablet 100 mg  100 mg Oral PRN         Risks, benefits and possible side effects of Medications:     Risks, benefits, and possible side effects of medications explained to patient and patient verbalizes understanding. Labs: I have personally reviewed all pertinent laboratory results. I have personally reviewed all pertinent laboratory/tests results.   Labs in last 72 hours:   Recent Labs     11/08/23  1415 11/09/23  0459 11/10/23  0435 WBC 7.53   < > 7.75   RBC 4.01   < > 3.11*   HGB 12.1   < > 9.5*   HCT 36.5   < > 28.3*      < > 258   RDW 13.0   < > 12.9   NEUTROABS 5.80   < > 4.54   SODIUM 134*   < > 130*   K 3.5   < > 3.8   CL 95*   < > 92*   CO2 29   < > 42*   BUN 15   < > 12   CREATININE 1.15   < > 0.94   GLUC 138   < > 87   CALCIUM 10.9*   < > 9.1   AST 20  --   --    ALT 16  --   --    ALKPHOS 43  --   --    TP 7.5  --   --    ALB 4.6  --   --    TBILI 0.46  --   --     < > = values in this interval not displayed. Counseling / Coordination of Care  Total floor / unit time spent today 45 minutes. Greater than 50% of total time was spent with the patient and / or family counseling and / or coordination of care.  A description of the counseling / coordination of care: Talking to the patient and discussing with primary team    Galina Carrera MD

## 2023-11-10 NOTE — CASE MANAGEMENT
Case Management Assessment & Discharge Planning Note    Patient name Margie Cantrell  Location [address] [place] [address] [address]/[facility] 419-22 MRN [id number]  : 1955 Date 11/10/2023       Current Admission Date: 2023  Current Admission Diagnosis:Epidural hemorrhage without loss of consciousness [facility])   Patient Active Problem List    Diagnosis Date Noted    Scalp hematoma, initial encounter 2023    Epidural hemorrhage without loss of consciousness ([address]) 2023    Worsening headaches 2023    Uncontrolled morning headache 2023    Snores 2023    JAMES (obstructive sleep apnea) 2023    Fall 2023    Abnormal MRI 2023    Facet arthritis of lumbosacral region 2023    Hypercholesterolemia 2023    Hiatal hernia 2023    Drug-induced constipation 2023    Continuous opioid dependence ([address]) 2023    Bereavement due to life event 2022    Fatty liver disease, nonalcoholic     SY (generalized anxiety disorder) 2022    Major depression, recurrent, chronic ([address]) 10/14/2022    Eating disorder 10/14/2022    Primary localized osteoarthritis of right knee 2022    Other insomnia 2020    Headache, chronic migraine without aura 2020    Cervicalgia 2020    Cervical dystonia 2020    Medication overuse headache 2020    Depression with anxiety 2020    Lumbago 2020    Bilateral occipital neuralgia 2020    Migraine without status migrainosus, not intractable 2018    Moderate episode of recurrent major depressive disorder ([address]) 2018    Chronic GERD 2018    Controlled type 2 diabetes mellitus, without long-term current use of insulin ([address]) 2018    Hypertension, essential 2018    Chronic neck pain 2018    Fibromyalgia 2018      LOS (days): 2  Geometric Mean LOS (GMLOS) (days): 3.30  Days to GMLOS:1.4     OBJECTIVE:    Risk of Unplanned Readmission Score: 16.44 Current admission status: Inpatient       Preferred Pharmacy:   [facility] [address] [facility], [address] [facility] [address] [facility]  [address] [facility]  Phone: [phone] Fax: [phone]    Primary Care Provider: Kiki Jensen DO    Primary Insurance: MEDICARE  Secondary Insurance: BLUE CROSS    ASSESSMENT:  [address] [facility] [address], Lazar[facility] Representative - Sister   Primary Phone: [phone] (Mobile)                 Patient Information  Admitted from[de-identified] Home  Mental Status: Alert  During Assessment patient was accompanied by: Not accompanied during assessment  Assessment information provided by[de-identified] Patient  Primary Caregiver: Self  Support Systems: Family members  [place] of Residence: [address] [place] [address] do you live in?: [address] [place] [address] entry access options.  Select all that apply.: Stairs  Number of steps to enter home.: 3  Type of Current Residence: Ranch  In the last 12 months, was there a time when you were not able to pay the mortgage or rent on time?: No  In the last 12 months, how many places have you lived?: 1  In the last 12 months, was there a time when you did not have a steady place to sleep or slept in a shelter (including now)?: No  Homeless/housing insecurity resource given?: N/A  Living Arrangements: Lives Alone  Is patient a ?: No    Activities of Daily Living Prior to Admission  Functional Status: Independent  Completes ADLs independently?: Yes  Ambulates independently?: Yes  Does patient currently own DME?: Yes  What DME does the patient currently own?: Elsi Alvarez  Does patient have a history of Outpatient Therapy (PT/OT)?: No  Does the patient have a history of Short-Term Rehab?: No  Does patient have a history of HHC?: No  Does patient currently have [facility]?: No    Patient Information Continued  Income Source: Pension/FDC  Does patient have prescription coverage?: Yes  Within the past 12 months, you worried that your food would run out before you got the money to buy more.: Never true  Within the past 12 months, the food you bought just didn't last and you didn't have money to get more.: Never true  Food insecurity resource given?: N/A  Does patient receive dialysis treatments?: No  Does patient have a history of substance abuse?: No  Does patient have a history of Mental Health Diagnosis?: Yes  Is patient receiving treatment for mental health?: Yes  Has patient received inpatient treatment related to mental health in the last 2 years?: No    Means of Transportation  In the past 12 months, has lack of transportation kept you from medical appointments or from getting medications?: No  In the past 12 months, has lack of transportation kept you from meetings, work, or from getting things needed for daily living?: No  Was application for public transport provided?: N/A        DISCHARGE DETAILS:    Discharge planning discussed with[de-identified] Patient  Freedom of Choice: Yes  Comments - Freedom of Choice: Discussed FOC  CM contacted family/caregiver?: No- see comments (Declined)  Were Treatment Team discharge recommendations reviewed with patient/caregiver?: Yes  Did patient/caregiver verbalize understanding of patient care needs?: N/A- going to facility  Were patient/caregiver advised of the risks associated with not following Treatment Team discharge recommendations?: Yes    Other Referral/Resources/Interventions Provided:  Referral Comments: this CM explained different levels of STR, patient requesting referral to [facility] and [address] [place] [address], entered in [address] [place] [address] reviewed d/c planning process including the following: identifying help at home, patient preference for d/c planning needs, Discharge Lounge, [facility] Meds to Bed program, availability of treatment team to discuss questions or concerns patient and/or family may have regarding understanding medications and recognizing signs and symptoms once discharged.   CM also encouraged patient to follow up with all recommended appointments after discharge. Patient advised of importance for patient and family to participate in managing patient’s medical well being. Information obtained from patient, lives in ranch style home, 3 STEPHY. IADLS, uses walker and cane at times. History of depression, never been hospitalized, follows with the [facility]. Patient is a retired LPN, used to work in a doctors office. Good

## 2023-11-10 NOTE — PROGRESS NOTES
[address] [facility]  Progress Note  Name: Roger Diaz  MRN: [id number]  Unit/Bed#: [facility] 652-13 I Date of Admission: 11/8/2023   Date of Service: 11/10/2023 I Hospital Day: 2    Assessment/Plan   Scalp hematoma, initial encounter  Assessment & Plan  Patient presents with frontal scalp hematoma  - Minimal pain on palpation, fluctuant  -Patient has bilateral periorbital hematoma, reports it is expanding/worsening  -No vision changes  -- Headaches  -- GCS 15, nonfocal    Fall  Assessment & Plan  Patient presents status post fall, reports episodes of vertigo for months prior to fall  - Patient reports fall due to disorientation from darkness  - Patient currently on walker, able to do all activities of daily living at baseline  - Patient currently lives alone at home  - Geriatrics consult placed    Hypertension, essential  Assessment & Plan  - Hold home valsartan-HCTZ 160-25 mg indefinitely as syncope and collapse believed to be due to hypotension from antihypertensives  - Orthostatic BP completed and borderline negative  - Encourage PO intake      Major depression, recurrent, chronic ([address])  Assessment & Plan  Patient reports being currently depressed, has been depressed since the death of her  2 years ago.   Does not currently want to live anymore, denies suicidal intent  - Patient being managed outpatient with psychiatry  - Psych consult placed and no further intervention recommended at this time  - Continue home Cymbalta meds  -- No continuous observation  -- No inpatient behavioral health placement  -- Follow up with Psychiatrist outpatient    Headache, chronic migraine without aura  Assessment & Plan  - Chronic history of migraines  - Pt reports headaches are more frequent since injury  - Continue home Ubrelvy in addition to home oxycodone 5 mg for severe pain  - Schedule tylenol  - PRN Oxy 2.5/5 mg for moderate to severe pain    * Epidural hemorrhage without loss of consciousness [facility])  Assessment & Plan  Patient is status post fall on Saturday, noticed scalp hematoma and bilateral orbital hematomas on subsequent days  - Neuro exam: GCS 15, non-focal  - Continue neurologic checks: Every 4 hours. - Reversal agent administered: DDAVP  - CT scan of the head on 11/9 reviewed: stable  - Appreciate Neurosurgery evaluation and recommendations. - Complete 7 day course of Keppra for seizure prophylaxis  - Chemical DVT prophylaxis: Lovenox  - Hold all anticoagulants and anti platelet medications for 2 weeks and/or until cleared by Neurosurgery to resume.  - PT and OT (including cognitive evaluation) evaluation and treatment as indicated. Bowel Regimen: senokot S  VTE Prophylaxis:Enoxaparin (Lovenox)     Disposition: Medically stable for discharge to rehab. Subjective   Chief Complaint: "I have a head ache"    Subjective: Patient reports that she has a headache in the back of her head that feels like its pulling towards the front of her scalp. She reports history of migraines and this is similar to her chronic headaches. She denies photophobia or phonophobia. She is otherwise tolerating a diet and ambulating. Objective   Vitals:   Temp:  [98 °F (36.7 °C)-99.2 °F (37.3 °C)] 98 °F (36.7 °C)  HR:  [65-83] 83  Resp:  [16-21] 16  BP: (102-124)/(55-72) 103/55    I/O         11/08 0701  11/09 0700 11/09 0701  11/10 0700 11/10 0701  11/11 0700    P. O.  720     I.V. (mL/kg) 500 (7.4)      IV Piggyback 130 300.4     Total Intake(mL/kg) 630 (9.3) 1020.4 (14.3)     Urine (mL/kg/hr) 300      Total Output 300      Net +330 +1020.4            Unmeasured Urine Occurrence  3 x              Physical Exam:   GENERAL APPEARANCE: Patient in no acute distress. HEENT: Extensive facial ecchymosis with left temporal scalp hematomas; PERRL, EOMs intact; Mucous membranes moist  NECK / BACK: Cervical rigidity  CV: Regular rate and rhythm; no murmur/gallops/rubs appreciated.   CHEST / LUNGS: Clear to auscultation; no wheezes/rales/rhonci. ABD: NABS; soft; non-distended; non-tender. : Voiding  EXT: +2 pulses bilaterally upper & lower extremities; no edema. NEURO: GCS 15; no focal neurologic deficits; neurovascularly intact. SKIN: Warm, dry and well perfused; no rash; no jaundice. Invasive Devices       Peripheral Intravenous Line  Duration             Peripheral IV 11/08/23 Right;Ventral (anterior) Wrist 1 day                          Lab Results: Results: I have personally reviewed all pertinent laboratory/tests results  Imaging: I have personally reviewed pertinent reports.      Other Studies:   CT head wo contrast   Final Result by Iqra Houston MD (11/09 0244)      Stable size and appearance of epidural hematoma overlying the left epidural region measuring 6 mm in thickness                  Workstation performed: [id number]         CT head wo contrast    (Results Pending)

## 2023-11-10 NOTE — ASSESSMENT & PLAN NOTE
Patient presents with frontal scalp hematoma  - Minimal pain on palpation, fluctuant  -Patient has bilateral periorbital hematoma, reports it is expanding/worsening  -No vision changes  -- Headaches  -- GCS 15, nonfocal

## 2023-11-10 NOTE — ASSESSMENT & PLAN NOTE
Patient reports being currently depressed, has been depressed since the death of her  2 years ago.   Does not currently want to live anymore, denies suicidal intent  - Patient being managed outpatient with psychiatry  - Psych consult placed and no further intervention recommended at this time  - Continue home Cymbalta meds  -- No continuous observation  -- No inpatient behavioral health placement  -- Follow up with Psychiatrist outpatient

## 2023-11-10 NOTE — TELEPHONE ENCOUNTER
11/10/2023-PT [address]  11/27/2023 APT W/CT HEAD      11/09/2023-Daria Rothman PA-C   2 week hospital follow up with AP and CTH for intracranial epidural hematoma

## 2023-11-10 NOTE — PROGRESS NOTES
Pastoral Care Progress Note    2023  Patient: Vesta Haider : 1955  Admission Date & Time: 2023 1752  MRN: [id number] CSN: [id number]                     Chaplaincy Interventions Utilized:   Empowerment: Encouraged self-care and Provided anticipatory guidance    Exploration: Explored alternatives, Explored hope, Facilitated expression of regret, and Identified, evaluated & reinforced appropriate coping strategies    Relationship Building: Cultivated a relationship of care and support and Listened empathically    Ritual: Provided prayer and Read sacred text    Chaplaincy Outcomes Achieved:  Expressed intermediate hope, Expressed peace, and Expressed ultimate hope    Spiritual Coping Strategies Utilized:   Spiritual comfort    Pt has been struggling with depression for a long time. She has no energy, and has trouble getting dressed in the morning. She also has health problems that don't help the situation, such as back pain, and stomach problems. She has siblings and a son but they don't live close by. Her mother is in a memory care facility about an hour away from her, and her  is . We talked about these issues and the importance of trying to get involved with activities and other people. We also talked about prayer. I prayed with her and shared scripture from [place].

## 2023-11-11 LAB
GLUCOSE SERPL-MCNC: 100 MG/DL (ref 65–140)
GLUCOSE SERPL-MCNC: 152 MG/DL (ref 65–140)
GLUCOSE SERPL-MCNC: 84 MG/DL (ref 65–140)
GLUCOSE SERPL-MCNC: 94 MG/DL (ref 65–140)

## 2023-11-11 PROCEDURE — 99232 SBSQ HOSP IP/OBS MODERATE 35: CPT | Performed by: SURGERY

## 2023-11-11 PROCEDURE — 82948 REAGENT STRIP/BLOOD GLUCOSE: CPT

## 2023-11-11 RX ADMIN — OXYCODONE HYDROCHLORIDE 5 MG: 5 TABLET ORAL at 12:18

## 2023-11-11 RX ADMIN — ENOXAPARIN SODIUM 30 MG: 30 INJECTION SUBCUTANEOUS at 08:26

## 2023-11-11 RX ADMIN — OXYCODONE HYDROCHLORIDE 5 MG: 5 TABLET ORAL at 05:34

## 2023-11-11 RX ADMIN — DULOXETINE HYDROCHLORIDE 60 MG: 60 CAPSULE, DELAYED RELEASE ORAL at 21:01

## 2023-11-11 RX ADMIN — DULOXETINE HYDROCHLORIDE 30 MG: 30 CAPSULE, DELAYED RELEASE ORAL at 08:26

## 2023-11-11 RX ADMIN — SENNOSIDES, DOCUSATE SODIUM 1 TABLET: 8.6; 5 TABLET ORAL at 17:01

## 2023-11-11 RX ADMIN — LEVETIRACETAM 500 MG: 500 TABLET, FILM COATED ORAL at 21:01

## 2023-11-11 RX ADMIN — ACETAMINOPHEN 975 MG: 325 TABLET, FILM COATED ORAL at 21:01

## 2023-11-11 RX ADMIN — ACETAMINOPHEN 975 MG: 325 TABLET, FILM COATED ORAL at 13:28

## 2023-11-11 RX ADMIN — CHLORHEXIDINE GLUCONATE 15 ML: 1.2 SOLUTION ORAL at 08:26

## 2023-11-11 RX ADMIN — CLONAZEPAM 0.25 MG: 0.5 TABLET ORAL at 16:45

## 2023-11-11 RX ADMIN — CHLORHEXIDINE GLUCONATE 15 ML: 1.2 SOLUTION ORAL at 21:00

## 2023-11-11 RX ADMIN — PRAVASTATIN SODIUM 40 MG: 40 TABLET ORAL at 16:45

## 2023-11-11 RX ADMIN — ACETAMINOPHEN 975 MG: 325 TABLET, FILM COATED ORAL at 05:32

## 2023-11-11 RX ADMIN — BUPROPION HYDROCHLORIDE 150 MG: 150 TABLET, FILM COATED, EXTENDED RELEASE ORAL at 08:26

## 2023-11-11 RX ADMIN — ENOXAPARIN SODIUM 30 MG: 30 INJECTION SUBCUTANEOUS at 21:00

## 2023-11-11 RX ADMIN — PANTOPRAZOLE SODIUM 20 MG: 20 TABLET, DELAYED RELEASE ORAL at 05:32

## 2023-11-11 RX ADMIN — BUSPIRONE HYDROCHLORIDE 20 MG: 10 TABLET ORAL at 17:01

## 2023-11-11 RX ADMIN — OXYCODONE HYDROCHLORIDE 5 MG: 5 TABLET ORAL at 18:01

## 2023-11-11 RX ADMIN — BUSPIRONE HYDROCHLORIDE 20 MG: 10 TABLET ORAL at 08:26

## 2023-11-11 RX ADMIN — LEVETIRACETAM 500 MG: 500 TABLET, FILM COATED ORAL at 08:26

## 2023-11-11 NOTE — PLAN OF CARE
Problem: Potential for Falls  Goal: Patient will remain free of falls  Description: INTERVENTIONS:  - Educate patient/family on patient safety including physical limitations  - Instruct patient to call for assistance with activity   - Consult OT/PT to assist with strengthening/mobility   - Keep Call bell within reach  - Keep bed low and locked with side rails adjusted as appropriate  - Keep care items and personal belongings within reach  - Initiate and maintain comfort rounds  - Make Fall Risk Sign visible to staff  - Offer Toileting every 2 Hours, in advance of need  - Initiate/Maintain alarm  - Obtain necessary fall risk management equipment:   - Apply yellow socks and bracelet for high fall risk patients  - Consider moving patient to room near nurses station  Outcome: Progressing     Problem: MOBILITY - ADULT  Goal: Maintain or return to baseline ADL function  Description: INTERVENTIONS:  -  Assess patient's ability to carry out ADLs; assess patient's baseline for ADL function and identify physical deficits which impact ability to perform ADLs (bathing, care of mouth/teeth, toileting, grooming, dressing, etc.)  - Assess/evaluate cause of self-care deficits   - Assess range of motion  - Assess patient's mobility; develop plan if impaired  - Assess patient's need for assistive devices and provide as appropriate  - Encourage maximum independence but intervene and supervise when necessary  - Involve family in performance of ADLs  - Assess for home care needs following discharge   - Consider OT consult to assist with ADL evaluation and planning for discharge  - Provide patient education as appropriate  Outcome: Progressing  Goal: Maintains/Returns to pre admission functional level  Description: INTERVENTIONS:  - Perform BMAT or MOVE assessment daily.   - Set and communicate daily mobility goal to care team and patient/family/caregiver.    - Collaborate with rehabilitation services on mobility goals if consulted  - Perform Range of Motion 3 times a day. - Reposition patient every 2 hours.   - Dangle patient 3 times a day  - Stand patient 3 times a day  - Ambulate patient 3 times a day  - Out of bed to chair 3 times a day   - Out of bed for meals 3 times a day  - Out of bed for toileting  - Record patient progress and toleration of activity level   Outcome: Progressing     Problem: PAIN - ADULT  Goal: Verbalizes/displays adequate comfort level or baseline comfort level  Description: Interventions:  - Encourage patient to monitor pain and request assistance  - Assess pain using appropriate pain scale  - Administer analgesics based on type and severity of pain and evaluate response  - Implement non-pharmacological measures as appropriate and evaluate response  - Consider cultural and social influences on pain and pain management  - Notify physician/advanced practitioner if interventions unsuccessful or patient reports new pain  Outcome: Progressing     Problem: INFECTION - ADULT  Goal: Absence or prevention of progression during hospitalization  Description: INTERVENTIONS:  - Assess and monitor for signs and symptoms of infection  - Monitor lab/diagnostic results  - Monitor all insertion sites, i.e. indwelling lines, tubes, and drains  - Monitor endotracheal if appropriate and nasal secretions for changes in amount and color  - [place] appropriate cooling/warming therapies per order  - Administer medications as ordered  - Instruct and encourage patient and family to use good hand hygiene technique  - Identify and instruct in appropriate isolation precautions for identified infection/condition  Outcome: Progressing  Goal: Absence of fever/infection during neutropenic period  Description: INTERVENTIONS:  - Monitor WBC    Outcome: Progressing     Problem: SAFETY ADULT  Goal: Patient will remain free of falls  Description: INTERVENTIONS:  - Educate patient/family on patient safety including physical limitations  - Instruct patient to call for assistance with activity   - Consult OT/PT to assist with strengthening/mobility   - Keep Call bell within reach  - Keep bed low and locked with side rails adjusted as appropriate  - Keep care items and personal belongings within reach  - Initiate and maintain comfort rounds  - Make Fall Risk Sign visible to staff  - Offer Toileting every 2 Hours, in advance of need  - Initiate/Maintain alarm  - Obtain necessary fall risk management equipment:   - Apply yellow socks and bracelet for high fall risk patients  - Consider moving patient to room near nurses station  Outcome: Progressing  Goal: Maintain or return to baseline ADL function  Description: INTERVENTIONS:  -  Assess patient's ability to carry out ADLs; assess patient's baseline for ADL function and identify physical deficits which impact ability to perform ADLs (bathing, care of mouth/teeth, toileting, grooming, dressing, etc.)  - Assess/evaluate cause of self-care deficits   - Assess range of motion  - Assess patient's mobility; develop plan if impaired  - Assess patient's need for assistive devices and provide as appropriate  - Encourage maximum independence but intervene and supervise when necessary  - Involve family in performance of ADLs  - Assess for home care needs following discharge   - Consider OT consult to assist with ADL evaluation and planning for discharge  - Provide patient education as appropriate  Outcome: Progressing  Goal: Maintains/Returns to pre admission functional level  Description: INTERVENTIONS:  - Perform BMAT or MOVE assessment daily.   - Set and communicate daily mobility goal to care team and patient/family/caregiver. - Collaborate with rehabilitation services on mobility goals if consulted  - Perform Range of Motion 3 times a day. - Reposition patient every 2 hours.   - Dangle patient 3 times a day  - Stand patient 3 times a day  - Ambulate patient 3 times a day  - Out of bed to chair 3 times a day   - Out of bed for meals 3 times a day  - Out of bed for toileting  - Record patient progress and toleration of activity level   Outcome: Progressing     Problem: DISCHARGE PLANNING  Goal: Discharge to home or other facility with appropriate resources  Description: INTERVENTIONS:  - Identify barriers to discharge w/patient and caregiver  - Arrange for needed discharge resources and transportation as appropriate  - Identify discharge learning needs (meds, wound care, etc.)  - Arrange for interpretive services to assist at discharge as needed  - Refer to Case Management Department for coordinating discharge planning if the patient needs post-hospital services based on physician/advanced practitioner order or complex needs related to functional status, cognitive ability, or social support system  Outcome: Progressing     Problem: Knowledge Deficit  Goal: Patient/family/caregiver demonstrates understanding of disease process, treatment plan, medications, and discharge instructions  Description: Complete learning assessment and assess knowledge base. Interventions:  - Provide teaching at level of understanding  - Provide teaching via preferred learning methods  Outcome: Progressing     Problem: Nutrition/Hydration-ADULT  Goal: Nutrient/Hydration intake appropriate for improving, restoring or maintaining nutritional needs  Description: Monitor and assess patient's nutrition/hydration status for malnutrition. Collaborate with interdisciplinary team and initiate plan and interventions as ordered. Monitor patient's weight and dietary intake as ordered or per policy. Utilize nutrition screening tool and intervene as necessary. Determine patient's food preferences and provide high-protein, high-caloric foods as appropriate.      INTERVENTIONS:  - Monitor oral intake, urinary output, labs, and treatment plans  - Assess nutrition and hydration status and recommend course of action  - Evaluate amount of meals eaten  - Assist patient with eating if necessary   - Allow adequate time for meals  - Recommend/ encourage appropriate diets, oral nutritional supplements, and vitamin/mineral supplements  - Order, calculate, and assess calorie counts as needed  - Recommend, monitor, and adjust tube feedings and TPN/PPN based on assessed needs  - Assess need for intravenous fluids  - Provide specific nutrition/hydration education as appropriate  - Include patient/family/caregiver in decisions related to nutrition  Outcome: Progressing     Problem: NEUROSENSORY - ADULT  Goal: Achieves stable or improved neurological status  Description: INTERVENTIONS  - Monitor and report changes in neurological status  - Monitor vital signs such as temperature, blood pressure, glucose, and any other labs ordered   - Initiate measures to prevent increased intracranial pressure  - Monitor for seizure activity and implement precautions if appropriate      Outcome: Progressing  Goal: Remains free of injury related to seizures activity  Description: INTERVENTIONS  - Maintain airway, patient safety  and administer oxygen as ordered  - Monitor patient for seizure activity, document and report duration and description of seizure to physician/advanced practitioner  - If seizure occurs,  ensure patient safety during seizure  - Reorient patient post seizure  - Seizure pads on all 4 side rails  - Instruct patient/family to notify RN of any seizure activity including if an aura is experienced  - Instruct patient/family to call for assistance with activity based on nursing assessment  - Administer anti-seizure medications if ordered    Outcome: Progressing  Goal: Achieves maximal functionality and self care  Description: INTERVENTIONS  - Monitor swallowing and airway patency with patient fatigue and changes in neurological status  - Encourage and assist patient to increase activity and self care.    - Encourage visually impaired, hearing impaired and aphasic patients to use assistive/communication devices  Outcome: Progressing     Problem: CARDIOVASCULAR - ADULT  Goal: Maintains optimal cardiac output and hemodynamic stability  Description: INTERVENTIONS:  - Monitor I/O, vital signs and rhythm  - Monitor for S/S and trends of decreased cardiac output  - Administer and titrate ordered vasoactive medications to optimize hemodynamic stability  - Assess quality of pulses, skin color and temperature  - Assess for signs of decreased coronary artery perfusion  - Instruct patient to report change in severity of symptoms  Outcome: Progressing  Goal: Absence of cardiac dysrhythmias or at baseline rhythm  Description: INTERVENTIONS:  - Continuous cardiac monitoring, vital signs, obtain 12 lead EKG if ordered  - Administer antiarrhythmic and heart rate control medications as ordered  - Monitor electrolytes and administer replacement therapy as ordered  Outcome: Progressing     Problem: RESPIRATORY - ADULT  Goal: Achieves optimal ventilation and oxygenation  Description: INTERVENTIONS:  - Assess for changes in respiratory status  - Assess for changes in mentation and behavior  - Position to facilitate oxygenation and minimize respiratory effort  - Oxygen administered by appropriate delivery if ordered  - Initiate smoking cessation education as indicated  - Encourage broncho-pulmonary hygiene including cough, deep breathe, Incentive Spirometry  - Assess the need for suctioning and aspirate as needed  - Assess and instruct to report SOB or any respiratory difficulty  - Respiratory Therapy support as indicated  Outcome: Progressing     Problem: GASTROINTESTINAL - ADULT  Goal: Minimal or absence of nausea and/or vomiting  Description: INTERVENTIONS:  - Administer IV fluids if ordered to ensure adequate hydration  - Maintain NPO status until nausea and vomiting are resolved  - Nasogastric tube if ordered  - Administer ordered antiemetic medications as needed  - Provide nonpharmacologic comfort measures as appropriate  - Advance diet as tolerated, if ordered  - Consider nutrition services referral to assist patient with adequate nutrition and appropriate food choices  Outcome: Progressing  Goal: Maintains or returns to baseline bowel function  Description: INTERVENTIONS:  - Assess bowel function  - Encourage oral fluids to ensure adequate hydration  - Administer IV fluids if ordered to ensure adequate hydration  - Administer ordered medications as needed  - Encourage mobilization and activity  - Consider nutritional services referral to assist patient with adequate nutrition and appropriate food choices  Outcome: Progressing  Goal: Maintains adequate nutritional intake  Description: INTERVENTIONS:  - Monitor percentage of each meal consumed  - Identify factors contributing to decreased intake, treat as appropriate  - Assist with meals as needed  - Monitor I&O, weight, and lab values if indicated  - Obtain nutrition services referral as needed  Outcome: Progressing  Goal: Oral mucous membranes remain intact  Description: INTERVENTIONS  - Assess oral mucosa and hygiene practices  - Implement preventative oral hygiene regimen  - Implement oral medicated treatments as ordered  - Initiate Nutrition services referral as needed  Outcome: Progressing     Problem: GENITOURINARY - ADULT  Goal: Maintains or returns to baseline urinary function  Description: INTERVENTIONS:  - Assess urinary function  - Encourage oral fluids to ensure adequate hydration if ordered  - Administer IV fluids as ordered to ensure adequate hydration  - Administer ordered medications as needed  - Offer frequent toileting  - Follow urinary retention protocol if ordered  Outcome: Progressing  Goal: Absence of urinary retention  Description: INTERVENTIONS:  - Assess patient’s ability to void and empty bladder  - Monitor I/O  - Bladder scan as needed  - Discuss with physician/AP medications to alleviate retention as needed  - Discuss catheterization for long term situations as appropriate  Outcome: Progressing     Problem: METABOLIC, FLUID AND ELECTROLYTES - ADULT  Goal: Electrolytes maintained within normal limits  Description: INTERVENTIONS:  - Monitor labs and assess patient for signs and symptoms of electrolyte imbalances  - Administer electrolyte replacement as ordered  - Monitor response to electrolyte replacements, including repeat lab results as appropriate  - Instruct patient on fluid and nutrition as appropriate  Outcome: Progressing  Goal: Fluid balance maintained  Description: INTERVENTIONS:  - Monitor labs   - Monitor I/O and WT  - Instruct patient on fluid and nutrition as appropriate  - Assess for signs & symptoms of volume excess or deficit  Outcome: Progressing  Goal: Glucose maintained within target range  Description: INTERVENTIONS:  - Monitor Blood Glucose as ordered  - Assess for signs and symptoms of hyperglycemia and hypoglycemia  - Administer ordered medications to maintain glucose within target range  - Assess nutritional intake and initiate nutrition service referral as needed  Outcome: Progressing     Problem: SKIN/TISSUE INTEGRITY - ADULT  Goal: Skin Integrity remains intact(Skin Breakdown Prevention)  Description: Assess:  -Perform [place] assessment every   -Clean and moisturize skin every   -Inspect skin when repositioning, toileting, and assisting with ADLS  -Assess under medical devices such as  every   -Assess extremities for adequate circulation and sensation     Bed Management:  -Have minimal linens on bed & keep smooth, unwrinkled  -Change linens as needed when moist or perspiring  -Avoid sitting or lying in one position for more than  hours while in bed  -Keep HOB at degrees     Toileting:  -Offer bedside commode  -Assess for incontinence every   -Use incontinent care products after each incontinent episode such as     Activity:  -Mobilize patient  times a day  -Encourage activity and walks on unit  -Encourage or provide ROM exercises   -Turn and reposition patient every  Hours  -Use appropriate equipment to lift or move patient in bed  -Instruct/ Assist with weight shifting every  when out of bed in chair  -Consider limitation of chair time  hour intervals    Skin Care:  -Avoid use of baby powder, tape, friction and shearing, hot water or constrictive clothing  -Relieve pressure over bony prominences using   -Do not massage red bony areas    Next Steps:  -Teach patient strategies to minimize risks such as    -Consider consults to  interdisciplinary teams such as   Outcome: Progressing  Goal: Incision(s), wounds(s) or drain site(s) healing without S/S of infection  Description: INTERVENTIONS  - Assess and document dressing, incision, wound bed, drain sites and surrounding tissue  - Provide patient and family education  - Perform skin care/dressing changes every  Outcome: Progressing     Problem: HEMATOLOGIC - ADULT  Goal: Maintains hematologic stability  Description: INTERVENTIONS  - Assess for signs and symptoms of bleeding or hemorrhage  - Monitor labs  - Administer supportive blood products/factors as ordered and appropriate  Outcome: Progressing     Problem: MUSCULOSKELETAL - ADULT  Goal: Maintain or return mobility to safest level of function  Description: INTERVENTIONS:  - Assess patient's ability to carry out ADLs; assess patient's baseline for ADL function and identify physical deficits which impact ability to perform ADLs (bathing, care of mouth/teeth, toileting, grooming, dressing, etc.)  - Assess/evaluate cause of self-care deficits   - Assess range of motion  - Assess patient's mobility  - Assess patient's need for assistive devices and provide as appropriate  - Encourage maximum independence but intervene and supervise when necessary  - Involve family in performance of ADLs  - Assess for home care needs following discharge   - Consider OT consult to assist with ADL evaluation and planning for discharge  - Provide patient education as appropriate  Outcome: Progressing     Problem: COPING  Goal: Pt/Family able to verbalize concerns and demonstrate effective coping strategies  Description: INTERVENTIONS:  - Assist patient/family to identify coping skills, available support systems and cultural and spiritual values  - Provide emotional support, including active listening and acknowledgement of concerns of patient and caregivers  - Reduce environmental stimuli, as able  - Provide patient education  - Assess for spiritual pain/suffering and initiate spiritual care, including notification of Pastoral Care or kinsey based community as needed  - Assess effectiveness of coping strategies  Outcome: Progressing  Goal: Will report anxiety at manageable levels  Description: INTERVENTIONS:  - Administer medication as ordered  - Teach and encourage coping skills  - Provide emotional support  - Assess patient/family for anxiety and ability to cope  Outcome: Progressing

## 2023-11-11 NOTE — PROGRESS NOTES
[facility]  Progress Note  Name: Glenn Brannon  MRN: [id number]  [facility] Date of Admission: 11/8/2023   Date of Service: 11/11/2023 I Hospital Day: 3    Assessment/Plan   Scalp hematoma, initial encounter  Assessment & Plan  Patient presents with frontal scalp hematoma  - Minimal pain on palpation, fluctuant  -Patient has bilateral periorbital hematoma, reports it is expanding/worsening  -No vision changes  -- Headaches  -- GCS 15, nonfocal    Fall  Assessment & Plan  Patient presents status post fall, reports episodes of vertigo for months prior to fall  - Patient reports fall due to disorientation from darkness  - Patient currently on walker, able to do all activities of daily living at baseline  - Patient currently lives alone at home  - Geriatrics consult placed    Hypertension, essential  Assessment & Plan  - Hold home valsartan-HCTZ 160-25 mg indefinitely as syncope and collapse believed to be due to hypotension from antihypertensives  - Orthostatic BP completed and borderline negative  - Encourage PO intake      Major depression, recurrent, chronic ([address])  Assessment & Plan  Patient reports being currently depressed, has been depressed since the death of her  2 years ago.   Does not currently want to live anymore, denies suicidal intent  - Patient being managed outpatient with psychiatry  - Psych consult placed and no further intervention recommended at this time  - Continue home Cymbalta meds  -- No continuous observation  -- No inpatient behavioral health placement  -- Follow up with Psychiatrist outpatient    Headache, chronic migraine without aura  Assessment & Plan  - Chronic history of migraines  - Pt reports headaches are more frequent since injury  - Continue home Ubrelvy in addition to home oxycodone 5 mg for severe pain  - Schedule tylenol  - PRN Oxy 2.5/5 mg for moderate to severe pain    * Epidural hemorrhage without loss of consciousness [facility])  Assessment & Plan  Patient is status post fall on Saturday, noticed scalp hematoma and bilateral orbital hematomas on subsequent days  - Neuro exam: GCS 15, non-focal  - Continue neurologic checks: Every 4 hours. - Reversal agent administered: DDAVP  - CT scan of the head on 11/9 reviewed: stable  - Appreciate Neurosurgery evaluation and recommendations. - Complete 7 day course of Keppra for seizure prophylaxis  - Chemical DVT prophylaxis: Lovenox  - Hold all anticoagulants and anti platelet medications for 2 weeks and/or until cleared by Neurosurgery to resume.  - PT and OT (including cognitive evaluation) evaluation and treatment as indicated. Bowel Regimen: senokot, miralax  VTE Prophylaxis:Enoxaparin (Lovenox)     Disposition: pending PT/OT evaluation    Subjective   Chief Complaint: s/p fall    Subjective: Patient complains of having a headache at this time. Denies having any speech changes, weakness, vision changes, or other complaints at this time. No acute events overnight. Objective   Vitals:   Temp:  [98.2 °F (36.8 °C)-98.7 °F (37.1 °C)] 98.2 °F (36.8 °C)  HR:  [66-80] 66  Resp:  [16-18] 17  BP: (117-120)/(65-71) 118/68    I/O         11/09 0701  11/10 0700 11/10 0701  11/11 0700 11/11 0701  11/12 0700    P. O. 720 450     I.V. (mL/kg)       IV Piggyback 300.4      Total Intake(mL/kg) 1020.4 (14.3) 450 (6.3)     Urine (mL/kg/hr)       Total Output       Net +1020.4 +450            Unmeasured Urine Occurrence 3 x 1 x              Physical Exam:   GENERAL APPEARANCE: Patient in no acute distress. HEENT: Periorbital ecchymosis bilaterally; PERRL, EOMs intact; Mucous membranes moist  CV: Regular rate and rhythm; no murmur/gallops/rubs appreciated. CHEST / LUNGS: Clear to auscultation; no wheezes/rales/rhonci. ABD: NABS; soft; non-distended; non-tender. : voiding  EXT: +2 pulses bilaterally upper & lower extremities; no edema.   NEURO: GCS 15; no focal neurologic deficits; neurovascularly intact. SKIN: Warm, dry and well perfused; no rash; no jaundice.       Invasive Devices       Peripheral Intravenous Line  Duration             Peripheral IV 11/08/23 Right;Ventral (anterior) Wrist 2 days                          Lab Results: No new labs  Imaging:No new imaging  Other Studies: N/A

## 2023-11-12 LAB
GLUCOSE SERPL-MCNC: 106 MG/DL (ref 65–140)
GLUCOSE SERPL-MCNC: 108 MG/DL (ref 65–140)
GLUCOSE SERPL-MCNC: 62 MG/DL (ref 65–140)
GLUCOSE SERPL-MCNC: 83 MG/DL (ref 65–140)

## 2023-11-12 PROCEDURE — 99232 SBSQ HOSP IP/OBS MODERATE 35: CPT | Performed by: STUDENT IN AN ORGANIZED HEALTH CARE EDUCATION/TRAINING PROGRAM

## 2023-11-12 PROCEDURE — 82948 REAGENT STRIP/BLOOD GLUCOSE: CPT

## 2023-11-12 RX ADMIN — ACETAMINOPHEN 975 MG: 325 TABLET, FILM COATED ORAL at 05:04

## 2023-11-12 RX ADMIN — ACETAMINOPHEN 975 MG: 325 TABLET, FILM COATED ORAL at 21:12

## 2023-11-12 RX ADMIN — CHLORHEXIDINE GLUCONATE 15 ML: 1.2 SOLUTION ORAL at 08:20

## 2023-11-12 RX ADMIN — OXYCODONE HYDROCHLORIDE 5 MG: 5 TABLET ORAL at 02:15

## 2023-11-12 RX ADMIN — PANTOPRAZOLE SODIUM 20 MG: 20 TABLET, DELAYED RELEASE ORAL at 05:04

## 2023-11-12 RX ADMIN — PRAVASTATIN SODIUM 40 MG: 40 TABLET ORAL at 17:32

## 2023-11-12 RX ADMIN — ENOXAPARIN SODIUM 30 MG: 30 INJECTION SUBCUTANEOUS at 08:21

## 2023-11-12 RX ADMIN — OXYCODONE HYDROCHLORIDE 5 MG: 5 TABLET ORAL at 08:22

## 2023-11-12 RX ADMIN — DULOXETINE HYDROCHLORIDE 60 MG: 60 CAPSULE, DELAYED RELEASE ORAL at 21:12

## 2023-11-12 RX ADMIN — CLONAZEPAM 0.25 MG: 0.5 TABLET ORAL at 15:59

## 2023-11-12 RX ADMIN — ACETAMINOPHEN 975 MG: 325 TABLET, FILM COATED ORAL at 13:47

## 2023-11-12 RX ADMIN — OXYCODONE HYDROCHLORIDE 5 MG: 5 TABLET ORAL at 15:59

## 2023-11-12 RX ADMIN — OXYCODONE HYDROCHLORIDE 5 MG: 5 TABLET ORAL at 22:09

## 2023-11-12 RX ADMIN — DULOXETINE HYDROCHLORIDE 30 MG: 30 CAPSULE, DELAYED RELEASE ORAL at 08:21

## 2023-11-12 RX ADMIN — ENOXAPARIN SODIUM 30 MG: 30 INJECTION SUBCUTANEOUS at 21:12

## 2023-11-12 RX ADMIN — CLONAZEPAM 0.25 MG: 0.5 TABLET ORAL at 08:21

## 2023-11-12 RX ADMIN — CHLORHEXIDINE GLUCONATE 15 ML: 1.2 SOLUTION ORAL at 21:12

## 2023-11-12 RX ADMIN — LEVETIRACETAM 500 MG: 500 TABLET, FILM COATED ORAL at 08:23

## 2023-11-12 RX ADMIN — BUSPIRONE HYDROCHLORIDE 20 MG: 10 TABLET ORAL at 17:32

## 2023-11-12 RX ADMIN — BUPROPION HYDROCHLORIDE 150 MG: 150 TABLET, FILM COATED, EXTENDED RELEASE ORAL at 08:23

## 2023-11-12 RX ADMIN — LEVETIRACETAM 500 MG: 500 TABLET, FILM COATED ORAL at 21:12

## 2023-11-12 RX ADMIN — BUSPIRONE HYDROCHLORIDE 20 MG: 10 TABLET ORAL at 08:21

## 2023-11-12 RX ADMIN — SENNOSIDES, DOCUSATE SODIUM 2 TABLET: 8.6; 5 TABLET ORAL at 17:32

## 2023-11-12 NOTE — PLAN OF CARE
Problem: Potential for Falls  Goal: Patient will remain free of falls  Description: INTERVENTIONS:  - Educate patient/family on patient safety including physical limitations  - Instruct patient to call for assistance with activity   - Consult OT/PT to assist with strengthening/mobility   - Keep Call bell within reach  - Keep bed low and locked with side rails adjusted as appropriate  - Keep care items and personal belongings within reach  - Initiate and maintain comfort rounds  - Make Fall Risk Sign visible to staff  - Offer Toileting every 2 Hours, in advance of need  - Initiate/Maintain alarm  - Obtain necessary fall risk management equipment:   - Apply yellow socks and bracelet for high fall risk patients  - Consider moving patient to room near nurses station  Outcome: Progressing     Problem: MOBILITY - ADULT  Goal: Maintain or return to baseline ADL function  Description: INTERVENTIONS:  -  Assess patient's ability to carry out ADLs; assess patient's baseline for ADL function and identify physical deficits which impact ability to perform ADLs (bathing, care of mouth/teeth, toileting, grooming, dressing, etc.)  - Assess/evaluate cause of self-care deficits   - Assess range of motion  - Assess patient's mobility; develop plan if impaired  - Assess patient's need for assistive devices and provide as appropriate  - Encourage maximum independence but intervene and supervise when necessary  - Involve family in performance of ADLs  - Assess for home care needs following discharge   - Consider OT consult to assist with ADL evaluation and planning for discharge  - Provide patient education as appropriate  Outcome: Progressing  Goal: Maintains/Returns to pre admission functional level  Description: INTERVENTIONS:  - Perform BMAT or MOVE assessment daily.   - Set and communicate daily mobility goal to care team and patient/family/caregiver.    - Collaborate with rehabilitation services on mobility goals if consulted  - Perform Range of Motion 3 times a day. - Reposition patient every 2 hours.   - Dangle patient 3 times a day  - Stand patient 3 times a day  - Ambulate patient 3 times a day  - Out of bed to chair 3 times a day   - Out of bed for meals 3 times a day  - Out of bed for toileting  - Record patient progress and toleration of activity level   Outcome: Progressing     Problem: PAIN - ADULT  Goal: Verbalizes/displays adequate comfort level or baseline comfort level  Description: Interventions:  - Encourage patient to monitor pain and request assistance  - Assess pain using appropriate pain scale  - Administer analgesics based on type and severity of pain and evaluate response  - Implement non-pharmacological measures as appropriate and evaluate response  - Consider cultural and social influences on pain and pain management  - Notify physician/advanced practitioner if interventions unsuccessful or patient reports new pain  Outcome: Progressing     Problem: INFECTION - ADULT  Goal: Absence or prevention of progression during hospitalization  Description: INTERVENTIONS:  - Assess and monitor for signs and symptoms of infection  - Monitor lab/diagnostic results  - Monitor all insertion sites, i.e. indwelling lines, tubes, and drains  - Monitor endotracheal if appropriate and nasal secretions for changes in amount and color  - [place] appropriate cooling/warming therapies per order  - Administer medications as ordered  - Instruct and encourage patient and family to use good hand hygiene technique  - Identify and instruct in appropriate isolation precautions for identified infection/condition  Outcome: Progressing  Goal: Absence of fever/infection during neutropenic period  Description: INTERVENTIONS:  - Monitor WBC    Outcome: Progressing     Problem: SAFETY ADULT  Goal: Patient will remain free of falls  Description: INTERVENTIONS:  - Educate patient/family on patient safety including physical limitations  - Instruct patient to call for assistance with activity   - Consult OT/PT to assist with strengthening/mobility   - Keep Call bell within reach  - Keep bed low and locked with side rails adjusted as appropriate  - Keep care items and personal belongings within reach  - Initiate and maintain comfort rounds  - Make Fall Risk Sign visible to staff  - Offer Toileting every 2 Hours, in advance of need  - Initiate/Maintain alarm  - Obtain necessary fall risk management equipment:   - Apply yellow socks and bracelet for high fall risk patients  - Consider moving patient to room near nurses station  Outcome: Progressing  Goal: Maintain or return to baseline ADL function  Description: INTERVENTIONS:  -  Assess patient's ability to carry out ADLs; assess patient's baseline for ADL function and identify physical deficits which impact ability to perform ADLs (bathing, care of mouth/teeth, toileting, grooming, dressing, etc.)  - Assess/evaluate cause of self-care deficits   - Assess range of motion  - Assess patient's mobility; develop plan if impaired  - Assess patient's need for assistive devices and provide as appropriate  - Encourage maximum independence but intervene and supervise when necessary  - Involve family in performance of ADLs  - Assess for home care needs following discharge   - Consider OT consult to assist with ADL evaluation and planning for discharge  - Provide patient education as appropriate  Outcome: Progressing  Goal: Maintains/Returns to pre admission functional level  Description: INTERVENTIONS:  - Perform BMAT or MOVE assessment daily.   - Set and communicate daily mobility goal to care team and patient/family/caregiver. - Collaborate with rehabilitation services on mobility goals if consulted  - Perform Range of Motion 3 times a day. - Reposition patient every 2 hours.   - Dangle patient 3 times a day  - Stand patient 3 times a day  - Ambulate patient 3 times a day  - Out of bed to chair 3 times a day   - Out of bed for meals 3 times a day  - Out of bed for toileting  - Record patient progress and toleration of activity level   Outcome: Progressing     Problem: DISCHARGE PLANNING  Goal: Discharge to home or other facility with appropriate resources  Description: INTERVENTIONS:  - Identify barriers to discharge w/patient and caregiver  - Arrange for needed discharge resources and transportation as appropriate  - Identify discharge learning needs (meds, wound care, etc.)  - Arrange for interpretive services to assist at discharge as needed  - Refer to Case Management Department for coordinating discharge planning if the patient needs post-hospital services based on physician/advanced practitioner order or complex needs related to functional status, cognitive ability, or social support system  Outcome: Progressing     Problem: Knowledge Deficit  Goal: Patient/family/caregiver demonstrates understanding of disease process, treatment plan, medications, and discharge instructions  Description: Complete learning assessment and assess knowledge base. Interventions:  - Provide teaching at level of understanding  - Provide teaching via preferred learning methods  Outcome: Progressing     Problem: Nutrition/Hydration-ADULT  Goal: Nutrient/Hydration intake appropriate for improving, restoring or maintaining nutritional needs  Description: Monitor and assess patient's nutrition/hydration status for malnutrition. Collaborate with interdisciplinary team and initiate plan and interventions as ordered. Monitor patient's weight and dietary intake as ordered or per policy. Utilize nutrition screening tool and intervene as necessary. Determine patient's food preferences and provide high-protein, high-caloric foods as appropriate.      INTERVENTIONS:  - Monitor oral intake, urinary output, labs, and treatment plans  - Assess nutrition and hydration status and recommend course of action  - Evaluate amount of meals eaten  - Assist patient with eating if necessary   - Allow adequate time for meals  - Recommend/ encourage appropriate diets, oral nutritional supplements, and vitamin/mineral supplements  - Order, calculate, and assess calorie counts as needed  - Recommend, monitor, and adjust tube feedings and TPN/PPN based on assessed needs  - Assess need for intravenous fluids  - Provide specific nutrition/hydration education as appropriate  - Include patient/family/caregiver in decisions related to nutrition  Outcome: Progressing     Problem: NEUROSENSORY - ADULT  Goal: Achieves stable or improved neurological status  Description: INTERVENTIONS  - Monitor and report changes in neurological status  - Monitor vital signs such as temperature, blood pressure, glucose, and any other labs ordered   - Initiate measures to prevent increased intracranial pressure  - Monitor for seizure activity and implement precautions if appropriate      Outcome: Progressing  Goal: Remains free of injury related to seizures activity  Description: INTERVENTIONS  - Maintain airway, patient safety  and administer oxygen as ordered  - Monitor patient for seizure activity, document and report duration and description of seizure to physician/advanced practitioner  - If seizure occurs,  ensure patient safety during seizure  - Reorient patient post seizure  - Seizure pads on all 4 side rails  - Instruct patient/family to notify RN of any seizure activity including if an aura is experienced  - Instruct patient/family to call for assistance with activity based on nursing assessment  - Administer anti-seizure medications if ordered    Outcome: Progressing  Goal: Achieves maximal functionality and self care  Description: INTERVENTIONS  - Monitor swallowing and airway patency with patient fatigue and changes in neurological status  - Encourage and assist patient to increase activity and self care.    - Encourage visually impaired, hearing impaired and aphasic patients to use assistive/communication devices  Outcome: Progressing     Problem: CARDIOVASCULAR - ADULT  Goal: Maintains optimal cardiac output and hemodynamic stability  Description: INTERVENTIONS:  - Monitor I/O, vital signs and rhythm  - Monitor for S/S and trends of decreased cardiac output  - Administer and titrate ordered vasoactive medications to optimize hemodynamic stability  - Assess quality of pulses, skin color and temperature  - Assess for signs of decreased coronary artery perfusion  - Instruct patient to report change in severity of symptoms  Outcome: Progressing  Goal: Absence of cardiac dysrhythmias or at baseline rhythm  Description: INTERVENTIONS:  - Continuous cardiac monitoring, vital signs, obtain 12 lead EKG if ordered  - Administer antiarrhythmic and heart rate control medications as ordered  - Monitor electrolytes and administer replacement therapy as ordered  Outcome: Progressing     Problem: RESPIRATORY - ADULT  Goal: Achieves optimal ventilation and oxygenation  Description: INTERVENTIONS:  - Assess for changes in respiratory status  - Assess for changes in mentation and behavior  - Position to facilitate oxygenation and minimize respiratory effort  - Oxygen administered by appropriate delivery if ordered  - Initiate smoking cessation education as indicated  - Encourage broncho-pulmonary hygiene including cough, deep breathe, Incentive Spirometry  - Assess the need for suctioning and aspirate as needed  - Assess and instruct to report SOB or any respiratory difficulty  - Respiratory Therapy support as indicated  Outcome: Progressing     Problem: GASTROINTESTINAL - ADULT  Goal: Minimal or absence of nausea and/or vomiting  Description: INTERVENTIONS:  - Administer IV fluids if ordered to ensure adequate hydration  - Maintain NPO status until nausea and vomiting are resolved  - Nasogastric tube if ordered  - Administer ordered antiemetic medications as needed  - Provide nonpharmacologic comfort measures as appropriate  - Advance diet as tolerated, if ordered  - Consider nutrition services referral to assist patient with adequate nutrition and appropriate food choices  Outcome: Progressing  Goal: Maintains or returns to baseline bowel function  Description: INTERVENTIONS:  - Assess bowel function  - Encourage oral fluids to ensure adequate hydration  - Administer IV fluids if ordered to ensure adequate hydration  - Administer ordered medications as needed  - Encourage mobilization and activity  - Consider nutritional services referral to assist patient with adequate nutrition and appropriate food choices  Outcome: Progressing  Goal: Maintains adequate nutritional intake  Description: INTERVENTIONS:  - Monitor percentage of each meal consumed  - Identify factors contributing to decreased intake, treat as appropriate  - Assist with meals as needed  - Monitor I&O, weight, and lab values if indicated  - Obtain nutrition services referral as needed  Outcome: Progressing  Goal: Oral mucous membranes remain intact  Description: INTERVENTIONS  - Assess oral mucosa and hygiene practices  - Implement preventative oral hygiene regimen  - Implement oral medicated treatments as ordered  - Initiate Nutrition services referral as needed  Outcome: Progressing     Problem: GENITOURINARY - ADULT  Goal: Maintains or returns to baseline urinary function  Description: INTERVENTIONS:  - Assess urinary function  - Encourage oral fluids to ensure adequate hydration if ordered  - Administer IV fluids as ordered to ensure adequate hydration  - Administer ordered medications as needed  - Offer frequent toileting  - Follow urinary retention protocol if ordered  Outcome: Progressing  Goal: Absence of urinary retention  Description: INTERVENTIONS:  - Assess patient’s ability to void and empty bladder  - Monitor I/O  - Bladder scan as needed  - Discuss with physician/AP medications to alleviate retention as needed  - Discuss catheterization for long term situations as appropriate  Outcome: Progressing     Problem: METABOLIC, FLUID AND ELECTROLYTES - ADULT  Goal: Electrolytes maintained within normal limits  Description: INTERVENTIONS:  - Monitor labs and assess patient for signs and symptoms of electrolyte imbalances  - Administer electrolyte replacement as ordered  - Monitor response to electrolyte replacements, including repeat lab results as appropriate  - Instruct patient on fluid and nutrition as appropriate  Outcome: Progressing  Goal: Fluid balance maintained  Description: INTERVENTIONS:  - Monitor labs   - Monitor I/O and WT  - Instruct patient on fluid and nutrition as appropriate  - Assess for signs & symptoms of volume excess or deficit  Outcome: Progressing  Goal: Glucose maintained within target range  Description: INTERVENTIONS:  - Monitor Blood Glucose as ordered  - Assess for signs and symptoms of hyperglycemia and hypoglycemia  - Administer ordered medications to maintain glucose within target range  - Assess nutritional intake and initiate nutrition service referral as needed  Outcome: Progressing     Problem: SKIN/TISSUE INTEGRITY - ADULT  Goal: Skin Integrity remains intact(Skin Breakdown Prevention)  Description: Assess:  -Perform [place] assessment every   -Clean and moisturize skin every   -Inspect skin when repositioning, toileting, and assisting with ADLS  -Assess under medical devices such as  every   -Assess extremities for adequate circulation and sensation     Bed Management:  -Have minimal linens on bed & keep smooth, unwrinkled  -Change linens as needed when moist or perspiring  -Avoid sitting or lying in one position for more than  hours while in bed  -Keep HOB at degrees     Toileting:  -Offer bedside commode  -Assess for incontinence every   -Use incontinent care products after each incontinent episode such as     Activity:  -Mobilize patient  times a day  -Encourage activity and walks on unit  -Encourage or provide ROM exercises   -Turn and reposition patient every  Hours  -Use appropriate equipment to lift or move patient in bed  -Instruct/ Assist with weight shifting every  when out of bed in chair  -Consider limitation of chair time  hour intervals    Skin Care:  -Avoid use of baby powder, tape, friction and shearing, hot water or constrictive clothing  -Relieve pressure over bony prominences using   -Do not massage red bony areas    Next Steps:  -Teach patient strategies to minimize risks such as    -Consider consults to  interdisciplinary teams such as   Outcome: Progressing  Goal: Incision(s), wounds(s) or drain site(s) healing without S/S of infection  Description: INTERVENTIONS  - Assess and document dressing, incision, wound bed, drain sites and surrounding tissue  - Provide patient and family education  - Perform skin care/dressing changes every   Outcome: Progressing     Problem: HEMATOLOGIC - ADULT  Goal: Maintains hematologic stability  Description: INTERVENTIONS  - Assess for signs and symptoms of bleeding or hemorrhage  - Monitor labs  - Administer supportive blood products/factors as ordered and appropriate  Outcome: Progressing     Problem: MUSCULOSKELETAL - ADULT  Goal: Maintain or return mobility to safest level of function  Description: INTERVENTIONS:  - Assess patient's ability to carry out ADLs; assess patient's baseline for ADL function and identify physical deficits which impact ability to perform ADLs (bathing, care of mouth/teeth, toileting, grooming, dressing, etc.)  - Assess/evaluate cause of self-care deficits   - Assess range of motion  - Assess patient's mobility  - Assess patient's need for assistive devices and provide as appropriate  - Encourage maximum independence but intervene and supervise when necessary  - Involve family in performance of ADLs  - Assess for home care needs following discharge   - Consider OT consult to assist with ADL evaluation and planning for discharge  - Provide patient education as appropriate  Outcome: Progressing     Problem: COPING  Goal: Pt/Family able to verbalize concerns and demonstrate effective coping strategies  Description: INTERVENTIONS:  - Assist patient/family to identify coping skills, available support systems and cultural and spiritual values  - Provide emotional support, including active listening and acknowledgement of concerns of patient and caregivers  - Reduce environmental stimuli, as able  - Provide patient education  - Assess for spiritual pain/suffering and initiate spiritual care, including notification of Pastoral Care or kinsey based community as needed  - Assess effectiveness of coping strategies  Outcome: Progressing  Goal: Will report anxiety at manageable levels  Description: INTERVENTIONS:  - Administer medication as ordered  - Teach and encourage coping skills  - Provide emotional support  - Assess patient/family for anxiety and ability to cope  Outcome: Progressing

## 2023-11-12 NOTE — PLAN OF CARE
Problem: Potential for Falls  Goal: Patient will remain free of falls  Description: INTERVENTIONS:  - Educate patient/family on patient safety including physical limitations  - Instruct patient to call for assistance with activity   - Consult OT/PT to assist with strengthening/mobility   - Keep Call bell within reach  - Keep bed low and locked with side rails adjusted as appropriate  - Keep care items and personal belongings within reach  - Initiate and maintain comfort rounds  - Make Fall Risk Sign visible to staff  - Offer Toileting   - Apply yellow socks and bracelet for high fall risk patients  - Consider moving patient to room near nurses station  Outcome: Progressing     Problem: MOBILITY - ADULT  Goal: Maintain or return to baseline ADL function  Description: INTERVENTIONS:  -  Assess patient's ability to carry out ADLs; assess patient's baseline for ADL function and identify physical deficits which impact ability to perform ADLs (bathing, care of mouth/teeth, toileting, grooming, dressing, etc.)  - Assess/evaluate cause of self-care deficits   - Assess range of motion  - Assess patient's mobility; develop plan if impaired  - Assess patient's need for assistive devices and provide as appropriate  - Encourage maximum independence but intervene and supervise when necessary  - Involve family in performance of ADLs  - Assess for home care needs following discharge   - Consider OT consult to assist with ADL evaluation and planning for discharge  - Provide patient education as appropriate  Outcome: Progressing  Goal: Maintains/Returns to pre admission functional level  Description: INTERVENTIONS:  - Perform BMAT or MOVE assessment daily.   - Set and communicate daily mobility goal to care team and patient/family/caregiver.    - Collaborate with rehabilitation services on mobility goals if consulted  - Reposition patient   - Out of bed for toileting  - Record patient progress and toleration of activity level Outcome: Progressing     Problem: PAIN - ADULT  Goal: Verbalizes/displays adequate comfort level or baseline comfort level  Description: Interventions:  - Encourage patient to monitor pain and request assistance  - Assess pain using appropriate pain scale  - Administer analgesics based on type and severity of pain and evaluate response  - Implement non-pharmacological measures as appropriate and evaluate response  - Consider cultural and social influences on pain and pain management  - Notify physician/advanced practitioner if interventions unsuccessful or patient reports new pain  Outcome: Progressing     Problem: INFECTION - ADULT  Goal: Absence or prevention of progression during hospitalization  Description: INTERVENTIONS:  - Assess and monitor for signs and symptoms of infection  - Monitor lab/diagnostic results  - Monitor all insertion sites, i.e. indwelling lines, tubes, and drains  - Monitor endotracheal if appropriate and nasal secretions for changes in amount and color  - [place] appropriate cooling/warming therapies per order  - Administer medications as ordered  - Instruct and encourage patient and family to use good hand hygiene technique  - Identify and instruct in appropriate isolation precautions for identified infection/condition  Outcome: Progressing  Goal: Absence of fever/infection during neutropenic period  Description: INTERVENTIONS:  - Monitor WBC    Outcome: Progressing     Problem: SAFETY ADULT  Goal: Patient will remain free of falls  Description: INTERVENTIONS:  - Educate patient/family on patient safety including physical limitations  - Instruct patient to call for assistance with activity   - Consult OT/PT to assist with strengthening/mobility   - Keep Call bell within reach  - Keep bed low and locked with side rails adjusted as appropriate  - Keep care items and personal belongings within reach  - Initiate and maintain comfort rounds  - Make Fall Risk Sign visible to staff  - Offer Toileting   - Apply yellow socks and bracelet for high fall risk patients  - Consider moving patient to room near nurses station  Outcome: Progressing  Goal: Maintain or return to baseline ADL function  Description: INTERVENTIONS:  -  Assess patient's ability to carry out ADLs; assess patient's baseline for ADL function and identify physical deficits which impact ability to perform ADLs (bathing, care of mouth/teeth, toileting, grooming, dressing, etc.)  - Assess/evaluate cause of self-care deficits   - Assess range of motion  - Assess patient's mobility; develop plan if impaired  - Assess patient's need for assistive devices and provide as appropriate  - Encourage maximum independence but intervene and supervise when necessary  - Involve family in performance of ADLs  - Assess for home care needs following discharge   - Consider OT consult to assist with ADL evaluation and planning for discharge  - Provide patient education as appropriate  Outcome: Progressing  Goal: Maintains/Returns to pre admission functional level  Description: INTERVENTIONS:  - Perform BMAT or MOVE assessment daily.   - Set and communicate daily mobility goal to care team and patient/family/caregiver.    - Collaborate with rehabilitation services on mobility goals if consulted  - Out of bed for toileting  - Record patient progress and toleration of activity level   Outcome: Progressing     Problem: DISCHARGE PLANNING  Goal: Discharge to home or other facility with appropriate resources  Description: INTERVENTIONS:  - Identify barriers to discharge w/patient and caregiver  - Arrange for needed discharge resources and transportation as appropriate  - Identify discharge learning needs (meds, wound care, etc.)  - Arrange for interpretive services to assist at discharge as needed  - Refer to Case Management Department for coordinating discharge planning if the patient needs post-hospital services based on physician/advanced practitioner order or complex needs related to functional status, cognitive ability, or social support system  Outcome: Progressing     Problem: Knowledge Deficit  Goal: Patient/family/caregiver demonstrates understanding of disease process, treatment plan, medications, and discharge instructions  Description: Complete learning assessment and assess knowledge base. Interventions:  - Provide teaching at level of understanding  - Provide teaching via preferred learning methods  Outcome: Progressing     Problem: Nutrition/Hydration-ADULT  Goal: Nutrient/Hydration intake appropriate for improving, restoring or maintaining nutritional needs  Description: Monitor and assess patient's nutrition/hydration status for malnutrition. Collaborate with interdisciplinary team and initiate plan and interventions as ordered. Monitor patient's weight and dietary intake as ordered or per policy. Utilize nutrition screening tool and intervene as necessary. Determine patient's food preferences and provide high-protein, high-caloric foods as appropriate.      INTERVENTIONS:  - Monitor oral intake, urinary output, labs, and treatment plans  - Assess nutrition and hydration status and recommend course of action  - Evaluate amount of meals eaten  - Assist patient with eating if necessary   - Allow adequate time for meals  - Recommend/ encourage appropriate diets, oral nutritional supplements, and vitamin/mineral supplements  - Order, calculate, and assess calorie counts as needed  - Recommend, monitor, and adjust tube feedings and TPN/PPN based on assessed needs  - Assess need for intravenous fluids  - Provide specific nutrition/hydration education as appropriate  - Include patient/family/caregiver in decisions related to nutrition  Outcome: Progressing     Problem: NEUROSENSORY - ADULT  Goal: Achieves stable or improved neurological status  Description: INTERVENTIONS  - Monitor and report changes in neurological status  - Monitor vital signs such as temperature, blood pressure, glucose, and any other labs ordered   - Initiate measures to prevent increased intracranial pressure  - Monitor for seizure activity and implement precautions if appropriate      Outcome: Progressing  Goal: Remains free of injury related to seizures activity  Description: INTERVENTIONS  - Maintain airway, patient safety  and administer oxygen as ordered  - Monitor patient for seizure activity, document and report duration and description of seizure to physician/advanced practitioner  - If seizure occurs,  ensure patient safety during seizure  - Reorient patient post seizure  - Seizure pads on all 4 side rails  - Instruct patient/family to notify RN of any seizure activity including if an aura is experienced  - Instruct patient/family to call for assistance with activity based on nursing assessment  - Administer anti-seizure medications if ordered    Outcome: Progressing  Goal: Achieves maximal functionality and self care  Description: INTERVENTIONS  - Monitor swallowing and airway patency with patient fatigue and changes in neurological status  - Encourage and assist patient to increase activity and self care.    - Encourage visually impaired, hearing impaired and aphasic patients to use assistive/communication devices  Outcome: Progressing     Problem: CARDIOVASCULAR - ADULT  Goal: Maintains optimal cardiac output and hemodynamic stability  Description: INTERVENTIONS:  - Monitor I/O, vital signs and rhythm  - Monitor for S/S and trends of decreased cardiac output  - Administer and titrate ordered vasoactive medications to optimize hemodynamic stability  - Assess quality of pulses, skin color and temperature  - Assess for signs of decreased coronary artery perfusion  - Instruct patient to report change in severity of symptoms  Outcome: Progressing  Goal: Absence of cardiac dysrhythmias or at baseline rhythm  Description: INTERVENTIONS:  - Continuous cardiac monitoring, vital signs, obtain 12 lead EKG if ordered  - Administer antiarrhythmic and heart rate control medications as ordered  - Monitor electrolytes and administer replacement therapy as ordered  Outcome: Progressing     Problem: RESPIRATORY - ADULT  Goal: Achieves optimal ventilation and oxygenation  Description: INTERVENTIONS:  - Assess for changes in respiratory status  - Assess for changes in mentation and behavior  - Position to facilitate oxygenation and minimize respiratory effort  - Oxygen administered by appropriate delivery if ordered  - Initiate smoking cessation education as indicated  - Encourage broncho-pulmonary hygiene including cough, deep breathe, Incentive Spirometry  - Assess the need for suctioning and aspirate as needed  - Assess and instruct to report SOB or any respiratory difficulty  - Respiratory Therapy support as indicated  Outcome: Progressing     Problem: GASTROINTESTINAL - ADULT  Goal: Minimal or absence of nausea and/or vomiting  Description: INTERVENTIONS:  - Administer IV fluids if ordered to ensure adequate hydration  - Maintain NPO status until nausea and vomiting are resolved  - Nasogastric tube if ordered  - Administer ordered antiemetic medications as needed  - Provide nonpharmacologic comfort measures as appropriate  - Advance diet as tolerated, if ordered  - Consider nutrition services referral to assist patient with adequate nutrition and appropriate food choices  Outcome: Progressing  Goal: Maintains or returns to baseline bowel function  Description: INTERVENTIONS:  - Assess bowel function  - Encourage oral fluids to ensure adequate hydration  - Administer IV fluids if ordered to ensure adequate hydration  - Administer ordered medications as needed  - Encourage mobilization and activity  - Consider nutritional services referral to assist patient with adequate nutrition and appropriate food choices  Outcome: Progressing  Goal: Maintains adequate nutritional intake  Description: INTERVENTIONS:  - Monitor percentage of each meal consumed  - Identify factors contributing to decreased intake, treat as appropriate  - Assist with meals as needed  - Monitor I&O, weight, and lab values if indicated  - Obtain nutrition services referral as needed  Outcome: Progressing  Goal: Oral mucous membranes remain intact  Description: INTERVENTIONS  - Assess oral mucosa and hygiene practices  - Implement preventative oral hygiene regimen  - Implement oral medicated treatments as ordered  - Initiate Nutrition services referral as needed  Outcome: Progressing     Problem: GENITOURINARY - ADULT  Goal: Maintains or returns to baseline urinary function  Description: INTERVENTIONS:  - Assess urinary function  - Encourage oral fluids to ensure adequate hydration if ordered  - Administer IV fluids as ordered to ensure adequate hydration  - Administer ordered medications as needed  - Offer frequent toileting  - Follow urinary retention protocol if ordered  Outcome: Progressing  Goal: Absence of urinary retention  Description: INTERVENTIONS:  - Assess patient’s ability to void and empty bladder  - Monitor I/O  - Bladder scan as needed  - Discuss with physician/AP medications to alleviate retention as needed  - Discuss catheterization for long term situations as appropriate  Outcome: Progressing     Problem: METABOLIC, FLUID AND ELECTROLYTES - ADULT  Goal: Electrolytes maintained within normal limits  Description: INTERVENTIONS:  - Monitor labs and assess patient for signs and symptoms of electrolyte imbalances  - Administer electrolyte replacement as ordered  - Monitor response to electrolyte replacements, including repeat lab results as appropriate  - Instruct patient on fluid and nutrition as appropriate  Outcome: Progressing  Goal: Fluid balance maintained  Description: INTERVENTIONS:  - Monitor labs   - Monitor I/O and WT  - Instruct patient on fluid and nutrition as appropriate  - Assess for signs & symptoms of volume excess or deficit  Outcome: Progressing  Goal: Glucose maintained within target range  Description: INTERVENTIONS:  - Monitor Blood Glucose as ordered  - Assess for signs and symptoms of hyperglycemia and hypoglycemia  - Administer ordered medications to maintain glucose within target range  - Assess nutritional intake and initiate nutrition service referral as needed  Outcome: Progressing     Problem: SKIN/TISSUE INTEGRITY - ADULT  Goal: Skin Integrity remains intact(Skin Breakdown Prevention)  Description: Assess:  -Perform Marquis assessment   -Inspect skin when repositioning, toileting, and assisting with ADLS  -Assess under medical devices  -Assess extremities for adequate circulation and sensation     Bed Management:  -Have minimal linens on bed & keep smooth, unwrinkled  -Change linens as needed when moist or perspiring  -Avoid sitting or lying in one position     Toileting:  -Offer bedside commode  -Assess for incontinence   -Use incontinent care products after each incontinent episode   Activity:  -Mobilize patient   -Encourage activity and walks on unit  -Encourage or provide ROM exercises   -Turn and reposition patient  -Use appropriate equipment to lift or move patient in bed  -Instruct/ Assist with weight shifting     Skin Care:  -Avoid use of baby powder, tape, friction and shearing, hot water or constrictive clothing  -Relieve pressure over bony prominences   -Do not massage red bony areas    Next Steps:  -Teach patient strategies to minimize risks   Outcome: Progressing  Goal: Incision(s), wounds(s) or drain site(s) healing without S/S of infection  Description: INTERVENTIONS  - Assess and document dressing, incision, wound bed, drain sites and surrounding tissue  - Provide patient and family education  - Perform skin care/dressing changes   Outcome: Progressing     Problem: HEMATOLOGIC - ADULT  Goal: Maintains hematologic stability  Description: INTERVENTIONS  - Assess for signs and symptoms of bleeding or hemorrhage  - Monitor labs  - Administer supportive blood products/factors as ordered and appropriate  Outcome: Progressing     Problem: MUSCULOSKELETAL - ADULT  Goal: Maintain or return mobility to safest level of function  Description: INTERVENTIONS:  - Assess patient's ability to carry out ADLs; assess patient's baseline for ADL function and identify physical deficits which impact ability to perform ADLs (bathing, care of mouth/teeth, toileting, grooming, dressing, etc.)  - Assess/evaluate cause of self-care deficits   - Assess range of motion  - Assess patient's mobility  - Assess patient's need for assistive devices and provide as appropriate  - Encourage maximum independence but intervene and supervise when necessary  - Involve family in performance of ADLs  - Assess for home care needs following discharge   - Consider OT consult to assist with ADL evaluation and planning for discharge  - Provide patient education as appropriate  Outcome: Progressing     Problem: COPING  Goal: Pt/Family able to verbalize concerns and demonstrate effective coping strategies  Description: INTERVENTIONS:  - Assist patient/family to identify coping skills, available support systems and cultural and spiritual values  - Provide emotional support, including active listening and acknowledgement of concerns of patient and caregivers  - Reduce environmental stimuli, as able  - Provide patient education  - Assess for spiritual pain/suffering and initiate spiritual care, including notification of Pastoral Care or kinsey based community as needed  - Assess effectiveness of coping strategies  Outcome: Progressing  Goal: Will report anxiety at manageable levels  Description: INTERVENTIONS:  - Administer medication as ordered  - Teach and encourage coping skills  - Provide emotional support  - Assess patient/family for anxiety and ability to cope  Outcome: Progressing

## 2023-11-12 NOTE — ASSESSMENT & PLAN NOTE
Patient presents with frontal scalp hematoma  - Minimal pain on palpation, fluctuant  -Patient has bilateral periorbital hematoma, reports it is expanding/worsening  -No vision changes  -- Headaches  -- GCS 15, nonfocal Safety Risk - Non-Violent Restraints    • Patient will remain free from self-harm Not Progressing          Diabetes/Glucose Control    • Glucose maintained within prescribed range Progressing        Patient/Family Goals    • Patient/Family Long Term Goal P

## 2023-11-12 NOTE — PROGRESS NOTES
[address] [facility]  Progress Note  Name: Chris Neumann  MRN: [id number]  [facility] Date of Admission: 11/8/2023   Date of Service: 11/12/2023 I Hospital Day: 4    Assessment/Plan   Scalp hematoma, initial encounter  Assessment & Plan  Patient presents with frontal scalp hematoma  - Minimal pain on palpation, fluctuant  -Patient has bilateral periorbital hematoma, reports it is expanding/worsening  -No vision changes  -- Headaches  -- GCS 15, nonfocal    Fall  Assessment & Plan  Patient presents status post fall, reports episodes of vertigo for months prior to fall  - Patient reports fall due to disorientation from darkness  - Patient currently on walker, able to do all activities of daily living at baseline  - Patient currently lives alone at home  - Geriatrics consult placed    Hypertension, essential  Assessment & Plan  - Hold home valsartan-HCTZ 160-25 mg indefinitely as syncope and collapse believed to be due to hypotension from antihypertensives  - Orthostatic BP completed and borderline negative  - Encourage PO intake      Major depression, recurrent, chronic ([address])  Assessment & Plan  Patient reports being currently depressed, has been depressed since the death of her  2 years ago.   Does not currently want to live anymore, denies suicidal intent  - Patient being managed outpatient with psychiatry  - Psych consult placed and no further intervention recommended at this time  - Continue home Cymbalta meds  -- No continuous observation  -- No inpatient behavioral health placement  -- Follow up with Psychiatrist outpatient    Headache, chronic migraine without aura  Assessment & Plan  - Chronic history of migraines  - Pt reports headaches are more frequent since injury  - Continue home Ubrelvy in addition to home oxycodone 5 mg for severe pain  - Schedule tylenol  - PRN Oxy 2.5/5 mg for moderate to severe pain    * Epidural hemorrhage without loss of consciousness [facility])  Assessment & Plan  Patient is status post fall on Saturday, noticed scalp hematoma and bilateral orbital hematomas on subsequent days  - Neuro exam: GCS 15, non-focal  - Continue neurologic checks: Every 4 hours. - Reversal agent administered: DDAVP  - CT scan of the head on 11/9 reviewed: stable  - Appreciate Neurosurgery evaluation and recommendations. - Complete 7 day course of Keppra for seizure prophylaxis  - Chemical DVT prophylaxis: Lovenox  - Hold all anticoagulants and anti platelet medications for 2 weeks and/or until cleared by Neurosurgery to resume.  - PT and OT (including cognitive evaluation) evaluation and treatment as indicated. Bowel Regimen: Senna, MiraLAX, Dulcolax  VTE Prophylaxis:Enoxaparin (Lovenox)     Disposition: Pending placement    Subjective   Chief Complaint: Headache    Subjective: This morning patient reports continued headaches, although noticed mild improvements today. She has also felt some relief since receiving pain medications 1 hour ago. She denies other complaints. Objective   Vitals:   Temp:  [98.3 °F (36.8 °C)-98.7 °F (37.1 °C)] 98.5 °F (36.9 °C)  HR:  [69-85] 69  Resp:  [16-18] 18  BP: ()/(51-66) 101/57    I/O         11/10 0701  11/11 0700 11/11 0701  11/12 0700 11/12 0701  11/13 0700    P. O. 450 236     IV Piggyback       Total Intake(mL/kg) 450 (6.3) 236 (3.3)     Net +450 +236            Unmeasured Urine Occurrence 1 x 3 x              Physical Exam:   GENERAL APPEARANCE: No acute distress  NEURO: Awake and alert  HEENT: Facial ecchymosis  CV: RRR  LUNGS: CTAB  GI: Nondistended, nontender  MSK: No pitting edema  SKIN: Warm and dry    Invasive Devices       Peripheral Intravenous Line  Duration             Peripheral IV 11/08/23 Right;Ventral (anterior) Wrist 3 days                          Lab Results: Results: I have personally reviewed all pertinent laboratory/tests results  Imaging: I have personally reviewed pertinent reports.      Other Studies: N/A

## 2023-11-13 VITALS
DIASTOLIC BLOOD PRESSURE: 59 MMHG | RESPIRATION RATE: 18 BRPM | WEIGHT: 161.16 LBS | HEART RATE: 72 BPM | OXYGEN SATURATION: 95 % | SYSTOLIC BLOOD PRESSURE: 98 MMHG | TEMPERATURE: 98.2 F | HEIGHT: 66 IN | BODY MASS INDEX: 25.9 KG/M2

## 2023-11-13 LAB
ANION GAP SERPL CALCULATED.3IONS-SCNC: 7 MMOL/L
BUN SERPL-MCNC: 9 MG/DL (ref 5–25)
CALCIUM SERPL-MCNC: 9.8 MG/DL (ref 8.4–10.2)
CHLORIDE SERPL-SCNC: 102 MMOL/L (ref 96–108)
CO2 SERPL-SCNC: 30 MMOL/L (ref 21–32)
CREAT SERPL-MCNC: 0.87 MG/DL (ref 0.6–1.3)
ERYTHROCYTE [DISTWIDTH] IN BLOOD BY AUTOMATED COUNT: 13.7 % (ref 11.6–15.1)
GFR SERPL CREATININE-BSD FRML MDRD: 68 ML/MIN/1.73SQ M
GLUCOSE SERPL-MCNC: 128 MG/DL (ref 65–140)
GLUCOSE SERPL-MCNC: 83 MG/DL (ref 65–140)
GLUCOSE SERPL-MCNC: 83 MG/DL (ref 65–140)
HCT VFR BLD AUTO: 33.5 % (ref 34.8–46.1)
HGB BLD-MCNC: 10.7 G/DL (ref 11.5–15.4)
MAGNESIUM SERPL-MCNC: 1.7 MG/DL (ref 1.9–2.7)
MCH RBC QN AUTO: 29.9 PG (ref 26.8–34.3)
MCHC RBC AUTO-ENTMCNC: 31.9 G/DL (ref 31.4–37.4)
MCV RBC AUTO: 94 FL (ref 82–98)
PLATELET # BLD AUTO: 292 THOUSANDS/UL (ref 149–390)
PMV BLD AUTO: 9.3 FL (ref 8.9–12.7)
POTASSIUM SERPL-SCNC: 4 MMOL/L (ref 3.5–5.3)
RBC # BLD AUTO: 3.58 MILLION/UL (ref 3.81–5.12)
SARS-COV-2 RNA RESP QL NAA+PROBE: NEGATIVE
SODIUM SERPL-SCNC: 139 MMOL/L (ref 135–147)
WBC # BLD AUTO: 5.32 THOUSAND/UL (ref 4.31–10.16)

## 2023-11-13 PROCEDURE — 82948 REAGENT STRIP/BLOOD GLUCOSE: CPT

## 2023-11-13 PROCEDURE — 85027 COMPLETE CBC AUTOMATED: CPT

## 2023-11-13 PROCEDURE — 83735 ASSAY OF MAGNESIUM: CPT

## 2023-11-13 PROCEDURE — 99238 HOSP IP/OBS DSCHRG MGMT 30/<: CPT | Performed by: STUDENT IN AN ORGANIZED HEALTH CARE EDUCATION/TRAINING PROGRAM

## 2023-11-13 PROCEDURE — 80048 BASIC METABOLIC PNL TOTAL CA: CPT

## 2023-11-13 PROCEDURE — 87635 SARS-COV-2 COVID-19 AMP PRB: CPT | Performed by: PHYSICIAN ASSISTANT

## 2023-11-13 RX ORDER — CLONAZEPAM 0.5 MG/1
0.25 TABLET ORAL 3 TIMES DAILY PRN
Qty: 6 TABLET | Refills: 0 | Status: ON HOLD | OUTPATIENT
Start: 2023-11-13 | End: 2023-11-23

## 2023-11-13 RX ORDER — ACETAMINOPHEN 325 MG/1
975 TABLET ORAL EVERY 8 HOURS SCHEDULED
Refills: 0 | Status: ON HOLD
Start: 2023-11-13

## 2023-11-13 RX ORDER — OXYCODONE HYDROCHLORIDE 5 MG/1
5 TABLET ORAL EVERY 6 HOURS PRN
Qty: 5 TABLET | Refills: 0 | Status: ON HOLD | OUTPATIENT
Start: 2023-11-13 | End: 2023-11-23

## 2023-11-13 RX ORDER — LEVETIRACETAM 500 MG/1
500 TABLET ORAL EVERY 12 HOURS SCHEDULED
Qty: 5 TABLET | Refills: 0 | Status: ON HOLD
Start: 2023-11-13 | End: 2023-11-16

## 2023-11-13 RX ORDER — AMOXICILLIN 250 MG
2 CAPSULE ORAL 2 TIMES DAILY
Refills: 0 | Status: ON HOLD
Start: 2023-11-13

## 2023-11-13 RX ADMIN — BUPROPION HYDROCHLORIDE 150 MG: 150 TABLET, FILM COATED, EXTENDED RELEASE ORAL at 09:10

## 2023-11-13 RX ADMIN — CLONAZEPAM 0.25 MG: 0.5 TABLET ORAL at 06:01

## 2023-11-13 RX ADMIN — PANTOPRAZOLE SODIUM 20 MG: 20 TABLET, DELAYED RELEASE ORAL at 05:49

## 2023-11-13 RX ADMIN — OXYCODONE HYDROCHLORIDE 5 MG: 5 TABLET ORAL at 05:49

## 2023-11-13 RX ADMIN — CLONAZEPAM 0.25 MG: 0.5 TABLET ORAL at 13:08

## 2023-11-13 RX ADMIN — LEVETIRACETAM 500 MG: 500 TABLET, FILM COATED ORAL at 09:11

## 2023-11-13 RX ADMIN — DULOXETINE HYDROCHLORIDE 30 MG: 30 CAPSULE, DELAYED RELEASE ORAL at 09:09

## 2023-11-13 RX ADMIN — CHLORHEXIDINE GLUCONATE 15 ML: 1.2 SOLUTION ORAL at 09:11

## 2023-11-13 RX ADMIN — BUSPIRONE HYDROCHLORIDE 20 MG: 10 TABLET ORAL at 09:10

## 2023-11-13 RX ADMIN — ACETAMINOPHEN 975 MG: 325 TABLET, FILM COATED ORAL at 05:50

## 2023-11-13 RX ADMIN — ACETAMINOPHEN 975 MG: 325 TABLET, FILM COATED ORAL at 13:08

## 2023-11-13 RX ADMIN — SENNOSIDES, DOCUSATE SODIUM 2 TABLET: 8.6; 5 TABLET ORAL at 09:09

## 2023-11-13 RX ADMIN — OXYCODONE HYDROCHLORIDE 5 MG: 5 TABLET ORAL at 13:08

## 2023-11-13 RX ADMIN — ENOXAPARIN SODIUM 30 MG: 30 INJECTION SUBCUTANEOUS at 09:10

## 2023-11-13 NOTE — PLAN OF CARE
Problem: Potential for Falls  Goal: Patient will remain free of falls  Description: INTERVENTIONS:  - Educate patient/family on patient safety including physical limitations  - Instruct patient to call for assistance with activity   - Consult OT/PT to assist with strengthening/mobility   - Keep Call bell within reach  - Keep bed low and locked with side rails adjusted as appropriate  - Keep care items and personal belongings within reach  - Initiate and maintain comfort rounds  - Make Fall Risk Sign visible to staff  - Offer Toileting every 2 Hours, in advance of need  - Initiate/Maintain bed alarm  - Obtain necessary fall risk management equipment:   - Apply yellow socks and bracelet for high fall risk patients  - Consider moving patient to room near nurses station  Outcome: Progressing     Problem: MOBILITY - ADULT  Goal: Maintain or return to baseline ADL function  Description: INTERVENTIONS:  -  Assess patient's ability to carry out ADLs; assess patient's baseline for ADL function and identify physical deficits which impact ability to perform ADLs (bathing, care of mouth/teeth, toileting, grooming, dressing, etc.)  - Assess/evaluate cause of self-care deficits   - Assess range of motion  - Assess patient's mobility; develop plan if impaired  - Assess patient's need for assistive devices and provide as appropriate  - Encourage maximum independence but intervene and supervise when necessary  - Involve family in performance of ADLs  - Assess for home care needs following discharge   - Consider OT consult to assist with ADL evaluation and planning for discharge  - Provide patient education as appropriate  Outcome: Progressing  Goal: Maintains/Returns to pre admission functional level  Description: INTERVENTIONS:  - Perform BMAT or MOVE assessment daily.   - Set and communicate daily mobility goal to care team and patient/family/caregiver.    - Collaborate with rehabilitation services on mobility goals if consulted  - Perform Range of Motion 3 times a day. - Reposition patient every 2 hours.   - Dangle patient 3 times a day  - Stand patient 3 times a day  - Ambulate patient 3 times a day  - Out of bed to chair 3 times a day   - Out of bed for meals 3 times a day  - Out of bed for toileting  - Record patient progress and toleration of activity level   Outcome: Progressing     Problem: PAIN - ADULT  Goal: Verbalizes/displays adequate comfort level or baseline comfort level  Description: Interventions:  - Encourage patient to monitor pain and request assistance  - Assess pain using appropriate pain scale  - Administer analgesics based on type and severity of pain and evaluate response  - Implement non-pharmacological measures as appropriate and evaluate response  - Consider cultural and social influences on pain and pain management  - Notify physician/advanced practitioner if interventions unsuccessful or patient reports new pain  Outcome: Progressing     Problem: INFECTION - ADULT  Goal: Absence or prevention of progression during hospitalization  Description: INTERVENTIONS:  - Assess and monitor for signs and symptoms of infection  - Monitor lab/diagnostic results  - Monitor all insertion sites, i.e. indwelling lines, tubes, and drains  - Monitor endotracheal if appropriate and nasal secretions for changes in amount and color  - Baldwin appropriate cooling/warming therapies per order  - Administer medications as ordered  - Instruct and encourage patient and family to use good hand hygiene technique  - Identify and instruct in appropriate isolation precautions for identified infection/condition  Outcome: Progressing  Goal: Absence of fever/infection during neutropenic period  Description: INTERVENTIONS:  - Monitor WBC    Outcome: Progressing     Problem: SAFETY ADULT  Goal: Patient will remain free of falls  Description: INTERVENTIONS:  - Educate patient/family on patient safety including physical limitations  - Instruct patient to call for assistance with activity   - Consult OT/PT to assist with strengthening/mobility   - Keep Call bell within reach  - Keep bed low and locked with side rails adjusted as appropriate  - Keep care items and personal belongings within reach  - Initiate and maintain comfort rounds  - Make Fall Risk Sign visible to staff  - Offer Toileting every 2 Hours, in advance of need  - Initiate/Maintain bed alarm  - Obtain necessary fall risk management equipment:   - Apply yellow socks and bracelet for high fall risk patients  - Consider moving patient to room near nurses station  Outcome: Progressing  Goal: Maintain or return to baseline ADL function  Description: INTERVENTIONS:  -  Assess patient's ability to carry out ADLs; assess patient's baseline for ADL function and identify physical deficits which impact ability to perform ADLs (bathing, care of mouth/teeth, toileting, grooming, dressing, etc.)  - Assess/evaluate cause of self-care deficits   - Assess range of motion  - Assess patient's mobility; develop plan if impaired  - Assess patient's need for assistive devices and provide as appropriate  - Encourage maximum independence but intervene and supervise when necessary  - Involve family in performance of ADLs  - Assess for home care needs following discharge   - Consider OT consult to assist with ADL evaluation and planning for discharge  - Provide patient education as appropriate  Outcome: Progressing  Goal: Maintains/Returns to pre admission functional level  Description: INTERVENTIONS:  - Perform BMAT or MOVE assessment daily.   - Set and communicate daily mobility goal to care team and patient/family/caregiver. - Collaborate with rehabilitation services on mobility goals if consulted  - Perform Range of Motion 3 times a day. - Reposition patient every 2 hours.   - Dangle patient 3 times a day  - Stand patient 3 times a day  - Ambulate patient 3 times a day  - Out of bed to chair 3 times a day   - Out of bed for meals 3 times a day  - Out of bed for toileting  - Record patient progress and toleration of activity level   Outcome: Progressing     Problem: DISCHARGE PLANNING  Goal: Discharge to home or other facility with appropriate resources  Description: INTERVENTIONS:  - Identify barriers to discharge w/patient and caregiver  - Arrange for needed discharge resources and transportation as appropriate  - Identify discharge learning needs (meds, wound care, etc.)  - Arrange for interpretive services to assist at discharge as needed  - Refer to Case Management Department for coordinating discharge planning if the patient needs post-hospital services based on physician/advanced practitioner order or complex needs related to functional status, cognitive ability, or social support system  Outcome: Progressing     Problem: Knowledge Deficit  Goal: Patient/family/caregiver demonstrates understanding of disease process, treatment plan, medications, and discharge instructions  Description: Complete learning assessment and assess knowledge base. Interventions:  - Provide teaching at level of understanding  - Provide teaching via preferred learning methods  Outcome: Progressing     Problem: Nutrition/Hydration-ADULT  Goal: Nutrient/Hydration intake appropriate for improving, restoring or maintaining nutritional needs  Description: Monitor and assess patient's nutrition/hydration status for malnutrition. Collaborate with interdisciplinary team and initiate plan and interventions as ordered. Monitor patient's weight and dietary intake as ordered or per policy. Utilize nutrition screening tool and intervene as necessary. Determine patient's food preferences and provide high-protein, high-caloric foods as appropriate.      INTERVENTIONS:  - Monitor oral intake, urinary output, labs, and treatment plans  - Assess nutrition and hydration status and recommend course of action  - Evaluate amount of meals eaten  - Assist patient with eating if necessary   - Allow adequate time for meals  - Recommend/ encourage appropriate diets, oral nutritional supplements, and vitamin/mineral supplements  - Order, calculate, and assess calorie counts as needed  - Recommend, monitor, and adjust tube feedings and TPN/PPN based on assessed needs  - Assess need for intravenous fluids  - Provide specific nutrition/hydration education as appropriate  - Include patient/family/caregiver in decisions related to nutrition  Outcome: Progressing     Problem: NEUROSENSORY - ADULT  Goal: Achieves stable or improved neurological status  Description: INTERVENTIONS  - Monitor and report changes in neurological status  - Monitor vital signs such as temperature, blood pressure, glucose, and any other labs ordered   - Initiate measures to prevent increased intracranial pressure  - Monitor for seizure activity and implement precautions if appropriate      Outcome: Progressing  Goal: Remains free of injury related to seizures activity  Description: INTERVENTIONS  - Maintain airway, patient safety  and administer oxygen as ordered  - Monitor patient for seizure activity, document and report duration and description of seizure to physician/advanced practitioner  - If seizure occurs,  ensure patient safety during seizure  - Reorient patient post seizure  - Seizure pads on all 4 side rails  - Instruct patient/family to notify RN of any seizure activity including if an aura is experienced  - Instruct patient/family to call for assistance with activity based on nursing assessment  - Administer anti-seizure medications if ordered    Outcome: Progressing  Goal: Achieves maximal functionality and self care  Description: INTERVENTIONS  - Monitor swallowing and airway patency with patient fatigue and changes in neurological status  - Encourage and assist patient to increase activity and self care.    - Encourage visually impaired, hearing impaired and aphasic patients to use assistive/communication devices  Outcome: Progressing     Problem: CARDIOVASCULAR - ADULT  Goal: Maintains optimal cardiac output and hemodynamic stability  Description: INTERVENTIONS:  - Monitor I/O, vital signs and rhythm  - Monitor for S/S and trends of decreased cardiac output  - Administer and titrate ordered vasoactive medications to optimize hemodynamic stability  - Assess quality of pulses, skin color and temperature  - Assess for signs of decreased coronary artery perfusion  - Instruct patient to report change in severity of symptoms  Outcome: Progressing  Goal: Absence of cardiac dysrhythmias or at baseline rhythm  Description: INTERVENTIONS:  - Continuous cardiac monitoring, vital signs, obtain 12 lead EKG if ordered  - Administer antiarrhythmic and heart rate control medications as ordered  - Monitor electrolytes and administer replacement therapy as ordered  Outcome: Progressing     Problem: RESPIRATORY - ADULT  Goal: Achieves optimal ventilation and oxygenation  Description: INTERVENTIONS:  - Assess for changes in respiratory status  - Assess for changes in mentation and behavior  - Position to facilitate oxygenation and minimize respiratory effort  - Oxygen administered by appropriate delivery if ordered  - Initiate smoking cessation education as indicated  - Encourage broncho-pulmonary hygiene including cough, deep breathe, Incentive Spirometry  - Assess the need for suctioning and aspirate as needed  - Assess and instruct to report SOB or any respiratory difficulty  - Respiratory Therapy support as indicated  Outcome: Progressing     Problem: GASTROINTESTINAL - ADULT  Goal: Minimal or absence of nausea and/or vomiting  Description: INTERVENTIONS:  - Administer IV fluids if ordered to ensure adequate hydration  - Maintain NPO status until nausea and vomiting are resolved  - Nasogastric tube if ordered  - Administer ordered antiemetic medications as needed  - Provide nonpharmacologic comfort measures as appropriate  - Advance diet as tolerated, if ordered  - Consider nutrition services referral to assist patient with adequate nutrition and appropriate food choices  Outcome: Progressing  Goal: Maintains or returns to baseline bowel function  Description: INTERVENTIONS:  - Assess bowel function  - Encourage oral fluids to ensure adequate hydration  - Administer IV fluids if ordered to ensure adequate hydration  - Administer ordered medications as needed  - Encourage mobilization and activity  - Consider nutritional services referral to assist patient with adequate nutrition and appropriate food choices  Outcome: Progressing  Goal: Maintains adequate nutritional intake  Description: INTERVENTIONS:  - Monitor percentage of each meal consumed  - Identify factors contributing to decreased intake, treat as appropriate  - Assist with meals as needed  - Monitor I&O, weight, and lab values if indicated  - Obtain nutrition services referral as needed  Outcome: Progressing  Goal: Oral mucous membranes remain intact  Description: INTERVENTIONS  - Assess oral mucosa and hygiene practices  - Implement preventative oral hygiene regimen  - Implement oral medicated treatments as ordered  - Initiate Nutrition services referral as needed  Outcome: Progressing     Problem: GENITOURINARY - ADULT  Goal: Maintains or returns to baseline urinary function  Description: INTERVENTIONS:  - Assess urinary function  - Encourage oral fluids to ensure adequate hydration if ordered  - Administer IV fluids as ordered to ensure adequate hydration  - Administer ordered medications as needed  - Offer frequent toileting  - Follow urinary retention protocol if ordered  Outcome: Progressing  Goal: Absence of urinary retention  Description: INTERVENTIONS:  - Assess patient’s ability to void and empty bladder  - Monitor I/O  - Bladder scan as needed  - Discuss with physician/AP medications to alleviate retention as needed  - Discuss catheterization for long term situations as appropriate  Outcome: Progressing     Problem: METABOLIC, FLUID AND ELECTROLYTES - ADULT  Goal: Electrolytes maintained within normal limits  Description: INTERVENTIONS:  - Monitor labs and assess patient for signs and symptoms of electrolyte imbalances  - Administer electrolyte replacement as ordered  - Monitor response to electrolyte replacements, including repeat lab results as appropriate  - Instruct patient on fluid and nutrition as appropriate  Outcome: Progressing  Goal: Fluid balance maintained  Description: INTERVENTIONS:  - Monitor labs   - Monitor I/O and WT  - Instruct patient on fluid and nutrition as appropriate  - Assess for signs & symptoms of volume excess or deficit  Outcome: Progressing  Goal: Glucose maintained within target range  Description: INTERVENTIONS:  - Monitor Blood Glucose as ordered  - Assess for signs and symptoms of hyperglycemia and hypoglycemia  - Administer ordered medications to maintain glucose within target range  - Assess nutritional intake and initiate nutrition service referral as needed  Outcome: Progressing        Problem: HEMATOLOGIC - ADULT  Goal: Maintains hematologic stability  Description: INTERVENTIONS  - Assess for signs and symptoms of bleeding or hemorrhage  - Monitor labs  - Administer supportive blood products/factors as ordered and appropriate  Outcome: Progressing     Problem: MUSCULOSKELETAL - ADULT  Goal: Maintain or return mobility to safest level of function  Description: INTERVENTIONS:  - Assess patient's ability to carry out ADLs; assess patient's baseline for ADL function and identify physical deficits which impact ability to perform ADLs (bathing, care of mouth/teeth, toileting, grooming, dressing, etc.)  - Assess/evaluate cause of self-care deficits   - Assess range of motion  - Assess patient's mobility  - Assess patient's need for assistive devices and provide as appropriate  - Encourage maximum independence but intervene and supervise when necessary  - Involve family in performance of ADLs  - Assess for home care needs following discharge   - Consider OT consult to assist with ADL evaluation and planning for discharge  - Provide patient education as appropriate  Outcome: Progressing     Problem: COPING  Goal: Pt/Family able to verbalize concerns and demonstrate effective coping strategies  Description: INTERVENTIONS:  - Assist patient/family to identify coping skills, available support systems and cultural and spiritual values  - Provide emotional support, including active listening and acknowledgement of concerns of patient and caregivers  - Reduce environmental stimuli, as able  - Provide patient education  - Assess for spiritual pain/suffering and initiate spiritual care, including notification of Pastoral Care or kinsey based community as needed  - Assess effectiveness of coping strategies  Outcome: Progressing  Goal: Will report anxiety at manageable levels  Description: INTERVENTIONS:  - Administer medication as ordered  - Teach and encourage coping skills  - Provide emotional support  - Assess patient/family for anxiety and ability to cope  Outcome: Progressing

## 2023-11-13 NOTE — PROGRESS NOTES
Patient:  Allison Patel    MRN:  [id number]    Aidin Request ID:  [id number]    Level of care reserved:  [address]    Partner Reserved:  [facility], [address] [place] [address] [phone]    Clinical needs requested:    Geography searched:  10 miles around [facility]    Start of Service:    Request sent:  4:08pm EST on 11/10/2023 by Brenda Joyce    Partner reserved:  9:34am EST on 11/13/2023 by Brenda Joyce    Choice list shared:  9:34am EST on 11/13/2023 by Brenda Joyce

## 2023-11-13 NOTE — CASE MANAGEMENT
Case Management Discharge Planning Note    Patient name Arlet Houston  Location [address] [place] [address] [address] [id number] MRN [id number]  : 1955 Date 2023       Current Admission Date: 2023  Current Admission Diagnosis:Epidural hemorrhage without loss of consciousness [facility])   Patient Active Problem List    Diagnosis Date Noted    Scalp hematoma, initial encounter 2023    Epidural hemorrhage without loss of consciousness ([address]) 2023    Worsening headaches 2023    Uncontrolled morning headache 2023    Snores 2023    JAMES (obstructive sleep apnea) 2023    Fall 2023    Abnormal MRI 2023    Facet arthritis of lumbosacral region 2023    Hypercholesterolemia 2023    Hiatal hernia 2023    Drug-induced constipation 2023    Continuous opioid dependence ([address]) 2023    Bereavement due to life event 2022    Fatty liver disease, nonalcoholic     SY (generalized anxiety disorder) 2022    Major depression, recurrent, chronic ([address]) 10/14/2022    Eating disorder 10/14/2022    Primary localized osteoarthritis of right knee 2022    Other insomnia 2020    Headache, chronic migraine without aura 2020    Cervicalgia 2020    Cervical dystonia 2020    Medication overuse headache 2020    Depression with anxiety 2020    Lumbago 2020    Bilateral occipital neuralgia 2020    Migraine without status migrainosus, not intractable 2018    Moderate episode of recurrent major depressive disorder ([address]) 2018    Chronic GERD 2018    Controlled type 2 diabetes mellitus, without long-term current use of insulin ([address]) 2018    Hypertension, essential 2018    Chronic neck pain 2018    Fibromyalgia 2018      LOS (days): 5  Geometric Mean LOS (GMLOS) (days): 3.30  Days to GMLOS:-1.3     OBJECTIVE:  Risk of Unplanned Readmission Score: 14.08         Current admission status: Inpatient   Preferred Pharmacy:   [facility] [address]  [address] [address]  Phone: [phone] Fax: [phone]    Primary Care Provider: Meri Angeles DO    Primary Insurance: MEDICARE  Secondary Insurance: BLUE CROSS    DISCHARGE DETAILS:    Discharge planning discussed with[de-identified] Patient  Freedom of Choice: Yes  Comments - Freedom of Choice: Discussed FOC  CM contacted family/caregiver?: No- see comments (Declined)     Other Referral/Resources/Interventions Provided:  Interventions: Short Term Rehab  Referral Comments: [facility] able to admit patient today, patient in agreement with DCP, covid swab requested    Transport at Discharge : Wheelchair van  Dispatcher Contacted: Yes  Number/Name of Dispatcher: SLETS  Transported by [employer] and Unit #): [place]  ETA of Transport (Date): 11/13/23  ETA of Transport (Time): 1500     IMM Given (Date):: 11/13/23  IMM Given to[de-identified] Patient     IMM reviewed with patient, patient agrees with discharge determination.     Accepting Facility Name, [facility] : [address] [place] [address]  Receiving Facility/Agency Phone Number: [phone]  Facility/Agency Fax Number: [phone]

## 2023-11-13 NOTE — ASSESSMENT & PLAN NOTE
Patient presents with frontal scalp hematoma  - Minimal pain on palpation, fluctuant  - Patient has bilateral periorbital hematoma, reports it is expanding/worsening  - No vision changes  -- Headaches  -- GCS 15, nonfocal

## 2023-11-13 NOTE — PLAN OF CARE
Problem: Potential for Falls  Goal: Patient will remain free of falls  Description: INTERVENTIONS:  - Educate patient/family on patient safety including physical limitations  - Instruct patient to call for assistance with activity   - Consult OT/PT to assist with strengthening/mobility   - Keep Call bell within reach  - Keep bed low and locked with side rails adjusted as appropriate  - Keep care items and personal belongings within reach  - Initiate and maintain comfort rounds  - Make Fall Risk Sign visible to staff  - Offer Toileting every 2 Hours, in advance of need  - Initiate/Maintain alarm  - Obtain necessary fall risk management equipment:   - Apply yellow socks and bracelet for high fall risk patients  - Consider moving patient to room near nurses station  Outcome: Progressing     Problem: MOBILITY - ADULT  Goal: Maintain or return to baseline ADL function  Description: INTERVENTIONS:  -  Assess patient's ability to carry out ADLs; assess patient's baseline for ADL function and identify physical deficits which impact ability to perform ADLs (bathing, care of mouth/teeth, toileting, grooming, dressing, etc.)  - Assess/evaluate cause of self-care deficits   - Assess range of motion  - Assess patient's mobility; develop plan if impaired  - Assess patient's need for assistive devices and provide as appropriate  - Encourage maximum independence but intervene and supervise when necessary  - Involve family in performance of ADLs  - Assess for home care needs following discharge   - Consider OT consult to assist with ADL evaluation and planning for discharge  - Provide patient education as appropriate  Outcome: Progressing  Goal: Maintains/Returns to pre admission functional level  Description: INTERVENTIONS:  - Perform BMAT or MOVE assessment daily.   - Set and communicate daily mobility goal to care team and patient/family/caregiver.    - Collaborate with rehabilitation services on mobility goals if consulted  - Perform Range of Motion 3 times a day. - Reposition patient every 2 hours.   - Dangle patient 3 times a day  - Stand patient 3 times a day  - Ambulate patient 3 times a day  - Out of bed to chair 3 times a day   - Out of bed for meals 3 times a day  - Out of bed for toileting  - Record patient progress and toleration of activity level   Outcome: Progressing     Problem: PAIN - ADULT  Goal: Verbalizes/displays adequate comfort level or baseline comfort level  Description: Interventions:  - Encourage patient to monitor pain and request assistance  - Assess pain using appropriate pain scale  - Administer analgesics based on type and severity of pain and evaluate response  - Implement non-pharmacological measures as appropriate and evaluate response  - Consider cultural and social influences on pain and pain management  - Notify physician/advanced practitioner if interventions unsuccessful or patient reports new pain  Outcome: Progressing     Problem: INFECTION - ADULT  Goal: Absence or prevention of progression during hospitalization  Description: INTERVENTIONS:  - Assess and monitor for signs and symptoms of infection  - Monitor lab/diagnostic results  - Monitor all insertion sites, i.e. indwelling lines, tubes, and drains  - Monitor endotracheal if appropriate and nasal secretions for changes in amount and color  - [place] appropriate cooling/warming therapies per order  - Administer medications as ordered  - Instruct and encourage patient and family to use good hand hygiene technique  - Identify and instruct in appropriate isolation precautions for identified infection/condition  Outcome: Progressing  Goal: Absence of fever/infection during neutropenic period  Description: INTERVENTIONS:  - Monitor WBC    Outcome: Progressing     Problem: SAFETY ADULT  Goal: Patient will remain free of falls  Description: INTERVENTIONS:  - Educate patient/family on patient safety including physical limitations  - Instruct patient to call for assistance with activity   - Consult OT/PT to assist with strengthening/mobility   - Keep Call bell within reach  - Keep bed low and locked with side rails adjusted as appropriate  - Keep care items and personal belongings within reach  - Initiate and maintain comfort rounds  - Make Fall Risk Sign visible to staff  - Offer Toileting every 2 Hours, in advance of need  - Initiate/Maintain alarm  - Obtain necessary fall risk management equipment:   - Apply yellow socks and bracelet for high fall risk patients  - Consider moving patient to room near nurses station  Outcome: Progressing  Goal: Maintain or return to baseline ADL function  Description: INTERVENTIONS:  -  Assess patient's ability to carry out ADLs; assess patient's baseline for ADL function and identify physical deficits which impact ability to perform ADLs (bathing, care of mouth/teeth, toileting, grooming, dressing, etc.)  - Assess/evaluate cause of self-care deficits   - Assess range of motion  - Assess patient's mobility; develop plan if impaired  - Assess patient's need for assistive devices and provide as appropriate  - Encourage maximum independence but intervene and supervise when necessary  - Involve family in performance of ADLs  - Assess for home care needs following discharge   - Consider OT consult to assist with ADL evaluation and planning for discharge  - Provide patient education as appropriate  Outcome: Progressing  Goal: Maintains/Returns to pre admission functional level  Description: INTERVENTIONS:  - Perform BMAT or MOVE assessment daily.   - Set and communicate daily mobility goal to care team and patient/family/caregiver. - Collaborate with rehabilitation services on mobility goals if consulted  - Perform Range of Motion 3 times a day. - Reposition patient every 2 hours.   - Dangle patient 3 times a day  - Stand patient 3 times a day  - Ambulate patient 3 times a day  - Out of bed to chair 3 times a day   - Out of bed for meals 3 times a day  - Out of bed for toileting  - Record patient progress and toleration of activity level   Outcome: Progressing     Problem: DISCHARGE PLANNING  Goal: Discharge to home or other facility with appropriate resources  Description: INTERVENTIONS:  - Identify barriers to discharge w/patient and caregiver  - Arrange for needed discharge resources and transportation as appropriate  - Identify discharge learning needs (meds, wound care, etc.)  - Arrange for interpretive services to assist at discharge as needed  - Refer to Case Management Department for coordinating discharge planning if the patient needs post-hospital services based on physician/advanced practitioner order or complex needs related to functional status, cognitive ability, or social support system  Outcome: Progressing     Problem: Knowledge Deficit  Goal: Patient/family/caregiver demonstrates understanding of disease process, treatment plan, medications, and discharge instructions  Description: Complete learning assessment and assess knowledge base. Interventions:  - Provide teaching at level of understanding  - Provide teaching via preferred learning methods  Outcome: Progressing     Problem: Nutrition/Hydration-ADULT  Goal: Nutrient/Hydration intake appropriate for improving, restoring or maintaining nutritional needs  Description: Monitor and assess patient's nutrition/hydration status for malnutrition. Collaborate with interdisciplinary team and initiate plan and interventions as ordered. Monitor patient's weight and dietary intake as ordered or per policy. Utilize nutrition screening tool and intervene as necessary. Determine patient's food preferences and provide high-protein, high-caloric foods as appropriate.      INTERVENTIONS:  - Monitor oral intake, urinary output, labs, and treatment plans  - Assess nutrition and hydration status and recommend course of action  - Evaluate amount of meals eaten  - Assist patient with eating if necessary   - Allow adequate time for meals  - Recommend/ encourage appropriate diets, oral nutritional supplements, and vitamin/mineral supplements  - Order, calculate, and assess calorie counts as needed  - Recommend, monitor, and adjust tube feedings and TPN/PPN based on assessed needs  - Assess need for intravenous fluids  - Provide specific nutrition/hydration education as appropriate  - Include patient/family/caregiver in decisions related to nutrition  Outcome: Progressing     Problem: NEUROSENSORY - ADULT  Goal: Achieves stable or improved neurological status  Description: INTERVENTIONS  - Monitor and report changes in neurological status  - Monitor vital signs such as temperature, blood pressure, glucose, and any other labs ordered   - Initiate measures to prevent increased intracranial pressure  - Monitor for seizure activity and implement precautions if appropriate      Outcome: Progressing  Goal: Remains free of injury related to seizures activity  Description: INTERVENTIONS  - Maintain airway, patient safety  and administer oxygen as ordered  - Monitor patient for seizure activity, document and report duration and description of seizure to physician/advanced practitioner  - If seizure occurs,  ensure patient safety during seizure  - Reorient patient post seizure  - Seizure pads on all 4 side rails  - Instruct patient/family to notify RN of any seizure activity including if an aura is experienced  - Instruct patient/family to call for assistance with activity based on nursing assessment  - Administer anti-seizure medications if ordered    Outcome: Progressing  Goal: Achieves maximal functionality and self care  Description: INTERVENTIONS  - Monitor swallowing and airway patency with patient fatigue and changes in neurological status  - Encourage and assist patient to increase activity and self care.    - Encourage visually impaired, hearing impaired and aphasic patients to use assistive/communication devices  Outcome: Progressing     Problem: CARDIOVASCULAR - ADULT  Goal: Maintains optimal cardiac output and hemodynamic stability  Description: INTERVENTIONS:  - Monitor I/O, vital signs and rhythm  - Monitor for S/S and trends of decreased cardiac output  - Administer and titrate ordered vasoactive medications to optimize hemodynamic stability  - Assess quality of pulses, skin color and temperature  - Assess for signs of decreased coronary artery perfusion  - Instruct patient to report change in severity of symptoms  Outcome: Progressing  Goal: Absence of cardiac dysrhythmias or at baseline rhythm  Description: INTERVENTIONS:  - Continuous cardiac monitoring, vital signs, obtain 12 lead EKG if ordered  - Administer antiarrhythmic and heart rate control medications as ordered  - Monitor electrolytes and administer replacement therapy as ordered  Outcome: Progressing     Problem: RESPIRATORY - ADULT  Goal: Achieves optimal ventilation and oxygenation  Description: INTERVENTIONS:  - Assess for changes in respiratory status  - Assess for changes in mentation and behavior  - Position to facilitate oxygenation and minimize respiratory effort  - Oxygen administered by appropriate delivery if ordered  - Initiate smoking cessation education as indicated  - Encourage broncho-pulmonary hygiene including cough, deep breathe, Incentive Spirometry  - Assess the need for suctioning and aspirate as needed  - Assess and instruct to report SOB or any respiratory difficulty  - Respiratory Therapy support as indicated  Outcome: Progressing     Problem: GASTROINTESTINAL - ADULT  Goal: Minimal or absence of nausea and/or vomiting  Description: INTERVENTIONS:  - Administer IV fluids if ordered to ensure adequate hydration  - Maintain NPO status until nausea and vomiting are resolved  - Nasogastric tube if ordered  - Administer ordered antiemetic medications as needed  - Provide nonpharmacologic comfort measures as appropriate  - Advance diet as tolerated, if ordered  - Consider nutrition services referral to assist patient with adequate nutrition and appropriate food choices  Outcome: Progressing  Goal: Maintains or returns to baseline bowel function  Description: INTERVENTIONS:  - Assess bowel function  - Encourage oral fluids to ensure adequate hydration  - Administer IV fluids if ordered to ensure adequate hydration  - Administer ordered medications as needed  - Encourage mobilization and activity  - Consider nutritional services referral to assist patient with adequate nutrition and appropriate food choices  Outcome: Progressing  Goal: Maintains adequate nutritional intake  Description: INTERVENTIONS:  - Monitor percentage of each meal consumed  - Identify factors contributing to decreased intake, treat as appropriate  - Assist with meals as needed  - Monitor I&O, weight, and lab values if indicated  - Obtain nutrition services referral as needed  Outcome: Progressing  Goal: Oral mucous membranes remain intact  Description: INTERVENTIONS  - Assess oral mucosa and hygiene practices  - Implement preventative oral hygiene regimen  - Implement oral medicated treatments as ordered  - Initiate Nutrition services referral as needed  Outcome: Progressing     Problem: GENITOURINARY - ADULT  Goal: Maintains or returns to baseline urinary function  Description: INTERVENTIONS:  - Assess urinary function  - Encourage oral fluids to ensure adequate hydration if ordered  - Administer IV fluids as ordered to ensure adequate hydration  - Administer ordered medications as needed  - Offer frequent toileting  - Follow urinary retention protocol if ordered  Outcome: Progressing  Goal: Absence of urinary retention  Description: INTERVENTIONS:  - Assess patient’s ability to void and empty bladder  - Monitor I/O  - Bladder scan as needed  - Discuss with physician/AP medications to alleviate retention as needed  - Discuss catheterization for long term situations as appropriate  Outcome: Progressing     Problem: METABOLIC, FLUID AND ELECTROLYTES - ADULT  Goal: Electrolytes maintained within normal limits  Description: INTERVENTIONS:  - Monitor labs and assess patient for signs and symptoms of electrolyte imbalances  - Administer electrolyte replacement as ordered  - Monitor response to electrolyte replacements, including repeat lab results as appropriate  - Instruct patient on fluid and nutrition as appropriate  Outcome: Progressing  Goal: Fluid balance maintained  Description: INTERVENTIONS:  - Monitor labs   - Monitor I/O and WT  - Instruct patient on fluid and nutrition as appropriate  - Assess for signs & symptoms of volume excess or deficit  Outcome: Progressing  Goal: Glucose maintained within target range  Description: INTERVENTIONS:  - Monitor Blood Glucose as ordered  - Assess for signs and symptoms of hyperglycemia and hypoglycemia  - Administer ordered medications to maintain glucose within target range  - Assess nutritional intake and initiate nutrition service referral as needed  Outcome: Progressing     Problem: SKIN/TISSUE INTEGRITY - ADULT  Goal: Skin Integrity remains intact(Skin Breakdown Prevention)  Description: Assess:  -Perform [place] assessment every   -Clean and moisturize skin every   -Inspect skin when repositioning, toileting, and assisting with ADLS  -Assess under medical devices such as  every   -Assess extremities for adequate circulation and sensation     Bed Management:  -Have minimal linens on bed & keep smooth, unwrinkled  -Change linens as needed when moist or perspiring  -Avoid sitting or lying in one position for more than  hours while in bed  -Keep HOB at degrees     Toileting:  -Offer bedside commode  -Assess for incontinence every   -Use incontinent care products after each incontinent episode such as     Activity:  -Mobilize patient  times a day  -Encourage activity and walks on unit  -Encourage or provide ROM exercises   -Turn and reposition patient every  Hours  -Use appropriate equipment to lift or move patient in bed  -Instruct/ Assist with weight shifting every  when out of bed in chair  -Consider limitation of chair time  hour intervals    Skin Care:  -Avoid use of baby powder, tape, friction and shearing, hot water or constrictive clothing  -Relieve pressure over bony prominences using   -Do not massage red bony areas    Next Steps:  -Teach patient strategies to minimize risks such as    -Consider consults to  interdisciplinary teams such as   Outcome: Progressing  Goal: Incision(s), wounds(s) or drain site(s) healing without S/S of infection  Description: INTERVENTIONS  - Assess and document dressing, incision, wound bed, drain sites and surrounding tissue  - Provide patient and family education  - Perform skin care/dressing changes every   Outcome: Progressing     Problem: HEMATOLOGIC - ADULT  Goal: Maintains hematologic stability  Description: INTERVENTIONS  - Assess for signs and symptoms of bleeding or hemorrhage  - Monitor labs  - Administer supportive blood products/factors as ordered and appropriate  Outcome: Progressing     Problem: MUSCULOSKELETAL - ADULT  Goal: Maintain or return mobility to safest level of function  Description: INTERVENTIONS:  - Assess patient's ability to carry out ADLs; assess patient's baseline for ADL function and identify physical deficits which impact ability to perform ADLs (bathing, care of mouth/teeth, toileting, grooming, dressing, etc.)  - Assess/evaluate cause of self-care deficits   - Assess range of motion  - Assess patient's mobility  - Assess patient's need for assistive devices and provide as appropriate  - Encourage maximum independence but intervene and supervise when necessary  - Involve family in performance of ADLs  - Assess for home care needs following discharge   - Consider OT consult to assist with ADL evaluation and planning for discharge  - Provide patient education as appropriate  Outcome: Progressing     Problem: COPING  Goal: Pt/Family able to verbalize concerns and demonstrate effective coping strategies  Description: INTERVENTIONS:  - Assist patient/family to identify coping skills, available support systems and cultural and spiritual values  - Provide emotional support, including active listening and acknowledgement of concerns of patient and caregivers  - Reduce environmental stimuli, as able  - Provide patient education  - Assess for spiritual pain/suffering and initiate spiritual care, including notification of Pastoral Care or kinsey based community as needed  - Assess effectiveness of coping strategies  Outcome: Progressing  Goal: Will report anxiety at manageable levels  Description: INTERVENTIONS:  - Administer medication as ordered  - Teach and encourage coping skills  - Provide emotional support  - Assess patient/family for anxiety and ability to cope  Outcome: Progressing     Problem: Potential for Falls  Goal: Patient will remain free of falls  Description: INTERVENTIONS:  - Educate patient/family on patient safety including physical limitations  - Instruct patient to call for assistance with activity   - Consult OT/PT to assist with strengthening/mobility   - Keep Call bell within reach  - Keep bed low and locked with side rails adjusted as appropriate  - Keep care items and personal belongings within reach  - Initiate and maintain comfort rounds  - Make Fall Risk Sign visible to staff  - Offer Toileting every  Hours, in advance of need  - Initiate/Maintain alarm  - Obtain necessary fall risk management equipment:   - Apply yellow socks and bracelet for high fall risk patients  - Consider moving patient to room near nurses station  Outcome: Progressing     Problem: MOBILITY - ADULT  Goal: Maintain or return to baseline ADL function  Description: INTERVENTIONS:  -  Assess patient's ability to carry out ADLs; assess patient's baseline for ADL function and identify physical deficits which impact ability to perform ADLs (bathing, care of mouth/teeth, toileting, grooming, dressing, etc.)  - Assess/evaluate cause of self-care deficits   - Assess range of motion  - Assess patient's mobility; develop plan if impaired  - Assess patient's need for assistive devices and provide as appropriate  - Encourage maximum independence but intervene and supervise when necessary  - Involve family in performance of ADLs  - Assess for home care needs following discharge   - Consider OT consult to assist with ADL evaluation and planning for discharge  - Provide patient education as appropriate  Outcome: Progressing  Goal: Maintains/Returns to pre admission functional level  Description: INTERVENTIONS:  - Perform BMAT or MOVE assessment daily.   - Set and communicate daily mobility goal to care team and patient/family/caregiver. - Collaborate with rehabilitation services on mobility goals if consulted  - Perform Range of Motion  times a day. - Reposition patient every  hours.   - Dangle patient  times a day  - Stand patient  times a day  - Ambulate patient  times a day  - Out of bed to chair  times a day   - Out of bed for meals  times a day  - Out of bed for toileting  - Record patient progress and toleration of activity level   Outcome: Progressing     Problem: PAIN - ADULT  Goal: Verbalizes/displays adequate comfort level or baseline comfort level  Description: Interventions:  - Encourage patient to monitor pain and request assistance  - Assess pain using appropriate pain scale  - Administer analgesics based on type and severity of pain and evaluate response  - Implement non-pharmacological measures as appropriate and evaluate response  - Consider cultural and social influences on pain and pain management  - Notify physician/advanced practitioner if interventions unsuccessful or patient reports new pain  Outcome: Progressing     Problem: INFECTION - ADULT  Goal: Absence or prevention of progression during hospitalization  Description: INTERVENTIONS:  - Assess and monitor for signs and symptoms of infection  - Monitor lab/diagnostic results  - Monitor all insertion sites, i.e. indwelling lines, tubes, and drains  - Monitor endotracheal if appropriate and nasal secretions for changes in amount and color  - [place] appropriate cooling/warming therapies per order  - Administer medications as ordered  - Instruct and encourage patient and family to use good hand hygiene technique  - Identify and instruct in appropriate isolation precautions for identified infection/condition  Outcome: Progressing  Goal: Absence of fever/infection during neutropenic period  Description: INTERVENTIONS:  - Monitor WBC    Outcome: Progressing     Problem: SAFETY ADULT  Goal: Patient will remain free of falls  Description: INTERVENTIONS:  - Educate patient/family on patient safety including physical limitations  - Instruct patient to call for assistance with activity   - Consult OT/PT to assist with strengthening/mobility   - Keep Call bell within reach  - Keep bed low and locked with side rails adjusted as appropriate  - Keep care items and personal belongings within reach  - Initiate and maintain comfort rounds  - Make Fall Risk Sign visible to staff  - Offer Toileting every  Hours, in advance of need  - Initiate/Maintain alarm  - Obtain necessary fall risk management equipment:   - Apply yellow socks and bracelet for high fall risk patients  - Consider moving patient to room near nurses station  Outcome: Progressing  Goal: Maintain or return to baseline ADL function  Description: INTERVENTIONS:  -  Assess patient's ability to carry out ADLs; assess patient's baseline for ADL function and identify physical deficits which impact ability to perform ADLs (bathing, care of mouth/teeth, toileting, grooming, dressing, etc.)  - Assess/evaluate cause of self-care deficits   - Assess range of motion  - Assess patient's mobility; develop plan if impaired  - Assess patient's need for assistive devices and provide as appropriate  - Encourage maximum independence but intervene and supervise when necessary  - Involve family in performance of ADLs  - Assess for home care needs following discharge   - Consider OT consult to assist with ADL evaluation and planning for discharge  - Provide patient education as appropriate  Outcome: Progressing  Goal: Maintains/Returns to pre admission functional level  Description: INTERVENTIONS:  - Perform BMAT or MOVE assessment daily.   - Set and communicate daily mobility goal to care team and patient/family/caregiver. - Collaborate with rehabilitation services on mobility goals if consulted  - Perform Range of Motion  times a day. - Reposition patient every  hours.   - Dangle patient  times a day  - Stand patient  times a day  - Ambulate patient  times a day  - Out of bed to chair  times a day   - Out of bed for meals  times a day  - Out of bed for toileting  - Record patient progress and toleration of activity level   Outcome: Progressing     Problem: DISCHARGE PLANNING  Goal: Discharge to home or other facility with appropriate resources  Description: INTERVENTIONS:  - Identify barriers to discharge w/patient and caregiver  - Arrange for needed discharge resources and transportation as appropriate  - Identify discharge learning needs (meds, wound care, etc.)  - Arrange for interpretive services to assist at discharge as needed  - Refer to Case Management Department for coordinating discharge planning if the patient needs post-hospital services based on physician/advanced practitioner order or complex needs related to functional status, cognitive ability, or social support system  Outcome: Progressing     Problem: Knowledge Deficit  Goal: Patient/family/caregiver demonstrates understanding of disease process, treatment plan, medications, and discharge instructions  Description: Complete learning assessment and assess knowledge base. Interventions:  - Provide teaching at level of understanding  - Provide teaching via preferred learning methods  Outcome: Progressing     Problem: Nutrition/Hydration-ADULT  Goal: Nutrient/Hydration intake appropriate for improving, restoring or maintaining nutritional needs  Description: Monitor and assess patient's nutrition/hydration status for malnutrition. Collaborate with interdisciplinary team and initiate plan and interventions as ordered. Monitor patient's weight and dietary intake as ordered or per policy. Utilize nutrition screening tool and intervene as necessary. Determine patient's food preferences and provide high-protein, high-caloric foods as appropriate.      INTERVENTIONS:  - Monitor oral intake, urinary output, labs, and treatment plans  - Assess nutrition and hydration status and recommend course of action  - Evaluate amount of meals eaten  - Assist patient with eating if necessary   - Allow adequate time for meals  - Recommend/ encourage appropriate diets, oral nutritional supplements, and vitamin/mineral supplements  - Order, calculate, and assess calorie counts as needed  - Recommend, monitor, and adjust tube feedings and TPN/PPN based on assessed needs  - Assess need for intravenous fluids  - Provide specific nutrition/hydration education as appropriate  - Include patient/family/caregiver in decisions related to nutrition  Outcome: Progressing     Problem: NEUROSENSORY - ADULT  Goal: Achieves stable or improved neurological status  Description: INTERVENTIONS  - Monitor and report changes in neurological status  - Monitor vital signs such as temperature, blood pressure, glucose, and any other labs ordered   - Initiate measures to prevent increased intracranial pressure  - Monitor for seizure activity and implement precautions if appropriate      Outcome: Progressing  Goal: Remains free of injury related to seizures activity  Description: INTERVENTIONS  - Maintain airway, patient safety  and administer oxygen as ordered  - Monitor patient for seizure activity, document and report duration and description of seizure to physician/advanced practitioner  - If seizure occurs,  ensure patient safety during seizure  - Reorient patient post seizure  - Seizure pads on all 4 side rails  - Instruct patient/family to notify RN of any seizure activity including if an aura is experienced  - Instruct patient/family to call for assistance with activity based on nursing assessment  - Administer anti-seizure medications if ordered    Outcome: Progressing  Goal: Achieves maximal functionality and self care  Description: INTERVENTIONS  - Monitor swallowing and airway patency with patient fatigue and changes in neurological status  - Encourage and assist patient to increase activity and self care.    - Encourage visually impaired, hearing impaired and aphasic patients to use assistive/communication devices  Outcome: Progressing     Problem: CARDIOVASCULAR - ADULT  Goal: Maintains optimal cardiac output and hemodynamic stability  Description: INTERVENTIONS:  - Monitor I/O, vital signs and rhythm  - Monitor for S/S and trends of decreased cardiac output  - Administer and titrate ordered vasoactive medications to optimize hemodynamic stability  - Assess quality of pulses, skin color and temperature  - Assess for signs of decreased coronary artery perfusion  - Instruct patient to report change in severity of symptoms  Outcome: Progressing  Goal: Absence of cardiac dysrhythmias or at baseline rhythm  Description: INTERVENTIONS:  - Continuous cardiac monitoring, vital signs, obtain 12 lead EKG if ordered  - Administer antiarrhythmic and heart rate control medications as ordered  - Monitor electrolytes and administer replacement therapy as ordered  Outcome: Progressing     Problem: RESPIRATORY - ADULT  Goal: Achieves optimal ventilation and oxygenation  Description: INTERVENTIONS:  - Assess for changes in respiratory status  - Assess for changes in mentation and behavior  - Position to facilitate oxygenation and minimize respiratory effort  - Oxygen administered by appropriate delivery if ordered  - Initiate smoking cessation education as indicated  - Encourage broncho-pulmonary hygiene including cough, deep breathe, Incentive Spirometry  - Assess the need for suctioning and aspirate as needed  - Assess and instruct to report SOB or any respiratory difficulty  - Respiratory Therapy support as indicated  Outcome: Progressing     Problem: GASTROINTESTINAL - ADULT  Goal: Minimal or absence of nausea and/or vomiting  Description: INTERVENTIONS:  - Administer IV fluids if ordered to ensure adequate hydration  - Maintain NPO status until nausea and vomiting are resolved  - Nasogastric tube if ordered  - Administer ordered antiemetic medications as needed  - Provide nonpharmacologic comfort measures as appropriate  - Advance diet as tolerated, if ordered  - Consider nutrition services referral to assist patient with adequate nutrition and appropriate food choices  Outcome: Progressing  Goal: Maintains or returns to baseline bowel function  Description: INTERVENTIONS:  - Assess bowel function  - Encourage oral fluids to ensure adequate hydration  - Administer IV fluids if ordered to ensure adequate hydration  - Administer ordered medications as needed  - Encourage mobilization and activity  - Consider nutritional services referral to assist patient with adequate nutrition and appropriate food choices  Outcome: Progressing  Goal: Maintains adequate nutritional intake  Description: INTERVENTIONS:  - Monitor percentage of each meal consumed  - Identify factors contributing to decreased intake, treat as appropriate  - Assist with meals as needed  - Monitor I&O, weight, and lab values if indicated  - Obtain nutrition services referral as needed  Outcome: Progressing  Goal: Oral mucous membranes remain intact  Description: INTERVENTIONS  - Assess oral mucosa and hygiene practices  - Implement preventative oral hygiene regimen  - Implement oral medicated treatments as ordered  - Initiate Nutrition services referral as needed  Outcome: Progressing     Problem: GENITOURINARY - ADULT  Goal: Maintains or returns to baseline urinary function  Description: INTERVENTIONS:  - Assess urinary function  - Encourage oral fluids to ensure adequate hydration if ordered  - Administer IV fluids as ordered to ensure adequate hydration  - Administer ordered medications as needed  - Offer frequent toileting  - Follow urinary retention protocol if ordered  Outcome: Progressing  Goal: Absence of urinary retention  Description: INTERVENTIONS:  - Assess patient’s ability to void and empty bladder  - Monitor I/O  - Bladder scan as needed  - Discuss with physician/AP medications to alleviate retention as needed  - Discuss catheterization for long term situations as appropriate  Outcome: Progressing     Problem: METABOLIC, FLUID AND ELECTROLYTES - ADULT  Goal: Electrolytes maintained within normal limits  Description: INTERVENTIONS:  - Monitor labs and assess patient for signs and symptoms of electrolyte imbalances  - Administer electrolyte replacement as ordered  - Monitor response to electrolyte replacements, including repeat lab results as appropriate  - Instruct patient on fluid and nutrition as appropriate  Outcome: Progressing  Goal: Fluid balance maintained  Description: INTERVENTIONS:  - Monitor labs   - Monitor I/O and WT  - Instruct patient on fluid and nutrition as appropriate  - Assess for signs & symptoms of volume excess or deficit  Outcome: Progressing  Goal: Glucose maintained within target range  Description: INTERVENTIONS:  - Monitor Blood Glucose as ordered  - Assess for signs and symptoms of hyperglycemia and hypoglycemia  - Administer ordered medications to maintain glucose within target range  - Assess nutritional intake and initiate nutrition service referral as needed  Outcome: Progressing     Problem: SKIN/TISSUE INTEGRITY - ADULT  Goal: Skin Integrity remains intact(Skin Breakdown Prevention)  Description: Assess:  -Perform Marquis assessment every   -Clean and moisturize skin every   -Inspect skin when repositioning, toileting, and assisting with ADLS  -Assess under medical devices such as  every   -Assess extremities for adequate circulation and sensation     Bed Management:  -Have minimal linens on bed & keep smooth, unwrinkled  -Change linens as needed when moist or perspiring  -Avoid sitting or lying in one position for more than  hours while in bed  -Keep HOB at degrees     Toileting:  -Offer bedside commode  -Assess for incontinence every   -Use incontinent care products after each incontinent episode such as     Activity:  -Mobilize patient  times a day  -Encourage activity and walks on unit  -Encourage or provide ROM exercises   -Turn and reposition patient every  Hours  -Use appropriate equipment to lift or move patient in bed  -Instruct/ Assist with weight shifting every  when out of bed in chair  -Consider limitation of chair time  hour intervals    Skin Care:  -Avoid use of baby powder, tape, friction and shearing, hot water or constrictive clothing  -Relieve pressure over bony prominences using   -Do not massage red bony areas    Next Steps:  -Teach patient strategies to minimize risks such as    -Consider consults to  interdisciplinary teams such as   Outcome: Progressing  Goal: Incision(s), wounds(s) or drain site(s) healing without S/S of infection  Description: INTERVENTIONS  - Assess and document dressing, incision, wound bed, drain sites and surrounding tissue  - Provide patient and family education  - Perform skin care/dressing changes every   Outcome: Progressing     Problem: HEMATOLOGIC - ADULT  Goal: Maintains hematologic stability  Description: INTERVENTIONS  - Assess for signs and symptoms of bleeding or hemorrhage  - Monitor labs  - Administer supportive blood products/factors as ordered and appropriate  Outcome: Progressing     Problem: MUSCULOSKELETAL - ADULT  Goal: Maintain or return mobility to safest level of function  Description: INTERVENTIONS:  - Assess patient's ability to carry out ADLs; assess patient's baseline for ADL function and identify physical deficits which impact ability to perform ADLs (bathing, care of mouth/teeth, toileting, grooming, dressing, etc.)  - Assess/evaluate cause of self-care deficits   - Assess range of motion  - Assess patient's mobility  - Assess patient's need for assistive devices and provide as appropriate  - Encourage maximum independence but intervene and supervise when necessary  - Involve family in performance of ADLs  - Assess for home care needs following discharge   - Consider OT consult to assist with ADL evaluation and planning for discharge  - Provide patient education as appropriate  Outcome: Progressing     Problem: COPING  Goal: Pt/Family able to verbalize concerns and demonstrate effective coping strategies  Description: INTERVENTIONS:  - Assist patient/family to identify coping skills, available support systems and cultural and spiritual values  - Provide emotional support, including active listening and acknowledgement of concerns of patient and caregivers  - Reduce environmental stimuli, as able  - Provide patient education  - Assess for spiritual pain/suffering and initiate spiritual care, including notification of Pastoral Care or kinsey based community as needed  - Assess effectiveness of coping strategies  Outcome: Progressing  Goal: Will report anxiety at manageable levels  Description: INTERVENTIONS:  - Administer medication as ordered  - Teach and encourage coping skills  - Provide emotional support  - Assess patient/family for anxiety and ability to cope  Outcome: Progressing

## 2023-11-14 ENCOUNTER — NURSING HOME VISIT (OUTPATIENT)
Dept: GERIATRICS | Facility: OTHER | Age: 68
End: 2023-11-14
Payer: MEDICARE

## 2023-11-14 ENCOUNTER — PATIENT OUTREACH (OUTPATIENT)
Dept: CASE MANAGEMENT | Facility: OTHER | Age: 68
End: 2023-11-14

## 2023-11-14 ENCOUNTER — TELEPHONE (OUTPATIENT)
Dept: FAMILY MEDICINE CLINIC | Facility: HOSPITAL | Age: 68
End: 2023-11-14

## 2023-11-14 DIAGNOSIS — M54.42 CHRONIC MIDLINE LOW BACK PAIN WITH BILATERAL SCIATICA: ICD-10-CM

## 2023-11-14 DIAGNOSIS — K21.9 CHRONIC GERD: ICD-10-CM

## 2023-11-14 DIAGNOSIS — G89.29 CHRONIC MIDLINE LOW BACK PAIN WITH BILATERAL SCIATICA: ICD-10-CM

## 2023-11-14 DIAGNOSIS — K59.03 DRUG-INDUCED CONSTIPATION: ICD-10-CM

## 2023-11-14 DIAGNOSIS — K44.9 HIATAL HERNIA: ICD-10-CM

## 2023-11-14 DIAGNOSIS — G43.709 CHRONIC MIGRAINE WITHOUT AURA WITHOUT STATUS MIGRAINOSUS, NOT INTRACTABLE: ICD-10-CM

## 2023-11-14 DIAGNOSIS — I10 HYPERTENSION, ESSENTIAL: ICD-10-CM

## 2023-11-14 DIAGNOSIS — S06.4XAA EPIDURAL HEMATOMA (HCC): Primary | ICD-10-CM

## 2023-11-14 DIAGNOSIS — Z91.89 AT RISK FOR DELIRIUM: ICD-10-CM
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
## 2023-11-14 DIAGNOSIS — Z71.89 ADVANCE CARE PLANNING: ICD-10-CM

## 2023-11-14 DIAGNOSIS — M54.41 CHRONIC MIDLINE LOW BACK PAIN WITH BILATERAL SCIATICA: ICD-10-CM

## 2023-11-14 DIAGNOSIS — F41.8 DEPRESSION WITH ANXIETY: ICD-10-CM

## 2023-11-14 DIAGNOSIS — F11.20 CONTINUOUS OPIOID DEPENDENCE (HCC): ICD-10-CM

## 2023-11-14 DIAGNOSIS — E11.9 CONTROLLED TYPE 2 DIABETES MELLITUS WITHOUT COMPLICATION, WITHOUT LONG-TERM CURRENT USE OF INSULIN (HCC): ICD-10-CM

## 2023-11-14 DIAGNOSIS — E78.00 HYPERCHOLESTEROLEMIA: ICD-10-CM

## 2023-11-14 DIAGNOSIS — S00.03XD HEMATOMA OF SCALP, SUBSEQUENT ENCOUNTER: ICD-10-CM

## 2023-11-14 DIAGNOSIS — I51.89 DIASTOLIC DYSFUNCTION: ICD-10-CM

## 2023-11-14 DIAGNOSIS — G47.33 OSA (OBSTRUCTIVE SLEEP APNEA): ICD-10-CM

## 2023-11-14 PROCEDURE — 99306 1ST NF CARE HIGH MDM 50: CPT | Performed by: STUDENT IN AN ORGANIZED HEALTH CARE EDUCATION/TRAINING PROGRAM

## 2023-11-14 NOTE — ASSESSMENT & PLAN NOTE
-likely multifactorial, was ambulating in the dark without her cane, possible orthostatic/hypotensive component  -monitor orthostatic vitals  -PT/OT  -fall precautions  -encourage appropriate DME use  -SW to follow for safe discharge planning/homecare services as appropriate

## 2023-11-14 NOTE — DISCHARGE SUMMARY
[address] [facility]  Discharge- Kecia Casey 1955, 70 y.o. female MRN: [id number]  Unit/Bed#: [facility] 602-01 Encounter: [id number]  Primary Care Provider: Zarina Aguilar DO   Date and time admitted to hospital: 11/8/2023  5:52 PM    Scalp hematoma  Assessment & Plan  Patient presents with frontal scalp hematoma  - Minimal pain on palpation, fluctuant  - Patient has bilateral periorbital hematoma, reports it is expanding/worsening  - No vision changes  -- Headaches  -- GCS 15, nonfocal    Fall  Assessment & Plan  Patient presents status post fall, reports episodes of vertigo for months prior to fall  - Patient reports fall due to disorientation from darkness  - Patient currently on walker, able to do all activities of daily living at baseline  - Patient currently lives alone at home  - Geriatrics consult placed    Hypertension, essential  Assessment & Plan  - Hold home valsartan-HCTZ 160-25 mg indefinitely as syncope and collapse believed to be due to hypotension from antihypertensives  - Orthostatic BP completed and borderline negative  - Encourage PO intake      Major depression, recurrent, chronic ([address])  Assessment & Plan  Patient reports being currently depressed, has been depressed since the death of her  2 years ago.   Does not currently want to live anymore, denies suicidal intent  - Patient being managed outpatient with psychiatry  - Psych consult placed and no further intervention recommended at this time  - Continue home Cymbalta meds  -- No continuous observation  -- No inpatient behavioral health placement  -- Follow up with Psychiatrist outpatient    Headache, chronic migraine without aura  Assessment & Plan  - Chronic history of migraines  - Pt reports headaches are more frequent since injury  - Continue home Ubrelvy in addition to home oxycodone 5 mg for severe pain  - Schedule tylenol  - PRN Oxy 2.5/5 mg for moderate to severe pain    * Epidural hemorrhage without loss of consciousness [facility])  Assessment & Plan  Patient is status post fall on Saturday, noticed scalp hematoma and bilateral orbital hematomas on subsequent days  - Neuro exam: GCS 15, non-focal  - Continue neurologic checks: Every 4 hours. - Reversal agent administered: DDAVP  - CT scan of the head on 11/9 reviewed: stable  - Appreciate Neurosurgery evaluation and recommendations. - Complete 7 day course of Keppra for seizure prophylaxis  - Chemical DVT prophylaxis: Lovenox  - Hold all anticoagulants and anti platelet medications for 2 weeks and/or until cleared by Neurosurgery to resume.  - PT and OT (including cognitive evaluation) evaluation and treatment as indicated. Bowel Regimen: MiraLAX, Senokot-S  VTE Prophylaxis:Enoxaparin (Lovenox)     Disposition: Medically stable for discharge to rehab    Subjective   Chief Complaint: Headache    Subjective: Patient states she is doing well this morning, endorses mild headache that is tolerable with current pain regimen. She denies all other complaints. Tolerating a diet, denies abdominal pain, nausea, vomiting. She is motivated for discharge to rehab. Objective   Vitals:        I/O         11/12 0701  11/13 0700 11/13 0701  11/14 0700 11/14 0701  11/15 0700    P. O. Total Intake(mL/kg)       Net              Unmeasured Urine Occurrence  1 x     Unmeasured Stool Occurrence  0 x              Physical Exam:   GENERAL APPEARANCE: Patient in no acute distress. HEENT: Facial ecchymosis, periorbital hematomas to bilateral eyes; PERRL, EOMs intact; Mucous membranes moist  NECK / BACK: ROM normal  CV: Regular rate and rhythm; no murmur/gallops/rubs appreciated. CHEST / LUNGS: Clear to auscultation; no wheezes/rales/rhonci. ABD: NABS; soft; non-distended; non-tender. : Voiding  EXT: +2 pulses bilaterally upper & lower extremities; no edema. NEURO: GCS 15; no focal neurologic deficits; neurovascularly intact.   SKIN: Warm, dry and well perfused; no rash; no jaundice. Invasive Devices       None                         Lab Results: Results: I have personally reviewed all pertinent laboratory/tests results, BMP/CMP: No results found for: "SODIUM", "K", "CL", "CO2", "ANIONGAP", "BUN", "CREATININE", "GLUCOSE", "CALCIUM", "AST", "ALT", "ALKPHOS", "PROT", "BILITOT", "EGFR", and CBC: No results found for: "WBC", "HGB", "HCT", "MCV", "PLT", "ADJUSTEDWBC", "RBC", "MCH", "MCHC", "RDW", "MPV", "NRBC"  Imaging: I have personally reviewed pertinent reports. Other Studies: None           Medical Problems       Resolved Problems  Date Reviewed: 11/14/2023   None         Admission Date:   Admission Orders (From admission, onward)       Ordered        11/08/23 1908  Inpatient Admission  Once                            Admitting Diagnosis: Traumatic epidural hematoma with loss of consciousness, initial encounter ([facility]) [S06.4X9A]  Unspecified multiple injuries, initial encounter [T07. XXXA]    HPI: Documented by Johanna Dao MD on 11/08/2023, "Stacy Richey is a 70 y.o. female who presents secondary to fall on Sunday. She reports walking from the kitchen to her living room Saturday night with a tray full of food and turning off the light in her kitchen. She subsequently became disoriented and fell, hitting her head on the fireplace nearby. She does not know how long she was down for, but she denies having any loss of consciousness. She has loss of memory surrounding the incident. She next remembers herself on the couch later that night with a headache. Of note, she has had multiple episodes of vertigo over the past month. She reports being dizzy prior to fall, but the room was not spinning. Over the next 2 days, she noticed a scalp hematoma and bilateral orbital hematomas forming. She initially reported upper bucks, was transferred to [address] [place] [address] for further management. of note she is on daily aspirin.   Of note she reports being incredibly depressed since the passing of her  2 years ago and not wanting to live anymore, but no active suicidal ideation or plan. She denies any nausea vomiting."    Procedures Performed: No orders of the defined types were placed in this encounter. Summary of Hospital Course: Patient was seen and evaluated by the trauma team and admitted with findings of an epidural hemorrhage and a scalp hematoma. Patient was given DDAVP on admission for reversal of aspirin due to this finding. Neurosurgery was consulted to manage patient nonoperatively. Repeat CT head was stable. Patient was placed on Keppra for seizure prophylaxis. Psychiatry was consulted because of patient's ongoing severe depression. Patient was cleared from psychiatric standpoint and did not meet criteria for inpatient psychiatric admission. PT/OT evaluated patient and recommended discharge to inpatient rehab. Patient was stable for discharge on 11/13/2023. She will follow-up outpatient with neurosurgery in approximately 2 weeks. Significant Findings, Care, Treatment and Services Provided: Epidural hemorrhage, scalp hematoma    Complications: None    Condition at Discharge: good         Discharge instructions/Information to patient and family:   See after visit summary for information provided to patient and family. Provisions for Follow-Up Care:  See after visit summary for information related to follow-up care and any pertinent home health orders. PCP: Carlotta Underwood DO    Disposition: Skilled nursing facility at [facility]    Planned Readmission: No    Discharge Statement   I spent 28 minutes discharging the patient. This time was spent on the day of discharge. I had direct contact with the patient on the day of discharge. Additional documentation is required if more than 30 minutes were spent on discharge. Discharge Medications:  See after visit summary for reconciled discharge medications provided to patient and family.

## 2023-11-14 NOTE — ASSESSMENT & PLAN NOTE
Noted on echo, no noted CHF history on chart review  Appears clinically euvolemic  Monitor volume status clinically and monitor daily weights  Follow up with PCP, Cardiology as appropriate

## 2023-11-14 NOTE — ASSESSMENT & PLAN NOTE
Chronic constipation at home in setting of chronic oxycodone use  At risk due to hospitalization, relative immobility, comorbidities  Monitor stool output - last BM reported ~4 days ago  Bowel regimen: miralax daily and senna 2 tabs daily, titrate as appropriate; will give bisacodyl suppository 11/14, consider bisacodyl suppository PRN if no BM in 2-3 days  Encourage mobility as tolerated, PO hydration as appropriate, high fiber diet/prune juice in outpatient setting  Goal is for 1 easy BM every 1-2 days

## 2023-11-14 NOTE — ASSESSMENT & PLAN NOTE
[place]MP reviewed as per admission note 11/14  Bowel regimen for constipation  Follows pain management in [place]

## 2023-11-14 NOTE — TELEPHONE ENCOUNTER
Spoke with sister Christine Brandon re: Michi Hunter, pt was transferred to [facility]) rehab.  CR

## 2023-11-14 NOTE — ASSESSMENT & PLAN NOTE
-stable  -continue calcium carbonate, nexium  -recommend OOB with meals, sit upright for at least 30 minutes afterwards, avoid trigger foods  -continue to monitor  -follow up with PCP, GI as appropriate

## 2023-11-14 NOTE — ASSESSMENT & PLAN NOTE
-discontinue home valsartan-HCTZ 160-25 due to concern of low BP and orthostasis contributing to her fall  -monitor BP  -avoid hypotension  -follow up with PCP

## 2023-11-14 NOTE — PROGRESS NOTES
Outpatient care management referral via HRR report 11/14/23. Discharged to [facility] 11/13/23. Email sent to facility to inform them I will be following the patient during their skilled stay. This Admin Coordinator will continue to monitor via chart review.

## 2023-11-14 NOTE — ASSESSMENT & PLAN NOTE
S/p fall at home while on aspirin  Eval by Neurosurgery in hospital with non-operative management recommended  Complete 7 day course of Keppra for seizure prophylaxis  OK for chemical DVT prophylaxis: Lovenox  Holding aspirin for 2 weeks and/or until cleared by Neurosurgery to resume.   PT/OT  Repeat CT head as ordered prior to [facility] f/u  Follow up with Neurosurgery

## 2023-11-14 NOTE — ASSESSMENT & PLAN NOTE
L front scalp hematoma  Shrinking gradually per patient  Minimally tender, fluctuant, no open wound/bleed/drainage  Monitor routine labs including Hb  See plan under epidural hematoma

## 2023-11-14 NOTE — PROGRESS NOTES
[facility]: [facility]    HISTORY AND PHYSICAL  Nursing Home Place of Service: nursing home place of service: POS 31 Skilled Care-Part A Coverage    NAME: Kecia Casey  : 1955 AGE: 70 y.o. SEX: female MRN: [id number]  DATE OF ENCOUNTER: 2023    Records Reviewed include: Hospital records    Chief Complaint/ Reason for Admission:   Epidural hematoma s/p fall    History of Present Illness: Kecia Casey is a 70 y.o. female with PMH including GERD, hiatal hernia, fatty liver, DM2, JAMES, migraines, HTN, HLD, arthritis, anxiety/depression  Patient was hospitalized at [address] [place] [address] (transfer from formerly [facility]) from  to 23  For details of hospitalization, see hospital records including discharge documentation from 23 (final discharge summary not yet in chart)  Briefly, patient hospitalized 4 days s/p fall (, +headstrike, +LOC), found to have scalp hematoma, left posterior epidural hematoma; followed by Neurosurgery with non-operative management recommended, to hold aspirin x2 weeks pending Neurosurgery follow-up, OK for pharmacologic DVT ppx; to f/u with repeat CT head and Neurosurgery in approx 2 weeks. Patient seen and examined in room  Others present: none  Patient laying in bed  Appears comfortable, awake, alert, oriented to situation, able to converse appropriately  Patient in good spirits, states she lives alone, she went to get food in the kitchen with the lights off and felt she lost her balance, the tray slipped to her left and she fell to her left, thinks her head hit her fireplace metal screen and she lost consciousness for unknown amount of time, then awoke and cared for herself for e few days but eventually went to hospital as above due to worsening nausea and L sided head pain.  Presently she feels better, notes intermittent L sided head pain superimposed on chronic stable migraines, feels her L head pain has been gradually improving, pain overall controlled presently. Does not feel any focal weakness. Appetite stable, eating/drinking well, no swallowing issues. +ongoing constipation, does have at baseline, last BM 4 days ago. Did feel orthostatic lightheadedness at home on rising recently. No breathing issues. No notable pain elsewhere, does get chronic stable baseline back pain. Notes chronic depression hx, worse since 2 years ago since her  passed away, follows Psych/therapy outpatient, does endorse thoughts of being better off dead but no active suicidal intent/plan, notes she wants to keep living for her son primarily, not wishing to talk to Psych at rehab at this time. No acute cardiopulmonary, abdominal, or urinary symptoms; see ROS for more details. No further questions or acute concerns identified.       [place] PDMP reviewed 11/14:  1 [id number] 11/13/2023 11/13/2023 clonazePAM (Tablet) 6.0 4 0.5 MG NA [facility] SERV [place] / 0 [place]    1 [id number] 11/13/2023 11/13/2023 oxyCODONE HCL (Tablet) 5.0 1 5 MG 37.50 [facility]STITUTIONAL PHARMACY SERV [place] / 0 [place]    2 [id number] 10/17/2023 10/17/2023 oxyCODONE HCL (Tablet) 90.0 30 10 MG 45.0 [facility]  Com[place] / 0 [place]    2 [id number] 10/09/2023 09/12/2023 clonazePAM (Tablet) 90.0 30 0.5 MG NA [facility] #[id number] [place] / 2 [place]    2 [id number] 09/21/2023 09/21/2023 oxyCODONE HCL (Tablet) 90.0 23 10 MG 58.70 [facility]  [place] / 0 [place]    2 [id number] 09/03/2023 09/01/2023 clonazePAM (Tablet) 90.0 30 0.5 MG NA [facility] #[id number] Commercial Insurance 0 / 0 [place]    2 [id number] 08/30/2023 08/24/2023 oxyCODONE HCL (Tablet) 85.0 22 10 MG 57.95 [facility]  [place] / 0 [place]    2 [id number] 08/05/2023 08/04/2023 clonazePAM (TABLET) 90.0 30 0.5 MG NA [facility] #[id number] Commercial Insurance 0 / 0 [place] [address] [id number] 07/27/2023 07/27/2023 oxyCODONE HCL (Tablet) 120.0 30 10 MG 60.0 [facility]  Commercial Insurance 0 / 0         Lab Review:   11/13: BMP generally stable/non-actionable, Mg 1.7; CBC with Hb 10.7 (improving, baseline 14). A1c from 2022 6.5. TSH from 2020 WNL      CT head "Small acute epidural hemorrhagic area in the left posterior temporal region with maximum thickness of 5.8 mm. .  Left frontal scalp hematoma."  CT C-spine "No cervical spine fracture or traumatic malalignment. "  CT CAP "No CT findings of trauma in the chest, abdomen or pelvis "  CXR "No acute cardiopulmonary findings "  CT head 11/9 "Stable size and appearance of epidural hematoma overlying the left epidural region measuring 6 mm in thickness "          EKG 11/8: NSR, 95bpm, Qtc 424  Echo 9/25/23: LV 65, normal systolic function, grade 1 diastolic dysfunction  Holter monitor 9/20/23:   "The patient was in sinus rhythm throughout the duration of the study. The average heart rate was 86 bpm.   Rare ectopy without any significant arrhythmias. One run of atrial tachycardia lasting 6-beats noted. Symptoms did not correspond to any significant rhythm abnormalities. "        Social: Patient lives in a 1 story home alone, no pets, no aides. At baseline ambulates with a cane in setting of arthritis, chronic back pain, and hx of R achilles transplant in 2007. Reports being fully independent at baseline including ADLs, IADLs, medication management, finances, driving. Retired nurse.  passed away 2 years ago. Has a an adult son.     Lives: Home, Alone  Social Support: family  Fall in the past 12 months: 1 as above  Use of assistance Device: Cane    Allergies  No Known Allergies    Past Medical History  Past Medical History:   Diagnosis Date    Anxiety     Arthritis     Cancer ([address])     Skin    Depression     Diabetes ([address])     GERD (gastroesophageal reflux disease) Hyperlipidemia     Hypertension     Migraine         Past Surgical History:   Procedure Laterality Date    ANKLE SURGERY      CARPAL TUNNEL RELEASE Right     ROTATOR CUFF REPAIR         Family History  Family History   Problem Relation Age of Onset    Mental illness Mother     Hyperlipidemia Mother     Hypertension Mother     Allergies Mother     Migraines Mother     Anxiety disorder Mother     Osteoporosis Mother     Hyperlipidemia Father     Heart disease Father     Cancer Father         Bladder    Migraines Sister     Hyperlipidemia Brother     Multiple sclerosis Brother     Hyperlipidemia Brother     Migraines Brother         Rare    Migraines Son         Rare    Colon cancer Maternal Grandmother     Heart attack Maternal Grandfather     Peripheral vascular disease Paternal Grandmother     Diabetes Paternal Grandmother     Asthma Paternal Grandfather     Diabetes Paternal Grandfather     Heart attack Paternal Grandfather        Social History  Social History     Tobacco Use   Smoking Status Former    Types: Cigarettes    Quit date:     Years since quittin.8   Smokeless Tobacco Never      Social History     Substance and Sexual Activity   Alcohol Use Not Currently      Social History     Substance and Sexual Activity   Drug Use Never            Physical Exam    Vital Signs  There were no vitals filed for this visit. BP: 98/55 mmHg  2023 08:08   Temp:96.2 °F  2023 08:08 Pulse:79 bpm  2023 08:08 Weight:152.3 Lbs  2023 06:11   Resp:18 Breaths/min  2023 08:08 BS:72 mg/dL  2023 06:14 O2:95 %  2023 08:09 Pain:7  2023 09:18         Physical Exam  Vitals reviewed. Constitutional:       General: She is not in acute distress. Appearance: She is not toxic-appearing or diaphoretic. HENT:      Head: Normocephalic.       Comments: Approx 1.5 inch fluctuant lump at L forehead, mildly tender, no open wound/bleed/drainage  Bruising under bilateral eyes and along L cheek  No notable facial swelling       Right Ear: External ear normal.      Left Ear: External ear normal.      Nose: Nose normal. No rhinorrhea. Mouth/Throat:      Mouth: Mucous membranes are moist.      Pharynx: Oropharynx is clear. No posterior oropharyngeal erythema. Eyes:      General: No scleral icterus. Right eye: No discharge. Left eye: No discharge. Extraocular Movements: Extraocular movements intact. Conjunctiva/sclera: Conjunctivae normal.      Pupils: Pupils are equal, round, and reactive to light. Cardiovascular:      Rate and Rhythm: Normal rate and regular rhythm. Pulmonary:      Effort: Pulmonary effort is normal. No respiratory distress. Breath sounds: Normal breath sounds. No wheezing or rales. Abdominal:      General: Bowel sounds are normal.      Palpations: Abdomen is soft. Tenderness: There is no abdominal tenderness. There is no guarding. Musculoskeletal:         General: No swelling or tenderness. Cervical back: No rigidity. Right lower leg: No edema. Left lower leg: No edema. Skin:     General: Skin is warm and dry. Coloration: Skin is not jaundiced. Neurological:      General: No focal deficit present. Mental Status: She is alert and oriented to person, place, and time. Mental status is at baseline. Motor: No weakness. Psychiatric:         Mood and Affect: Mood normal.         Behavior: Behavior normal.         Thought Content: Thought content normal.         Judgment: Judgment normal.         Review of Systems:  Review of Systems   Constitutional:  Negative for appetite change, chills, diaphoresis, fatigue and fever. HENT:  Negative for drooling, ear pain, hearing loss, rhinorrhea, sore throat and trouble swallowing. Eyes:  Negative for pain, discharge, redness, itching and visual disturbance. Respiratory:  Negative for cough, choking, chest tightness, shortness of breath and wheezing. Cardiovascular:  Negative for chest pain, palpitations and leg swelling. Gastrointestinal:  Positive for constipation. Negative for abdominal pain, blood in stool, diarrhea, nausea and vomiting. Genitourinary:  Negative for difficulty urinating, dysuria and hematuria. Musculoskeletal:  Positive for back pain (chronic baseline) and gait problem. Negative for arthralgias. Skin:  Negative for color change. Bruising on face   Neurological:  Positive for light-headedness (intermittent on rising) and headaches. Negative for seizures, syncope, facial asymmetry, speech difficulty and weakness. Psychiatric/Behavioral:  Positive for suicidal ideas (thoughts of being better off dead but no active intent/plan and wants to live for her son). Negative for agitation, behavioral problems and confusion. The patient is not nervous/anxious and is not hyperactive. All other systems reviewed and are negative.       List of Current Medications:  Current Outpatient Medications   Medication Instructions    acetaminophen (TYLENOL) 975 mg, Oral, Every 8 hours scheduled    B COMPLEX VITAMINS PO 1 tablet, Oral, Daily    Baclofen 5 MG TABS Oral, Take 1-2 tabs TID prn     bisacodyl (BISACODYL) 10 mg, Oral, Daily PRN    buPROPion (WELLBUTRIN XL) 150 mg, Oral, Daily    busPIRone (BUSPAR) 20 mg, Oral, 2 times daily    calcium carbonate (OS-MIKE) 600 mg, Oral, 2 times daily    Cholecalciferol 50 MCG (2000 UT) CAPS 1 capsule, Oral, Daily    clonazePAM (KLONOPIN) 0.25 mg, Oral, 3 times daily PRN    DULoxetine (CYMBALTA) 30 mg, Oral, Daily at bedtime    DULoxetine (CYMBALTA) 60 mg, Oral, Daily at bedtime    esomeprazole (NEXIUM) 20 mg, Oral, Daily (early morning)    levETIRAcetam (KEPPRA) 500 mg, Oral, Every 12 hours scheduled    Lidocaine-Menthol 4-1 % CREA 1 Dose, Apply externally, As needed, Patch 5%    magnesium oxide (MAG-OX) 400 mg, Oral, 2 times daily    metFORMIN (GLUCOPHAGE) 1,000 mg, Oral, 2 times daily with meals multivitamin (THERAGRAN) TABS 1 tablet, Oral, Daily    OnabotulinumtoxinA (BOTOX IM) Intramuscular    oxyCODONE (ROXICODONE) 5 mg, Oral, Every 6 hours PRN    polyethylene glycol (MIRALAX) 17 g, Oral, Daily at bedtime    potassium chloride (K-DUR,KLOR-CON) 20 mEq tablet 20 mEq, Oral, Daily    Riboflavin (Vitamin B-2) 100 MG TABS 2 in the morning and 2 at bedtime    rosuvastatin (CRESTOR) 20 mg, Oral, Daily    senna-docusate sodium (SENOKOT S) 8.6-50 mg per tablet 2 tablets, Oral, 2 times daily    Ubrogepant (UBRELVY) 100 mg, Oral, As needed, Take 1 tablet (100 mg) one time as needed for migraine. May repeat one additional tablet (100 mg) at least two hours after the first dose. Do not use more than two doses per day         Medication reviewed. All orders signed. Complete list is in the paper chart. Allergies  No Known Allergies    Labs/Diagnostics (reviewed by this provider): I personally reviewed lab results and imaging studies. Full reports are in the paper chart.      Assessment/Plan:    Chronic GERD  -stable  -continue calcium carbonate, nexium  -recommend OOB with meals, sit upright for at least 30 minutes afterwards, avoid trigger foods  -continue to monitor  -follow up with PCP, GI as appropriate      Drug-induced constipation  Chronic constipation at home in setting of chronic oxycodone use  At risk due to hospitalization, relative immobility, comorbidities  Monitor stool output - last BM reported ~4 days ago  Bowel regimen: miralax daily and senna 2 tabs daily, titrate as appropriate; will give bisacodyl suppository 11/14, consider bisacodyl suppository PRN if no BM in 2-3 days  Encourage mobility as tolerated, PO hydration as appropriate, high fiber diet/prune juice in outpatient setting  Goal is for 1 easy BM every 1-2 days     Hiatal hernia  See plan under GERD    Controlled type 2 diabetes mellitus, without long-term current use of insulin ([address])    Lab Results   Component Value Date    HGBA1C 6.5 (H) 07/11/2022     -monitor sugars  -avoid hypoglycemia  -continue metformin  -follow up with PCP    JAMES (obstructive sleep apnea)  -following outpatient for sleep study    Headache, chronic migraine without aura  -hx of chronic migraines  -used to get around 20 per month  -since starting botox injections q3 months and Ubrelvy PRN, migraines better overall, now around 10 per month  -follow up with PCP, Neuro    Hypertension, essential  -discontinue home valsartan-HCTZ 160-25 due to concern of low BP and orthostasis contributing to her fall  -monitor BP  -avoid hypotension  -follow up with PCP    Epidural hematoma (HCC)  S/p fall at home while on aspirin  Eval by Neurosurgery in hospital with non-operative management recommended  Complete 7 day course of Keppra for seizure prophylaxis  OK for chemical DVT prophylaxis: Lovenox  Holding aspirin for 2 weeks and/or until cleared by Neurosurgery to resume.   PT/OT  Repeat CT head as ordered prior to [facility] f/u  Follow up with Neurosurgery    Continuous opioid dependence ([address])  [place] PDMP reviewed as per admission note 11/14  Bowel regimen for constipation  Follows pain management in [place]    Depression with anxiety  -follows with Psych/therapy services outpatient  -reports mood stable  -does endorse thoughts of being better off dead but no active suicidal intent/plan, notes she wants to keep living for her son primarily  -not wishing to talk to Psych at rehab at this time  -continue bupropion, buspirone, clonazepam PRN, duloxetine  -supportive care        Fall  -likely multifactorial, was ambulating in the dark without her cane, possible orthostatic/hypotensive component  -monitor orthostatic vitals  -PT/OT  -fall precautions  -encourage appropriate DME use  -SW to follow for safe discharge planning/homecare services as appropriate    Hypercholesterolemia  -continue statin    Scalp hematoma  L front scalp hematoma  Shrinking gradually per patient  Minimally tender, fluctuant, no open wound/bleed/drainage  Monitor routine labs including Hb  See plan under epidural hematoma    At risk for delirium  Delirium precautions  -Patient is high risk of delirium due to age, comorbidities, hospitalization/unfamiliar environment  -delirium precautions  -maintain normal sleep/wake cycle  -minimize overnight interruptions, group overnight vitals/labs/nursing checks as possible  -dim lights, close blinds and turn off tv to minimize stimulation and encourage sleep environment in evenings  -ensure that pain is well controlled  -monitor for fecal and urinary retention which may precipitate delirium  -encourage early mobilization and ambulation  -provide frequent reorientation and redirection  -encourage family and friends at the bedside to help help calm patient if anxious  -avoid medications which may precipitate or worsen delirium such as tramadol, benzodiazepine, anticholinergics, and benadryl  -encourage hydration and nutrition   -redirect unwanted behaviors as first line, avoid physical restraints, use chemical restraint only if all other attempts have been unsuccessful (could consider Zyprexa 2.5mg IM Q, monitor for orthostatic hypotension and QTc prolongation with repeat dosing, recommend lowest dose possible for shortest duration possible)      Advance care planning  HCP and code discussion as below    Chronic midline low back pain with bilateral sciatica  Chronic, baseline, stable  Following Pain Management outpatient  Arrived to rehab with acetaminophen 975mg q8hr, baclofen 5mg TID PRN, duloxetine, topical lidocaine, oxycodone 5mg X5WB PRN      Diastolic dysfunction  Noted on echo, no noted CHF history on chart review  Appears clinically euvolemic  Monitor volume status clinically and monitor daily weights  Follow up with PCP, Cardiology as appropriate       Pain: intermittent, gradually improving L sided head pain and chronic hx of migraines  Rehab Potential:Good  Patient Informed of Medical Condition: yes   Patient is Capable of Understanding Their Right: yes   Discharge Plan: home  Vaccination:   Immunization History   Administered Date(s) Administered    Fluzone Split Quad 0.5 mL 12/15/2017, 09/14/2018    INFLUENZA 12/15/2017, 09/14/2018, 12/27/2019    Pneumococcal Conjugate 13-Valent 06/21/2019    Tdap 01/04/2016       DVT ppx: continue lovenox at rehab    Advanced Directives: Patient names her son Juan Jimenez as first point of contact/HCP, also Joyce Galdamez with her siblings  Code status:DNR (Per discussion with resident or POA) Extensive discussion/education with patient today regarding their wishes with respect to resuscitative measures; discussed potential risks vs benefits of resuscitative measures; patient verbalizes understanding, appears to have capacity to make this decision presently, and clearly verbalizes a desire for DNR/DNI, 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 States she has paperwork to this effect at home. PCP: Fly      -Total time spent on this encounter today including documentation and workup review, face to face time, history and exam, and documentation/orders was approximately 60 minutes. -This note will be copied to [facility] EMR where vitals and medication orders are placed. Trav Silva D.O.   Geriatric Medicine  11/14/2023 11:41 AM

## 2023-11-14 NOTE — ASSESSMENT & PLAN NOTE
Chronic, baseline, stable  Following Pain Management outpatient  Arrived to rehab with acetaminophen 975mg q8hr, baclofen 5mg TID PRN, duloxetine, topical lidocaine, oxycodone 5mg q6hr PRN

## 2023-11-14 NOTE — ASSESSMENT & PLAN NOTE
-follows with Psych/therapy services outpatient  -reports mood stable  -does endorse thoughts of being better off dead but no active suicidal intent/plan, notes she wants to keep living for her son primarily  -not wishing to talk to Psych at rehab at this time  -continue bupropion, buspirone, clonazepam PRN, duloxetine  -supportive care

## 2023-11-14 NOTE — ASSESSMENT & PLAN NOTE
Lab Results   Component Value Date    HGBA1C 6.5 (H) 07/11/2022     -monitor sugars  -avoid hypoglycemia  -continue metformin  -follow up with PCP

## 2023-11-14 NOTE — ASSESSMENT & PLAN NOTE
Delirium precautions  -Patient is high risk of delirium due to age, comorbidities, hospitalization/unfamiliar environment  -delirium precautions  -maintain normal sleep/wake cycle  -minimize overnight interruptions, group overnight vitals/labs/nursing checks as possible  -dim lights, close blinds and turn off tv to minimize stimulation and encourage sleep environment in evenings  -ensure that pain is well controlled  -monitor for fecal and urinary retention which may precipitate delirium  -encourage early mobilization and ambulation  -provide frequent reorientation and redirection  -encourage family and friends at the bedside to help help calm patient if anxious  -avoid medications which may precipitate or worsen delirium such as tramadol, benzodiazepine, anticholinergics, and benadryl  -encourage hydration and nutrition   -redirect unwanted behaviors as first line, avoid physical restraints, use chemical restraint only if all other attempts have been unsuccessful (could consider Zyprexa 2.5mg IM Q, monitor for orthostatic hypotension and QTc prolongation with repeat dosing, recommend lowest dose possible for shortest duration possible) No

## 2023-11-14 NOTE — ASSESSMENT & PLAN NOTE
-hx of chronic migraines  -used to get around 20 per month  -since starting botox injections q3 months and Ubrelvy PRN, migraines better overall, now around 10 per month  -follow up with PCP, Neuro

## 2023-11-16 ENCOUNTER — NURSING HOME VISIT (OUTPATIENT)
Dept: GERIATRICS | Facility: OTHER | Age: 68
End: 2023-11-16
Payer: MEDICARE

## 2023-11-16 DIAGNOSIS — K59.03 DRUG-INDUCED CONSTIPATION: ICD-10-CM

## 2023-11-16 DIAGNOSIS — M54.41 CHRONIC MIDLINE LOW BACK PAIN WITH BILATERAL SCIATICA: ICD-10-CM

## 2023-11-16 DIAGNOSIS — G89.29 CHRONIC MIDLINE LOW BACK PAIN WITH BILATERAL SCIATICA: ICD-10-CM

## 2023-11-16 DIAGNOSIS — M54.42 CHRONIC MIDLINE LOW BACK PAIN WITH BILATERAL SCIATICA: ICD-10-CM

## 2023-11-16 DIAGNOSIS — S06.4XAA EPIDURAL HEMATOMA (HCC): Primary | ICD-10-CM

## 2023-11-16 DIAGNOSIS — S00.03XD HEMATOMA OF SCALP, SUBSEQUENT ENCOUNTER: ICD-10-CM

## 2023-11-16 DIAGNOSIS — F41.8 DEPRESSION WITH ANXIETY: ICD-10-CM

## 2023-11-16 DIAGNOSIS — G43.709 CHRONIC MIGRAINE WITHOUT AURA WITHOUT STATUS MIGRAINOSUS, NOT INTRACTABLE: ICD-10-CM

## 2023-11-16 PROCEDURE — 99309 SBSQ NF CARE MODERATE MDM 30: CPT | Performed by: STUDENT IN AN ORGANIZED HEALTH CARE EDUCATION/TRAINING PROGRAM

## 2023-11-16 NOTE — PROGRESS NOTES
[facility]    PROGRESS NOTE  Nursing Home Place of Service: nursing home place of service: POS [facility]-Part A Coverage    NAME: Zay Mora  : 1955 AGE: 70 y.o.  SEX: female MRN: [id number]  DATE OF ENCOUNTER: 2023    Assessment and Plan     Problem List Items Addressed This Visit          Digestive    Drug-induced constipation     Chronic constipation at home in setting of chronic oxycodone use  At risk due to hospitalization, relative immobility, comorbidities  Monitor stool output - last BM yesterday and day prior  Bowel regimen: miralax daily and docusate BID, stop senna as possibly contributing to cramping, titrate as appropriate; gave bisacodyl suppository , consider bisacodyl suppository PRN if no BM in 2-3 days  Encourage mobility as tolerated, PO hydration as appropriate, high fiber diet/prune juice in outpatient setting  Goal is for 1 easy BM every 1-2 days             Cardiovascular and Mediastinum    Headache, chronic migraine without aura     -hx of chronic migraines  -used to get around 20 per month  -since starting botox injections q3 months and Ubrelvy PRN, migraines better overall, now around 10 per month  -patient's sister has her refill of Ubrelvy and to bring in   -follow up with PCP, Neuro            Nervous and Auditory    Chronic midline low back pain with bilateral sciatica     Chronic, baseline, stable  Following Pain Management outpatient  Arrived to rehab with acetaminophen 975mg q8hr, baclofen 5mg TID PRN, duloxetine, topical lidocaine, oxycodone 5mg q6hr PRN - current regimen working well            Other    Depression with anxiety     -follows with Psych/therapy services outpatient  -reports mood stable  -does endorse thoughts of being better off dead but no active suicidal intent/plan, notes she wants to keep living for her son primarily  -not wishing to talk to Psych at rehab at this time  -continue bupropion, buspirone, clonazepam PRN (dose was updated to be same as her home dose), duloxetine  -supportive care         Scalp hematoma     L front scalp hematoma  Shrinking gradually  Minimally tender, fluctuant, no open wound/bleed/drainage  Monitor routine labs including Hb  See plan under epidural hematoma         Epidural hematoma ([address]) - Primary     S/p fall at home while on aspirin  Eval by Neurosurgery in hospital with non-operative management recommended  Completed 7 day course of Keppra for seizure prophylaxis  OK for chemical DVT prophylaxis: Lovenox  Holding aspirin for 2 weeks and/or until cleared by Neurosurgery to resume. PT/OT  Repeat CT head as ordered prior to [facility] f/u  Follow up with Neurosurgery                Chief Complaint     Follow up headaches    History of Present Illness     Quin Myers is a 70 y.o. female who was seen today for follow up.   70 y.o. female with PMH including GERD, hiatal hernia, fatty liver, DM2, JAMES, migraines, HTN, HLD, arthritis, anxiety/depression     Patient seen and examined in room  Others present: none  Patient laying in bed  Appears comfortable, awake, alert, oriented to situation, able to converse appropriately  Patient notes feeling OK overall. Her L sided head pain from her fall has been improving. She notes she does have one of her usual migraines this morning, just took a dose of David Puffer (notes her sister has a refill picked up for her and will bring it in tomorrow), she notes present migraine is her usual home migraine without any acute worsening or change. Scalp hematoma and facial bruising appear improving. No new/acute pain anywhere else, stable chronic back pain controlled on current regimen. Eating/drinking OK, no swallowing concerns. Last BM yesterday and the day before.  She notes intermittent stomach cramping sensation since August 2023, she was going to f/u with GI outpatient when she had the recent hospitalization and intends to f/u with GI outpatient on discharge, no abdominal pain, no N/V. No acute cardiopulmonary, abdominal, or urinary symptoms; see ROS for more details. Feels therapy is going well. No further questions or acute concerns identified. Lab Review:   11/13: BMP generally stable/non-actionable, Mg 1.7; CBC with Hb 10.7 (improving, baseline 14). A1c from 2022 6.5. TSH from 2020 WNL  11/16: BMP generally stable, CBC with Hb 10.9        CT head "Small acute epidural hemorrhagic area in the left posterior temporal region with maximum thickness of 5.8 mm. .  Left frontal scalp hematoma."  CT C-spine "No cervical spine fracture or traumatic malalignment. "  CT CAP "No CT findings of trauma in the chest, abdomen or pelvis "  CXR "No acute cardiopulmonary findings "  CT head 11/9 "Stable size and appearance of epidural hematoma overlying the left epidural region measuring 6 mm in thickness "              EKG 11/8: NSR, 95bpm, Qtc 424  Echo 9/25/23: LV 65, normal systolic function, grade 1 diastolic dysfunction  Holter monitor 9/20/23:   "The patient was in sinus rhythm throughout the duration of the study. The average heart rate was 86 bpm.   Rare ectopy without any significant arrhythmias. One run of atrial tachycardia lasting 6-beats noted. Symptoms did not correspond to any significant rhythm abnormalities. "          The following portions of the patient's history were reviewed and updated as appropriate: allergies, current medications, past family history, past medical history, past social history, past surgical history and problem list.    Review of Systems     Review of Systems   Constitutional:  Negative for appetite change, chills, diaphoresis, fatigue and fever. HENT:  Negative for drooling, ear pain, hearing loss, rhinorrhea, sore throat and trouble swallowing. Eyes:  Positive for photophobia (with her migraines). Negative for pain, discharge, redness, itching and visual disturbance.    Respiratory:  Negative for cough, choking, chest tightness, shortness of breath and wheezing. Cardiovascular:  Negative for chest pain, palpitations and leg swelling. Gastrointestinal:  Positive for constipation (improved). Negative for abdominal pain, blood in stool, diarrhea, nausea and vomiting. Genitourinary:  Negative for difficulty urinating, dysuria and hematuria. Musculoskeletal:  Positive for back pain (chronic baseline) and gait problem. Negative for arthralgias. Skin:  Negative for color change. Bruising on face   Neurological:  Positive for light-headedness (intermittent on rising) and headaches. Negative for seizures, syncope, facial asymmetry, speech difficulty and weakness. Psychiatric/Behavioral:  Positive for suicidal ideas (thoughts of being better off dead but no active intent/plan and wants to live for her son). Negative for agitation, behavioral problems and confusion. The patient is not nervous/anxious and is not hyperactive. All other systems reviewed and are negative.       Active Problem List     Patient Active Problem List   Diagnosis    Headache, chronic migraine without aura    Cervicalgia    Cervical dystonia    Medication overuse headache    Depression with anxiety    Chronic midline low back pain with bilateral sciatica    Bilateral occipital neuralgia    Primary localized osteoarthritis of right knee    Major depression, recurrent, chronic (HCC)    Eating disorder    SY (generalized anxiety disorder)    Bereavement due to life event    Continuous opioid dependence ([address])    Facet arthritis of lumbosacral region    Migraine without status migrainosus, not intractable    Moderate episode of recurrent major depressive disorder (HCC)    Hypercholesterolemia    Other insomnia    Chronic GERD    Hiatal hernia    Controlled type 2 diabetes mellitus, without long-term current use of insulin (HCC)    Fatty liver disease, nonalcoholic    Hypertension, essential    Chronic neck pain    Fibromyalgia Drug-induced constipation    Worsening headaches    Uncontrolled morning headache    Snores    JAMES (obstructive sleep apnea)    Fall    Abnormal MRI    Scalp hematoma    Epidural hemorrhage without loss of consciousness (HCC)    Epidural hematoma (HCC)    At risk for delirium    Advance care planning    Diastolic dysfunction       Objective     Vital Signs:     BP: 134/63 mmHg  11/16/2023 07:10 Temp:98 °F  11/16/2023 07:00 Pulse:85 bpm  11/16/2023 07:10 Weight:150 Lbs  11/16/2023 06:06   Resp:19 Breaths/min  11/16/2023 07:00 BS:103 mg/dL  11/16/2023 06:37 O2:97 %  11/16/2023 07:00 Pain:10  11/16/2023 09:25       Physical Exam  Vitals reviewed. Constitutional:       General: She is not in acute distress. Appearance: She is not toxic-appearing or diaphoretic. HENT:      Head: Normocephalic. Comments: Approx 1 inch fluctuant lump at L forehead (shrunk from prior), mildly tender, no open wound/bleed/drainage  Bruising under bilateral eyes and along L cheek appears improving/receding  No notable facial swelling       Right Ear: External ear normal.      Left Ear: External ear normal.      Nose: Nose normal. No rhinorrhea. Mouth/Throat:      Mouth: Mucous membranes are dry. Pharynx: Oropharynx is clear. No posterior oropharyngeal erythema. Eyes:      General: No scleral icterus. Right eye: No discharge. Left eye: No discharge. Extraocular Movements: Extraocular movements intact. Conjunctiva/sclera: Conjunctivae normal.      Pupils: Pupils are equal, round, and reactive to light. Cardiovascular:      Rate and Rhythm: Normal rate and regular rhythm. Pulmonary:      Effort: Pulmonary effort is normal. No respiratory distress. Breath sounds: Normal breath sounds. No wheezing or rales. Abdominal:      General: Bowel sounds are normal.      Palpations: Abdomen is soft. Tenderness: There is no abdominal tenderness. There is no guarding.    Musculoskeletal: General: No swelling or tenderness. Cervical back: No rigidity. Right lower leg: No edema. Left lower leg: No edema. Skin:     General: Skin is warm and dry. Coloration: Skin is not jaundiced. Neurological:      General: No focal deficit present. Mental Status: She is alert and oriented to person, place, and time. Mental status is at baseline. Motor: No weakness. Psychiatric:         Mood and Affect: Mood normal.         Behavior: Behavior normal.         Thought Content: Thought content normal.         Judgment: Judgment normal.         Pertinent Laboratory/Diagnostic Studies:  Laboratory and Imaging studies reviewed. Full report in the paper chart. Current Medications   Medications reviewed and updated in facility chart.      -Total time spent on this encounter today including documentation and workup review, face to face time, history and exam, and documentation/orders was approximately 30 minutes. -This note will be copied to [facility] EMR where vitals and medication orders are placed. Lesley Gutierrez D.O.   Geriatric Medicine  11/16/2023 11:05 AM

## 2023-11-16 NOTE — ASSESSMENT & PLAN NOTE
-hx of chronic migraines  -used to get around 20 per month  -since starting botox injections q3 months and Ubrelvy PRN, migraines better overall, now around 10 per month  -patient's sister has her refill of David Puffer and to bring in 11/17  -follow up with PCP, Neuro

## 2023-11-16 NOTE — ASSESSMENT & PLAN NOTE
Chronic, baseline, stable  Following Pain Management outpatient  Arrived to rehab with acetaminophen 975mg q8hr, baclofen 5mg TID PRN, duloxetine, topical lidocaine, oxycodone 5mg q6hr PRN - current regimen working well

## 2023-11-16 NOTE — ASSESSMENT & PLAN NOTE
-follows with Psych/therapy services outpatient  -reports mood stable  -does endorse thoughts of being better off dead but no active suicidal intent/plan, notes she wants to keep living for her son primarily  -not wishing to talk to Psych at rehab at this time  -continue bupropion, buspirone, clonazepam PRN (dose was updated to be same as her home dose), duloxetine  -supportive care

## 2023-11-16 NOTE — ASSESSMENT & PLAN NOTE
S/p fall at home while on aspirin  Eval by Neurosurgery in hospital with non-operative management recommended  Completed 7 day course of Keppra for seizure prophylaxis  OK for chemical DVT prophylaxis: Lovenox  Holding aspirin for 2 weeks and/or until cleared by Neurosurgery to resume.   PT/OT  Repeat CT head as ordered prior to [facility] f/u  Follow up with Neurosurgery

## 2023-11-16 NOTE — ASSESSMENT & PLAN NOTE
Chronic constipation at home in setting of chronic oxycodone use  At risk due to hospitalization, relative immobility, comorbidities  Monitor stool output - last BM yesterday and day prior  Bowel regimen: miralax daily and docusate BID, stop senna as possibly contributing to cramping, titrate as appropriate; gave bisacodyl suppository 11/14, consider bisacodyl suppository PRN if no BM in 2-3 days  Encourage mobility as tolerated, PO hydration as appropriate, high fiber diet/prune juice in outpatient setting  Goal is for 1 easy BM every 1-2 days

## 2023-11-16 NOTE — ASSESSMENT & PLAN NOTE
L front scalp hematoma  Shrinking gradually  Minimally tender, fluctuant, no open wound/bleed/drainage  Monitor routine labs including Hb  See plan under epidural hematoma

## 2023-11-21 ENCOUNTER — PATIENT OUTREACH (OUTPATIENT)
Dept: CASE MANAGEMENT | Facility: OTHER | Age: 68
End: 2023-11-21

## 2023-11-21 ENCOUNTER — NURSING HOME VISIT (OUTPATIENT)
Dept: GERIATRICS | Facility: OTHER | Age: 68
End: 2023-11-21
Payer: MEDICARE

## 2023-11-21 DIAGNOSIS — S06.4XAA EPIDURAL HEMATOMA (HCC): Primary | ICD-10-CM

## 2023-11-21 DIAGNOSIS — I10 HYPERTENSION, ESSENTIAL: ICD-10-CM

## 2023-11-21 DIAGNOSIS — G43.709 CHRONIC MIGRAINE WITHOUT AURA WITHOUT STATUS MIGRAINOSUS, NOT INTRACTABLE: ICD-10-CM

## 2023-11-21 DIAGNOSIS — S00.03XD HEMATOMA OF SCALP, SUBSEQUENT ENCOUNTER: ICD-10-CM

## 2023-11-21 DIAGNOSIS — E11.9 CONTROLLED TYPE 2 DIABETES MELLITUS WITHOUT COMPLICATION, WITHOUT LONG-TERM CURRENT USE OF INSULIN (HCC): ICD-10-CM

## 2023-11-21 DIAGNOSIS — K59.03 DRUG-INDUCED CONSTIPATION: ICD-10-CM

## 2023-11-21 DIAGNOSIS — M54.42 CHRONIC MIDLINE LOW BACK PAIN WITH BILATERAL SCIATICA: ICD-10-CM

## 2023-11-21 DIAGNOSIS — G89.29 CHRONIC MIDLINE LOW BACK PAIN WITH BILATERAL SCIATICA: ICD-10-CM

## 2023-11-21 DIAGNOSIS — M54.41 CHRONIC MIDLINE LOW BACK PAIN WITH BILATERAL SCIATICA: ICD-10-CM

## 2023-11-21 PROCEDURE — 99309 SBSQ NF CARE MODERATE MDM 30: CPT | Performed by: STUDENT IN AN ORGANIZED HEALTH CARE EDUCATION/TRAINING PROGRAM

## 2023-11-21 NOTE — PROGRESS NOTES
Chart review complete the patient is currently admitted to [facility]. No LCD at this time. This Admin Coordinator will continue to monitor via chart review.

## 2023-11-21 NOTE — PROGRESS NOTES
[facility]    PROGRESS NOTE  Nursing Home Place of Service: nursing home place of service: POS 31 Skilled Care-Part A Coverage    NAME: Adelaide Boggs  : 1955 AGE: 70 y.o.  SEX: female MRN: [id number]  DATE OF ENCOUNTER: 2023    Assessment and Plan     Problem List Items Addressed This Visit          Digestive    Drug-induced constipation     Chronic constipation at home in setting of chronic oxycodone use  At risk due to hospitalization, relative immobility, comorbidities  Monitor stool output - last BM today and yesterday  Bowel regimen: miralax daily, stopped senna as possibly contributing to her cramping, titrate as appropriate; gave bisacodyl suppository , consider bisacodyl suppository PRN if no BM in 2-3 days  Encourage mobility as tolerated, PO hydration as appropriate, high fiber diet/prune juice in outpatient setting  Goal is for 1 easy BM every 1-2 days             Endocrine    Controlled type 2 diabetes mellitus, without long-term current use of insulin ([facility])       Lab Results   Component Value Date    HGBA1C 6.5 (H) 2022   -monitor sugars - generally stable around or just under 100  -avoid hypoglycemia  -continue metformin, consider reducing dose  in outpatient setting given well controlled sugars  -follow up with PCP            Cardiovascular and Mediastinum    Headache, chronic migraine without aura     -hx of chronic migraines  -used to get around 20 per month  -since starting botox injections q3 months and Ubrelvy PRN, migraines better overall, now around 10 per month  -patient reports next scheduled botox on  but will call to reschedule as likely to still be at rehab at that time  -follow up with PCP, Neurology         Hypertension, essential     -discontinued home valsartan-HCTZ 160-25 due to concern of low BP and orthostasis contributing to her fall  -monitor BP - well controlled off med generally [id number] systolic  -avoid hypotension  -follow up with PCP            Nervous and Auditory    Chronic midline low back pain with bilateral sciatica     Chronic, baseline, stable  Following Pain Management outpatient  Arrived to rehab with acetaminophen 975mg q8hr, baclofen 5mg TID PRN, duloxetine, topical lidocaine, oxycodone 5mg q6hr PRN - current regimen working well            Other    Scalp hematoma     L front scalp hematoma  Shrinking gradually  Minimally tender, fluctuant, no open wound/bleed/drainage  Monitor routine labs including Hb  See plan under epidural hematoma         Epidural hematoma ([address]) - Primary     S/p fall at home while on aspirin  Eval by Neurosurgery in hospital with non-operative management recommended  Completed 7 day course of Keppra for seizure prophylaxis  OK for chemical DVT prophylaxis: Lovenox  Holding aspirin for 2 weeks and/or until cleared by Neurosurgery to resume. PT/OT  Repeat CT head as ordered prior to [facility] f/u (patient notes her Neurologist had ordered MRI brain for her prior to her recent hospitalization that she had not gotten done yet - discussed with patient and she will check with her Neurology team if they would prefer her to get CT or MRI done as next test outpatient)  Follow up with Neurosurgery, Neurology              Chief Complaint     Follow up headaches, hematoma    History of Present Illness     Jerilyn Patricia is a 70 y.o. female who was seen today for follow up.   70 y.o. female with PMH including GERD, hiatal hernia, fatty liver, DM2, JAMES, migraines, HTN, HLD, arthritis, anxiety/depression     Patient seen and examined in room  Others present: none  Patient seated on side of bed, able to reposition independently  Appears comfortable, awake, alert, oriented to situation, able to converse appropriately  Patient appears in good spirits, notes feeling well overall. Her L sided head pain from her fall has been improving steadily.  Feels overall her head pain since her fall has been getting better gradually. Scalp hematoma and facial bruising have been shrinking/improving. No new/acute pain anywhere else, stable chronic back pain controlled on current regimen. Eating/drinking OK, no swallowing concerns. Last BM earlier today and yesterday. No abdominal pain, no N/V. No acute cardiopulmonary, abdominal, or urinary symptoms; see ROS for more details. Feels therapy is going well. No further questions or acute concerns identified. Lab Review:   11/13: BMP generally stable/non-actionable, Mg 1.7; CBC with Hb 10.7 (improving, baseline 14). A1c from 2022 6.5. TSH from 2020 WNL  11/16: BMP generally stable, CBC with Hb 10.9  11/20: BMP generally stable, CBC generally stable, Hb 12.4        CT head "Small acute epidural hemorrhagic area in the left posterior temporal region with maximum thickness of 5.8 mm. .  Left frontal scalp hematoma."  CT C-spine "No cervical spine fracture or traumatic malalignment. "  CT CAP "No CT findings of trauma in the chest, abdomen or pelvis "  CXR "No acute cardiopulmonary findings "  CT head 11/9 "Stable size and appearance of epidural hematoma overlying the left epidural region measuring 6 mm in thickness "              EKG 11/8: NSR, 95bpm, Qtc 424  Echo 9/25/23: LV 65, normal systolic function, grade 1 diastolic dysfunction  Holter monitor 9/20/23:   "The patient was in sinus rhythm throughout the duration of the study. The average heart rate was 86 bpm.   Rare ectopy without any significant arrhythmias. One run of atrial tachycardia lasting 6-beats noted.   Symptoms did not correspond to any significant rhythm abnormalities. "          The following portions of the patient's history were reviewed and updated as appropriate: allergies, current medications, past family history, past medical history, past social history, past surgical history and problem list.    Review of Systems     Review of Systems   Constitutional:  Negative for appetite change, chills, diaphoresis, fatigue and fever. HENT:  Negative for drooling, ear pain, hearing loss, rhinorrhea, sore throat and trouble swallowing. Eyes:  Negative for photophobia (with her migraines), pain, discharge, redness, itching and visual disturbance. Respiratory:  Negative for cough, choking, chest tightness, shortness of breath and wheezing. Cardiovascular:  Negative for chest pain, palpitations and leg swelling. Gastrointestinal:  Negative for abdominal pain, blood in stool, constipation (improved), diarrhea, nausea and vomiting. Genitourinary:  Negative for difficulty urinating, dysuria and hematuria. Musculoskeletal:  Positive for back pain (chronic baseline) and gait problem. Negative for arthralgias. Skin:  Negative for color change. Bruising on face   Neurological:  Positive for light-headedness (intermittent on rising, stable/improving) and headaches (improving). Negative for seizures, syncope, facial asymmetry, speech difficulty and weakness. Psychiatric/Behavioral:  Positive for suicidal ideas (thoughts of being better off dead but no active intent/plan and wants to live for her son). Negative for agitation, behavioral problems and confusion. The patient is not nervous/anxious and is not hyperactive. All other systems reviewed and are negative.       Active Problem List     Patient Active Problem List   Diagnosis    Headache, chronic migraine without aura    Cervicalgia    Cervical dystonia    Medication overuse headache    Depression with anxiety    Chronic midline low back pain with bilateral sciatica    Bilateral occipital neuralgia    Primary localized osteoarthritis of right knee    Major depression, recurrent, chronic (HCC)    Eating disorder    SY (generalized anxiety disorder)    Bereavement due to life event    Continuous opioid dependence ([address])    Facet arthritis of lumbosacral region    Migraine without status migrainosus, not intractable    Moderate episode of recurrent major depressive disorder ([address])    Hypercholesterolemia    Other insomnia    Chronic GERD    Hiatal hernia    Controlled type 2 diabetes mellitus, without long-term current use of insulin (HCC)    Fatty liver disease, nonalcoholic    Hypertension, essential    Chronic neck pain    Fibromyalgia    Drug-induced constipation    Worsening headaches    Uncontrolled morning headache    Snores    JAMES (obstructive sleep apnea)    Fall    Abnormal MRI    Scalp hematoma    Epidural hemorrhage without loss of consciousness (HCC)    Epidural hematoma ([address])    At risk for delirium    Advance care planning    Diastolic dysfunction       Objective     Vital Signs:     BP: 104/61 mmHg  11/21/2023 07:56 Temp:98.2 °F  11/21/2023 07:56 Pulse:78 bpm  11/21/2023 07:56 Weight:147.6 Lbs  11/21/2023 05:04   Resp:22 Breaths/min  11/21/2023 07:56 BS:82 mg/dL  11/21/2023 06:29 O2:97 %  11/21/2023 07:57 Pain:4  11/21/2023 06:00       Physical Exam  Vitals reviewed. Constitutional:       General: She is not in acute distress. Appearance: She is not toxic-appearing or diaphoretic. HENT:      Head: Normocephalic. Comments: Approx 0.75 inch fluctuant lump at L forehead (shrunk from prior), mildly tender, no open wound/bleed/drainage  Bruising under bilateral eyes and along L cheek appears significantly improving/receding  No notable facial swelling       Right Ear: External ear normal.      Left Ear: External ear normal.      Nose: Nose normal. No rhinorrhea. Mouth/Throat:      Mouth: Mucous membranes are dry. Pharynx: Oropharynx is clear. No posterior oropharyngeal erythema. Eyes:      General: No scleral icterus. Right eye: No discharge. Left eye: No discharge. Extraocular Movements: Extraocular movements intact. Conjunctiva/sclera: Conjunctivae normal.      Pupils: Pupils are equal, round, and reactive to light.    Cardiovascular:      Rate and Rhythm: Normal rate and regular rhythm. Pulmonary:      Effort: Pulmonary effort is normal. No respiratory distress. Breath sounds: Normal breath sounds. No wheezing or rales. Abdominal:      General: Bowel sounds are normal.      Palpations: Abdomen is soft. Tenderness: There is no abdominal tenderness. There is no guarding. Musculoskeletal:         General: No swelling or tenderness. Cervical back: No rigidity. Right lower leg: No edema. Left lower leg: No edema. Skin:     General: Skin is warm and dry. Coloration: Skin is not jaundiced. Neurological:      General: No focal deficit present. Mental Status: She is alert and oriented to person, place, and time. Mental status is at baseline. Motor: No weakness. Psychiatric:         Mood and Affect: Mood normal.         Behavior: Behavior normal.         Thought Content: Thought content normal.         Judgment: Judgment normal.         Pertinent Laboratory/Diagnostic Studies:  Laboratory and Imaging studies reviewed. Full report in the paper chart. Current Medications   Medications reviewed and updated in facility chart.      -Total time spent on this encounter today including documentation and workup review, face to face time, history and exam, and documentation/orders was approximately 30 minutes. -This note will be copied to [facility] EMR where vitals and medication orders are placed. Siobhan Funk D.O.   Geriatric Medicine  11/21/2023 12:23 PM

## 2023-11-21 NOTE — ASSESSMENT & PLAN NOTE
S/p fall at home while on aspirin  Eval by Neurosurgery in hospital with non-operative management recommended  Completed 7 day course of Keppra for seizure prophylaxis  OK for chemical DVT prophylaxis: Lovenox  Holding aspirin for 2 weeks and/or until cleared by Neurosurgery to resume.   PT/OT  Repeat CT head as ordered prior to [facility] f/u (patient notes her Neurologist had ordered MRI brain for her prior to her recent hospitalization that she had not gotten done yet - discussed with patient and she will check with her Neurology team if they would prefer her to get CT or MRI done as next test outpatient)  Follow up with Neurosurgery, Neurology

## 2023-11-21 NOTE — ASSESSMENT & PLAN NOTE
-hx of chronic migraines  -used to get around 20 per month  -since starting botox injections q3 months and Ubrelvy PRN, migraines better overall, now around 10 per month  -patient reports next scheduled botox on 11/27 but will call to reschedule as likely to still be at rehab at that time  -follow up with PCP, Neurology

## 2023-11-21 NOTE — ASSESSMENT & PLAN NOTE
Chronic constipation at home in setting of chronic oxycodone use  At risk due to hospitalization, relative immobility, comorbidities  Monitor stool output - last BM today and yesterday  Bowel regimen: miralax daily, stopped senna as possibly contributing to her cramping, titrate as appropriate; gave bisacodyl suppository 11/14, consider bisacodyl suppository PRN if no BM in 2-3 days  Encourage mobility as tolerated, PO hydration as appropriate, high fiber diet/prune juice in outpatient setting  Goal is for 1 easy BM every 1-2 days

## 2023-11-21 NOTE — ASSESSMENT & PLAN NOTE
Lab Results   Component Value Date    HGBA1C 6.5 (H) 07/11/2022     -monitor sugars - generally stable around or just under 100  -avoid hypoglycemia  -continue metformin, consider reducing dose  in outpatient setting given well controlled sugars  -follow up with PCP

## 2023-11-21 NOTE — ASSESSMENT & PLAN NOTE
-discontinued home valsartan-HCTZ 160-25 due to concern of low BP and orthostasis contributing to her fall  -monitor BP - well controlled off med generally [id number] systolic  -avoid hypotension  -follow up with PCP

## 2023-11-24 ENCOUNTER — HOME HEALTH ADMISSION (OUTPATIENT)
Dept: HOME HEALTH SERVICES | Facility: HOME HEALTHCARE | Age: 68
End: 2023-11-24
Payer: MEDICARE

## 2023-11-24 ENCOUNTER — TELEPHONE (OUTPATIENT)
Dept: NEUROSURGERY | Facility: CLINIC | Age: 68
End: 2023-11-24

## 2023-11-24 ENCOUNTER — APPOINTMENT (OUTPATIENT)
Dept: CT IMAGING | Facility: HOSPITAL | Age: 68
End: 2023-11-24
Payer: MEDICARE

## 2023-11-24 PROCEDURE — 70450 CT HEAD/BRAIN W/O DYE: CPT

## 2023-11-24 RX ORDER — ENOXAPARIN SODIUM 100 MG/ML
INJECTION SUBCUTANEOUS
Qty: 3 ML | Refills: 0 | OUTPATIENT
Start: 2023-11-24

## 2023-11-24 NOTE — TELEPHONE ENCOUNTER
Contacted facility to confirm appt and verify CTH. Spoke to Kellie who confirmed and stated CTH will be done sometime 11/24. I told her I will follow up to verify if CT was done, if not we will reschedule appt.

## 2023-11-27 ENCOUNTER — TELEMEDICINE (OUTPATIENT)
Dept: NEUROSURGERY | Facility: CLINIC | Age: 68
End: 2023-11-27
Payer: MEDICARE

## 2023-11-27 ENCOUNTER — TELEPHONE (OUTPATIENT)
Dept: NEUROSURGERY | Facility: CLINIC | Age: 68
End: 2023-11-27

## 2023-11-27 ENCOUNTER — PATIENT OUTREACH (OUTPATIENT)
Dept: CASE MANAGEMENT | Facility: OTHER | Age: 68
End: 2023-11-27

## 2023-11-27 ENCOUNTER — TELEPHONE (OUTPATIENT)
Dept: NEUROLOGY | Facility: CLINIC | Age: 68
End: 2023-11-27

## 2023-11-27 ENCOUNTER — NURSING HOME VISIT (OUTPATIENT)
Dept: GERIATRICS | Facility: OTHER | Age: 68
End: 2023-11-27
Payer: MEDICARE

## 2023-11-27 DIAGNOSIS — S06.4XAA EPIDURAL HEMATOMA (HCC): Primary | ICD-10-CM

## 2023-11-27 DIAGNOSIS — G89.29 CHRONIC MIDLINE LOW BACK PAIN WITH BILATERAL SCIATICA: ICD-10-CM

## 2023-11-27 DIAGNOSIS — S00.03XD HEMATOMA OF SCALP, SUBSEQUENT ENCOUNTER: ICD-10-CM

## 2023-11-27 DIAGNOSIS — K59.03 DRUG-INDUCED CONSTIPATION: ICD-10-CM

## 2023-11-27 DIAGNOSIS — F11.20 CONTINUOUS OPIOID DEPENDENCE (HCC): ICD-10-CM

## 2023-11-27 DIAGNOSIS — M54.41 CHRONIC MIDLINE LOW BACK PAIN WITH BILATERAL SCIATICA: ICD-10-CM
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
## 2023-11-27 DIAGNOSIS — G43.709 CHRONIC MIGRAINE WITHOUT AURA WITHOUT STATUS MIGRAINOSUS, NOT INTRACTABLE: ICD-10-CM

## 2023-11-27 DIAGNOSIS — K44.9 HIATAL HERNIA: ICD-10-CM

## 2023-11-27 DIAGNOSIS — G47.33 OSA (OBSTRUCTIVE SLEEP APNEA): ICD-10-CM

## 2023-11-27 DIAGNOSIS — N17.9 AKI (ACUTE KIDNEY INJURY) (HCC): Primary | ICD-10-CM

## 2023-11-27 DIAGNOSIS — I51.89 DIASTOLIC DYSFUNCTION: ICD-10-CM

## 2023-11-27 DIAGNOSIS — K21.9 CHRONIC GERD: ICD-10-CM

## 2023-11-27 DIAGNOSIS — E11.9 CONTROLLED TYPE 2 DIABETES MELLITUS WITHOUT COMPLICATION, WITHOUT LONG-TERM CURRENT USE OF INSULIN (HCC): ICD-10-CM

## 2023-11-27 DIAGNOSIS — E78.00 HYPERCHOLESTEROLEMIA: ICD-10-CM

## 2023-11-27 DIAGNOSIS — I10 HYPERTENSION, ESSENTIAL: ICD-10-CM

## 2023-11-27 DIAGNOSIS — M54.42 CHRONIC MIDLINE LOW BACK PAIN WITH BILATERAL SCIATICA: ICD-10-CM

## 2023-11-27 DIAGNOSIS — F41.8 DEPRESSION WITH ANXIETY: ICD-10-CM

## 2023-11-27 PROCEDURE — 99213 OFFICE O/P EST LOW 20 MIN: CPT | Performed by: PHYSICIAN ASSISTANT

## 2023-11-27 PROCEDURE — 99316 NF DSCHRG MGMT 30 MIN+: CPT | Performed by: STUDENT IN AN ORGANIZED HEALTH CARE EDUCATION/TRAINING PROGRAM

## 2023-11-27 RX ORDER — OXYCODONE HYDROCHLORIDE 5 MG/1
5 CAPSULE ORAL EVERY 6 HOURS PRN
COMMUNITY

## 2023-11-27 RX ORDER — BISACODYL 10 MG
10 SUPPOSITORY, RECTAL RECTAL AS NEEDED
COMMUNITY

## 2023-11-27 RX ORDER — NALOXONE HYDROCHLORIDE 4 MG/.1ML
SPRAY NASAL
Qty: 1 EACH | Refills: 1 | Status: SHIPPED | OUTPATIENT
Start: 2023-11-27 | End: 2024-11-26

## 2023-11-27 NOTE — ASSESSMENT & PLAN NOTE
-hx of chronic migraines  -used to get around 20 per month  -since starting botox injections q3 months and Ubrelvy PRN, migraines better overall, now around 10 per month  -stable  -follow up with PCP, Neurology

## 2023-11-27 NOTE — ASSESSMENT & PLAN NOTE
Lab Results   Component Value Date    HGBA1C 6.5 (H) 07/11/2022       -monitor sugars - fasting sugars generally stable around or just under 100  -avoid hypoglycemia  -continue metformin, consider reducing dose  in outpatient setting given well controlled sugars (metformin should not contribute to hypoglycemia)  -follow up with PCP

## 2023-11-27 NOTE — ASSESSMENT & PLAN NOTE
Chronic, baseline, stable  Following Pain Management outpatient  Arrived to rehab with acetaminophen 975mg q8hr, baclofen 5mg TID PRN, duloxetine, topical lidocaine, oxycodone 5mg q6hr PRN - current regimen working well  Adjust/wean as tolerated, recommend to continue to do so in outpatient setting, defer to PCP

## 2023-11-27 NOTE — ASSESSMENT & PLAN NOTE
[place] per admission note 11/14  Bowel regimen as appropriate for constipation  Naloxone PRN for emergency use (sent to pharmacy)  Follows pain management in [place]

## 2023-11-27 NOTE — ASSESSMENT & PLAN NOTE
Chronic constipation at home in setting of chronic oxycodone use  At risk due to hospitalization, relative immobility, comorbidities  Monitor stool output - appears improved/stable recently  Bowel regimen: miralax daily, stopped senna as possibly contributing to some chronic abdominal cramping, titrate as appropriate; gave bisacodyl suppository 11/14, consider bisacodyl suppository PRN if no BM in 2-3 days  Encourage mobility as tolerated, PO hydration as appropriate, high fiber diet/prune juice in outpatient setting  Goal is for 1 easy BM every 1-2 days

## 2023-11-27 NOTE — ASSESSMENT & PLAN NOTE
-likely multifactorial, was ambulating in the dark without her cane, possible orthostatic/hypotensive component  -monitor orthostatic vitals  -PT/OT  -fall precautions  -encourage appropriate DME use

## 2023-11-27 NOTE — ASSESSMENT & PLAN NOTE
-follows with Psych/therapy services outpatient  -reports mood stable  -has endorsed thoughts of being better off dead but no active suicidal intent/plan, notes she wants to keep living for her son primarily  -not wishing to talk to Psych at rehab  -continue bupropion, buspirone, clonazepam PRN (dose was updated to be same as her home dose), duloxetine  -supportive care

## 2023-11-27 NOTE — ASSESSMENT & PLAN NOTE
strike-out   11/27/2023 05:48 146.8 Lbs Standing dhoward (Manual)    strike-out   11/27/2023 05:13 146.8 Lbs Standing jcarpenter (Manual)    strike-out   11/26/2023 05:50 147. 4 Lbs Standing dhoward (Manual)    strike-out   11/25/2023 06:16 146.2 Lbs Bed eguerrier (Manual)    strike-out   11/24/2023 05:25 146.4 Lbs Standing shazzard (Manual)    strike-out   11/23/2023 06:34 146.2 Lbs Standing shazzard (Manual)    strike-out   11/22/2023 06:01 147. 4 Lbs Standing dhoward (Manual)    strike-out   11/21/2023 05:04 147.6 Lbs Standing shazzard (Manual)    strike-out   11/19/2023 06:00 151.0 Lbs Bed sliu (Manual)    strike-out   11/18/2023 06:22 146.2 Lbs Bed ldiaz (Manual)    strike-out   11/17/2023 05:35 147.8 Lbs Standing dhoward (Manual)    strike-out   11/17/2023 05:34 147.8 Lbs Standing dhoward (Manual)    strike-out   11/16/2023 06:06 150.0 Lbs Bed shazzard (Manual)    strike-out   11/15/2023 05:07 147.6 Lbs Standing shazzard (Manual)    strike-out   11/14/2023 06:11 152. 3 Lbs Bed shazzard (Manual)    strike-out   11/13/2023 16:45 152.9 Lbs Bed acombs (Manual)      Noted on echo, no noted CHF history on chart review  Appears clinically euvolemic  Monitor volume status clinically and monitor daily weights  Follow up with PCP, Cardiology as appropriate

## 2023-11-27 NOTE — TELEPHONE ENCOUNTER
11/27/23 Virtual done today needs 4 weeks virtual with another cthead 2-3 days prior - [facility] spoke with Jorge Bell tried to schedule with her she declined states to call family who will bring her for ct  she wont be here in 4 weeks -BA   11/27/23 10:50am  I Spoke with sister Slick Steve @ [phone] she will discuss this cthead with her tonight and call central scheduling for apt in 4 weeks then call us back for virtual apt after with AP -she has central scheduling dept number as well as ours to call back for our f/u apt -ba

## 2023-11-27 NOTE — PROGRESS NOTES
ADT alert received the patient discharged 11/27/23 to [facility]. I have removed myself off of the care team, added the CM to the care team who will follow the patient through the episode, sent the care manager an inbasket notifying them of the HRR Referal.  Ambulatory referral placed for complex care management. Discharge paperwork attached to this encounter.
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
## 2023-11-27 NOTE — PROGRESS NOTES
[facility]: [facility]    DISCHARGE SUMMARY  Nursing Home Place of Service: 31: SNF/Short Term Rehab    NAME: Parul Mandel  : 1955 AGE: 70 y.o. SEX: female MRN: [id number]  DATE OF ENCOUNTER: 2023    DATE OF ADMISSION: 23 DATE OF DISCHARGE:23 DISCHARGE DISPOSITION: home with VNA    HPI: Parul Mandel is a 70 y.o. female with PMH including GERD, hiatal hernia, fatty liver, DM2, JAMES, migraines, HTN, HLD, arthritis, anxiety/depression     Reason for admission: Patient was hospitalized at [facility] (transfer from [facility]) from  to 23  For details of hospitalization, see hospital records including discharge documentation from 23 (final discharge summary not yet in chart)  Briefly, patient hospitalized 4 days s/p fall (, +headstrike, +LOC), found to have scalp hematoma, left posterior epidural hematoma; followed by Neurosurgery with non-operative management recommended, to hold aspirin x2 weeks pending Neurosurgery follow-up, OK for pharmacologic DVT ppx; to f/u with repeat CT head and Neurosurgery in approx 2 weeks. Course of stay: Patient was admitted to [address] [place] [address] for rehabilitation due to physical deconditioning and hematoma as above. Significant events during the stay include: n/a. The patient participated in PT/OT. Patient seen and examined in room  Others present: none  Patient laying in bed able to reposition and sit up independently  Appears comfortable, awake, alert, oriented to situation, able to converse appropriately  Patient in good spirits, feeling well. Pleased with her [facility] virtual appt this morning and working on setting up outpatient follow-up appts. Her L sided head pain has notably improved/resolved towards only her baseline intermittent migraines recently. Notes overall her headaches are "not as severe" anymore.  Scalp hematoma and facial bruising have been shrinking/improving. No new/acute pain anywhere else, stable chronic back pain controlled on current regimen. Eating/drinking OK, no swallowing concerns. Last BM 1-2 days ago and does not feel any recent diarrhea/constipation. No abdominal pain, no N/V. No acute cardiopulmonary, abdominal, or urinary symptoms; see ROS for more details. Feels therapy has gone well and she is happy to be going home today. No further questions or acute concerns identified. [place] PDMP reviewed 11/14:  1 [id number] 11/13/2023 11/13/2023 clonazePAM (Tablet) 6.0 4 0.5 MG NA [facility] [place] / 0 [place]     1 [id number] 11/13/2023 11/13/2023 oxyCODONE HCL (Tablet) 5.0 1 5 MG 37.50 [facility] [place] / 0 [place]     2 [id number] 10/17/2023 10/17/2023 oxyCODONE HCL (Tablet) 90.0 30 10 MG 45.0 [facility]  Commercial [place] 0 / 0 [place]     2 [id number] 10/09/2023 09/12/2023 clonazePAM (Tablet) 90.0 30 0.5 MG NA [facility] #[id number] [place] 0 / 2 [place]     2 [id number] 09/21/2023 09/21/2023 oxyCODONE HCL (Tablet) 90.0 23 10 MG 58.70 [facility]  Commercial [place] 0 / 0 [place]     2 [id number] 09/03/2023 09/01/2023 clonazePAM (Tablet) 90.0 30 0.5 MG NA [facility][id number] Commercial Insurance 0 / 0 [address]     2 [id number] 08/30/2023 08/24/2023 oxyCODONE HCL (Tablet) 85.0 22 10 MG 57.95 [facility]  Commercial [place] / 0 [place]     2 [id number] 08/05/2023 08/04/2023 clonazePAM (TABLET) 90.0 30 0.5 MG NA [facility] #[id number] Commercial Insurance 0 / 0 [address]     2 [id number] 07/27/2023 07/27/2023 oxyCODONE HCL (Tablet) 120.0 30 10 MG 60.0 [facility]  Commercial Insurance 0 / 0             Lab Review:   11/13: BMP generally stable/non-actionable, Mg 1.7; CBC with Hb 10.7 (improving, baseline 14).  A1c from 2022 6.5. TSH from 2020 WNL  11/16: BMP generally stable/non-actionable, Hb 10.9  11/20: BMP generally stable/non-actionable, CBC generally stable/non-actionable  11/27: BMP, CBC generally stable        CT head "Small acute epidural hemorrhagic area in the left posterior temporal region with maximum thickness of 5.8 mm. .  Left frontal scalp hematoma."  CT C-spine "No cervical spine fracture or traumatic malalignment. "  CT CAP "No CT findings of trauma in the chest, abdomen or pelvis "  CXR "No acute cardiopulmonary findings "  CT head 11/9 "Stable size and appearance of epidural hematoma overlying the left epidural region measuring 6 mm in thickness "      CT head 11/24: "0.5 cm thick late subacute hypodense epidural hematoma along left parietotemporal convexity, decreased in density from prior exam.   No new acute intracranial abnormality."        EKG 11/8: NSR, 95bpm, Qtc 424  Echo 9/25/23: LV 65, normal systolic function, grade 1 diastolic dysfunction  Holter monitor 9/20/23:   "The patient was in sinus rhythm throughout the duration of the study. The average heart rate was 86 bpm.   Rare ectopy without any significant arrhythmias. One run of atrial tachycardia lasting 6-beats noted.   Symptoms did not correspond to any significant rhythm abnormalities. "         Assessment/Plan:    Chronic GERD  -stable  -continue calcium carbonate, nexium  -recommend OOB with meals, sit upright for at least 30 minutes afterwards, avoid trigger foods  -continue to monitor  -follow up with PCP, GI as appropriate    Drug-induced constipation  Chronic constipation at home in setting of chronic oxycodone use  At risk due to hospitalization, relative immobility, comorbidities  Monitor stool output - appears improved/stable recently  Bowel regimen: miralax daily, stopped senna as possibly contributing to some chronic abdominal cramping, titrate as appropriate; gave bisacodyl suppository 11/14, consider bisacodyl suppository PRN if no BM in 2-3 days  Encourage mobility as tolerated, PO hydration as appropriate, high fiber diet/prune juice in outpatient setting  Goal is for 1 easy BM every 1-2 days     Hiatal hernia  See plan under GERD     Controlled type 2 diabetes mellitus, without long-term current use of insulin ([facility])    Lab Results   Component Value Date    HGBA1C 6.5 (H) 07/11/2022       -monitor sugars - fasting sugars generally stable around or just under 100  -avoid hypoglycemia  -continue metformin, consider reducing dose  in outpatient setting given well controlled sugars (metformin should not contribute to hypoglycemia)  -follow up with PCP    JAMES (obstructive sleep apnea)  -following outpatient for sleep study     Headache, chronic migraine without aura  -hx of chronic migraines  -used to get around 20 per month  -since starting botox injections q3 months and Ubrelvy PRN, migraines better overall, now around 10 per month  -stable  -follow up with PCP, Neurology    Hypertension, essential  -discontinued home valsartan-HCTZ 160-25 due to concern of low BP and orthostasis contributing to her fall  -monitor BP - well controlled off med generally [id number] systolic (very rarely higher)  -avoid hypotension  -encourage PO intake/hydration  -follow up with PCP       Chronic midline low back pain with bilateral sciatica  Chronic, baseline, stable  Following Pain Management outpatient  Arrived to rehab with acetaminophen 975mg q8hr, baclofen 5mg TID PRN, duloxetine, topical lidocaine, oxycodone 5mg q6hr PRN - current regimen working well  Adjust/wean as tolerated, recommend to continue to do so in outpatient setting, defer to PCP    Continuous opioid dependence ([address])  [place] reviewed [place] per admission note 11/14  Bowel regimen as appropriate for constipation  Naloxone PRN for emergency use (sent to pharmacy)  Follows pain management in [place]    Depression with anxiety  -follows with Psych/therapy services outpatient  -reports mood stable  -has endorsed thoughts of being better off dead but no active suicidal intent/plan, notes she wants to keep living for her son primarily  -not wishing to talk to Psych at rehab  -continue bupropion, buspirone, clonazepam PRN (dose was updated to be same as her home dose), duloxetine  -supportive care    Diastolic dysfunction  strike-out   11/27/2023 05:48 146.8 Lbs Standing dhoward (Manual)    strike-out   11/27/2023 05:13 146.8 Lbs Standing jcarpenter (Manual)    strike-out   11/26/2023 05:50 147. 4 Lbs Standing dhoward (Manual)    strike-out   11/25/2023 06:16 146.2 Lbs Bed eguerrier (Manual)    strike-out   11/24/2023 05:25 146.4 Lbs Standing shazzard (Manual)    strike-out   11/23/2023 06:34 146.2 Lbs Standing shazzard (Manual)    strike-out   11/22/2023 06:01 147. 4 Lbs Standing dhoward (Manual)    strike-out   11/21/2023 05:04 147.6 Lbs Standing shazzard (Manual)    strike-out   11/19/2023 06:00 151.0 Lbs Bed sliu (Manual)    strike-out   11/18/2023 06:22 146.2 Lbs Bed ldiaz (Manual)    strike-out   11/17/2023 05:35 147.8 Lbs Standing dhoward (Manual)    strike-out   11/17/2023 05:34 147.8 Lbs Standing dhoward (Manual)    strike-out   11/16/2023 06:06 150.0 Lbs Bed shazzard (Manual)    strike-out   11/15/2023 05:07 147.6 Lbs Standing shazzard (Manual)    strike-out   11/14/2023 06:11 152. 3 Lbs Bed shazzard (Manual)    strike-out   11/13/2023 16:45 152.9 Lbs Bed acombs (Manual)      Noted on echo, no noted CHF history on chart review  Appears clinically euvolemic  Monitor volume status clinically and monitor daily weights  Follow up with PCP, Cardiology as appropriate    Epidural hematoma ([address])  S/p fall at home while on aspirin  Eval by Neurosurgery in hospital with non-operative management recommended  Completed 7 day course of Keppra for seizure prophylaxis  OK for chemical DVT prophylaxis: Lovenox  Holding aspirin for 2 weeks and/or until cleared by Neurosurgery to resume.   PT/OT  Follow up with Neurosurgery, Neurology    Fall  -likely multifactorial, was ambulating in the dark without her cane, possible orthostatic/hypotensive component  -monitor orthostatic vitals  -PT/OT  -fall precautions  -encourage appropriate DME use    Hypercholesterolemia  -continue statin     Scalp hematoma  L front scalp hematoma  Shrinking gradually, no open wound/bleed/drainage  Monitor routine labs including Hb  See plan under epidural hematoma           Review of Systems   Constitutional:  Negative for appetite change, chills, diaphoresis, fatigue and fever. HENT:  Negative for drooling, ear pain, hearing loss, rhinorrhea, sore throat and trouble swallowing. Eyes:  Negative for photophobia (with her migraines), pain, discharge, redness, itching and visual disturbance. Respiratory:  Negative for cough, choking, chest tightness, shortness of breath and wheezing. Cardiovascular:  Negative for chest pain, palpitations and leg swelling. Gastrointestinal:  Negative for abdominal pain, blood in stool, constipation (improved), diarrhea, nausea and vomiting. Genitourinary:  Negative for difficulty urinating, dysuria and hematuria. Musculoskeletal:  Positive for back pain (chronic baseline) and gait problem. Negative for arthralgias. Skin:  Negative for color change. Bruising on face   Neurological:  Positive for headaches (improving toward baseline). Negative for seizures, syncope, facial asymmetry, speech difficulty, weakness and light-headedness (intermittent on rising, stable/improving). Psychiatric/Behavioral:  Positive for suicidal ideas (thoughts of being better off dead but no active intent/plan and wants to live for her son). Negative for agitation, behavioral problems and confusion. The patient is not nervous/anxious and is not hyperactive. All other systems reviewed and are negative.       Vital Signs:     BP: 155/67 mmHg  11/27/2023 08:10   Temp:97.2 °F  11/27/2023 08:10 Pulse:86 bpm  11/27/2023 08:10 Weight:146.8 Lbs  11/27/2023 05:48   Resp:18 Breaths/min  11/27/2023 08:10 BS:73 mg/dL  11/27/2023 05:12 O2:96 %  11/27/2023 08:10 Pain:4  11/27/2023 07:11       Exam:     Physical Exam  Vitals reviewed. Constitutional:       General: She is not in acute distress. Appearance: She is not toxic-appearing or diaphoretic. HENT:      Head: Normocephalic. Comments: Approx 0.6 inch lump at L forehead (shrunk from prior), non-tender, no open wound/bleed/drainage  Bruising under bilateral eyes and along L cheek significantly improving/receding  No notable facial swelling       Right Ear: External ear normal.      Left Ear: External ear normal.      Nose: Nose normal. No rhinorrhea. Mouth/Throat:      Mouth: Mucous membranes are dry. Pharynx: Oropharynx is clear. No posterior oropharyngeal erythema. Eyes:      General: No scleral icterus. Right eye: No discharge. Left eye: No discharge. Extraocular Movements: Extraocular movements intact. Conjunctiva/sclera: Conjunctivae normal.      Pupils: Pupils are equal, round, and reactive to light. Cardiovascular:      Rate and Rhythm: Normal rate and regular rhythm. Pulmonary:      Effort: Pulmonary effort is normal. No respiratory distress. Breath sounds: Normal breath sounds. No wheezing or rales. Abdominal:      General: Bowel sounds are normal.      Palpations: Abdomen is soft. Tenderness: There is no abdominal tenderness. There is no guarding. Musculoskeletal:         General: No swelling or tenderness. Cervical back: No rigidity. Right lower leg: No edema. Left lower leg: No edema. Skin:     General: Skin is warm and dry. Coloration: Skin is not jaundiced. Neurological:      General: No focal deficit present. Mental Status: She is alert and oriented to person, place, and time. Mental status is at baseline. Motor: No weakness.    Psychiatric:         Mood and Affect: Mood normal. Behavior: Behavior normal.         Thought Content: Thought content normal.         Judgment: Judgment normal.           Discharge Medications: See discharge medication list which was reviewed and signed. Status at time of discharge: Stable    Discussion with patient/family as appropriate and further instructions:  -Fall precautions  -Aspiration precautions  -Bleeding precautions  -Monitor for signs/symptoms of infection  -Medication list was reviewed and signed  -DME form was completed    Follow-up Recommendations: Please follow-up with your primary care physician within 7-10 days of discharge to review medication changes and current status. Problem List Follow-up Recommendations:      -Total time spent on this encounter today including documentation and workup review, face to face time, history and exam, and documentation/orders was approximately 50 minutes. -This note will be copied to [facility] EMR where vitals and medication orders are placed. Yancy Nicole D.O.   Geriatric Medicine  11/27/2023 11:07 AM

## 2023-11-27 NOTE — ASSESSMENT & PLAN NOTE
-discontinued home valsartan-HCTZ 160-25 due to concern of low BP and orthostasis contributing to her fall  -monitor BP - well controlled off med generally [id number] systolic (very rarely higher)  -avoid hypotension  -encourage PO intake/hydration  -follow up with PCP

## 2023-11-27 NOTE — ASSESSMENT & PLAN NOTE
L front scalp hematoma  Shrinking gradually, no open wound/bleed/drainage  Monitor routine labs including Hb  See plan under epidural hematoma

## 2023-11-27 NOTE — ASSESSMENT & PLAN NOTE
Pt presents for approximately 2-week follow-up for left intracranial epidural hematoma status post fall on 11/4/2023. Patient was previously on aspirin 81 mg for heart health that is currently held. Imaging reviewed personally. Final results below discussed with the patient. CT head wo contrast 11/22/2023: 0.5 cm thick late subacute hypodense epidural hematoma along the left parietotemporal convexity, decreased in density from prior exam.    Plan  Recommend SBP goal <160mmHg. Pt advised to avoid high blood pressure. Pain/headache management with OTC pain meds/prescribed medications as needed. Discussed with patient to avoid any blood thinning medications and to avoid alcohol. Patient to continue to hold aspirin at this time. Recommend safety precautions to avoid trauma to head or falls. Patient to avoid strenuous activity. Should pt experience any new or worsening neurological signs or red flag signs, pt made aware to present to ED for evaluation. No neurosurgical intervention is anticipated at this time. Pt will require continued surveillance. Pt will have follow up with neurosurgery in 4 weeks with repeat CTH wo. Patient verbalized understanding and was in agreement with the plan. Patient made aware to contact neurosurgery with any questions or concerns.

## 2023-11-27 NOTE — ASSESSMENT & PLAN NOTE
S/p fall at home while on aspirin  Eval by Neurosurgery in hospital with non-operative management recommended  Completed 7 day course of Keppra for seizure prophylaxis  OK for chemical DVT prophylaxis: Lovenox  Holding aspirin for 2 weeks and/or until cleared by Neurosurgery to resume.   PT/OT  Follow up with Neurosurgery, Neurology

## 2023-11-27 NOTE — PROGRESS NOTES
Virtual Regular Visit    Verification of patient location:    Patient is located at Other in the following state in which I hold an active license [place]      Assessment/Plan:    Problem List Items Addressed This Visit          Other    Epidural hematoma ([address]) - Primary     Pt presents for approximately 2-week follow-up for left intracranial epidural hematoma status post fall on 11/4/2023. Patient was previously on aspirin 81 mg for heart health that is currently held. Imaging reviewed personally. Final results below discussed with the patient. CT head wo contrast 11/22/2023: 0.5 cm thick late subacute hypodense epidural hematoma along the left parietotemporal convexity, decreased in density from prior exam.    Plan  Recommend SBP goal <160mmHg. Pt advised to avoid high blood pressure. Pain/headache management with OTC pain meds/prescribed medications as needed. Discussed with patient to avoid any blood thinning medications and to avoid alcohol. Patient to continue to hold aspirin at this time. Recommend safety precautions to avoid trauma to head or falls. Patient to avoid strenuous activity. Should pt experience any new or worsening neurological signs or red flag signs, pt made aware to present to ED for evaluation. No neurosurgical intervention is anticipated at this time. Pt will require continued surveillance. Pt will have follow up with neurosurgery in 4 weeks with repeat CTH wo. Patient verbalized understanding and was in agreement with the plan. Patient made aware to contact neurosurgery with any questions or concerns.              Relevant Orders    CT head without contrast            Reason for visit is   Chief Complaint   Patient presents with    Virtual Regular Visit     Follow up-Epidural hematoma s/p fall, with [facility]        Encounter provider Darin Vaca PA-C    Provider located at [address] [place] [address]  [phone]      Recent Visits  Date Type Provider Dept   11/24/23 Telephone [address] recent visits within past 7 days and meeting all other requirements  Today's Visits  Date Type Provider Dept   11/27/23 Telephone [facility]   11/27/23 Telemedicine Fani Gallegos, [address] [facility]   Showing today's visits and meeting all other requirements  Future Appointments  No visits were found meeting these conditions. Showing future appointments within next 150 days and meeting all other requirements       The patient was identified by name and date of birth. Marilynn Romberg was informed that this is a telemedicine visit and that the visit is being conducted through the phone. She agrees to proceed. .  My office door was closed. No one else was in the room. She acknowledged consent and understanding of privacy and security of the video platform. The patient has agreed to participate and understands they can discontinue the visit at any time. Patient is aware this is a billable service. It was my intent to perform this visit via video technology but the patient was not able to do a video connection so the visit was completed via audio telephone only. Subjective  Marilynn Romberg is a 70 y.o. female       With past medical history of anxiety, depression, diabetes, hypertension, hyperlipidemia, migraines, who was previously on aspirin for heart health who  had a virtual visit today for approximately 2-week follow-up for epidural hematoma status post fall on 11/4/2023. Patient reports that she has a history of intermittent migraines, however, since the fall she has been having daily headaches. Patient reports that over the last few days she has had a decrease in the intensity of the headaches. Patient denies any dizziness, lightheadedness or visual disturbance. Patient denies any new numbness, tingling or weakness.  Pt denies nausea or vomiting. Patient reports that she is currently at rehab. Patient reports that she has been improving with physical therapy and she anticipates to be discharged to home today. Past Medical History:   Diagnosis Date    Anxiety     Arthritis     Cancer ([address])     Skin    Depression     Diabetes ([address])     GERD (gastroesophageal reflux disease)     Hyperlipidemia     Hypertension     Migraine        Past Surgical History:   Procedure Laterality Date    ANKLE SURGERY      CARPAL TUNNEL RELEASE Right     ROTATOR CUFF REPAIR         Current Outpatient Medications   Medication Sig Dispense Refill    naloxone (NARCAN) 4 mg/0.1 mL nasal spray Administer 1 spray into a nostril. If no response after 2-3 minutes, give another dose in the other nostril using a new spray. 1 each 1     No current facility-administered medications for this visit. No Known Allergies    Review of Systems   HENT:  Negative for tinnitus and trouble swallowing. Eyes:  Negative for pain and visual disturbance. Respiratory:  Negative for chest tightness and shortness of breath. Gastrointestinal: Negative. Genitourinary: Negative. Musculoskeletal:  Negative for gait problem. Skin:  Negative for rash and wound. Neurological:  Positive for headaches (migraines). Negative for dizziness, tremors, seizures, speech difficulty, weakness, light-headedness and numbness. Psychiatric/Behavioral:  Negative for confusion and decreased concentration. Video Exam    There were no vitals filed for this visit. Physical Exam  Constitutional:       General: She is not in acute distress. Pulmonary:      Effort: No respiratory distress. Neurological:      Mental Status: She is alert and oriented to person, place, and time. Comments: Speech is clear, fluent and congruent. No acute distress.            Visit Time  Total Visit Duration:

## 2023-11-28 ENCOUNTER — HOME CARE VISIT (OUTPATIENT)
Dept: HOME HEALTH SERVICES | Facility: HOME HEALTHCARE | Age: 68
End: 2023-11-28
Payer: MEDICARE

## 2023-11-28 VITALS
SYSTOLIC BLOOD PRESSURE: 120 MMHG | RESPIRATION RATE: 18 BRPM | OXYGEN SATURATION: 98 % | DIASTOLIC BLOOD PRESSURE: 72 MMHG | TEMPERATURE: 97.8 F | HEART RATE: 98 BPM

## 2023-11-28 PROCEDURE — 10330081 VN NO-PAY CLAIM PROCEDURE

## 2023-11-28 PROCEDURE — G0299 HHS/HOSPICE OF RN EA 15 MIN: HCPCS

## 2023-11-28 PROCEDURE — 400013 VN SOC

## 2023-11-28 NOTE — TELEPHONE ENCOUNTER
Received VM transcription from 11/27/23, 10:16 AM:    Yes, I have an appointment with Caroline Aguilar today. I think it's 1:15. This is [facility]. It's for botox injections. Unfortunately, I've been in the hospital for the last couple weeks. I fell, concussion and a bleed. So I am just going to be going home today. I don't know what time. So I won't make today's appointment and I should've asked because I still have this large bump on my forehead. So if you can give me a call, please do. Thank you.  -------------------------    Pt's appt cancelled yesterday. Pt requesting call back.

## 2023-11-28 NOTE — TELEPHONE ENCOUNTER
Chart reviewed  Pt had televisit appt w/ neurosurgery yesterday. See notes    Called [phone]  No answer. Continuous ringing. No option to leave a message. Called [phone] (listed on incoming call). Spoke to pt re: below. States that she is not sure if she should reschedule her botox now or wait bec she is still having daily HA from the concussion. +hematoma to her forehead. She will be getting PT and OT at home. She is not ready to drive. [facility]  Asking for Paulette's recommendation     [phone], ok to leave detailed message

## 2023-11-28 NOTE — TELEPHONE ENCOUNTER
She absolutely needs to continue her Botox to help prevent her headaches and migraines. If she delays too long then will have worsening of her amount and severity. Daily headaches can also be from her regular headache and migraines as she is due for her botox now.

## 2023-11-29 ENCOUNTER — PATIENT OUTREACH (OUTPATIENT)
Dept: FAMILY MEDICINE CLINIC | Facility: HOSPITAL | Age: 68
End: 2023-11-29

## 2023-11-29 ENCOUNTER — HOME CARE VISIT (OUTPATIENT)
Dept: HOME HEALTH SERVICES | Facility: HOME HEALTHCARE | Age: 68
End: 2023-11-29
Payer: MEDICARE

## 2023-11-29 ENCOUNTER — TRANSITIONAL CARE MANAGEMENT (OUTPATIENT)
Dept: FAMILY MEDICINE CLINIC | Facility: HOSPITAL | Age: 68
End: 2023-11-29

## 2023-11-29 VITALS — OXYGEN SATURATION: 97 % | SYSTOLIC BLOOD PRESSURE: 124 MMHG | HEART RATE: 92 BPM | DIASTOLIC BLOOD PRESSURE: 80 MMHG

## 2023-11-29 PROCEDURE — G0152 HHCP-SERV OF OT,EA 15 MIN: HCPCS

## 2023-11-29 NOTE — PROGRESS NOTES
Outpatient Care Management Note:    New HRR hand off received from [facility]. Patient was hospitalized from 11/8-11/13/23 with  epidural hemorrhage post fall. She had a scalp hematoma and bilateral periorbital hematomas She was transferred to [address] [place] [address] for rehab and discharged to home on 11/27 with [facility]-nursing and therapy. She follow up with Neurosurgery and completed her repeat CATscan. She is to follow up with her PCP. Care manager called Ryan Benson introduced myself and the care management program. Ryan Benson states that she is managing ok at home. She lives alone. She has a sister and brother that live an hour away. Her son lives in [place]. Ryan Benson denies any difficulty managing her daily ADLS. She does still drive but is not driving now. I gave her the name and number of [employer]. She will call for more information. She declined [employer]. Ryan Benson is being seen by Wm DOBBINS: nursing, physical and occupational therapy. She states that she uses a cane. She is aware to be careful to prevent further falls. Med review completed. Ryan Benson is no longer taking her keppra. She states it was not on her discharge med list from the [facility]. There is nothing noted on neurosurgery consult. NICKOLAS will message the provider to verify. Ryan Benson states that she is still having headaches, but they are lessening. She is using tylenol and is aware to avoid any aspirin products. Ryan Benson is also diabetic. Her last HbA1C= 6.5. She only checks her blood sugars randomly. She agreed to check them more frequently post hospitalization. Ryan Benson will call Dr Anitra Lombard office and schedule. NICKOLAS gave Ryan Benson my contact information. She knows that my phone goes through my computer and I do not get messages after hours or on weekends. She is aware to call her PCP office directly with any immediate questions.       [employer] message received from [employer]: Please respond to Ms. Soto Colon that she no longer needs to be on Keppra. It is mainly needed for about 1 week after a head bleed. Her initial head bleed was on 11/8/23, so she no longer needs it as she is approximately 3 weeks from her initial head bleed. CM called Marcelina and reviewed above.

## 2023-11-30 ENCOUNTER — HOME CARE VISIT (OUTPATIENT)
Dept: HOME HEALTH SERVICES | Facility: HOME HEALTHCARE | Age: 68
End: 2023-11-30
Payer: MEDICARE

## 2023-11-30 ENCOUNTER — TELEPHONE (OUTPATIENT)
Dept: FAMILY MEDICINE CLINIC | Facility: HOSPITAL | Age: 68
End: 2023-11-30

## 2023-11-30 VITALS — HEART RATE: 95 BPM | DIASTOLIC BLOOD PRESSURE: 72 MMHG | OXYGEN SATURATION: 95 % | SYSTOLIC BLOOD PRESSURE: 132 MMHG

## 2023-11-30 DIAGNOSIS — R41.89 COGNITIVE CHANGES: Primary | ICD-10-CM

## 2023-11-30 PROCEDURE — G0151 HHCP-SERV OF PT,EA 15 MIN: HCPCS

## 2023-11-30 NOTE — TELEPHONE ENCOUNTER
Rey, my name is Ras and I'm an occupational therapist with [facility], [place]. I was calling regarding a patient of Doctor Ricco Don. The patients name is Qiun Myers. ULYSSES's YOB: 1955 and I'm seeking A verbal order to have a speech therapy evaluation to assess cognition and memory. If you could send an in basket message letting us know whether we could have a verbal order for speech therapy that would be great. If you need to reach me I can give you my phone number and that is [phone]. Thank you very much.  olamide Montenegro.
See message
36.7

## 2023-12-01 ENCOUNTER — HOME CARE VISIT (OUTPATIENT)
Dept: HOME HEALTH SERVICES | Facility: HOME HEALTHCARE | Age: 68
End: 2023-12-01
Payer: MEDICARE

## 2023-12-01 VITALS — HEART RATE: 88 BPM | OXYGEN SATURATION: 95 % | TEMPERATURE: 97.4 F

## 2023-12-01 VITALS — SYSTOLIC BLOOD PRESSURE: 136 MMHG | HEART RATE: 96 BPM | DIASTOLIC BLOOD PRESSURE: 86 MMHG | OXYGEN SATURATION: 98 %

## 2023-12-01 PROCEDURE — G0152 HHCP-SERV OF OT,EA 15 MIN: HCPCS

## 2023-12-01 PROCEDURE — G0299 HHS/HOSPICE OF RN EA 15 MIN: HCPCS

## 2023-12-04 ENCOUNTER — HOME CARE VISIT (OUTPATIENT)
Dept: HOME HEALTH SERVICES | Facility: HOME HEALTHCARE | Age: 68
End: 2023-12-04
Payer: MEDICARE

## 2023-12-04 PROCEDURE — G0180 MD CERTIFICATION HHA PATIENT: HCPCS | Performed by: STUDENT IN AN ORGANIZED HEALTH CARE EDUCATION/TRAINING PROGRAM

## 2023-12-04 PROCEDURE — G0153 HHCP-SVS OF S/L PATH,EA 15MN: HCPCS

## 2023-12-04 PROCEDURE — G0151 HHCP-SERV OF PT,EA 15 MIN: HCPCS

## 2023-12-05 ENCOUNTER — HOME CARE VISIT (OUTPATIENT)
Dept: HOME HEALTH SERVICES | Facility: HOME HEALTHCARE | Age: 68
End: 2023-12-05
Payer: MEDICARE

## 2023-12-05 NOTE — CASE COMMUNICATION
Speech therapy assessment completed 12/4/23. Pt scored within the normal range on [facility] Mental Status (UMS) exam.  Reports forgetfulness/memory deficits for IADLs.   She will benefit for brief ST f/u (x1-2) to train and assess external strategies for recall/organization for paying bills, keeping appts, etc.

## 2023-12-06 ENCOUNTER — HOME CARE VISIT (OUTPATIENT)
Dept: HOME HEALTH SERVICES | Facility: HOME HEALTHCARE | Age: 68
End: 2023-12-06
Payer: MEDICARE

## 2023-12-06 PROCEDURE — G0151 HHCP-SERV OF PT,EA 15 MIN: HCPCS

## 2023-12-07 ENCOUNTER — TELEPHONE (OUTPATIENT)
Dept: PSYCHIATRY | Facility: CLINIC | Age: 68
End: 2023-12-07

## 2023-12-07 ENCOUNTER — HOME CARE VISIT (OUTPATIENT)
Dept: HOME HEALTH SERVICES | Facility: HOME HEALTHCARE | Age: 68
End: 2023-12-07

## 2023-12-07 PROCEDURE — G0155 HHCP-SVS OF CSW,EA 15 MIN: HCPCS

## 2023-12-07 NOTE — TELEPHONE ENCOUNTER
Writer called patient to discuss options for scheduling DON to a new therapist. Patient thought she would get scheduled with previous therapist's replacement. Writer explained that the therapist did not have availability in her schedule for additional clients. Writer offered [facility] as an option for getting continued care. Patient is hesitant about meeting with a therapist over a video chat but willing to try. Writer sent message to coworker to follow up for scheduling.

## 2023-12-08 ENCOUNTER — HOME CARE VISIT (OUTPATIENT)
Dept: HOME HEALTH SERVICES | Facility: HOME HEALTHCARE | Age: 68
End: 2023-12-08
Payer: MEDICARE

## 2023-12-08 ENCOUNTER — TELEPHONE (OUTPATIENT)
Dept: HOME HEALTH SERVICES | Facility: HOME HEALTHCARE | Age: 68
End: 2023-12-08

## 2023-12-08 VITALS — DIASTOLIC BLOOD PRESSURE: 78 MMHG | HEART RATE: 90 BPM | SYSTOLIC BLOOD PRESSURE: 116 MMHG | OXYGEN SATURATION: 98 %

## 2023-12-08 DIAGNOSIS — R41.9 COGNITIVE COMPLAINTS: Primary | ICD-10-CM

## 2023-12-08 PROCEDURE — G0152 HHCP-SERV OF OT,EA 15 MIN: HCPCS

## 2023-12-08 PROCEDURE — G0153 HHCP-SVS OF S/L PATH,EA 15MN: HCPCS

## 2023-12-11 ENCOUNTER — PROCEDURE VISIT (OUTPATIENT)
Dept: NEUROLOGY | Facility: CLINIC | Age: 68
End: 2023-12-11
Payer: MEDICARE

## 2023-12-11 ENCOUNTER — PATIENT OUTREACH (OUTPATIENT)
Dept: FAMILY MEDICINE CLINIC | Facility: HOSPITAL | Age: 68
End: 2023-12-11

## 2023-12-11 VITALS — TEMPERATURE: 98.1 F | DIASTOLIC BLOOD PRESSURE: 69 MMHG | SYSTOLIC BLOOD PRESSURE: 113 MMHG | HEART RATE: 111 BPM

## 2023-12-11 DIAGNOSIS — G24.3 CERVICAL DYSTONIA: Primary | ICD-10-CM

## 2023-12-11 PROCEDURE — 64616 CHEMODENERV MUSC NECK DYSTON: CPT | Performed by: PHYSICIAN ASSISTANT

## 2023-12-11 NOTE — PROGRESS NOTES
Universal Protocol   Consent: Verbal consent obtained. Written consent obtained. Risks and benefits: risks, benefits and alternatives were discussed  Consent given by: patient  Time out: Immediately prior to procedure a "time out" was called to verify the correct patient, procedure, equipment, support staff and site/side marked as required. Patient understanding: patient states understanding of the procedure being performed  Patient consent: the patient's understanding of the procedure matches consent given  Procedure consent: procedure consent matches procedure scheduled  Relevant documents: relevant documents present and verified  Patient identity confirmed: verbally with patient      Chemodenervation     Date/Time  12/11/2023 2:15 PM     Performed by  Robert Fuentes PA-C   Authorized by  Robert Fuentes PA-C     Pre-procedure details      Prepped With: Alcohol     Anesthesia  (see MAR for exact dosages):      Anesthesia method:  None   Procedure details      Position:  Upright   Botox      Botox Type:  Type A    Brand:  Botox    mL's of Botulinum Toxin:  200    Final Concentration per CC:  50 units    Needle Gauge:  30 G 2.5 inch   Procedures      Botox Procedures: cervical dystonia and chronic headache      Indications: spasmodic torticollis and migraines     Injection Location      Head / Face:  L superior cervical paraspinal, R superior cervical paraspinal, L , R , L frontalis, R frontalis, L medial occipitalis, R medial occipitalis, L temporalis, R temporalis and procerus    L  injection amount:  5 unit(s)    R  injection amount:  5 unit(s)    L lateral frontalis:  5 unit(s)    R lateral frontalis:  5 unit(s)    L medial frontalis:  5 unit(s)    R medial frontalis:  5 unit(s)    L temporalis injection amount:  20 unit(s)    R temporalis injection amount:  20 unit(s)    Procerus injection amount:  5 unit(s)    L medial occipitalis injection amount:  15 unit(s)    R medial occipitalis injection amount:  15 unit(s)    L superior cervical paraspinal injection amount:  10 unit(s)    R superior cervical paraspinal injection amount:  15 unit(s)    Cervical Dystonia / Salivary Gland:  R sternocleidomastoid, L sternocleidomastoid, L upper trapezius, R upper trapezius, R splenius capitis and L splenius capitis      L splenius capitis injection amount:  7.5 unit(s)      R splenius capitis injection amount:  7.5 unit(s)      L sternocleidomastoid injection amount:  10 unit(s)      R sternocleidomastoid injection amount:  10 unit(s)      L upper trapezius injection amount:  15 unit(s)      R upper trapezius injection amount:  15 unit(s)   Total Units      Total units used:  200    Total units discarded:  0   Post-procedure details      Chemodenervation:  Neck, excluding muscles of the larynx    Neck Muscle Location[de-identified]  Bilateral neck muscle    Patient tolerance of procedure:   Tolerated well, no immediate complications   Comments       All medically necessary

## 2023-12-11 NOTE — PROGRESS NOTES
Outpatient Care Management Note:    Care manager called Marcelina. She states that she is doing well. Physical therapy discharged her and the nurse is scheduled to discharge her tomorrow. Devyn Goel states that she is using a cane only. She denies any balance issues or falls. She is no longer having any issues with lightheadedness and thinks that may have been related to low blood pressure. She does still get migraines, but saw her neurologist today for a botox treatment and is hoping this will help her symptoms.  did encourage Marcelina to monitor her blood sugars at least weekly. She has not been checking them. She agreed to monitor them and will call Dr Donny Nageotte if they are trending higher or lower. She is scheduled to see Dr Donny Nageotte on 12/29. Devyn Goel states that she still has 2 slight bumps on her scalp and has continued bruising under her eyes. It is resolving gradually. Devyn Goel states that she drove today for the first time. She waited until she was cleared and discharged from therapy. She denied any concerns. Devyn Goel declined ongoing outreach. She will keep my contact information and will call with any questions.

## 2023-12-12 ENCOUNTER — HOME CARE VISIT (OUTPATIENT)
Dept: HOME HEALTH SERVICES | Facility: HOME HEALTHCARE | Age: 68
End: 2023-12-12
Payer: MEDICARE

## 2023-12-12 VITALS
TEMPERATURE: 97.9 F | SYSTOLIC BLOOD PRESSURE: 102 MMHG | DIASTOLIC BLOOD PRESSURE: 62 MMHG | OXYGEN SATURATION: 98 % | HEART RATE: 80 BPM

## 2023-12-12 PROCEDURE — G0299 HHS/HOSPICE OF RN EA 15 MIN: HCPCS

## 2023-12-12 PROCEDURE — G0151 HHCP-SERV OF PT,EA 15 MIN: HCPCS

## 2023-12-14 ENCOUNTER — TELEMEDICINE (OUTPATIENT)
Dept: PSYCHIATRY | Facility: CLINIC | Age: 68
End: 2023-12-14
Payer: MEDICARE

## 2023-12-14 ENCOUNTER — DOCUMENTATION (OUTPATIENT)
Dept: PSYCHIATRY | Facility: CLINIC | Age: 68
End: 2023-12-14

## 2023-12-14 DIAGNOSIS — F33.9 MAJOR DEPRESSION, RECURRENT, CHRONIC (HCC): Primary | ICD-10-CM

## 2023-12-14 DIAGNOSIS — F41.8 DEPRESSION WITH ANXIETY: ICD-10-CM

## 2023-12-14 PROCEDURE — 90834 PSYTX W PT 45 MINUTES: CPT

## 2023-12-14 NOTE — BH TREATMENT PLAN
Outpatient Behavioral Health Psychotherapy Treatment Plan    Rosie Torres  1955     Date of Initial Psychotherapy Assessment: 3/15/2023   Date of Current Treatment Plan: 12/14/23  Treatment Plan Target Date: 12/14/2024  Treatment Plan Expiration Date: 6/14/2024    Diagnosis:   1. Major depression, recurrent, chronic ([address])        2. Depression with anxiety            Area(s) of Need: managing depression and anxiety and grief reaction from her  passing and her mother developing dementia    Long Term Goal 1 (in the client's own words): I want to manage my depression and anxiety and work through my grief    Stage of Change: Contemplation    Target Date for completion: 6/14/2024     Anticipated therapeutic modalities: client centered, supportive, grief counseling     People identified to complete this goal: Devyn Goel, therapist, Dr Noah Richards      Objective 1: (identify the means of measuring success in meeting the objective): Marcelina with work in therapy to develop coping skills for managing anxiety and depression as well as will work through her grief reaction from her  passing and her mother being sick in a nursing home. I am currently under the care of a [facility] psychiatric provider: yes    My [facility] psychiatric provider is: Dr Jessica Barksdale    I am currently taking psychiatric medications: Yes, as prescribed    I feel that I will be ready for discharge from mental health care when I reach the following (measurable goal/objective): When I can successfully manage my anxiety and depression, and work through my grief reaction. For children and adults who have a legal guardian:   Has there been any change to custody orders and/or guardianship status? NA. If yes, attach updated documentation.     I have created my Crisis Plan and have been offered a copy of this plan    [address]: Diagnosis and Treatment Plan explained to [facility] acknowledges an understanding of their diagnosis. Roni Schmitt agrees to this treatment plan. I have been offered a copy of this Treatment Plan. Yes    Ronisuyapa Schmitt, 1955, actively participated in the creation of this treatment plan during a virtual session, using the telephone. Roni Schmitt  provided verbal consent on 12/14/2023 at 3:27 PM. The treatment plan was transcribed into the [employer] Record at a later time.

## 2023-12-14 NOTE — PSYCH
Behavioral Health Psychotherapy Progress Note    Psychotherapy Provided: Individual Psychotherapy     1. Major depression, recurrent, chronic ([address])        2. Depression with anxiety            Goals addressed in session: Goal 1     DATA: Art and therapist tried to connect on the video session and was unable to connect virtually. Session ended up on the phone for this session. Therapist problem solved with Art for transferring her to another provider who can give in-person service. Art agreed to work with [facility] for an in person therapist. Escobar Woods worked with therapist to create a treatment plan for continuity of care. During this session, this clinician used the following therapeutic modalities: Solution-Focused Therapy    Substance Abuse was not addressed during this session. If the client is diagnosed with a co-occurring substance use disorder, please indicate any changes in the frequency or amount of use: . Stage of change for addressing substance use diagnoses: No substance use/Not applicable    ASSESSMENT:  Veronika Alves presents with a Euthymic/ normal mood. her affect is Normal range and intensity, which is congruent, with her mood and the content of the session. The client has not made progress on their goals. Veronika Alves presents with a none risk of suicide, none risk of self-harm, and none risk of harm to others. For any risk assessment that surpasses a "low" rating, a safety plan must be developed. A safety plan was indicated: no  If yes, describe in detail     PLAN: Between sessions, Veronika Alves will work with [facility] for [employer]. At the next session, the therapist will use Client-centered Therapy and Supportive Psychotherapy to address depression, anxiety. Behavioral Health Treatment Plan and Discharge Planning: Veronika Alves is aware of and agrees to continue to work on their treatment plan.  They have identified and are working toward their discharge goals. yes    Visit start and stop times:    12/14/23  Start Time: 1504  Stop Time: 1550  Total Visit Time: 46 minutes  Virtual Regular Visit    Verification of patient location:    Patient is located at Home in the following state in which I hold an active license [place]      Assessment/Plan:    Problem List Items Addressed This Visit       Depression with anxiety    Major depression, recurrent, chronic ([address]) - Primary       Goals addressed in session: Goal 1          Reason for visit is No chief complaint on file. Encounter provider Audelia Avila LCSW    Provider located at [address] [place] [id number] [phone]      Recent Visits  No visits were found meeting these conditions. Showing recent visits within past 7 days and meeting all other requirements  Today's Visits  Date Type Provider Dept   12/14/23 Telemedicine Audelia Avila LCSW [facility]   Showing today's visits and meeting all other requirements  Future Appointments  No visits were found meeting these conditions. Showing future appointments within next 150 days and meeting all other requirements       The patient was identified by name and date of birth. Marilynn Romberg was informed that this is a telemedicine visit and that the visit is being conducted throughTelephone. My office door was closed. No one else was in the room. She acknowledged consent and understanding of privacy and security of the video platform. The patient has agreed to participate and understands they can discontinue the visit at any time. Patient is aware this is a billable service.      Subjective  Marilynn Romberg is a 70 y.o. female [place] Nearing sounded calm and cooperative and was able to engage in problem solving for transfer of care to in person therapist .      HPI     Past Medical History:   Diagnosis Date    Anxiety     Arthritis Cancer ([facility])     Skin    Depression     Diabetes ([facility])     GERD (gastroesophageal reflux disease)     Hyperlipidemia     Hypertension     Migraine        Past Surgical History:   Procedure Laterality Date    ANKLE SURGERY      CARPAL TUNNEL RELEASE Right     ROTATOR CUFF REPAIR         Current Outpatient Medications   Medication Sig Dispense Refill    acetaminophen (TYLENOL) 325 mg tablet Take 3 tablets (975 mg total) by mouth every 8 (eight) hours  0    B COMPLEX VITAMINS PO Take 1 tablet by mouth daily      Baclofen 5 MG TABS Take by mouth Take 1-2 tabs TID prn      bisacodyl (Dulcolax) 10 mg suppository Insert 10 mg into the rectum as needed for constipation      bisacodyl 5 mg EC tablet Take 10 mg by mouth daily as needed for constipation (Patient not taking: Reported on 11/27/2023)      buPROPion (Wellbutrin XL) 150 mg 24 hr tablet Take 1 tablet (150 mg total) by mouth daily 30 tablet 1    busPIRone (BUSPAR) 10 mg tablet Take 2 tablets (20 mg total) by mouth 2 (two) times a day 360 tablet 0    calcium carbonate (OS-MIKE) 600 MG tablet Take 600 mg by mouth 2 (two) times a day      Cholecalciferol 50 MCG (2000 UT) CAPS Take 1 capsule by mouth daily      clonazePAM (KlonoPIN) 0.5 mg tablet Take 0.5 tablets (0.25 mg total) by mouth 3 (three) times a day as needed for anxiety for up to 10 days 6 tablet 0    clonazePAM (KlonoPIN) 0.5 mg tablet Take 0.5 mg by mouth 3 (three) times a day.  Indications: Feeling Anxious      DULoxetine (Cymbalta) 30 mg delayed release capsule Take 1 capsule (30 mg total) by mouth daily at bedtime 90 capsule 0    DULoxetine (CYMBALTA) 60 mg delayed release capsule Take 1 capsule (60 mg total) by mouth daily at bedtime 90 capsule 0    esomeprazole (NexIUM) 20 mg capsule Take 20 mg by mouth daily in the early morning       levETIRAcetam (KEPPRA) 500 mg tablet Take 1 tablet (500 mg total) by mouth every 12 (twelve) hours for 5 doses (Patient not taking: Reported on 11/27/2023) 5 tablet 0 Lidocaine-Menthol 4-1 % CREA Apply 1 Dose topically if needed Patch 5%      magnesium oxide (MAG-OX) 400 mg tablet Take 1 tablet (400 mg total) by mouth 2 (two) times a day 180 tablet 3    metFORMIN (GLUCOPHAGE) 1000 MG tablet Take 1,000 mg by mouth 2 (two) times a day with meals      multivitamin (THERAGRAN) TABS Take 1 tablet by mouth daily      naloxone (NARCAN) 4 mg/0.1 mL nasal spray Administer 1 spray into a nostril. If no response after 2-3 minutes, give another dose in the other nostril using a new spray. 1 each 1    OnabotulinumtoxinA (BOTOX IM) Inject into a muscle (Patient not taking: Reported on 11/27/2023)      oxyCODONE (OXY-IR) 5 MG capsule Take 5 mg by mouth every 6 (six) hours as needed for moderate pain      polyethylene glycol (MIRALAX) 17 g packet Take 17 g by mouth daily at bedtime      potassium chloride (K-DUR,KLOR-CON) 20 mEq tablet Take 1 tablet (20 mEq total) by mouth daily 90 tablet 3    Riboflavin (Vitamin B-2) 100 MG TABS 2 in the morning and 2 at bedtime 360 tablet 3    rosuvastatin (CRESTOR) 20 MG tablet Take 1 tablet (20 mg total) by mouth daily 90 tablet 3    Ubrogepant (Ubrelvy) 100 MG tablet Take 1 tablet (100 mg total) by mouth if needed (migraine) Take 1 tablet (100 mg) one time as needed for migraine. May repeat one additional tablet (100 mg) at least two hours after the first dose. Do not use more than two doses per day 16 tablet 6     No current facility-administered medications for this visit. No Known Allergies    Review of Systems    Video Exam    There were no vitals filed for this visit.     Physical Exam

## 2023-12-14 NOTE — PROGRESS NOTES
[address] [place] [address]    Patient Name Amanda Dominique     Date of Birth: 70 y.o. 1955      MRN: [id number]    Admission Date:  March 2023    Date of Transfer: December 14, 2023    Admission Diagnosis:     Major Depressive Disorder, recurrent, moderate    Current Diagnosis:     No diagnosis found. Reason for Admission: Justine Olson presented for treatment due to depressive symptoms, worsening depression, and passive death wish. Primary complaints included DEPRESSIVE SYMPTOMS: depressed mood and ANXIETY SYMPTOMS: daily anxiety symptoms, worrying about everyday issues. Progress in Treatment: Justine Olson was seen for Individual Couseling. During the course of treatment she had difficulty connecting to video session due to technical issues that she could not resolve. She had only one session with this writer. She expressed preferring in person therapy and that she was having much difficulty with technology for virtual sessions and could not resolve them. Episodes of Higher Level of Care: Yes, one inpatient hospital admission      Transfer request Initiated by: Psychiatrist: Sav Therapist:  Mckenzie Nascimento    Reason for Transfer Request:  technological difficulties with virtual platform; patient preference for in person therapy    Does this individual need a clinician with specialized training/expertise?: No    Is this client working with any other [facility] Providers/Therapists?  Psychiatrist: Dr. Kati Hurt  Therapist:  none    Other pertinent issues: Eating Disorder    Are there any specific individuals who would be a “best fit” or who have already agreed to accept this transfer request?      Psychiatrist: None   Therapist: None  Rationale: Not Applicable    Attempts to maintain the current therapeutic relationship: Not Applicable    Transfer request routed to Clinical Coordinator for input and/or approval.     Comments from other involved providers and/or clinical coordinator : None    Marcelina Metz [id number]

## 2023-12-20 ENCOUNTER — TELEMEDICINE (OUTPATIENT)
Dept: PSYCHIATRY | Facility: CLINIC | Age: 68
End: 2023-12-20
Payer: MEDICARE

## 2023-12-20 DIAGNOSIS — F33.9 MAJOR DEPRESSION, RECURRENT, CHRONIC (HCC): ICD-10-CM

## 2023-12-20 PROCEDURE — 99214 OFFICE O/P EST MOD 30 MIN: CPT

## 2023-12-20 RX ORDER — BUPROPION HYDROCHLORIDE 150 MG/1
150 TABLET ORAL DAILY
Qty: 30 TABLET | Refills: 1 | Status: SHIPPED | OUTPATIENT
Start: 2023-12-20
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
## 2024-01-10 ENCOUNTER — TELEPHONE (OUTPATIENT)
Dept: PSYCHIATRY | Facility: CLINIC | Age: 69
End: 2024-01-10

## 2024-01-10 NOTE — TELEPHONE ENCOUNTER
Pt called yesterday evening asking to speak with you about her medications and increased anxiety    Pt said that she does not want to wait until her next appt to discuss a medication change as her anxiety has gotten a lot worse, pt said that she doesn't want to talk to anyone, that her stomach is jittery, and she's not sure if the increased anxiety is due to her fall    She also mentioned that she has not had the MRI done yet to see if the buspar is the cause of her increased migraines

## 2024-01-22 ENCOUNTER — HOSPITAL ENCOUNTER (OUTPATIENT)
Dept: CT IMAGING | Facility: HOSPITAL | Age: 69
Discharge: HOME | End: 2024-01-22
Payer: MEDICARE

## 2024-01-22 DIAGNOSIS — S06.4XAA EPIDURAL HEMATOMA (HCC): ICD-10-CM

## 2024-01-22 PROCEDURE — 70450 CT HEAD/BRAIN W/O DYE: CPT

## 2024-01-22 PROCEDURE — G1004 CDSM NDSC: HCPCS

## 2024-01-24 ENCOUNTER — TELEMEDICINE (OUTPATIENT)
Dept: PSYCHIATRY | Facility: CLINIC | Age: 69
End: 2024-01-24
Payer: MEDICARE

## 2024-01-24 DIAGNOSIS — F41.1 GAD (GENERALIZED ANXIETY DISORDER): ICD-10-CM

## 2024-01-24 DIAGNOSIS — F33.9 MAJOR DEPRESSION, RECURRENT, CHRONIC (HCC): ICD-10-CM

## 2024-01-24 PROCEDURE — 99213 OFFICE O/P EST LOW 20 MIN: CPT

## 2024-01-24 RX ORDER — BUSPIRONE HYDROCHLORIDE 10 MG/1
10 TABLET ORAL 2 TIMES DAILY
Qty: 60 TABLET | Refills: 1 | Status: SHIPPED | OUTPATIENT
Start: 2024-01-24

## 2024-01-24 RX ORDER — BUPROPION HYDROCHLORIDE 75 MG/1
75 TABLET ORAL DAILY
Qty: 30 TABLET | Refills: 1 | Status: SHIPPED | OUTPATIENT
Start: 2024-01-24

## 2024-01-24 RX ORDER — DULOXETIN HYDROCHLORIDE 60 MG/1
60 CAPSULE, DELAYED RELEASE ORAL
Qty: 30 CAPSULE | Refills: 1 | Status: SHIPPED | OUTPATIENT
Start: 2024-01-24

## 2024-01-24 RX ORDER — DULOXETIN HYDROCHLORIDE 30 MG/1
30 CAPSULE, DELAYED RELEASE ORAL
Qty: 30 CAPSULE | Refills: 1 | Status: SHIPPED | OUTPATIENT
Start: 2024-01-24
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
## 2024-01-25 ENCOUNTER — TELEMEDICINE (OUTPATIENT)
Dept: PSYCHIATRY | Facility: CLINIC | Age: 69
End: 2024-01-25
Payer: MEDICARE

## 2024-01-25 DIAGNOSIS — F33.9 MAJOR DEPRESSION, RECURRENT, CHRONIC (HCC): Primary | ICD-10-CM

## 2024-01-25 PROCEDURE — 90834 PSYTX W PT 45 MINUTES: CPT

## 2024-01-25 NOTE — PSYCH
"Behavioral Health Psychotherapy Progress Note    Psychotherapy Provided: Individual Psychotherapy     1. Major depression, recurrent, chronic (HCC)            Goals addressed in session: Goal 1     DATA: Art shared she has been feeling depressed and staying in bed a lot.  She has been worried about her house problems with her air conditioning leaking on her ceiling.  She talked about her depression getting bad after her  passed away 3 years ago and her stomach and migraine problems.  She talked about her marriages, that she  her first  and her 2nd   in .  She talked about her 's death and how sudden it was.    During this session, this clinician used the following therapeutic modalities: Supportive Psychotherapy    Substance Abuse was not addressed during this session. If the client is diagnosed with a co-occurring substance use disorder, please indicate any changes in the frequency or amount of use: . Stage of change for addressing substance use diagnoses: No substance use/Not applicable    ASSESSMENT:  Art Jones presents with a Euthymic/ normal mood.     her affect is Normal range and intensity, which is congruent, with her mood and the content of the session. The client has made progress on their goals.     Art Jones presents with a none risk of suicide, none risk of self-harm, and none risk of harm to others.    For any risk assessment that surpasses a \"low\" rating, a safety plan must be developed.    A safety plan was indicated: no  If yes, describe in detail     PLAN: Between sessions, Art Jones will utilize coping skills for anxiety. At the next session, the therapist will use Supportive Psychotherapy to address depression.    Behavioral Health Treatment Plan and Discharge Planning: Art Jones is aware of and agrees to continue to work on their treatment plan. They have identified and are working toward their discharge goals. yes    Visit " start and stop times:    01/25/24  Start Time: 1500  Stop Time: 1552  Total Visit Time: 52 minutes  Virtual Regular Visit    Verification of patient location:    Patient is located at Home in the following state in which I hold an active license [place]      Assessment/Plan:    Problem List Items Addressed This Visit       Major depression, recurrent, chronic (HCC) - Primary       Goals addressed in session: Goal 1          Reason for visit is No chief complaint on file.       Encounter provider Lady Soto LCSW    Provider located at [address] [place] [id number] [phone]      Recent Visits  No visits were found meeting these conditions.  Showing recent visits within past 7 days and meeting all other requirements  Today's Visits  Date Type Provider Dept   01/25/24 Telemedicine Lady Soto LCSW  [facility]   Showing today's visits and meeting all other requirements  Future Appointments  No visits were found meeting these conditions.  Showing future appointments within next 150 days and meeting all other requirements       The patient was identified by name and date of birth. Aniya Jones was informed that this is a telemedicine visit and that the visit is being conducted throughthe [employer] platform. She agrees to proceed..  My office door was closed. No one else was in the room.  She acknowledged consent and understanding of privacy and security of the video platform. The patient has agreed to participate and understands they can discontinue the visit at any time.    Patient is aware this is a billable service.     Subjective  Aniya Jones is a 68 y.o. female        HPI     Past Medical History:   Diagnosis Date    Anxiety     Arthritis     Cancer (HCC)     Skin    Depression     Diabetes (HCC)     GERD (gastroesophageal reflux disease)     Hyperlipidemia     Hypertension      Migraine        Past Surgical History:   Procedure Laterality Date    ANKLE SURGERY      CARPAL TUNNEL RELEASE Right     ROTATOR CUFF REPAIR         Current Outpatient Medications   Medication Sig Dispense Refill    acetaminophen (TYLENOL) 325 mg tablet Take 3 tablets (975 mg total) by mouth every 8 (eight) hours  0    amoxicillin (AMOXIL) 500 mg capsule TAKE ONE CAPSULE BY MOUTH THREE TIMES A DAY FOR 10 DAYS STARTING THE DAY BEFORE APPOINTMENT      B COMPLEX VITAMINS PO Take 1 tablet by mouth daily      Baclofen 5 MG TABS Take by mouth Take 1-2 tabs TID prn      bisacodyl (Dulcolax) 10 mg suppository Insert 10 mg into the rectum as needed for constipation      bisacodyl 5 mg EC tablet Take 10 mg by mouth daily as needed for constipation (Patient not taking: Reported on 11/27/2023)      buPROPion (WELLBUTRIN) 75 mg tablet Take 1 tablet (75 mg total) by mouth in the morning 30 tablet 1    busPIRone (BUSPAR) 10 mg tablet Take 1 tablet (10 mg total) by mouth 2 (two) times a day 60 tablet 1    calcium carbonate (OS-MIKE) 600 MG tablet Take 600 mg by mouth 2 (two) times a day      Cholecalciferol 50 MCG (2000 UT) CAPS Take 1 capsule by mouth daily      clonazePAM (KlonoPIN) 0.5 mg tablet Take 0.5 mg by mouth 3 (three) times a day. Indications: Feeling Anxious      DULoxetine (Cymbalta) 30 mg delayed release capsule Take 1 capsule (30 mg total) by mouth daily at bedtime 30 capsule 1    DULoxetine (CYMBALTA) 60 mg delayed release capsule Take 1 capsule (60 mg total) by mouth daily at bedtime 30 capsule 1    esomeprazole (NexIUM) 20 mg capsule Take 20 mg by mouth daily in the early morning       Lidocaine-Menthol 4-1 % CREA Apply 1 Dose topically if needed Patch 5%      magnesium oxide (MAG-OX) 400 mg tablet Take 1 tablet (400 mg total) by mouth 2 (two) times a day 180 tablet 3    metFORMIN (GLUCOPHAGE) 1000 MG tablet Take 1,000 mg by mouth 2 (two) times a day with meals      Movantik 25 MG tablet TAKE 1 TABLET BY MOUTH  DAILY FOR CONSTIPATION      multivitamin (THERAGRAN) TABS Take 1 tablet by mouth daily      naloxone (NARCAN) 4 mg/0.1 mL nasal spray Administer 1 spray into a nostril. If no response after 2-3 minutes, give another dose in the other nostril using a new spray. 1 each 1    OnabotulinumtoxinA (BOTOX IM) Inject into a muscle (Patient not taking: Reported on 11/27/2023)      oxyCODONE (OXY-IR) 5 MG capsule Take 5 mg by mouth every 6 (six) hours as needed for moderate pain      oxyCODONE (ROXICODONE) 10 MG TABS Take 10 mg by mouth every 8 (eight) hours as needed      polyethylene glycol (MIRALAX) 17 g packet Take 17 g by mouth daily at bedtime      potassium chloride (K-DUR,KLOR-CON) 20 mEq tablet Take 1 tablet (20 mEq total) by mouth daily 90 tablet 3    Riboflavin (Vitamin B-2) 100 MG TABS 2 in the morning and 2 at bedtime 360 tablet 3    rosuvastatin (CRESTOR) 20 MG tablet Take 1 tablet (20 mg total) by mouth daily 90 tablet 3    Ubrogepant (Ubrelvy) 100 MG tablet Take 1 tablet (100 mg total) by mouth if needed (migraine) Take 1 tablet (100 mg) one time as needed for migraine. May repeat one additional tablet (100 mg) at least two hours after the first dose. Do not use more than two doses per day 16 tablet 6     No current facility-administered medications for this visit.        No Known Allergies    Review of Systems    Video Exam    There were no vitals filed for this visit.    Physical Exam

## 2024-02-01 ENCOUNTER — OFFICE VISIT (OUTPATIENT)
Dept: FAMILY MEDICINE CLINIC | Facility: HOSPITAL | Age: 69
End: 2024-02-01
Payer: MEDICARE

## 2024-02-01 VITALS
WEIGHT: 148 LBS | HEIGHT: 66 IN | HEART RATE: 83 BPM | SYSTOLIC BLOOD PRESSURE: 122 MMHG | BODY MASS INDEX: 23.78 KG/M2 | OXYGEN SATURATION: 99 % | DIASTOLIC BLOOD PRESSURE: 80 MMHG

## 2024-02-01 DIAGNOSIS — S06.4XAA EPIDURAL HEMATOMA (HCC): ICD-10-CM

## 2024-02-01 DIAGNOSIS — F33.1 MODERATE EPISODE OF RECURRENT MAJOR DEPRESSIVE DISORDER (HCC): ICD-10-CM

## 2024-02-01 DIAGNOSIS — E55.9 VITAMIN D DEFICIENCY: ICD-10-CM

## 2024-02-01 DIAGNOSIS — E28.39 MENOPAUSE OVARIAN FAILURE: ICD-10-CM

## 2024-02-01 DIAGNOSIS — Z76.89 ENCOUNTER FOR SUPPORT AND COORDINATION OF TRANSITION OF CARE: Primary | ICD-10-CM

## 2024-02-01 DIAGNOSIS — F11.20 CONTINUOUS OPIOID DEPENDENCE (HCC): ICD-10-CM

## 2024-02-01 DIAGNOSIS — Z12.31 ENCOUNTER FOR SCREENING MAMMOGRAM FOR MALIGNANT NEOPLASM OF BREAST: ICD-10-CM

## 2024-02-01 DIAGNOSIS — E11.69 TYPE 2 DIABETES MELLITUS WITH OTHER SPECIFIED COMPLICATION, WITHOUT LONG-TERM CURRENT USE OF INSULIN (HCC): ICD-10-CM
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
## 2024-02-01 DIAGNOSIS — Z12.11 SCREENING FOR COLON CANCER: ICD-10-CM

## 2024-02-01 DIAGNOSIS — F33.9 MAJOR DEPRESSION, RECURRENT, CHRONIC (HCC): ICD-10-CM

## 2024-02-01 DIAGNOSIS — S06.4X0D EPIDURAL HEMORRHAGE WITHOUT LOSS OF CONSCIOUSNESS, SUBSEQUENT ENCOUNTER: ICD-10-CM

## 2024-02-01 DIAGNOSIS — E53.8 VITAMIN B12 DEFICIENCY: ICD-10-CM

## 2024-02-01 DIAGNOSIS — S06.4X9A: ICD-10-CM

## 2024-02-01 DIAGNOSIS — E11.9 CONTROLLED TYPE 2 DIABETES MELLITUS WITHOUT COMPLICATION, WITHOUT LONG-TERM CURRENT USE OF INSULIN (HCC): ICD-10-CM

## 2024-02-01 DIAGNOSIS — S00.03XD HEMATOMA OF SCALP, SUBSEQUENT ENCOUNTER: ICD-10-CM

## 2024-02-01 LAB — SL AMB POCT HEMOGLOBIN AIC: 5.3 (ref ?–6.5)

## 2024-02-01 PROCEDURE — 99214 OFFICE O/P EST MOD 30 MIN: CPT | Performed by: NURSE PRACTITIONER

## 2024-02-01 PROCEDURE — 83036 HEMOGLOBIN GLYCOSYLATED A1C: CPT | Performed by: NURSE PRACTITIONER

## 2024-02-01 NOTE — PROGRESS NOTES
[facility]   Samantha MIRAMONTES    Assessment/Plan:      Diagnosis ICD-10-CM Associated Orders   1. Encounter for support and coordination of transition of care  Z76.89       2. Epidural hematoma (HCC)  S06.4XAA       3. Hematoma of scalp, subsequent encounter  S00.03XD       4. Fall, subsequent encounter  W19.XXXD       5. Encounter for screening mammogram for malignant neoplasm of breast  Z12.31 Mammo screening bilateral w 3d & cad      6. Menopause ovarian failure  E28.39 DXA bone density spine hip and pelvis      7. Screening for colon cancer  Z12.11 Ambulatory Referral to Gastroenterology      8. Controlled type 2 diabetes mellitus without complication, without long-term current use of insulin (HCC)  E11.9 POCT hemoglobin A1c     Hemoglobin A1C      9. Traumatic epidural hematoma with loss of consciousness, initial encounter (HCC)  S06.4X9A       10. Continuous opioid dependence (HCC)  F11.20       11. Major depression, recurrent, chronic ([facility])  F33.9 Vitamin B12     Vitamin D 1,25 dihydroxy      12. Moderate episode of recurrent major depressive disorder (HCC)  F33.1       13. Type 2 diabetes mellitus with other specified complication, without long-term current use of insulin (HCC)  E11.69       14. Vitamin B12 deficiency  E53.8 Vitamin B12      15. Vitamin D deficiency  E55.9 Vitamin D 1,25 dihydroxy      16. Epidural hemorrhage without loss of consciousness, subsequent encounter  S06.4X0D         Reduce metformin to 500mg twice daily, recheck A1C in 3 months.   Obtain fasting labwork, has script at homes along with add ons from today's office visit within next 3 months.  Schedule eye exam, DXA, mammogram  Return in 3 months (on 5/1/2024) for Annual Medicare Wellness Visit.  Patient may call or return to office with any questions or concerns.     ______________________________________________________________________  Subjective:     Patient ID: Aniya Jones is a 68 y.o. female.  Aniya  "Robert  Chief Complaint   Patient presents with    Follow-up     Patient was in [facility] for falling, then transferred to a rehab for PT       Here for follow up.  Hospitalized from 11/8/23-11/13/23 S/P fall  with head strike on fireplace while on ASA resulting in left posterior epidural hematoma as well as scalp hematoma.  +LOC, waited 2 days prior to evaluation/tx.  Given DDAVP and followed by Neurology.  Stabilized and discharged to [facility].  She participated in PT/OT and was able to discharge home with [facility] on 11/27/23.    Home PT/OT x2 weeks post SNF.    Pain well managed, taking 10mg oxy BID with 5mg prn   Still not steady on her feet, associated spinal stenosis, DM2, polypharmacy.  Notes impaired coordination however no falls since SNF discharge.    HA chronic, migraines since 13yr old.  Receives botox and followed closely by neurology.    Persistent MDD, spouse passed 2 yrs ago from pancreatic cancer.  Felt it was very traumatic for her along with history of chronic MDD.  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   Chronic constipation: uses senna-s, dulcolax.  Reports stool q5days, has had to disimpact self.  Had GI follow up scheduled but was hospitalized for fall.   Has not tried [place] yet, encouraged to do so.    Lost 13lb since September \"unintentionally\" however associated GERD,                  The following portions of the patient's history were reviewed and updated as appropriate: allergies, current medications, past family history, past medical history, past social history, past surgical history, and problem list.    Review of Systems   Constitutional:  Positive for unexpected weight change. Negative for activity change, appetite change, chills, fatigue and fever.   HENT: Negative.  Negative for congestion, ear pain, postnasal drip and sinus pain.    Eyes: Negative.    Respiratory: Negative.  Negative for cough and shortness of breath.  "   Cardiovascular: Negative.  Negative for chest pain and leg swelling.   Gastrointestinal:  Positive for constipation. Negative for diarrhea.   Endocrine: Negative.    Genitourinary: Negative.  Negative for difficulty urinating and dysuria.   Musculoskeletal:  Positive for back pain and gait problem.   Skin: Negative.    Allergic/Immunologic: Negative.  Negative for immunocompromised state.   Neurological:  Positive for weakness and headaches. Negative for dizziness and light-headedness.   Hematological: Negative.    Psychiatric/Behavioral:  Positive for dysphoric mood. Negative for self-injury, sleep disturbance and suicidal ideas.          Objective:      Vitals:    02/01/24 1422   BP: 122/80   Pulse: 83   SpO2: 99%      Physical Exam  Vitals and nursing note reviewed.   Constitutional:       Appearance: Normal appearance.   HENT:      Head: Normocephalic and atraumatic.      Right Ear: Tympanic membrane, ear canal and external ear normal.      Left Ear: Tympanic membrane, ear canal and external ear normal.      Nose: Nose normal.      Mouth/Throat:      Mouth: Mucous membranes are moist.      Pharynx: Oropharynx is clear.   Eyes:      Extraocular Movements: Extraocular movements intact.      Conjunctiva/sclera: Conjunctivae normal.      Pupils: Pupils are equal, round, and reactive to light.   Cardiovascular:      Rate and Rhythm: Normal rate and regular rhythm.      Pulses: Normal pulses. no weak pulses          Dorsalis pedis pulses are 2+ on the right side and 2+ on the left side.        Posterior tibial pulses are 2+ on the right side and 2+ on the left side.      Heart sounds: Normal heart sounds.   Pulmonary:      Effort: Pulmonary effort is normal.      Breath sounds: Normal breath sounds.   Abdominal:      General: Bowel sounds are normal.      Palpations: Abdomen is soft.   Musculoskeletal:         General: Normal range of motion.      Cervical back: Normal range of motion and neck supple.   Feet:       "Right foot:      Skin integrity: No ulcer, skin breakdown, erythema, warmth, callus or dry skin.      Left foot:      Skin integrity: No ulcer, skin breakdown, erythema, warmth, callus or dry skin.   Skin:     General: Skin is warm and dry.   Neurological:      General: No focal deficit present.      Mental Status: She is alert and oriented to person, place, and time.      Gait: Gait abnormal.      Comments: Walking with quad cane.    Psychiatric:         Mood and Affect: Mood is depressed. Affect is not tearful.         Speech: Speech normal.         Behavior: Behavior normal. Behavior is cooperative.         Thought Content: Thought content normal.         Judgment: Judgment normal.     Patient's shoes and socks removed.    Right Foot/Ankle   Right Foot Inspection  Skin Exam: skin normal and skin intact. No dry skin, no warmth, no callus, no erythema, no maceration, no abnormal color, no pre-ulcer, no ulcer and no callus.     Toe Exam: right toe deformity.     Sensory   Proprioception: intact  Monofilament testing: intact    Vascular  Capillary refills: < 3 seconds  The right DP pulse is 2+. The right PT pulse is 2+.     Left Foot/Ankle  Left Foot Inspection  Skin Exam: skin normal and skin intact. No dry skin, no warmth, no erythema, no maceration, normal color, no pre-ulcer, no ulcer and no callus.     Toe Exam: left toe deformity.     Sensory   Proprioception: intact  Monofilament testing: intact    Vascular  Capillary refills: < 3 seconds  The left DP pulse is 2+. The left PT pulse is 2+.     Assign Risk Category  No deformity present  No loss of protective sensation  No weak pulses  Risk: 0         Portions of the record may have been created with voice recognition software. Occasional wrong word or \"sound alike\" substitutions may have occurred due to the inherent limitations of voice recognition software. Please review the chart carefully and recognize, using context, where substitutions/typographical errors " may have occurred.       Depression Screening Follow-up Plan: Patient's depression screening was positive with a PHQ-2 score of . Their PHQ-9 score was 13. Patient assessed for underlying major depression. They have no active suicidal ideations. Brief counseling provided and recommend additional follow-up/re-evaluation next office visit. Continue regular follow-up with their psychologist/therapist/psychiatrist who is managing their mental health condition(s).

## 2024-02-01 NOTE — ASSESSMENT & PLAN NOTE
PHQ-2/9 Depression Screening    Little interest or pleasure in doing things: 2 - more than half the days  Feeling down, depressed, or hopeless: 3 - nearly every day  Trouble falling or staying asleep, or sleeping too much: 1 - several days  Feeling tired or having little energy: 1 - several days  Poor appetite or overeatin - several days  Feeling bad about yourself - or that you are a failure or have let yourself or your family down: 2 - more than half the days  Trouble concentrating on things, such as reading the newspaper or watching television: 1 - several days  Moving or speaking so slowly that other people could have noticed. Or the opposite - being so fidgety or restless that you have been moving around a lot more than usual: 1 - several days  Thoughts that you would be better off dead, or of hurting yourself in some way: 1 - several days  PHQ-9 Score: 13  PHQ-9 Interpretation: Moderate depression      Reports wishing she wasn't alive however denies SI/plan.  Reports chronic in nature, first mental health breakdown after father passed away several years ago.  Followed closely by psychiatry/psychotherapy.  Continue Cymbalta, Wellbutrin  Check Vitamin D, B12

## 2024-02-01 NOTE — PATIENT INSTRUCTIONS
Reduce metformin to 500mg twice daily, recheck A1C in 3 months.   Obtain fasting labwork, has script at homes along with add ons from today's office visit within next 3 month  Schedule eye exam, DXA and mammogram  Depression   AMBULATORY CARE:   Depression  is a mood disorder that causes feelings of sadness or hopelessness that do not go away. Depression may cause you to lose interest in things you used to enjoy. These feelings may interfere with your daily life.  Common signs and symptoms:   Appetite changes, or weight gain or loss    Trouble falling or staying asleep, or sleeping too much    Fatigue (being mentally and physically tired) or lack of energy    Feeling restless, irritable, or withdrawn    Feeling worthless, hopeless, discouraged, or guilty    Trouble concentrating, remembering things, doing daily tasks, or making decisions    Thoughts about hurting or killing yourself    Call your local emergency number (911 in the [place]) if:   You think about hurting yourself or someone else.    You have done something on purpose to hurt yourself.    Call your therapist or doctor if:   Your symptoms get worse or do not get better with treatment.    Your depression keeps you from doing your regular daily activities.    You have new symptoms since your last visit.    You have questions or concerns about your condition or care.    The following resources are available at any time to help you, if needed:   Contact a suicide prevention organization:        For the [employer] Suicide and Crisis Lifeline:     Call or text [employer]     Send a chat on https://Pet360.org/chat     Call 5-395-283-2413 (1-800-273-TALK)    For the Suicide Hotline, call 1-423.167.7686 (8-599-IMHIGXY)    For a list of international numbers: https://save.org/find-help/international-resources/  Treatment for depression  depends on how severe your symptoms are. You may need any of the following:  Cognitive behavioral therapy (CBT)  teaches you how to identify  and change negative thought patterns.    Antidepressant medicine  may be given to decrease or manage symptoms. You may need to take this medicine for several weeks before they start working.    Self-care:   Talk to someone about your depression.  Your healthcare provider may suggest counseling. You might feel more comfortable talking with a friend or family member about your depression. Choose someone you know will be supportive and encouraging.    Get regular physical activity.  Physical activity can lower your stress, improve your mood, and help you sleep better. Work with your healthcare provider to develop a plan that you enjoy.         Create a regular sleep schedule.  A routine can help you relax before bed. Listen to music, read, or do yoga. Try to go to bed and wake up at the same time every day. Sleep is important for emotional health.    Eat a variety of healthy foods.  Healthy foods include fruits, vegetables, whole-grain breads, low-fat dairy products, lean meats, fish, and cooked beans. A healthy meal plan is low in fat, salt, and added sugar.         Do not use alcohol, drugs, or nicotine products.  These can worsen depression or make it hard to manage. Talk to your therapist or healthcare provider if you use any of these products and need help to quit.    Follow up with your therapist or doctor as directed:  Your healthcare provider will monitor your progress at follow-up visits. Your provider will also monitor your medicine if you take antidepressants and ask if the medicine is helping. Tell your provider about any side effects or problems you have with your medicine. The type or amount of medicine may need to be changed. Write down your questions so you remember to ask them during your visits.  For more information or support:   [facility]  [address]  [place] [address]  Phone: 9- [phone]  Phone: 5- 518 - [phone]  Web Address:  http://www.sharona.org  988 Suicide and Crisis Lifeline  [address]  [place] [address]  Phone: 3- 599 - 715  Web Address: http://www.suicidepreventionlifeline.org OR https://[employer]/chat/    © Copyright [employer] 2023 Information is for End User's use only and may not be sold, redistributed or otherwise used for commercial purposes.  The above information is an  only. It is not intended as medical advice for individual conditions or treatments. Talk to your doctor, nurse or pharmacist before following any medical regimen to see if it is safe and effective for you.

## 2024-02-01 NOTE — ASSESSMENT & PLAN NOTE
S/P traumatic fall with head strike in November 2023 while on ASA  S/P DDAVP for epidural hematoma as well as scalp hematomoa  Discharged to SNF then home by December.    Followed closely by neurology.    Reports chronic migraines, unchanged from baseline.    A1C 5.3 today with 13lb weight loss since 9/23 -no overt s/s hypoglycemia however will decrease metformin to 500mg BID.

## 2024-02-01 NOTE — ASSESSMENT & PLAN NOTE
Lab Results   Component Value Date    HGBA1C 5.3 02/01/2024     Lost 13 lbs since 9/2023  Reduce metfromin to 500mg po BID  Recheck A1C in 3 months.  Continue lifestyle modifications.

## 2024-02-01 NOTE — ASSESSMENT & PLAN NOTE
PMDP reviewed, no red flags  Tappered self down to 10oxy BID with 5mg prn daily  Followed by pain management.

## 2024-02-06 ENCOUNTER — HOSPITAL ENCOUNTER (OUTPATIENT)
Dept: MRI IMAGING | Facility: HOSPITAL | Age: 69
Discharge: HOME | End: 2024-02-06
Payer: MEDICARE

## 2024-02-06 DIAGNOSIS — R93.89 ABNORMAL MRI: ICD-10-CM

## 2024-02-06 DIAGNOSIS — R51.9 WORSENING HEADACHES: ICD-10-CM
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
## 2024-02-06 DIAGNOSIS — R51.9 UNCONTROLLED MORNING HEADACHE: ICD-10-CM

## 2024-02-06 PROCEDURE — 70553 MRI BRAIN STEM W/O & W/DYE: CPT

## 2024-02-06 PROCEDURE — A9585 GADOBUTROL INJECTION: HCPCS | Performed by: PHYSICIAN ASSISTANT

## 2024-02-06 PROCEDURE — G1004 CDSM NDSC: HCPCS

## 2024-02-06 RX ORDER — GADOBUTROL 604.72 MG/ML
6 INJECTION INTRAVENOUS
Status: COMPLETED | OUTPATIENT
Start: 2024-02-06 | End: 2024-02-06

## 2024-02-06 RX ADMIN — GADOBUTROL 6 ML: 604.72 INJECTION INTRAVENOUS at 15:26

## 2024-02-12 ENCOUNTER — TELEMEDICINE (OUTPATIENT)
Dept: PSYCHIATRY | Facility: CLINIC | Age: 69
End: 2024-02-12
Payer: MEDICARE

## 2024-02-12 DIAGNOSIS — F41.1 GAD (GENERALIZED ANXIETY DISORDER): ICD-10-CM

## 2024-02-12 DIAGNOSIS — F33.9 MAJOR DEPRESSION, RECURRENT, CHRONIC (HCC): Primary | ICD-10-CM

## 2024-02-12 DIAGNOSIS — Z63.4 BEREAVEMENT DUE TO LIFE EVENT: ICD-10-CM

## 2024-02-12 PROCEDURE — 90834 PSYTX W PT 45 MINUTES: CPT

## 2024-02-12 SDOH — SOCIAL STABILITY - SOCIAL INSECURITY: DISSAPEARANCE AND DEATH OF FAMILY MEMBER: Z63.4

## 2024-02-12 NOTE — PSYCH
"Behavioral Health Psychotherapy Progress Note    Psychotherapy Provided: Individual Psychotherapy     1. Major depression, recurrent, chronic (HCC)        2. SY (generalized anxiety disorder)        3. Bereavement due to life event            Goals addressed in session: Goal 1     DATA: Art shared she would like weekly therapy because of feeling overwhelmed at times. She shared she has been having some medication changes recently which might account for it.  She shared she has been depressed for 17 years since her dad .  She also had to leave work because of panic attacks, and also got disability because of pain in her back.  Therapist and Art completed her safety plan this session and she openly communicated her thoughts and feelings about feeling depressed and in pain, and said she would get out and go to the thrift store this week to start improving her mood.  During this session, this clinician used the following therapeutic modalities: Client-centered Therapy    Substance Abuse was not addressed during this session. If the client is diagnosed with a co-occurring substance use disorder, please indicate any changes in the frequency or amount of use: . Stage of change for addressing substance use diagnoses: No substance use/Not applicable    ASSESSMENT:  Art Jones presents with a Euthymic/ normal mood.     her affect is Normal range and intensity, which is congruent, with her mood and the content of the session. The client has made progress on their goals.     Art Jones presents with a none risk of suicide, none risk of self-harm, and none risk of harm to others.    For any risk assessment that surpasses a \"low\" rating, a safety plan must be developed.    A safety plan was indicated: no  If yes, describe in detail     PLAN: Between sessions, Art Jones will utilize safety plan if needed, get out to a thrift store this week. At the next session, the therapist will use Client-centered " Therapy to address depression, anxiety.    Behavioral Health Treatment Plan and Discharge Planning: Art Jones is aware of and agrees to continue to work on their treatment plan. They have identified and are working toward their discharge goals. yes    Visit start and stop times:    02/12/24  Start Time: 1100  Stop Time: 1152  Total Visit Time: 52 minutes  Virtual Regular Visit    Verification of patient location:    Patient is located at Home in the following state in which I hold an active license [place]      Assessment/Plan:    Problem List Items Addressed This Visit       Major depression, recurrent, chronic (HCC) - Primary    SY (generalized anxiety disorder)    Bereavement due to life event       Goals addressed in session: Goal 1 and Goal 2          Reason for visit is No chief complaint on file.       Encounter provider Lady Soto LCSW    Provider located at [address]  [facility] [place] [id number] [phone]      Recent Visits  No visits were found meeting these conditions.  Showing recent visits within past 7 days and meeting all other requirements  Today's Visits  Date Type Provider Dept   02/12/24 Telemedicine Lady Soto LCSW  [facility]   Showing today's visits and meeting all other requirements  Future Appointments  No visits were found meeting these conditions.  Showing future appointments within next 150 days and meeting all other requirements       The patient was identified by name and date of birth. Aniya Jones was informed that this is a telemedicine visit and that the visit is being conducted throughthe [place] platform. She agrees to proceed..  My office door was closed. No one else was in the room.  She acknowledged consent and understanding of privacy and security of the video platform. The patient has agreed to participate and understands they can discontinue  the visit at any time.    Patient is aware this is a billable service.     Subjective  Aniya Jones is a 68 y.o. female  .      HPI     Past Medical History:   Diagnosis Date    Anxiety     Arthritis     Cancer (HCC)     Skin    Depression     Diabetes (HCC)     GERD (gastroesophageal reflux disease)     Hyperlipidemia     Hypertension     Migraine        Past Surgical History:   Procedure Laterality Date    ANKLE SURGERY      CARPAL TUNNEL RELEASE Right     ROTATOR CUFF REPAIR         Current Outpatient Medications   Medication Sig Dispense Refill    acetaminophen (TYLENOL) 325 mg tablet Take 3 tablets (975 mg total) by mouth every 8 (eight) hours  0    B COMPLEX VITAMINS PO Take 1 tablet by mouth daily      Baclofen 5 MG TABS Take by mouth Take 1-2 tabs TID prn      bisacodyl (Dulcolax) 10 mg suppository Insert 10 mg into the rectum as needed for constipation (Patient not taking: Reported on 2/1/2024)      buPROPion (WELLBUTRIN) 75 mg tablet Take 1 tablet (75 mg total) by mouth in the morning 30 tablet 1    busPIRone (BUSPAR) 10 mg tablet Take 1 tablet (10 mg total) by mouth 2 (two) times a day 60 tablet 1    calcium carbonate (OS-MIKE) 600 MG tablet Take 600 mg by mouth 2 (two) times a day      Cholecalciferol 50 MCG (2000 UT) CAPS Take 1 capsule by mouth daily      clonazePAM (KlonoPIN) 0.5 mg tablet Take 0.5 mg by mouth 3 (three) times a day. Indications: Feeling Anxious      DULoxetine (Cymbalta) 30 mg delayed release capsule Take 1 capsule (30 mg total) by mouth daily at bedtime 30 capsule 1    DULoxetine (CYMBALTA) 60 mg delayed release capsule Take 1 capsule (60 mg total) by mouth daily at bedtime 30 capsule 1    esomeprazole (NexIUM) 20 mg capsule Take 20 mg by mouth daily in the early morning       Lidocaine-Menthol 4-1 % CREA Apply 1 Dose topically if needed Patch 5%      magnesium oxide (MAG-OX) 400 mg tablet Take 1 tablet (400 mg total) by mouth 2 (two) times a day 180 tablet 3    metFORMIN (GLUCOPHAGE)  1000 MG tablet Take 500 mg by mouth 2 (two) times a day with meals      Movantik 25 MG tablet TAKE 1 TABLET BY MOUTH DAILY FOR CONSTIPATION (Patient not taking: Reported on 2/1/2024)      multivitamin (THERAGRAN) TABS Take 1 tablet by mouth daily      naloxone (NARCAN) 4 mg/0.1 mL nasal spray Administer 1 spray into a nostril. If no response after 2-3 minutes, give another dose in the other nostril using a new spray. 1 each 1    OnabotulinumtoxinA (BOTOX IM) Inject into a muscle      oxyCODONE (ROXICODONE) 10 MG TABS Take 10 mg by mouth every 8 (eight) hours as needed      polyethylene glycol (MIRALAX) 17 g packet Take 17 g by mouth daily at bedtime      potassium chloride (K-DUR,KLOR-CON) 20 mEq tablet Take 1 tablet (20 mEq total) by mouth daily 90 tablet 3    Riboflavin (Vitamin B-2) 100 MG TABS 2 in the morning and 2 at bedtime 360 tablet 3    rosuvastatin (CRESTOR) 20 MG tablet Take 1 tablet (20 mg total) by mouth daily 90 tablet 3    Ubrogepant (Ubrelvy) 100 MG tablet Take 1 tablet (100 mg total) by mouth if needed (migraine) Take 1 tablet (100 mg) one time as needed for migraine. May repeat one additional tablet (100 mg) at least two hours after the first dose. Do not use more than two doses per day 16 tablet 6     No current facility-administered medications for this visit.        No Known Allergies    Review of Systems    Video Exam    There were no vitals filed for this visit.    Physical Exam

## 2024-02-15 DIAGNOSIS — I67.89 CEREBRAL MICROVASCULAR DISEASE: ICD-10-CM

## 2024-02-15 DIAGNOSIS — E78.00 HYPERCHOLESTEROLEMIA: Primary | ICD-10-CM

## 2024-02-16 ENCOUNTER — TELEPHONE (OUTPATIENT)
Dept: PSYCHIATRY | Facility: CLINIC | Age: 69
End: 2024-02-16

## 2024-02-16 DIAGNOSIS — F41.1 GAD (GENERALIZED ANXIETY DISORDER): Primary | ICD-10-CM

## 2024-02-16 RX ORDER — CLONAZEPAM 0.5 MG/1
0.5 TABLET ORAL 3 TIMES DAILY
Qty: 90 TABLET | Refills: 1 | Status: SHIPPED | OUTPATIENT
Start: 2024-02-16

## 2024-02-16 NOTE — TELEPHONE ENCOUNTER
Pt requested refill of the clonazepam 0.5mg be sent to the [facility] pharmacy on file

## 2024-02-21 ENCOUNTER — TELEMEDICINE (OUTPATIENT)
Dept: PSYCHIATRY | Facility: CLINIC | Age: 69
End: 2024-02-21
Payer: MEDICARE

## 2024-02-21 DIAGNOSIS — F33.9 MAJOR DEPRESSION, RECURRENT, CHRONIC (HCC): Primary | ICD-10-CM

## 2024-02-21 PROCEDURE — 90834 PSYTX W PT 45 MINUTES: CPT

## 2024-02-21 PROCEDURE — 99213 OFFICE O/P EST LOW 20 MIN: CPT

## 2024-02-21 NOTE — PSYCH
"Behavioral Health Psychotherapy Progress Note    Psychotherapy Provided: Individual Psychotherapy     1. Major depression, recurrent, chronic (HCC)            Goals addressed in session: Goal 1     DATA: Art shared she has been feeling okay and was able to send pictures of damage from her house water damage.  She talked about her sister and how she is different from her.  Therapist had Art complete PHQ and SY and she scored a 11 and 8 respectively.  Art talked openly about her depression symptoms and anxiety as well.  Therapist suggested she set intention for good dreams since she said sometimes she has unpleasant dreams.  Therapist also suggested she do a little bit of her work around the house every day to feel accomplished.  She said she would try this.  During this session, this clinician used the following therapeutic modalities: Dialectical Behavior Therapy    Substance Abuse was not addressed during this session. If the client is diagnosed with a co-occurring substance use disorder, please indicate any changes in the frequency or amount of use: . Stage of change for addressing substance use diagnoses: No substance use/Not applicable    ASSESSMENT:  Art Jones presents with a Euthymic/ normal mood.     her affect is Normal range and intensity, which is congruent, with her mood and the content of the session. The client has made progress on their goals.     Art Jones presents with a none risk of suicide, none risk of self-harm, and none risk of harm to others.    For any risk assessment that surpasses a \"low\" rating, a safety plan must be developed.    A safety plan was indicated: no  If yes, describe in detail     PLAN: Between sessions, Art Jones will set intention for dreams, do a little something every day. At the next session, the therapist will use Dialectical Behavior Therapy to address depression.    Behavioral Health Treatment Plan and Discharge Planning: Art" Robert is aware of and agrees to continue to work on their treatment plan. They have identified and are working toward their discharge goals. yes    Visit start and stop times:    02/21/24  Start Time: 1000  Stop Time: 1052  Total Visit Time: 52 minutes  Virtual Regular Visit    Verification of patient location:    Patient is located at Home in the following state in which I hold an active license [place]      Assessment/Plan:    Problem List Items Addressed This Visit       Major depression, recurrent, chronic (HCC) - Primary       Goals addressed in session: Goal 1          Reason for visit is No chief complaint on file.       Encounter provider Lady Soto LCSW    Provider located [facility]  [address]  [place] [address] [phone]      Recent Visits  No visits were found meeting these conditions.  Showing recent visits within past 7 days and meeting all other requirements  Today's Visits  Date Type Provider Dept   02/21/24 Telemedicine Lady Soto LCSW  Psychiatric [facility]   Showing today's visits and meeting all other requirements  Future Appointments  No visits were found meeting these conditions.  Showing future appointments within next 150 days and meeting all other requirements       The patient was identified by name and date of birth. Aniya Jones was informed that this is a telemedicine visit and that the visit is being conducted throughthe Power[employer] platform. She agrees to proceed..  My office door was closed. No one else was in the room.  She acknowledged consent and understanding of privacy and security of the video platform. The patient has agreed to participate and understands they can discontinue the visit at any time.    Patient is aware this is a billable service.     Subjective  Aniya Jones is a 68 y.o. female  .      HPI     Past Medical History:   Diagnosis Date    Anxiety      Arthritis     Cancer (HCC)     Skin    Depression     Diabetes (HCC)     GERD (gastroesophageal reflux disease)     Hyperlipidemia     Hypertension     Migraine        Past Surgical History:   Procedure Laterality Date    ANKLE SURGERY      CARPAL TUNNEL RELEASE Right     ROTATOR CUFF REPAIR         Current Outpatient Medications   Medication Sig Dispense Refill    acetaminophen (TYLENOL) 325 mg tablet Take 3 tablets (975 mg total) by mouth every 8 (eight) hours  0    B COMPLEX VITAMINS PO Take 1 tablet by mouth daily      Baclofen 5 MG TABS Take by mouth Take 1-2 tabs TID prn      bisacodyl (Dulcolax) 10 mg suppository Insert 10 mg into the rectum as needed for constipation (Patient not taking: Reported on 2/1/2024)      buPROPion (WELLBUTRIN) 75 mg tablet Take 1 tablet (75 mg total) by mouth in the morning 30 tablet 1    busPIRone (BUSPAR) 10 mg tablet Take 1 tablet (10 mg total) by mouth 2 (two) times a day 60 tablet 1    calcium carbonate (OS-MIKE) 600 MG tablet Take 600 mg by mouth 2 (two) times a day      Cholecalciferol 50 MCG (2000 UT) CAPS Take 1 capsule by mouth daily      clonazePAM (KlonoPIN) 0.5 mg tablet Take 1 tablet (0.5 mg total) by mouth 3 (three) times a day 90 tablet 1    DULoxetine (Cymbalta) 30 mg delayed release capsule Take 1 capsule (30 mg total) by mouth daily at bedtime 30 capsule 1    DULoxetine (CYMBALTA) 60 mg delayed release capsule Take 1 capsule (60 mg total) by mouth daily at bedtime 30 capsule 1    esomeprazole (NexIUM) 20 mg capsule Take 20 mg by mouth daily in the early morning       Lidocaine-Menthol 4-1 % CREA Apply 1 Dose topically if needed Patch 5%      magnesium oxide (MAG-OX) 400 mg tablet Take 1 tablet (400 mg total) by mouth 2 (two) times a day 180 tablet 3    metFORMIN (GLUCOPHAGE) 1000 MG tablet Take 500 mg by mouth 2 (two) times a day with meals      Movantik 25 MG tablet TAKE 1 TABLET BY MOUTH DAILY FOR CONSTIPATION (Patient not taking: Reported on 2/1/2024)       multivitamin (THERAGRAN) TABS Take 1 tablet by mouth daily      naloxone (NARCAN) 4 mg/0.1 mL nasal spray Administer 1 spray into a nostril. If no response after 2-3 minutes, give another dose in the other nostril using a new spray. 1 each 1    OnabotulinumtoxinA (BOTOX IM) Inject into a muscle      oxyCODONE (ROXICODONE) 10 MG TABS Take 10 mg by mouth every 8 (eight) hours as needed      polyethylene glycol (MIRALAX) 17 g packet Take 17 g by mouth daily at bedtime      potassium chloride (K-DUR,KLOR-CON) 20 mEq tablet Take 1 tablet (20 mEq total) by mouth daily 90 tablet 3    Riboflavin (Vitamin B-2) 100 MG TABS 2 in the morning and 2 at bedtime 360 tablet 3    rosuvastatin (CRESTOR) 20 MG tablet Take 1 tablet (20 mg total) by mouth daily 90 tablet 3    Ubrogepant (Ubrelvy) 100 MG tablet Take 1 tablet (100 mg total) by mouth if needed (migraine) Take 1 tablet (100 mg) one time as needed for migraine. May repeat one additional tablet (100 mg) at least two hours after the first dose. Do not use more than two doses per day 16 tablet 6     No current facility-administered medications for this visit.        No Known Allergies    Review of Systems    Video Exam    There were no vitals filed for this visit.    Physical Exam
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
## 2024-03-06 ENCOUNTER — TELEMEDICINE (OUTPATIENT)
Dept: PSYCHIATRY | Facility: CLINIC | Age: 69
End: 2024-03-06
Payer: MEDICARE

## 2024-03-06 DIAGNOSIS — F33.9 MAJOR DEPRESSION, RECURRENT, CHRONIC (HCC): Primary | ICD-10-CM

## 2024-03-06 PROCEDURE — 90834 PSYTX W PT 45 MINUTES: CPT

## 2024-03-06 NOTE — PSYCH
"Behavioral Health Psychotherapy Progress Note    Psychotherapy Provided: Individual Psychotherapy     1. Major depression, recurrent, chronic (HCC)            Goals addressed in session: Goal 1     DATA: Art shared she is frustrated with her headaches.  Therapist engaged Art in light stream technique for her pain.  She said she prefers to think about healing light as sun rays.  She shared that doing some productive things made her feel better but there were also some stressors in terms of her new phone and her mother being sick.  Therapist talked with Art about tasks she can accomplish this week including going to the hospitals for Naren's records, and making a centralized place for passwords.  During this session, this clinician used the following therapeutic modalities: Client-centered Therapy, Mindfulness-based Strategies, and Supportive Psychotherapy    Substance Abuse was not addressed during this session. If the client is diagnosed with a co-occurring substance use disorder, please indicate any changes in the frequency or amount of use: . Stage of change for addressing substance use diagnoses: No substance use/Not applicable    ASSESSMENT:  Art Jones presents with a Euthymic/ normal mood.     her affect is Normal range and intensity, which is congruent, with her mood and the content of the session. The client has made progress on their goals.     Art Jones presents with a none risk of suicide, none risk of self-harm, and none risk of harm to others.    For any risk assessment that surpasses a \"low\" rating, a safety plan must be developed.    A safety plan was indicated: no  If yes, describe in detail     PLAN: Between sessions, Art Jones will do some productive tasks. At the next session, the therapist will use Client-centered Therapy and Supportive Psychotherapy to address depression.    Behavioral Health Treatment Plan and Discharge Planning: Art Jones is aware of and " agrees to continue to work on their treatment plan. They have identified and are working toward their discharge goals. yes    Visit start and stop times:    03/06/24     Virtual Regular Visit    Verification of patient location:    Patient is located at Home in the following state in which I hold an active license [place]      Assessment/Plan:    Problem List Items Addressed This Visit       Major depression, recurrent, chronic (HCC) - Primary       Goals addressed in session: Goal 1          Reason for visit is No chief complaint on file.       Encounter provider Lady Soto LCSW    Provider located at [address]  [place] [address] [phone]      Recent Visits  No visits were found meeting these conditions.  Showing recent visits within past 7 days and meeting all other requirements  Future Appointments  No visits were found meeting these conditions.  Showing future appointments within next 150 days and meeting all other requirements       The patient was identified by name and date of birth. Aniya Jones was informed that this is a telemedicine visit and that the visit is being conducted throughthe NE[employer]IX platform. She agrees to proceed..  My office door was closed. No one else was in the room.  She acknowledged consent and understanding of privacy and security of the video platform. The patient has agreed to participate and understands they can discontinue the visit at any time.    Patient is aware this is a billable service.     Subjective  Aniya Jones is a 68 y.o. female  .      HPI     Past Medical History:   Diagnosis Date    Anxiety     Arthritis     Cancer (HCC)     Skin    Depression     Diabetes (HCC)     GERD (gastroesophageal reflux disease)     Hyperlipidemia     Hypertension     Migraine        Past Surgical History:   Procedure Laterality Date    ANKLE SURGERY      CARPAL TUNNEL RELEASE Right      ROTATOR CUFF REPAIR         Current Outpatient Medications   Medication Sig Dispense Refill    acetaminophen (TYLENOL) 325 mg tablet Take 3 tablets (975 mg total) by mouth every 8 (eight) hours  0    B COMPLEX VITAMINS PO Take 1 tablet by mouth daily      Baclofen 5 MG TABS Take by mouth Take 1-2 tabs TID prn      bisacodyl (Dulcolax) 10 mg suppository Insert 10 mg into the rectum as needed for constipation (Patient not taking: Reported on 2/1/2024)      buPROPion (WELLBUTRIN) 75 mg tablet Take 1 tablet (75 mg total) by mouth in the morning 30 tablet 1    busPIRone (BUSPAR) 10 mg tablet Take 1 tablet (10 mg total) by mouth 2 (two) times a day 60 tablet 1    calcium carbonate (OS-MIKE) 600 MG tablet Take 600 mg by mouth 2 (two) times a day      Cholecalciferol 50 MCG (2000 UT) CAPS Take 1 capsule by mouth daily      clonazePAM (KlonoPIN) 0.5 mg tablet Take 1 tablet (0.5 mg total) by mouth 3 (three) times a day 90 tablet 1    DULoxetine (Cymbalta) 30 mg delayed release capsule Take 1 capsule (30 mg total) by mouth daily at bedtime 30 capsule 1    DULoxetine (CYMBALTA) 60 mg delayed release capsule Take 1 capsule (60 mg total) by mouth daily at bedtime 30 capsule 1    esomeprazole (NexIUM) 20 mg capsule Take 20 mg by mouth daily in the early morning       Lidocaine-Menthol 4-1 % CREA Apply 1 Dose topically if needed Patch 5%      magnesium oxide (MAG-OX) 400 mg tablet Take 1 tablet (400 mg total) by mouth 2 (two) times a day 180 tablet 3    metFORMIN (GLUCOPHAGE) 1000 MG tablet Take 500 mg by mouth 2 (two) times a day with meals      Movantik 25 MG tablet TAKE 1 TABLET BY MOUTH DAILY FOR CONSTIPATION (Patient not taking: Reported on 2/1/2024)      multivitamin (THERAGRAN) TABS Take 1 tablet by mouth daily      naloxone (NARCAN) 4 mg/0.1 mL nasal spray Administer 1 spray into a nostril. If no response after 2-3 minutes, give another dose in the other nostril using a new spray. 1 each 1    OnabotulinumtoxinA (BOTOX IM)  Inject into a muscle      oxyCODONE (ROXICODONE) 10 MG TABS Take 10 mg by mouth every 8 (eight) hours as needed      polyethylene glycol (MIRALAX) 17 g packet Take 17 g by mouth daily at bedtime      potassium chloride (K-DUR,KLOR-CON) 20 mEq tablet Take 1 tablet (20 mEq total) by mouth daily 90 tablet 3    Riboflavin (Vitamin B-2) 100 MG TABS 2 in the morning and 2 at bedtime 360 tablet 3    rosuvastatin (CRESTOR) 20 MG tablet Take 1 tablet (20 mg total) by mouth daily 90 tablet 3    Ubrogepant (Ubrelvy) 100 MG tablet Take 1 tablet (100 mg total) by mouth if needed (migraine) Take 1 tablet (100 mg) one time as needed for migraine. May repeat one additional tablet (100 mg) at least two hours after the first dose. Do not use more than two doses per day 16 tablet 6     No current facility-administered medications for this visit.        No Known Allergies    Review of Systems    Video Exam    There were no vitals filed for this visit.    Physical Exam

## 2024-03-12 ENCOUNTER — PROCEDURE VISIT (OUTPATIENT)
Dept: NEUROLOGY | Facility: CLINIC | Age: 69
End: 2024-03-12
Payer: MEDICARE

## 2024-03-12 VITALS — DIASTOLIC BLOOD PRESSURE: 82 MMHG | HEART RATE: 87 BPM | TEMPERATURE: 98.1 F | SYSTOLIC BLOOD PRESSURE: 128 MMHG

## 2024-03-12 DIAGNOSIS — G24.3 CERVICAL DYSTONIA: Primary | ICD-10-CM

## 2024-03-12 PROCEDURE — 64616 CHEMODENERV MUSC NECK DYSTON: CPT | Performed by: PHYSICIAN ASSISTANT

## 2024-03-12 NOTE — PROGRESS NOTES
"Universal Protocol   Consent: Verbal consent obtained. Written consent obtained.  Risks and benefits: risks, benefits and alternatives were discussed  Consent given by: patient  Time out: Immediately prior to procedure a \"time out\" was called to verify the correct patient, procedure, equipment, support staff and site/side marked as required.  Patient understanding: patient states understanding of the procedure being performed  Patient consent: the patient's understanding of the procedure matches consent given  Procedure consent: procedure consent matches procedure scheduled  Relevant documents: relevant documents present and verified  Patient identity confirmed: verbally with patient      Chemodenervation     Date/Time  3/12/2024 1:30 PM     Performed by  Paulette Trevino PA-C   Authorized by  Paulette Trevino PA-C     Pre-procedure details      Prepped With: Alcohol     Anesthesia  (see MAR for exact dosages):     Anesthesia method:  None   Procedure details      Position:  Upright   Botox      Botox Type:  Type A    Brand:  Botox    mL's of Botulinum Toxin:  200    Final Concentration per CC:  50 units    Needle Gauge:  30 G 2.5 inch   Procedures      Botox Procedures: cervical dystonia and chronic headache      Indications: spasmodic torticollis and migraines     Injection Location      Head / Face:  L superior cervical paraspinal, R superior cervical paraspinal, L , R , L frontalis, R frontalis, L medial occipitalis, R medial occipitalis, L temporalis, R temporalis and procerus    L  injection amount:  5 unit(s)    R  injection amount:  5 unit(s)    L lateral frontalis:  5 unit(s)    R lateral frontalis:  5 unit(s)    L medial frontalis:  5 unit(s)    R medial frontalis:  5 unit(s)    L temporalis injection amount:  20 unit(s)    R temporalis injection amount:  20 unit(s)    Procerus injection amount:  5 unit(s)    L medial occipitalis injection amount:  15 unit(s)    R medial " occipitalis injection amount:  15 unit(s)    L superior cervical paraspinal injection amount:  10 unit(s)    R superior cervical paraspinal injection amount:  15 unit(s)    Cervical Dystonia / Salivary Gland:  L splenius capitis, R splenius capitis, R sternocleidomastoid, L sternocleidomastoid, L upper trapezius and R upper trapezius      L splenius capitis injection amount:  7.5 unit(s)      R splenius capitis injection amount:  7.5 unit(s)      L sternocleidomastoid injection amount:  10 unit(s)      R sternocleidomastoid injection amount:  10 unit(s)      L upper trapezius injection amount:  15 unit(s)      R upper trapezius injection amount:  15 unit(s)   Total Units      Total units used:  200    Total units discarded:  0   Post-procedure details      Chemodenervation:  Neck, excluding muscles of the larynx    Neck Muscle Location::  Bilateral neck muscle    Patient tolerance of procedure:  Tolerated well, no immediate complications   Comments       5 units temporalis  All medically necessary

## 2024-03-14 NOTE — PSYCH
Virtual Regular Visit    Verification of patient location:    Patient is located at Home in the following state in which I hold an active license [place]      Assessment/Plan:    Problem List Items Addressed This Visit       Major depression, recurrent, chronic (HCC) - Primary       Goals addressed in session: Goal 1          Reason for visit is   Chief Complaint   Patient presents with    Virtual Regular Visit          Encounter provider Lady Soto LCSW    Provider located at [place] [address]  [place] [address] [phone]      Recent Visits  No visits were found meeting these conditions.  Showing recent visits within past 7 days and meeting all other requirements  Future Appointments  No visits were found meeting these conditions.  Showing future appointments within next 150 days and meeting all other requirements       The patient was identified by name and date of birth. Aniya Jones was informed that this is a telemedicine visit and that the visit is being conducted throughthe Virtru platform. She agrees to proceed..  My office door was closed. No one else was in the room.  She acknowledged consent and understanding of privacy and security of the video platform. The patient has agreed to participate and understands they can discontinue the visit at any time.    Patient is aware this is a billable service.     Subjective  Aniya Jones is a 68 y.o. female  .      HPI     Past Medical History:   Diagnosis Date    Anxiety     Arthritis     Cancer (HCC)     Skin    Depression     Diabetes (HCC)     GERD (gastroesophageal reflux disease)     Hyperlipidemia     Hypertension     Migraine        Past Surgical History:   Procedure Laterality Date    ANKLE SURGERY      CARPAL TUNNEL RELEASE Right     ROTATOR CUFF REPAIR         Current Outpatient Medications   Medication Sig Dispense Refill    acetaminophen (TYLENOL) 325 mg  tablet Take 3 tablets (975 mg total) by mouth every 8 (eight) hours  0    B COMPLEX VITAMINS PO Take 1 tablet by mouth daily      Baclofen 5 MG TABS Take by mouth Take 1-2 tabs TID prn      bisacodyl (Dulcolax) 10 mg suppository Insert 10 mg into the rectum as needed for constipation (Patient not taking: Reported on 2/1/2024)      buPROPion (WELLBUTRIN) 75 mg tablet Take 1 tablet (75 mg total) by mouth in the morning 30 tablet 1    busPIRone (BUSPAR) 10 mg tablet Take 1 tablet (10 mg total) by mouth 2 (two) times a day 60 tablet 1    calcium carbonate (OS-MIKE) 600 MG tablet Take 600 mg by mouth 2 (two) times a day      Cholecalciferol 50 MCG (2000 UT) CAPS Take 1 capsule by mouth daily      clonazePAM (KlonoPIN) 0.5 mg tablet Take 1 tablet (0.5 mg total) by mouth 3 (three) times a day 90 tablet 1    DULoxetine (Cymbalta) 30 mg delayed release capsule Take 1 capsule (30 mg total) by mouth daily at bedtime 30 capsule 1    DULoxetine (CYMBALTA) 60 mg delayed release capsule Take 1 capsule (60 mg total) by mouth daily at bedtime 30 capsule 1    esomeprazole (NexIUM) 20 mg capsule Take 20 mg by mouth daily in the early morning       Lidocaine-Menthol 4-1 % CREA Apply 1 Dose topically if needed Patch 5%      magnesium oxide (MAG-OX) 400 mg tablet Take 1 tablet (400 mg total) by mouth 2 (two) times a day 180 tablet 3    metFORMIN (GLUCOPHAGE) 1000 MG tablet Take 500 mg by mouth 2 (two) times a day with meals      Movantik 25 MG tablet TAKE 1 TABLET BY MOUTH DAILY FOR CONSTIPATION (Patient not taking: Reported on 2/1/2024)      multivitamin (THERAGRAN) TABS Take 1 tablet by mouth daily      naloxone (NARCAN) 4 mg/0.1 mL nasal spray Administer 1 spray into a nostril. If no response after 2-3 minutes, give another dose in the other nostril using a new spray. 1 each 1    OnabotulinumtoxinA (BOTOX IM) Inject into a muscle      oxyCODONE (ROXICODONE) 10 MG TABS Take 10 mg by mouth every 8 (eight) hours as needed       polyethylene glycol (MIRALAX) 17 g packet Take 17 g by mouth daily at bedtime      potassium chloride (K-DUR,KLOR-CON) 20 mEq tablet Take 1 tablet (20 mEq total) by mouth daily 90 tablet 3    Riboflavin (Vitamin B-2) 100 MG TABS 2 in the morning and 2 at bedtime 360 tablet 3    rosuvastatin (CRESTOR) 20 MG tablet Take 1 tablet (20 mg total) by mouth daily 90 tablet 3    Ubrogepant (Ubrelvy) 100 MG tablet Take 1 tablet (100 mg total) by mouth if needed (migraine) Take 1 tablet (100 mg) one time as needed for migraine. May repeat one additional tablet (100 mg) at least two hours after the first dose. Do not use more than two doses per day 16 tablet 6     No current facility-administered medications for this visit.        No Known Allergies    Review of Systems    Video Exam    There were no vitals filed for this visit.    Physical Exam           Behavioral Health Psychotherapy Progress Note    Psychotherapy Provided: Individual Psychotherapy     1. Major depression, recurrent, chronic (HCC)            Goals addressed in session: Goal 1     DATA: Therapist and Art discussed her long standing depression, its origins and how to address it.  Therapist suggested behavioral activation, encouraging Art to do one thing every day that is productive or enjoyable.  Art said she had started doing this the previous week and it felt good, and that she will continue to do this and notice any shifts in her mood.  During this session, this clinician used the following therapeutic modalities: Dialectical Behavior Therapy    Substance Abuse was not addressed during this session. If the client is diagnosed with a co-occurring substance use disorder, please indicate any changes in the frequency or amount of use: . Stage of change for addressing substance use diagnoses: No substance use/Not applicable    ASSESSMENT:  Art Jones presents with a Euthymic/ normal mood.     her affect is Normal range and intensity, which is  "congruent, with her mood and the content of the session. The client has made progress on their goals.     Art Jones presents with a none risk of suicide, none risk of self-harm, and none risk of harm to others.    For any risk assessment that surpasses a \"low\" rating, a safety plan must be developed.    A safety plan was indicated: no  If yes, describe in detail     PLAN: Between sessions, Art Jones will utilize behavioral acitvation for depression. At the next session, the therapist will use Dialectical Behavior Therapy to address depression.    Behavioral Health Treatment Plan and Discharge Planning: Art Jones is aware of and agrees to continue to work on their treatment plan. They have identified and are working toward their discharge goals. yes    Visit start and stop times:    03/14/24     "

## 2024-03-25 ENCOUNTER — TELEMEDICINE (OUTPATIENT)
Dept: PSYCHIATRY | Facility: CLINIC | Age: 69
End: 2024-03-25
Payer: MEDICARE

## 2024-03-25 DIAGNOSIS — F41.8 DEPRESSION WITH ANXIETY: Primary | ICD-10-CM

## 2024-03-25 PROCEDURE — 90834 PSYTX W PT 45 MINUTES: CPT

## 2024-03-25 NOTE — PSYCH
"Behavioral Health Psychotherapy Progress Note    Psychotherapy Provided: Individual Psychotherapy     1. Depression with anxiety            Goals addressed in session: Goal 1     DATA: Art shared she has been feeling better because she had some teeth extracted for getting implants later on.  She talked about her mother declining and her thoughts and feelings about her moving on soon.  Therapist reviewed behavioral activation and she said she had not done too much because of her mouth and headaches and therapy appointments.   She shared she has been reading the bible in the past few years which she has been enjoying.  She shared she grapples with Satan at times and does some prayers to \"rebuke\" Satan.    She said this helps her. She talked about her emotional eating issues and what has helped her to eat healthier, mostly that she cannot bring junk into her house.    During this session, this clinician used the following therapeutic modalities: Client-centered Therapy and Dialectical Behavior Therapy    Substance Abuse was not addressed during this session. If the client is diagnosed with a co-occurring substance use disorder, please indicate any changes in the frequency or amount of use: . Stage of change for addressing substance use diagnoses: No substance use/Not applicable    ASSESSMENT:  Art Jones presents with a Euthymic/ normal mood.     her affect is Normal range and intensity, which is congruent, with her mood and the content of the session. The client has made progress on their goals.     Art Jones presents with a none risk of suicide, none risk of self-harm, and none risk of harm to others.    For any risk assessment that surpasses a \"low\" rating, a safety plan must be developed.    A safety plan was indicated: no  If yes, describe in detail     PLAN: Between sessions, Art Jones will do behavioral activation to improve mood. At the next session, the therapist will use Client-centered " Therapy to address depression.    Behavioral Health Treatment Plan and Discharge Planning: Art Jones is aware of and agrees to continue to work on their treatment plan. They have identified and are working toward their discharge goals. yes    Visit start and stop times:    03/25/24  Start Time: 1500  Stop Time: 1552  Total Visit Time: 52 minutes  Virtual Regular Visit    Verification of patient location:    Patient is located at Home in the following state in which I hold an active license [place]      Assessment/Plan:    Problem List Items Addressed This Visit       Depression with anxiety - Primary       Goals addressed in session: Goal 1          Reason for visit is No chief complaint on file.       Encounter provider Lady Soto LCSW    [facility] [address]  [place] [address] [phone]      Recent Visits  No visits were found meeting these conditions.  Showing recent visits within past 7 days and meeting all other requirements  Today's Visits  Date Type Provider Dept   03/25/24 Telemedicine Lady Soto LCSW  Psychiatric [facility]   Showing today's visits and meeting all other requirements  Future Appointments  No visits were found meeting these conditions.  Showing future appointments within next 150 days and meeting all other requirements       The patient was identified by name and date of birth. Aniya Jones was informed that this is a telemedicine visit and that the visit is being conducted throughthe Wortal platform. She agrees to proceed..  My office door was closed. No one else was in the room.  She acknowledged consent and understanding of privacy and security of the video platform. The patient has agreed to participate and understands they can discontinue the visit at any time.    Patient is aware this is a billable service.     Subjective  Aniya Jones is a 68  y.o. female  .      HPI     Past Medical History:   Diagnosis Date    Anxiety     Arthritis     Cancer (HCC)     Skin    Depression     Diabetes (HCC)     GERD (gastroesophageal reflux disease)     Hyperlipidemia     Hypertension     Migraine        Past Surgical History:   Procedure Laterality Date    ANKLE SURGERY      CARPAL TUNNEL RELEASE Right     ROTATOR CUFF REPAIR         Current Outpatient Medications   Medication Sig Dispense Refill    acetaminophen (TYLENOL) 325 mg tablet Take 3 tablets (975 mg total) by mouth every 8 (eight) hours  0    B COMPLEX VITAMINS PO Take 1 tablet by mouth daily      Baclofen 5 MG TABS Take by mouth Take 1-2 tabs TID prn      bisacodyl (Dulcolax) 10 mg suppository Insert 10 mg into the rectum as needed for constipation (Patient not taking: Reported on 2/1/2024)      buPROPion (WELLBUTRIN) 75 mg tablet Take 1 tablet (75 mg total) by mouth in the morning 30 tablet 1    busPIRone (BUSPAR) 10 mg tablet Take 1 tablet (10 mg total) by mouth 2 (two) times a day 60 tablet 1    calcium carbonate (OS-MIKE) 600 MG tablet Take 600 mg by mouth 2 (two) times a day      Cholecalciferol 50 MCG (2000 UT) CAPS Take 1 capsule by mouth daily      clonazePAM (KlonoPIN) 0.5 mg tablet Take 1 tablet (0.5 mg total) by mouth 3 (three) times a day 90 tablet 1    DULoxetine (Cymbalta) 30 mg delayed release capsule Take 1 capsule (30 mg total) by mouth daily at bedtime 30 capsule 1    DULoxetine (CYMBALTA) 60 mg delayed release capsule Take 1 capsule (60 mg total) by mouth daily at bedtime 30 capsule 1    esomeprazole (NexIUM) 20 mg capsule Take 20 mg by mouth daily in the early morning       Lidocaine-Menthol 4-1 % CREA Apply 1 Dose topically if needed Patch 5%      magnesium oxide (MAG-OX) 400 mg tablet Take 1 tablet (400 mg total) by mouth 2 (two) times a day 180 tablet 3    metFORMIN (GLUCOPHAGE) 1000 MG tablet Take 500 mg by mouth 2 (two) times a day with meals      Movantik 25 MG tablet TAKE 1 TABLET BY  MOUTH DAILY FOR CONSTIPATION (Patient not taking: Reported on 2/1/2024)      multivitamin (THERAGRAN) TABS Take 1 tablet by mouth daily      naloxone (NARCAN) 4 mg/0.1 mL nasal spray Administer 1 spray into a nostril. If no response after 2-3 minutes, give another dose in the other nostril using a new spray. 1 each 1    OnabotulinumtoxinA (BOTOX IM) Inject into a muscle      oxyCODONE (ROXICODONE) 10 MG TABS Take 10 mg by mouth every 8 (eight) hours as needed      polyethylene glycol (MIRALAX) 17 g packet Take 17 g by mouth daily at bedtime      potassium chloride (K-DUR,KLOR-CON) 20 mEq tablet Take 1 tablet (20 mEq total) by mouth daily 90 tablet 3    Riboflavin (Vitamin B-2) 100 MG TABS 2 in the morning and 2 at bedtime 360 tablet 3    rosuvastatin (CRESTOR) 20 MG tablet Take 1 tablet (20 mg total) by mouth daily 90 tablet 3    Ubrogepant (Ubrelvy) 100 MG tablet Take 1 tablet (100 mg total) by mouth if needed (migraine) Take 1 tablet (100 mg) one time as needed for migraine. May repeat one additional tablet (100 mg) at least two hours after the first dose. Do not use more than two doses per day 16 tablet 6     No current facility-administered medications for this visit.        No Known Allergies    Review of Systems    Video Exam    There were no vitals filed for this visit.    Physical Exam

## 2024-04-04 ENCOUNTER — TELEPHONE (OUTPATIENT)
Dept: PSYCHIATRY | Facility: CLINIC | Age: 69
End: 2024-04-04

## 2024-04-04 DIAGNOSIS — F41.1 GAD (GENERALIZED ANXIETY DISORDER): ICD-10-CM

## 2024-04-04 DIAGNOSIS — F33.9 MAJOR DEPRESSION, RECURRENT, CHRONIC (HCC): ICD-10-CM

## 2024-04-04 RX ORDER — BUPROPION HYDROCHLORIDE 75 MG/1
75 TABLET ORAL DAILY
Qty: 30 TABLET | Refills: 1 | Status: SHIPPED | OUTPATIENT
Start: 2024-04-04

## 2024-04-04 RX ORDER — DULOXETIN HYDROCHLORIDE 30 MG/1
30 CAPSULE, DELAYED RELEASE ORAL
Qty: 30 CAPSULE | Refills: 1 | Status: SHIPPED | OUTPATIENT
Start: 2024-04-04

## 2024-04-04 RX ORDER — DULOXETIN HYDROCHLORIDE 60 MG/1
60 CAPSULE, DELAYED RELEASE ORAL
Qty: 30 CAPSULE | Refills: 1 | Status: SHIPPED | OUTPATIENT
Start: 2024-04-04

## 2024-04-04 NOTE — TELEPHONE ENCOUNTER
Pt requested refills of the duloxetine 60mg and bupropion 75mg (asked for #90) be sent to the [facility] on file

## 2024-04-08 ENCOUNTER — TELEMEDICINE (OUTPATIENT)
Dept: PSYCHIATRY | Facility: CLINIC | Age: 69
End: 2024-04-08

## 2024-04-08 DIAGNOSIS — F33.9 MAJOR DEPRESSION, RECURRENT, CHRONIC (HCC): Primary | ICD-10-CM

## 2024-04-08 DIAGNOSIS — F41.1 GAD (GENERALIZED ANXIETY DISORDER): ICD-10-CM

## 2024-04-08 NOTE — PSYCH
"Behavioral Health Psychotherapy Progress Note    Psychotherapy Provided: Individual Psychotherapy     1. Major depression, recurrent, chronic (HCC)        2. SY (generalized anxiety disorder)            Goals addressed in session: Goal 1     DATA: Art shared her mom passed at age 97.  She shared thoughts and feelings about it during session.   She was able to see her before she passed.  She is going to memorial this Friday.  She expressed thoughts and feelings about her loss and about family relationships.    During this session, this clinician used the following therapeutic modalities: Bereavement Therapy and Client-centered Therapy    Substance Abuse was not addressed during this session. If the client is diagnosed with a co-occurring substance use disorder, please indicate any changes in the frequency or amount of use: . Stage of change for addressing substance use diagnoses:     ASSESSMENT:  Art Jones presents with a Euthymic/ normal mood.     her affect is Normal range and intensity, which is congruent, with her mood and the content of the session. The client has made progress on their goals.     Art Jones presents with a none risk of suicide, none risk of self-harm, and none risk of harm to others.    For any risk assessment that surpasses a \"low\" rating, a safety plan must be developed.    A safety plan was indicated: no  If yes, describe in detail     PLAN: Between sessions, Art Jones will continue to express feelings with family and friends. At the next session, the therapist will use Bereavement Therapy and Client-centered Therapy to address grief reaction.    Behavioral Health Treatment Plan and Discharge Planning: Art Jones is aware of and agrees to continue to work on their treatment plan. They have identified and are working toward their discharge goals. yes    Visit start and stop times:    04/08/24  Start Time: 1400  Stop Time: 1452  Total Visit Time: 52 minutes  Virtual " Regular Visit    Verification of patient location:    Patient is located at Home in the following state in which I hold an active license [place]      Assessment/Plan:    Problem List Items Addressed This Visit       Major depression, recurrent, chronic (HCC) - Primary    SY (generalized anxiety disorder)       Goals addressed in session: Goal 1          Reason for visit is No chief complaint on file.       Encounter provider Lady Soto LCSW    Provider [facility] [address]  [facility] [place] [address] [phone]      Recent Visits  No visits were found meeting these conditions.  Showing recent visits within past 7 days and meeting all other requirements  Today's Visits  Date Type Provider Dept   04/08/24 Telemedicine Lady Soto LCSW  Psychiatric [facility]   Showing today's visits and meeting all other requirements  Future Appointments  No visits were found meeting these conditions.  Showing future appointments within next 150 days and meeting all other requirements       The patient was identified by name and date of birth. Aniya Jones was informed that this is a telemedicine visit and that the visit is being conducted throughthe Inkive platform. She agrees to proceed..  My office door was closed. No one else was in the room.  She acknowledged consent and understanding of privacy and security of the video platform. The patient has agreed to participate and understands they can discontinue the visit at any time.    Patient is aware this is a billable service.     Subjective  Aniya Jones is a 68 y.o. female        HPI     Past Medical History:   Diagnosis Date    Anxiety     Arthritis     Cancer (HCC)     Skin    Depression     Diabetes (HCC)     GERD (gastroesophageal reflux disease)     Hyperlipidemia     Hypertension     Migraine        Past Surgical History:   Procedure Laterality Date    ANKLE  SURGERY      CARPAL TUNNEL RELEASE Right     ROTATOR CUFF REPAIR         Current Outpatient Medications   Medication Sig Dispense Refill    acetaminophen (TYLENOL) 325 mg tablet Take 3 tablets (975 mg total) by mouth every 8 (eight) hours  0    B COMPLEX VITAMINS PO Take 1 tablet by mouth daily      Baclofen 5 MG TABS Take by mouth Take 1-2 tabs TID prn      bisacodyl (Dulcolax) 10 mg suppository Insert 10 mg into the rectum as needed for constipation (Patient not taking: Reported on 2/1/2024)      buPROPion (WELLBUTRIN) 75 mg tablet Take 1 tablet (75 mg total) by mouth in the morning 30 tablet 1    busPIRone (BUSPAR) 10 mg tablet Take 1 tablet (10 mg total) by mouth 2 (two) times a day 60 tablet 1    calcium carbonate (OS-MIKE) 600 MG tablet Take 600 mg by mouth 2 (two) times a day      Cholecalciferol 50 MCG (2000 UT) CAPS Take 1 capsule by mouth daily      clonazePAM (KlonoPIN) 0.5 mg tablet Take 1 tablet (0.5 mg total) by mouth 3 (three) times a day 90 tablet 1    DULoxetine (Cymbalta) 30 mg delayed release capsule Take 1 capsule (30 mg total) by mouth daily at bedtime 30 capsule 1    DULoxetine (CYMBALTA) 60 mg delayed release capsule Take 1 capsule (60 mg total) by mouth daily at bedtime 30 capsule 1    esomeprazole (NexIUM) 20 mg capsule Take 20 mg by mouth daily in the early morning       Lidocaine-Menthol 4-1 % CREA Apply 1 Dose topically if needed Patch 5%      magnesium oxide (MAG-OX) 400 mg tablet Take 1 tablet (400 mg total) by mouth 2 (two) times a day 180 tablet 3    metFORMIN (GLUCOPHAGE) 1000 MG tablet Take 500 mg by mouth 2 (two) times a day with meals      Movantik 25 MG tablet TAKE 1 TABLET BY MOUTH DAILY FOR CONSTIPATION (Patient not taking: Reported on 2/1/2024)      multivitamin (THERAGRAN) TABS Take 1 tablet by mouth daily      naloxone (NARCAN) 4 mg/0.1 mL nasal spray Administer 1 spray into a nostril. If no response after 2-3 minutes, give another dose in the other nostril using a new spray.  1 each 1    OnabotulinumtoxinA (BOTOX IM) Inject into a muscle      oxyCODONE (ROXICODONE) 10 MG TABS Take 10 mg by mouth every 8 (eight) hours as needed      polyethylene glycol (MIRALAX) 17 g packet Take 17 g by mouth daily at bedtime      potassium chloride (K-DUR,KLOR-CON) 20 mEq tablet Take 1 tablet (20 mEq total) by mouth daily 90 tablet 3    Riboflavin (Vitamin B-2) 100 MG TABS 2 in the morning and 2 at bedtime 360 tablet 3    rosuvastatin (CRESTOR) 20 MG tablet Take 1 tablet (20 mg total) by mouth daily 90 tablet 3    Ubrogepant (Ubrelvy) 100 MG tablet Take 1 tablet (100 mg total) by mouth if needed (migraine) Take 1 tablet (100 mg) one time as needed for migraine. May repeat one additional tablet (100 mg) at least two hours after the first dose. Do not use more than two doses per day 16 tablet 6     No current facility-administered medications for this visit.        No Known Allergies    Review of Systems    Video Exam    There were no vitals filed for this visit.    Physical Exam

## 2024-04-17 ENCOUNTER — TELEPHONE (OUTPATIENT)
Dept: PSYCHIATRY | Facility: CLINIC | Age: 69
End: 2024-04-17

## 2024-04-17 NOTE — TELEPHONE ENCOUNTER
Client called to change 4/18/24 appointment to virtual, had already been virtual, no changes made.

## 2024-04-18 ENCOUNTER — TELEPHONE (OUTPATIENT)
Dept: NEUROLOGY | Facility: CLINIC | Age: 69
End: 2024-04-18

## 2024-04-18 DIAGNOSIS — G43.709 CHRONIC MIGRAINE WITHOUT AURA WITHOUT STATUS MIGRAINOSUS, NOT INTRACTABLE: ICD-10-CM

## 2024-04-18 DIAGNOSIS — G43.711 INTRACTABLE CHRONIC MIGRAINE WITHOUT AURA AND WITH STATUS MIGRAINOSUS: Primary | ICD-10-CM

## 2024-04-18 RX ORDER — PROCHLORPERAZINE MALEATE 10 MG
10 TABLET ORAL EVERY 6 HOURS PRN
Qty: 10 TABLET | Refills: 0 | Status: SHIPPED | OUTPATIENT
Start: 2024-04-18

## 2024-04-18 RX ORDER — DEXAMETHASONE 2 MG/1
2 TABLET ORAL
Qty: 5 TABLET | Refills: 0 | Status: SHIPPED | OUTPATIENT
Start: 2024-04-18

## 2024-04-18 RX ORDER — DIVALPROEX SODIUM 500 MG/1
500 TABLET, EXTENDED RELEASE ORAL DAILY
Qty: 5 TABLET | Refills: 0 | Status: SHIPPED | OUTPATIENT
Start: 2024-04-18

## 2024-04-18 NOTE — TELEPHONE ENCOUNTER
4/16/24, 1:24    This is Art Jones. My phone # [phone]. I see Paulette for my botox injections. I think it's about a little more than a month and I didn't have any but I started last week of having one. I know the weather has something to do with it. Sunday, it was like to 6 o'clock and yesterday it was the same thing. The thing is, she had increased my Ubrelvy back in Dec to 16 pills but they need a new authorization. I don't know if they will give me the 16. I think I would still have a copayment which I don't want. What I do need is to have it authorized again for 10 pills that I was getting and pharmacy said even though I have refills in those 10 bec I asked for more, I have to get you re authorized, the 10 pills that I was getting for Ubrelvy. I hope I give you enough information. Again, my name is Art Jones. [phone]. I did find that the tylenol with migraine relieve, excedrin didn't help. It was the motrin, I still had it all day you know 12 hrs. I only had a little bit today. So if you can get me the authorization. My pharmacy is [facility] in [place].     Chart reviewed  Per enc 6/2/23-PA approved through 6/8/24 (FEP)    Call returned to pt. Acknowledge vm. Made aware of the approval. States that the pharmacy and the letter said if the quantity is increased, new authorization is required. She is ok getting 10 tabs but pharmacy said that also needs an authorization.   Pt states that she has a migraine now. Is there any med she can take to break her migraines?    Called [facility] pharmacy, spoke to Carmenza and states that he attempted to process the claim for Ubrelvy 10 tabs per 30 days and 16 tabs per 30 days but it is still requiring PA. Can [facility] helpdesk at  [phone]. ID [id number]     Urgent PA initiated on CMM. Key: [id number]  Approvedtoday  Your request has been approved through 4/18/25  Approval letter scanned in media    Called [facility] pharmacy, spoke to Carmenza and made aware of  the approval. He got a paid claim for 10 tabs per 30 days. Copay is $96.00 and for 16 tabs per 30 days, copay is $153.72.     Called [place] dept [phone] to initiate tier exception to lower copay cost. Spoke to Marianne. I was transferred to [phone]. Spoke to Reshma. I was transferred to another dept. Spoke to Aniya and states  [facility] Not eligible to tier exception to lower copay cost. Case# [id number].    SW, any assistance avail?    Paulette, any med you can prescribe to break the migraines?

## 2024-04-18 NOTE — TELEPHONE ENCOUNTER
Prochlorperazine 10 mg every 8 hours as needed  Decadron 2 mg for 5 days  Depakote 500 mg at bedtime  All sent to pharmacy

## 2024-04-18 NOTE — TELEPHONE ENCOUNTER
MSW attempted to reach patient at [phone] to offer the Ubrelvy PAP/discuss program guidelines which are as follows:     If you have limited or no health insurance coverage and meet certain income criteria, you might be eligible.     Eligibility for [employer] may be initiated through [employer] Patient Access Support, who will review financial support opportunities for all [employer] programs, including [employer]ist. If you have insurance, we may review your qualifying financial need based on a combination of your insurance coverage, household income, and out-of-pocket medical expenses during the application process.    Our income guidelines are based on the size of your household and annual income. The income criteria table can help you determine if you might qualify.  Household size  Annual income          1  90,360 or less          2  $122,640 or less          3  $154,920 or less          4  $187,200 or less          After 4, add $32,280 for each additional dependent family member    No answer. MSW left a message requesting callback. Awaiting same.

## 2024-04-18 NOTE — TELEPHONE ENCOUNTER
Called pt and made her aware of medications that sudeep called into pharm    Advised that SW left her a message regarding pt assistance.  She is not feeling good now and asked that you call her tomorrow

## 2024-04-18 NOTE — TELEPHONE ENCOUNTER
Patient called on three way wit [facility] and they are requesting for patient's script for Ubrelvy to be sent in to [facility], 90 day supply.  Please assist

## 2024-04-19 NOTE — TELEPHONE ENCOUNTER
Louann Kennedy   to Paulette Trevino PA-C  [facility] [place] Clinical Team 5         4/19/24 10:08 AM   FYI- patient will be able to afford the Ubrelvy 100mg with her insurance, as there is a cost savings available to her via the mail order pharmacy. No need to apply for the Ubrelvy PAP.

## 2024-04-19 NOTE — TELEPHONE ENCOUNTER
MSW phoned patient this date at [phone]. Patient stated that she was initially quoted $153 for the higher quantity of Ubrelvy, but that she spoke with someone on the phone for over an hour yesterday and they informed her the cost via mail order would be ~$80 for a 3 month supply. Patient is agreeable to pay this amount ($26.67 per month).     MSW phoned the number on the Ubrelvy script for [facility] at [phone] and spoke with Briana. She stated that patient's plan is handled by a different department and transferred this writer's call to [phone] where this writer spoke with Harshad who provided the estimated cost of the Ubrelvy for a 3 month supply via mail order is $85. Reference # for call is [id number].

## 2024-04-22 ENCOUNTER — TELEMEDICINE (OUTPATIENT)
Dept: PSYCHIATRY | Facility: CLINIC | Age: 69
End: 2024-04-22
Payer: MEDICARE

## 2024-04-22 DIAGNOSIS — F41.1 GAD (GENERALIZED ANXIETY DISORDER): ICD-10-CM

## 2024-04-22 DIAGNOSIS — F33.9 MAJOR DEPRESSION, RECURRENT, CHRONIC (HCC): Primary | ICD-10-CM

## 2024-04-22 PROCEDURE — 90834 PSYTX W PT 45 MINUTES: CPT

## 2024-04-22 NOTE — PSYCH
"Behavioral Health Psychotherapy Progress Note    Psychotherapy Provided: Individual Psychotherapy     1. Major depression, recurrent, chronic (HCC)        2. SY (generalized anxiety disorder)            Goals addressed in session: Goal 1     DATA: Art shared she is having a migraine recently which is why she hasn't been to therapy recently.  She said she feels tired recently as well.  She expressed thoughts and feelings about her mother passing and said she feels guilty at times as well as regrets not meeting with her more. Therapist went over stages of grief as psychoeducation for grief.  Art talked about her family and her thoughts and feelings about her extended family and growing up with her family.  Therapist encouraged her to get out of the house to improve her mood.    During this session, this clinician used the following therapeutic modalities: Bereavement Therapy and Client-centered Therapy    Substance Abuse was not addressed during this session. If the client is diagnosed with a co-occurring substance use disorder, please indicate any changes in the frequency or amount of use: . Stage of change for addressing substance use diagnoses: No substance use/Not applicable    ASSESSMENT:  Art Jones presents with a Euthymic/ normal mood.     her affect is Normal range and intensity, which is congruent, with her mood and the content of the session. The client has made progress on their goals.     Art Jones presents with a none risk of suicide, none risk of self-harm, and none risk of harm to others.    For any risk assessment that surpasses a \"low\" rating, a safety plan must be developed.    A safety plan was indicated: no  If yes, describe in detail     PLAN: Between sessions, Art Jones will get out of the house to improve her mood. At the next session, the therapist will use Bereavement Therapy and Client-centered Therapy to address grief process.    Behavioral Health Treatment Plan " and Discharge Planning: Art Jones is aware of and agrees to continue to work on their treatment plan. They have identified and are working toward their discharge goals. yes    Visit start and stop times:    04/22/24  Start Time: 1500  Stop Time: 1552  Total Visit Time: 52 minutes  Virtual Regular Visit    Verification of patient location:    Patient is located at Home in the following state in which I hold an active license [place]      Assessment/Plan:    Problem List Items Addressed This Visit       Major depression, recurrent, chronic (HCC) - Primary    SY (generalized anxiety disorder)       Goals addressed in session: Goal 1          Reason for visit is No chief complaint on file.       Encounter provider Lady Soto LCSW    Provider located at [place] [address]  [place] [address] [phone]      Recent Visits  No visits were found meeting these conditions.  Showing recent visits within past 7 days and meeting all other requirements  Today's Visits  Date Type Provider Dept   04/22/24 Telemedicine Lady Soto LCSW  [facility]   Showing today's visits and meeting all other requirements  Future Appointments  No visits were found meeting these conditions.  Showing future appointments within next 150 days and meeting all other requirements       The patient was identified by name and date of birth. Aniya Jones was informed that this is a telemedicine visit and that the visit is being conducted throughthe [employer] platform. She agrees to proceed..  My office door was closed. No one else was in the room.  She acknowledged consent and understanding of privacy and security of the video platform. The patient has agreed to participate and understands they can discontinue the visit at any time.    Patient is aware this is a billable service.     Subjective  Aniya Jones is a 68 y.o. female   .      HPI     Past Medical History:   Diagnosis Date    Anxiety     Arthritis     Cancer (HCC)     Skin    Depression     Diabetes (HCC)     GERD (gastroesophageal reflux disease)     Hyperlipidemia     Hypertension     Migraine        Past Surgical History:   Procedure Laterality Date    ANKLE SURGERY      CARPAL TUNNEL RELEASE Right     ROTATOR CUFF REPAIR         Current Outpatient Medications   Medication Sig Dispense Refill    acetaminophen (TYLENOL) 325 mg tablet Take 3 tablets (975 mg total) by mouth every 8 (eight) hours  0    B COMPLEX VITAMINS PO Take 1 tablet by mouth daily      Baclofen 5 MG TABS Take by mouth Take 1-2 tabs TID prn      bisacodyl (Dulcolax) 10 mg suppository Insert 10 mg into the rectum as needed for constipation (Patient not taking: Reported on 2/1/2024)      buPROPion (WELLBUTRIN) 75 mg tablet Take 1 tablet (75 mg total) by mouth in the morning 30 tablet 1    busPIRone (BUSPAR) 10 mg tablet Take 1 tablet (10 mg total) by mouth 2 (two) times a day 60 tablet 1    calcium carbonate (OS-MIKE) 600 MG tablet Take 600 mg by mouth 2 (two) times a day      Cholecalciferol 50 MCG (2000 UT) CAPS Take 1 capsule by mouth daily      clonazePAM (KlonoPIN) 0.5 mg tablet Take 1 tablet (0.5 mg total) by mouth 3 (three) times a day 90 tablet 1    dexamethasone (DECADRON) 2 mg tablet Take 1 tablet (2 mg total) by mouth daily with breakfast 5 tablet 0    divalproex sodium (DEPAKOTE ER) 500 mg 24 hr tablet Take 1 tablet (500 mg total) by mouth daily 5 tablet 0    DULoxetine (Cymbalta) 30 mg delayed release capsule Take 1 capsule (30 mg total) by mouth daily at bedtime 30 capsule 1    DULoxetine (CYMBALTA) 60 mg delayed release capsule Take 1 capsule (60 mg total) by mouth daily at bedtime 30 capsule 1    esomeprazole (NexIUM) 20 mg capsule Take 20 mg by mouth daily in the early morning       Lidocaine-Menthol 4-1 % CREA Apply 1 Dose topically if needed Patch 5%      magnesium oxide (MAG-OX) 400 mg tablet  Take 1 tablet (400 mg total) by mouth 2 (two) times a day 180 tablet 3    metFORMIN (GLUCOPHAGE) 1000 MG tablet Take 500 mg by mouth 2 (two) times a day with meals      Movantik 25 MG tablet TAKE 1 TABLET BY MOUTH DAILY FOR CONSTIPATION (Patient not taking: Reported on 2/1/2024)      multivitamin (THERAGRAN) TABS Take 1 tablet by mouth daily      naloxone (NARCAN) 4 mg/0.1 mL nasal spray Administer 1 spray into a nostril. If no response after 2-3 minutes, give another dose in the other nostril using a new spray. 1 each 1    OnabotulinumtoxinA (BOTOX IM) Inject into a muscle      oxyCODONE (ROXICODONE) 10 MG TABS Take 10 mg by mouth every 8 (eight) hours as needed      polyethylene glycol (MIRALAX) 17 g packet Take 17 g by mouth daily at bedtime      potassium chloride (K-DUR,KLOR-CON) 20 mEq tablet Take 1 tablet (20 mEq total) by mouth daily 90 tablet 3    prochlorperazine (COMPAZINE) 10 mg tablet Take 1 tablet (10 mg total) by mouth every 6 (six) hours as needed (migraine) 10 tablet 0    Riboflavin (Vitamin B-2) 100 MG TABS 2 in the morning and 2 at bedtime 360 tablet 3    rosuvastatin (CRESTOR) 20 MG tablet Take 1 tablet (20 mg total) by mouth daily 90 tablet 3    Ubrogepant (Ubrelvy) 100 MG tablet Take 1 tablet (100 mg total) by mouth if needed (migraine) Take 1 tablet (100 mg) one time as needed for migraine. May repeat one additional tablet (100 mg) at least two hours after the first dose. Do not use more than two doses per day 48 tablet 1     No current facility-administered medications for this visit.        No Known Allergies    Review of Systems    Video Exam    There were no vitals filed for this visit.    Physical Exam

## 2024-04-25 ENCOUNTER — TELEMEDICINE (OUTPATIENT)
Dept: PSYCHIATRY | Facility: CLINIC | Age: 69
End: 2024-04-25
Payer: MEDICARE

## 2024-04-25 DIAGNOSIS — F41.1 GAD (GENERALIZED ANXIETY DISORDER): ICD-10-CM

## 2024-04-25 DIAGNOSIS — F33.9 MAJOR DEPRESSION, RECURRENT, CHRONIC (HCC): ICD-10-CM

## 2024-04-25 PROCEDURE — 99214 OFFICE O/P EST MOD 30 MIN: CPT

## 2024-04-25 RX ORDER — BUSPIRONE HYDROCHLORIDE 10 MG/1
10 TABLET ORAL 2 TIMES DAILY
Qty: 60 TABLET | Refills: 1 | Status: SHIPPED | OUTPATIENT
Start: 2024-04-25

## 2024-04-25 RX ORDER — CLONAZEPAM 0.5 MG/1
0.5 TABLET ORAL 3 TIMES DAILY
Qty: 90 TABLET | Refills: 1 | Status: SHIPPED | OUTPATIENT
Start: 2024-04-25

## 2024-04-25 RX ORDER — DULOXETIN HYDROCHLORIDE 30 MG/1
30 CAPSULE, DELAYED RELEASE ORAL
Qty: 30 CAPSULE | Refills: 1 | Status: SHIPPED | OUTPATIENT
Start: 2024-04-25

## 2024-04-25 RX ORDER — BUPROPION HYDROCHLORIDE 75 MG/1
75 TABLET ORAL DAILY
Qty: 30 TABLET | Refills: 1 | Status: SHIPPED | OUTPATIENT
Start: 2024-04-25

## 2024-04-25 RX ORDER — DULOXETIN HYDROCHLORIDE 60 MG/1
60 CAPSULE, DELAYED RELEASE ORAL
Qty: 30 CAPSULE | Refills: 1 | Status: SHIPPED | OUTPATIENT
Start: 2024-04-25
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
## 2024-05-06 ENCOUNTER — TELEMEDICINE (OUTPATIENT)
Dept: PSYCHIATRY | Facility: CLINIC | Age: 69
End: 2024-05-06
Payer: MEDICARE

## 2024-05-06 DIAGNOSIS — F33.9 MAJOR DEPRESSION, RECURRENT, CHRONIC (HCC): Primary | ICD-10-CM

## 2024-05-06 PROCEDURE — 90834 PSYTX W PT 45 MINUTES: CPT

## 2024-05-06 NOTE — PSYCH
Virtual Regular Visit    Verification of patient location:    Patient is located at Home in the following state in which I hold an active license [place]      Assessment/Plan:    Problem List Items Addressed This Visit       Major depression, recurrent, chronic (HCC) - Primary       Goals addressed in session: Goal 1          Reason for visit is No chief complaint on file.       Encounter provider Lady Soto LCSW      Recent Visits  No visits were found meeting these conditions.  Showing recent visits within past 7 days and meeting all other requirements  Today's Visits  Date Type Provider Dept   05/06/24 Telemedicine Lady Soto LCSW [facility]   Showing today's visits and meeting all other requirements  Future Appointments  No visits were found meeting these conditions.  Showing future appointments within next 150 days and meeting all other requirements       The patient was identified by name and date of birth. Aniya Jones was informed that this is a telemedicine visit and that the visit is being conducted throughthe [employer] platform. She agrees to proceed..  My office door was closed. No one else was in the room.  She acknowledged consent and understanding of privacy and security of the video platform. The patient has agreed to participate and understands they can discontinue the visit at any time.    Patient is aware this is a billable service.     Subjective  Aniya Jones is a 68 y.o. female  .      HPI     Past Medical History:   Diagnosis Date    Anxiety     Arthritis     Cancer (HCC)     Skin    Depression     Diabetes (HCC)     GERD (gastroesophageal reflux disease)     Hyperlipidemia     Hypertension     Migraine        Past Surgical History:   Procedure Laterality Date    ANKLE SURGERY      CARPAL TUNNEL RELEASE Right     ROTATOR CUFF REPAIR         Current Outpatient Medications   Medication Sig Dispense Refill    acetaminophen (TYLENOL) 325 mg tablet Take 3  tablets (975 mg total) by mouth every 8 (eight) hours  0    B COMPLEX VITAMINS PO Take 1 tablet by mouth daily      Baclofen 5 MG TABS Take by mouth Take 1-2 tabs TID prn      bisacodyl (Dulcolax) 10 mg suppository Insert 10 mg into the rectum as needed for constipation (Patient not taking: Reported on 2/1/2024)      buPROPion (WELLBUTRIN) 75 mg tablet Take 1 tablet (75 mg total) by mouth in the morning 30 tablet 1    busPIRone (BUSPAR) 10 mg tablet Take 1 tablet (10 mg total) by mouth 2 (two) times a day 60 tablet 1    calcium carbonate (OS-MIKE) 600 MG tablet Take 600 mg by mouth 2 (two) times a day      Cholecalciferol 50 MCG (2000 UT) CAPS Take 1 capsule by mouth daily      clonazePAM (KlonoPIN) 0.5 mg tablet Take 1 tablet (0.5 mg total) by mouth 3 (three) times a day 90 tablet 1    dexamethasone (DECADRON) 2 mg tablet Take 1 tablet (2 mg total) by mouth daily with breakfast 5 tablet 0    divalproex sodium (DEPAKOTE ER) 500 mg 24 hr tablet Take 1 tablet (500 mg total) by mouth daily 5 tablet 0    DULoxetine (Cymbalta) 30 mg delayed release capsule Take 1 capsule (30 mg total) by mouth daily at bedtime 30 capsule 1    DULoxetine (CYMBALTA) 60 mg delayed release capsule Take 1 capsule (60 mg total) by mouth daily at bedtime 30 capsule 1    esomeprazole (NexIUM) 20 mg capsule Take 20 mg by mouth daily in the early morning       Lidocaine-Menthol 4-1 % CREA Apply 1 Dose topically if needed Patch 5%      magnesium oxide (MAG-OX) 400 mg tablet Take 1 tablet (400 mg total) by mouth 2 (two) times a day 180 tablet 3    metFORMIN (GLUCOPHAGE) 1000 MG tablet Take 500 mg by mouth 2 (two) times a day with meals      Movantik 25 MG tablet TAKE 1 TABLET BY MOUTH DAILY FOR CONSTIPATION (Patient not taking: Reported on 2/1/2024)      multivitamin (THERAGRAN) TABS Take 1 tablet by mouth daily      naloxone (NARCAN) 4 mg/0.1 mL nasal spray Administer 1 spray into a nostril. If no response after 2-3 minutes, give another dose in the  other nostril using a new spray. 1 each 1    OnabotulinumtoxinA (BOTOX IM) Inject into a muscle      oxyCODONE (ROXICODONE) 10 MG TABS Take 10 mg by mouth every 8 (eight) hours as needed      polyethylene glycol (MIRALAX) 17 g packet Take 17 g by mouth daily at bedtime      potassium chloride (K-DUR,KLOR-CON) 20 mEq tablet Take 1 tablet (20 mEq total) by mouth daily 90 tablet 3    prochlorperazine (COMPAZINE) 10 mg tablet Take 1 tablet (10 mg total) by mouth every 6 (six) hours as needed (migraine) 10 tablet 0    Riboflavin (Vitamin B-2) 100 MG TABS 2 in the morning and 2 at bedtime 360 tablet 3    rosuvastatin (CRESTOR) 20 MG tablet Take 1 tablet (20 mg total) by mouth daily 90 tablet 3    Ubrogepant (Ubrelvy) 100 MG tablet Take 1 tablet (100 mg total) by mouth if needed (migraine) Take 1 tablet (100 mg) one time as needed for migraine. May repeat one additional tablet (100 mg) at least two hours after the first dose. Do not use more than two doses per day 48 tablet 1     No current facility-administered medications for this visit.        No Known Allergies    Review of Systems    Video Exam    There were no vitals filed for this visit.    Physical Exam           Behavioral Health Psychotherapy Progress Note    Psychotherapy Provided: Individual Psychotherapy     1. Major depression, recurrent, chronic (HCC)            Goals addressed in session: Goal 1     DATA: Marcelina shared she got shingles and it was painful for her and she continues to deal with it.  She was able to openly communicating thoughts and feelings about losing her.  She shared she is having a hard time with feeling her sorrow because of feeling anxious about things she has to do in life such as chores in the house and financial things. She openly communicated thoughts and feelings about her mom and dad and how she was raised including how she felt grateful for how she was raised in a lot of ways.  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    During this session, this clinician used the following therapeutic modalities: Client-centered Therapy    Substance Abuse was not addressed during this session. If the client is diagnosed with a co-occurring substance use disorder, please indicate any changes in the frequency or amount of use: . Stage of change for addressing substance use diagnoses: No substance use/Not applicable    ASSESSMENT:  Art Jones presents with a Euthymic/ normal mood.     her affect is Normal range and intensity, which is congruent, with her mood and the content of the session. The client has made progress on their goals.     Art Jones presents with a none risk of suicide, none risk of self-harm, and none risk of harm to others.    For any risk assessment that surpasses a \"low\" rating, a safety plan must be developed.    A safety plan was indicated: no  If yes, describe in detail     PLAN: Between sessions, Art Jones will utilize distraction for improved mood. At the next session, the therapist will use Client-centered Therapy to address depression.    Behavioral Health Treatment Plan and Discharge Planning: Art Jones is aware of and agrees to continue to work on their treatment plan. They have identified and are working toward their discharge goals. yes    Visit start and stop times:    05/06/24  Start Time: 1500  Stop Time: 1552  Total Visit Time: 52 minutes  "

## 2024-05-16 ENCOUNTER — APPOINTMENT (OUTPATIENT)
Dept: LAB | Facility: CLINIC | Age: 69
End: 2024-05-16
Payer: MEDICARE

## 2024-05-16 DIAGNOSIS — E11.9 CONTROLLED TYPE 2 DIABETES MELLITUS WITHOUT COMPLICATION, WITHOUT LONG-TERM CURRENT USE OF INSULIN (HCC): ICD-10-CM

## 2024-05-16 DIAGNOSIS — E55.9 VITAMIN D DEFICIENCY: ICD-10-CM

## 2024-05-16 DIAGNOSIS — I67.89 CEREBRAL MICROVASCULAR DISEASE: ICD-10-CM

## 2024-05-16 DIAGNOSIS — E11.69 TYPE 2 DIABETES MELLITUS WITH OTHER SPECIFIED COMPLICATION, WITHOUT LONG-TERM CURRENT USE OF INSULIN (HCC): ICD-10-CM

## 2024-05-16 DIAGNOSIS — R55 NEAR SYNCOPE: ICD-10-CM

## 2024-05-16 DIAGNOSIS — E53.8 VITAMIN B12 DEFICIENCY: ICD-10-CM

## 2024-05-16 DIAGNOSIS — I10 ESSENTIAL (PRIMARY) HYPERTENSION: ICD-10-CM

## 2024-05-16 DIAGNOSIS — E78.00 HYPERCHOLESTEROLEMIA: ICD-10-CM

## 2024-05-16 DIAGNOSIS — R51.9 WORSENING HEADACHES: ICD-10-CM

## 2024-05-16 DIAGNOSIS — F33.9 MAJOR DEPRESSION, RECURRENT, CHRONIC (HCC): ICD-10-CM

## 2024-05-16 LAB
ALBUMIN SERPL BCP-MCNC: 4.4 G/DL (ref 3.5–5)
ALP SERPL-CCNC: 62 U/L (ref 34–104)
ALT SERPL W P-5'-P-CCNC: 25 U/L (ref 7–52)
ANION GAP SERPL CALCULATED.3IONS-SCNC: 11 MMOL/L (ref 4–13)
AST SERPL W P-5'-P-CCNC: 25 U/L (ref 13–39)
BASOPHILS # BLD AUTO: 0.06 THOUSANDS/ÂΜL (ref 0–0.1)
BASOPHILS NFR BLD AUTO: 1 % (ref 0–1)
BILIRUB SERPL-MCNC: 0.35 MG/DL (ref 0.2–1)
BUN SERPL-MCNC: 11 MG/DL (ref 5–25)
CALCIUM SERPL-MCNC: 9.9 MG/DL (ref 8.4–10.2)
CHLORIDE SERPL-SCNC: 97 MMOL/L (ref 96–108)
CHOLEST SERPL-MCNC: 152 MG/DL
CO2 SERPL-SCNC: 29 MMOL/L (ref 21–32)
CREAT SERPL-MCNC: 0.86 MG/DL (ref 0.6–1.3)
EOSINOPHIL # BLD AUTO: 0.16 THOUSAND/ÂΜL (ref 0–0.61)
EOSINOPHIL NFR BLD AUTO: 3 % (ref 0–6)
ERYTHROCYTE [DISTWIDTH] IN BLOOD BY AUTOMATED COUNT: 14.3 % (ref 11.6–15.1)
EST. AVERAGE GLUCOSE BLD GHB EST-MCNC: 117 MG/DL
GFR SERPL CREATININE-BSD FRML MDRD: 69 ML/MIN/1.73SQ M
GLUCOSE P FAST SERPL-MCNC: 85 MG/DL (ref 65–99)
HBA1C MFR BLD: 5.7 %
HCT VFR BLD AUTO: 38.2 % (ref 34.8–46.1)
HDLC SERPL-MCNC: 77 MG/DL
HGB BLD-MCNC: 12 G/DL (ref 11.5–15.4)
IMM GRANULOCYTES # BLD AUTO: 0.02 THOUSAND/UL (ref 0–0.2)
IMM GRANULOCYTES NFR BLD AUTO: 0 % (ref 0–2)
LDLC SERPL CALC-MCNC: 51 MG/DL (ref 0–100)
LYMPHOCYTES # BLD AUTO: 1.55 THOUSANDS/ÂΜL (ref 0.6–4.47)
LYMPHOCYTES NFR BLD AUTO: 31 % (ref 14–44)
MCH RBC QN AUTO: 28.2 PG (ref 26.8–34.3)
MCHC RBC AUTO-ENTMCNC: 31.4 G/DL (ref 31.4–37.4)
MCV RBC AUTO: 90 FL (ref 82–98)
MONOCYTES # BLD AUTO: 0.56 THOUSAND/ÂΜL (ref 0.17–1.22)
MONOCYTES NFR BLD AUTO: 11 % (ref 4–12)
NEUTROPHILS # BLD AUTO: 2.71 THOUSANDS/ÂΜL (ref 1.85–7.62)
NEUTS SEG NFR BLD AUTO: 54 % (ref 43–75)
NRBC BLD AUTO-RTO: 0 /100 WBCS
PLATELET # BLD AUTO: 273 THOUSANDS/UL (ref 149–390)
PMV BLD AUTO: 10 FL (ref 8.9–12.7)
POTASSIUM SERPL-SCNC: 3.9 MMOL/L (ref 3.5–5.3)
PROT SERPL-MCNC: 7 G/DL (ref 6.4–8.4)
RBC # BLD AUTO: 4.26 MILLION/UL (ref 3.81–5.12)
SODIUM SERPL-SCNC: 137 MMOL/L (ref 135–147)
TRIGL SERPL-MCNC: 121 MG/DL
TSH SERPL DL<=0.05 MIU/L-ACNC: 1.6 UIU/ML (ref 0.45–4.5)
VIT B12 SERPL-MCNC: 748 PG/ML (ref 180–914)
WBC # BLD AUTO: 5.06 THOUSAND/UL (ref 4.31–10.16)

## 2024-05-16 PROCEDURE — 82652 VIT D 1 25-DIHYDROXY: CPT

## 2024-05-16 PROCEDURE — 82607 VITAMIN B-12: CPT

## 2024-05-16 PROCEDURE — 85025 COMPLETE CBC W/AUTO DIFF WBC: CPT

## 2024-05-16 PROCEDURE — 80053 COMPREHEN METABOLIC PANEL: CPT

## 2024-05-16 PROCEDURE — 84443 ASSAY THYROID STIM HORMONE: CPT

## 2024-05-16 PROCEDURE — 83036 HEMOGLOBIN GLYCOSYLATED A1C: CPT

## 2024-05-16 PROCEDURE — 80061 LIPID PANEL: CPT

## 2024-05-16 PROCEDURE — 36415 COLL VENOUS BLD VENIPUNCTURE: CPT

## 2024-05-17 DIAGNOSIS — E11.69 TYPE 2 DIABETES MELLITUS WITH OTHER SPECIFIED COMPLICATION, WITHOUT LONG-TERM CURRENT USE OF INSULIN (HCC): Primary | ICD-10-CM

## 2024-05-17 LAB — 1,25(OH)2D3 SERPL-MCNC: 48.6 PG/ML (ref 24.8–81.5)

## 2024-05-21 ENCOUNTER — HOSPITAL ENCOUNTER (OUTPATIENT)
Dept: BONE DENSITY | Facility: IMAGING CENTER | Age: 69
Discharge: HOME | End: 2024-05-21
Payer: MEDICARE

## 2024-05-21 VITALS — HEIGHT: 64 IN | WEIGHT: 159 LBS | BODY MASS INDEX: 27.14 KG/M2

## 2024-05-21 DIAGNOSIS — E28.39 MENOPAUSE OVARIAN FAILURE: ICD-10-CM

## 2024-05-21 PROCEDURE — 77080 DXA BONE DENSITY AXIAL: CPT

## 2024-05-30 ENCOUNTER — TELEMEDICINE (OUTPATIENT)
Dept: PSYCHIATRY | Facility: CLINIC | Age: 69
End: 2024-05-30
Payer: MEDICARE

## 2024-05-30 DIAGNOSIS — F41.1 GAD (GENERALIZED ANXIETY DISORDER): ICD-10-CM

## 2024-05-30 DIAGNOSIS — F33.9 MAJOR DEPRESSION, RECURRENT, CHRONIC (HCC): ICD-10-CM

## 2024-05-30 PROCEDURE — 99214 OFFICE O/P EST MOD 30 MIN: CPT

## 2024-05-30 RX ORDER — BUSPIRONE HYDROCHLORIDE 10 MG/1
10 TABLET ORAL 2 TIMES DAILY
Qty: 60 TABLET | Refills: 1 | Status: SHIPPED | OUTPATIENT
Start: 2024-05-30

## 2024-05-30 RX ORDER — DULOXETIN HYDROCHLORIDE 30 MG/1
30 CAPSULE, DELAYED RELEASE ORAL
Qty: 30 CAPSULE | Refills: 1 | Status: SHIPPED | OUTPATIENT
Start: 2024-05-30

## 2024-05-30 RX ORDER — CLONAZEPAM 0.5 MG/1
0.5 TABLET ORAL 3 TIMES DAILY
Qty: 90 TABLET | Refills: 1 | Status: SHIPPED | OUTPATIENT
Start: 2024-05-30

## 2024-05-30 RX ORDER — BUPROPION HYDROCHLORIDE 75 MG/1
75 TABLET ORAL DAILY
Qty: 30 TABLET | Refills: 1 | Status: SHIPPED | OUTPATIENT
Start: 2024-05-30

## 2024-05-30 RX ORDER — DULOXETIN HYDROCHLORIDE 60 MG/1
60 CAPSULE, DELAYED RELEASE ORAL
Qty: 30 CAPSULE | Refills: 1 | Status: SHIPPED | OUTPATIENT
Start: 2024-05-30
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
## 2024-06-03 ENCOUNTER — TELEMEDICINE (OUTPATIENT)
Dept: PSYCHIATRY | Facility: CLINIC | Age: 69
End: 2024-06-03
Payer: MEDICARE

## 2024-06-03 DIAGNOSIS — F33.9 MAJOR DEPRESSION, RECURRENT, CHRONIC (HCC): Primary | ICD-10-CM

## 2024-06-03 DIAGNOSIS — F41.8 DEPRESSION WITH ANXIETY: ICD-10-CM

## 2024-06-03 PROCEDURE — 90834 PSYTX W PT 45 MINUTES: CPT

## 2024-06-03 NOTE — BH TREATMENT PLAN
Outpatient Behavioral Health Psychotherapy Treatment Plan    Art Jones  1955     Date of Initial Psychotherapy Assessment: 3/15/2023   Date of Current Treatment Plan: 06/03/24  Treatment Plan Target Date: 6/3/2025  Treatment Plan Expiration Date: 12/3/2024    Diagnosis:   1. Major depression, recurrent, chronic (HCC)        2. Depression with anxiety            Area(s) of Need: managing depression and anxiety and grief reaction from her  passing and her mother and her friend dying.    Long Term Goal 1 (in the client's own words): I want to manage my depression and anxiety and work through my grief    Stage of Change: Contemplation    Target Date for completion: 6/14/2025     Anticipated therapeutic modalities: client centered, supportive, grief counseling     People identified to complete this goal: Art, therapist, Dr Prosper Orozco      Objective 1: (identify the means of measuring success in meeting the objective): Art with work in therapy to develop coping skills for managing anxiety and depression as well as will work through her grief reaction from her  passing and her mother being sick in a nursing home.      Update 6/3/2024:  Art has had some more losses recently including her mother dying and a good friend in [place].  She is processing her feelings over these losses in thearpy.           I am currently under the care of a [facility]'s psychiatric provider: yes    My [facility]'s psychiatric provider is: Dr Orozco    I am currently taking psychiatric medications: Yes, as prescribed    I feel that I will be ready for discharge from mental health care when I reach the following (measurable goal/objective): When I can successfully manage my anxiety and depression, and work through my grief reaction.    For children and adults who have a legal guardian:   Has there been any change to custody orders and/or guardianship status? NA. If yes, attach updated documentation.    I have  created my Crisis Plan and have been offered a copy of this plan    Behavioral Health Treatment Plan [facility]: Diagnosis and Treatment Plan explained to Art Robert KwanBeth Robert acknowledges an understanding of their diagnosis. rAt Robert agrees to this treatment plan.    I have been offered a copy of this Treatment Plan. Yes    Art Jones, 1955, actively participated in the creation of this treatment plan during a virtual session, using the telephone.   Art Jones  provided verbal consent on 6/3/2024 at 3:27 PM. The treatment plan was transcribed into the Electronic Health Record at a later time.

## 2024-06-03 NOTE — PSYCH
Behavioral Health Psychotherapy Progress Note    Psychotherapy Provided: Individual Psychotherapy     1. Major depression, recurrent, chronic (HCC)        2. Depression with anxiety            Goals addressed in session: Goal 1     DATA: Art shared she is having difficutly with functioning because of her headaches and her forgetfulness and feels depressed over not getting out much.  She shared she is feeling guilty about getting money from her mother's death and is having difficulty with depositing checks from the inheritance.  Therapist helped her to explore her thoughts and feelings about her mother's death as well as her thoughts and feelings about getting older and her [facility] plans.  She is not sure what's next for her in life so she's having a hard time figuring that out.  She talked about how hard it was to lose her  [place] because of the lack of motivation she has to clean.  Therapist helped Art problem solve and encouraged her to look into hiring someone to clean the house to free up time and energy for her.  Therapist encouraged her to use behavioral activation for deciding to do something productive in terms of her getting to the bank in the coming week to deposit checks.  During this session, this clinician used the following therapeutic modalities: Client-centered Therapy    Substance Abuse was not addressed during this session. If the client is diagnosed with a co-occurring substance use disorder, please indicate any changes in the frequency or amount of use: . Stage of change for addressing substance use diagnoses: No substance use/Not applicable    ASSESSMENT:  Art Jones presents with a Euthymic/ normal mood.     her affect is Normal range and intensity, which is congruent, with her mood and the content of the session. The client has made progress on their goals.     Art Jones presents with a none risk of suicide, none risk of self-harm, and none risk of harm to  "others.    For any risk assessment that surpasses a \"low\" rating, a safety plan must be developed.    A safety plan was indicated: no  If yes, describe in detail     PLAN: Between sessions, Art Jones will get to the bank this week. At the next session, the therapist will use Dialectical Behavior Therapy to address depression.    Behavioral Health Treatment Plan and Discharge Planning: Art Jones is aware of and agrees to continue to work on their treatment plan. They have identified and are working toward their discharge goals. yes    Visit start and stop times:    06/03/24  Start Time: 1500  Stop Time: 1552  Total Visit Time: 52 minutes  Virtual Regular Visit    Verification of patient location:    Patient is located at Home in the following state in which I hold an active license [place]      Assessment/Plan:    Problem List Items Addressed This Visit       Depression with anxiety    Major depression, recurrent, chronic (HCC) - Primary       Goals addressed in session: Goal 1          Reason for visit is No chief complaint on file.       Encounter provider Lady Soto LCSW      Recent Visits  No visits were found meeting these conditions.  Showing recent visits within past 7 days and meeting all other requirements  Today's Visits  Date Type Provider Dept   06/03/24 Telemedicine Lady Soto LCSW [facility]   Showing today's visits and meeting all other requirements  Future Appointments  No visits were found meeting these conditions.  Showing future appointments within next 150 days and meeting all other requirements       The patient was identified by name and date of birth. Aniya Jones was informed that this is a telemedicine visit and that the visit is being conducted throughthe [employer] platform. She agrees to proceed..  My office door was closed. No one else was in the room.  She acknowledged consent and understanding of privacy and security of the video platform. " The patient has agreed to participate and understands they can discontinue the visit at any time.    Patient is aware this is a billable service.     Subjective  Aniya Jones is a 68 y.o. female  .      HPI     Past Medical History:   Diagnosis Date    Anxiety     Arthritis     Cancer (HCC)     Skin    Depression     Diabetes (HCC)     GERD (gastroesophageal reflux disease)     Hyperlipidemia     Hypertension     Migraine        Past Surgical History:   Procedure Laterality Date    ANKLE SURGERY      CARPAL TUNNEL RELEASE Right     ROTATOR CUFF REPAIR         Current Outpatient Medications   Medication Sig Dispense Refill    acetaminophen (TYLENOL) 325 mg tablet Take 3 tablets (975 mg total) by mouth every 8 (eight) hours  0    B COMPLEX VITAMINS PO Take 1 tablet by mouth daily      Baclofen 5 MG TABS Take by mouth Take 1-2 tabs TID prn      bisacodyl (Dulcolax) 10 mg suppository Insert 10 mg into the rectum as needed for constipation (Patient not taking: Reported on 2/1/2024)      buPROPion (WELLBUTRIN) 75 mg tablet Take 1 tablet (75 mg total) by mouth in the morning 30 tablet 1    busPIRone (BUSPAR) 10 mg tablet Take 1 tablet (10 mg total) by mouth 2 (two) times a day 60 tablet 1    calcium carbonate (OS-MIKE) 600 MG tablet Take 600 mg by mouth 2 (two) times a day      Cholecalciferol 50 MCG (2000 UT) CAPS Take 1 capsule by mouth daily      clonazePAM (KlonoPIN) 0.5 mg tablet Take 1 tablet (0.5 mg total) by mouth 3 (three) times a day 90 tablet 1    dexamethasone (DECADRON) 2 mg tablet Take 1 tablet (2 mg total) by mouth daily with breakfast 5 tablet 0    divalproex sodium (DEPAKOTE ER) 500 mg 24 hr tablet Take 1 tablet (500 mg total) by mouth daily 5 tablet 0    DULoxetine (Cymbalta) 30 mg delayed release capsule Take 1 capsule (30 mg total) by mouth daily at bedtime 30 capsule 1    DULoxetine (CYMBALTA) 60 mg delayed release capsule Take 1 capsule (60 mg total) by mouth daily at bedtime 30 capsule 1     esomeprazole (NexIUM) 20 mg capsule Take 20 mg by mouth daily in the early morning       Lidocaine-Menthol 4-1 % CREA Apply 1 Dose topically if needed Patch 5%      magnesium oxide (MAG-OX) 400 mg tablet Take 1 tablet (400 mg total) by mouth 2 (two) times a day 180 tablet 3    metFORMIN (GLUCOPHAGE) 500 mg tablet Take 0.5 tablets (250 mg total) by mouth 2 (two) times a day with meals 60 tablet 3    Movantik 25 MG tablet TAKE 1 TABLET BY MOUTH DAILY FOR CONSTIPATION (Patient not taking: Reported on 2/1/2024)      multivitamin (THERAGRAN) TABS Take 1 tablet by mouth daily      naloxone (NARCAN) 4 mg/0.1 mL nasal spray Administer 1 spray into a nostril. If no response after 2-3 minutes, give another dose in the other nostril using a new spray. 1 each 1    OnabotulinumtoxinA (BOTOX IM) Inject into a muscle      oxyCODONE (ROXICODONE) 10 MG TABS Take 10 mg by mouth every 8 (eight) hours as needed      polyethylene glycol (MIRALAX) 17 g packet Take 17 g by mouth daily at bedtime      potassium chloride (K-DUR,KLOR-CON) 20 mEq tablet Take 1 tablet (20 mEq total) by mouth daily 90 tablet 3    prochlorperazine (COMPAZINE) 10 mg tablet Take 1 tablet (10 mg total) by mouth every 6 (six) hours as needed (migraine) 10 tablet 0    Riboflavin (Vitamin B-2) 100 MG TABS 2 in the morning and 2 at bedtime 360 tablet 3    rosuvastatin (CRESTOR) 20 MG tablet Take 1 tablet (20 mg total) by mouth daily 90 tablet 3    Ubrogepant (Ubrelvy) 100 MG tablet Take 1 tablet (100 mg total) by mouth if needed (migraine) Take 1 tablet (100 mg) one time as needed for migraine. May repeat one additional tablet (100 mg) at least two hours after the first dose. Do not use more than two doses per day 48 tablet 1     No current facility-administered medications for this visit.        No Known Allergies    Review of Systems    Video Exam    There were no vitals filed for this visit.    Physical Exam     Behavioral Health Psychotherapy Progress  Note    Psychotherapy Provided: Individual Psychotherapy     1. Major depression, recurrent, chronic (HCC)        2. Depression with anxiety                        Visit start and stop times:    06/03/24  Start Time: 1500  Stop Time: 1552  Total Visit Time: 52 minutes

## 2024-06-04 ENCOUNTER — HOSPITAL ENCOUNTER (OUTPATIENT)
Dept: NON INVASIVE DIAGNOSTICS | Facility: HOSPITAL | Age: 69
Discharge: HOME | End: 2024-06-04
Payer: MEDICARE

## 2024-06-04 DIAGNOSIS — E78.00 HYPERCHOLESTEROLEMIA: ICD-10-CM

## 2024-06-04 DIAGNOSIS — I67.89 CEREBRAL MICROVASCULAR DISEASE: ICD-10-CM

## 2024-06-04 PROCEDURE — 93880 EXTRACRANIAL BILAT STUDY: CPT | Performed by: SURGERY

## 2024-06-04 PROCEDURE — 93880 EXTRACRANIAL BILAT STUDY: CPT

## 2024-06-06 DIAGNOSIS — K59.00 CONSTIPATION, UNSPECIFIED CONSTIPATION TYPE: Primary | ICD-10-CM

## 2024-06-06 RX ORDER — NALOXEGOL OXALATE 25 MG/1
25 TABLET, FILM COATED ORAL DAILY
Qty: 30 TABLET | Refills: 1 | Status: SHIPPED | OUTPATIENT
Start: 2024-06-06

## 2024-06-06 NOTE — TELEPHONE ENCOUNTER
Medication: Movantik    Dose/Frequency: TAKE 1 TABLET BY MOUTH DAILY FOR CONSTIPATION     Quantity: N/A    Pharmacy: [facility] - [place] [facility] - [address]    Office:   [] PCP/Provider -   [x] Speciality/Provider -     Does the patient have enough for 3 days?   [] Yes   [x] No - Send as HP to POD

## 2024-06-10 ENCOUNTER — TELEMEDICINE (OUTPATIENT)
Dept: PSYCHIATRY | Facility: CLINIC | Age: 69
End: 2024-06-10

## 2024-06-10 DIAGNOSIS — F41.8 DEPRESSION WITH ANXIETY: ICD-10-CM

## 2024-06-10 DIAGNOSIS — F33.9 MAJOR DEPRESSION, RECURRENT, CHRONIC (HCC): Primary | ICD-10-CM

## 2024-06-10 PROCEDURE — NC001 PR NO CHARGE

## 2024-06-10 NOTE — PSYCH
"Behavioral Health Psychotherapy Progress Note    Psychotherapy Provided: Individual Psychotherapy     1. Major depression, recurrent, chronic (HCC)        2. Depression with anxiety            Goals addressed in session: Goal 1     DATA: Art shared she was not feeling well today and was not in a mood for talking due to the \"shooting pains\" she has in her scalp from headaches.  She shared she has a neurology appointment tomorrow that she is hoping will help.  Art asked if she could skip today in therapy and come back next week when she was feeling better.  Therapist agreed and Art signed off at 3:08pm  During this session, this clinician used the following therapeutic modalities: Client-centered Therapy    Substance Abuse was not addressed during this session. If the client is diagnosed with a co-occurring substance use disorder, please indicate any changes in the frequency or amount of use: . Stage of change for addressing substance use diagnoses: No substance use/Not applicable    ASSESSMENT:  Art Jones presents with a Euthymic/ normal mood.     her affect is Normal range and intensity, which is congruent, with her mood and the content of the session. The client has made progress on their goals.     Art Jones presents with a none risk of suicide, none risk of self-harm, and none risk of harm to others.    For any risk assessment that surpasses a \"low\" rating, a safety plan must be developed.    A safety plan was indicated: no  If yes, describe in detail     PLAN: Between sessions, Art Jones will utilize behavioral activation if possible for improved mood. At the next session, the therapist will use Client-centered Therapy, Dialectical Behavior Therapy, and Mindfulness-based Strategies to address depression.    Behavioral Health Treatment Plan and Discharge Planning: Art Jones is aware of and agrees to continue to work on their treatment plan. They have identified and are " working toward their discharge goals. yes    Visit start and stop times:    06/10/24     Virtual Regular Visit    Verification of patient location:    Patient is located at Home in the following state in which I hold an active license [place]      Assessment/Plan:    Problem List Items Addressed This Visit       Depression with anxiety    Major depression, recurrent, chronic (HCC) - Primary       Goals addressed in session: Goal 1          Reason for visit is No chief complaint on file.       Encounter provider Lady Soto LCSW      Recent Visits  Date Type Provider Dept   06/03/24 Telemedicine Lady Soto LCSW [facility]   Showing recent visits within past 7 days and meeting all other requirements  Future Appointments  No visits were found meeting these conditions.  Showing future appointments within next 150 days and meeting all other requirements       The patient was identified by name and date of birth. Aniya Jones was informed that this is a telemedicine visit and that the visit is being conducted throughthe [employer] platform. She agrees to proceed..  My office door was closed. No one else was in the room.  She acknowledged consent and understanding of privacy and security of the video platform. The patient has agreed to participate and understands they can discontinue the visit at any time.    Patient is aware this is a billable service.     Subjective  Aniya Jones is a 68 y.o. female  .      HPI     Past Medical History:   Diagnosis Date    Anxiety     Arthritis     Cancer (HCC)     Skin    Depression     Diabetes (HCC)     GERD (gastroesophageal reflux disease)     Hyperlipidemia     Hypertension     Migraine        Past Surgical History:   Procedure Laterality Date    ANKLE SURGERY      CARPAL TUNNEL RELEASE Right     ROTATOR CUFF REPAIR         Current Outpatient Medications   Medication Sig Dispense Refill    acetaminophen (TYLENOL) 325 mg tablet Take 3 tablets (844  mg total) by mouth every 8 (eight) hours  0    B COMPLEX VITAMINS PO Take 1 tablet by mouth daily      Baclofen 5 MG TABS Take by mouth Take 1-2 tabs TID prn      bisacodyl (Dulcolax) 10 mg suppository Insert 10 mg into the rectum as needed for constipation (Patient not taking: Reported on 2/1/2024)      buPROPion (WELLBUTRIN) 75 mg tablet Take 1 tablet (75 mg total) by mouth in the morning 30 tablet 1    busPIRone (BUSPAR) 10 mg tablet Take 1 tablet (10 mg total) by mouth 2 (two) times a day 60 tablet 1    calcium carbonate (OS-MIKE) 600 MG tablet Take 600 mg by mouth 2 (two) times a day      Cholecalciferol 50 MCG (2000 UT) CAPS Take 1 capsule by mouth daily      clonazePAM (KlonoPIN) 0.5 mg tablet Take 1 tablet (0.5 mg total) by mouth 3 (three) times a day 90 tablet 1    dexamethasone (DECADRON) 2 mg tablet Take 1 tablet (2 mg total) by mouth daily with breakfast 5 tablet 0    divalproex sodium (DEPAKOTE ER) 500 mg 24 hr tablet Take 1 tablet (500 mg total) by mouth daily 5 tablet 0    DULoxetine (Cymbalta) 30 mg delayed release capsule Take 1 capsule (30 mg total) by mouth daily at bedtime 30 capsule 1    DULoxetine (CYMBALTA) 60 mg delayed release capsule Take 1 capsule (60 mg total) by mouth daily at bedtime 30 capsule 1    esomeprazole (NexIUM) 20 mg capsule Take 20 mg by mouth daily in the early morning       Lidocaine-Menthol 4-1 % CREA Apply 1 Dose topically if needed Patch 5%      magnesium oxide (MAG-OX) 400 mg tablet Take 1 tablet (400 mg total) by mouth 2 (two) times a day 180 tablet 3    metFORMIN (GLUCOPHAGE) 500 mg tablet Take 0.5 tablets (250 mg total) by mouth 2 (two) times a day with meals 60 tablet 3    Movantik 25 MG tablet Take 1 tablet (25 mg total) by mouth in the morning 30 tablet 1    multivitamin (THERAGRAN) TABS Take 1 tablet by mouth daily      naloxone (NARCAN) 4 mg/0.1 mL nasal spray Administer 1 spray into a nostril. If no response after 2-3 minutes, give another dose in the other  nostril using a new spray. 1 each 1    OnabotulinumtoxinA (BOTOX IM) Inject into a muscle      oxyCODONE (ROXICODONE) 10 MG TABS Take 10 mg by mouth every 8 (eight) hours as needed      polyethylene glycol (MIRALAX) 17 g packet Take 17 g by mouth daily at bedtime      potassium chloride (K-DUR,KLOR-CON) 20 mEq tablet Take 1 tablet (20 mEq total) by mouth daily 90 tablet 3    prochlorperazine (COMPAZINE) 10 mg tablet Take 1 tablet (10 mg total) by mouth every 6 (six) hours as needed (migraine) 10 tablet 0    Riboflavin (Vitamin B-2) 100 MG TABS 2 in the morning and 2 at bedtime 360 tablet 3    rosuvastatin (CRESTOR) 20 MG tablet Take 1 tablet (20 mg total) by mouth daily 90 tablet 3    Ubrogepant (Ubrelvy) 100 MG tablet Take 1 tablet (100 mg total) by mouth if needed (migraine) Take 1 tablet (100 mg) one time as needed for migraine. May repeat one additional tablet (100 mg) at least two hours after the first dose. Do not use more than two doses per day 48 tablet 1     No current facility-administered medications for this visit.        No Known Allergies    Review of Systems    Video Exam    There were no vitals filed for this visit.    Physical Exam

## 2024-06-11 ENCOUNTER — PROCEDURE VISIT (OUTPATIENT)
Dept: NEUROLOGY | Facility: CLINIC | Age: 69
End: 2024-06-11
Payer: MEDICARE

## 2024-06-11 VITALS — TEMPERATURE: 98 F | HEART RATE: 101 BPM | SYSTOLIC BLOOD PRESSURE: 134 MMHG | DIASTOLIC BLOOD PRESSURE: 60 MMHG

## 2024-06-11 DIAGNOSIS — G43.709 CHRONIC MIGRAINE WITHOUT AURA WITHOUT STATUS MIGRAINOSUS, NOT INTRACTABLE: ICD-10-CM

## 2024-06-11 DIAGNOSIS — G24.3 CERVICAL DYSTONIA: Primary | ICD-10-CM

## 2024-06-11 PROCEDURE — 64616 CHEMODENERV MUSC NECK DYSTON: CPT | Performed by: PHYSICIAN ASSISTANT

## 2024-06-11 NOTE — PROGRESS NOTES
"Universal Protocol   Consent: Verbal consent obtained. Written consent obtained.  Risks and benefits: risks, benefits and alternatives were discussed  Consent given by: patient  Time out: Immediately prior to procedure a \"time out\" was called to verify the correct patient, procedure, equipment, support staff and site/side marked as required.  Patient understanding: patient states understanding of the procedure being performed  Patient consent: the patient's understanding of the procedure matches consent given  Procedure consent: procedure consent matches procedure scheduled  Relevant documents: relevant documents present and verified  Patient identity confirmed: verbally with patient      Chemodenervation     Date/Time  6/11/2024 2:15 PM     Performed by  Paulette Trevino PA-C   Authorized by  Paulette Trevino PA-C     Pre-procedure details      Prepped With: Alcohol     Anesthesia  (see MAR for exact dosages):     Anesthesia method:  None   Procedure details      Position:  Upright   Botox      Botox Type:  Type A    Brand:  Botox    mL's of Botulinum Toxin:  200    Final Concentration per CC:  50 units    Needle Gauge:  30 G 2.5 inch   Procedures      Botox Procedures: cervical dystonia and chronic headache      Indications: spasmodic torticollis and migraines     Injection Location      Head / Face:  L superior cervical paraspinal, R superior cervical paraspinal, L , R , L frontalis, R frontalis, L medial occipitalis, R medial occipitalis, L temporalis, R temporalis and procerus    L  injection amount:  5 unit(s)    R  injection amount:  5 unit(s)    L lateral frontalis:  5 unit(s)    R lateral frontalis:  5 unit(s)    L medial frontalis:  5 unit(s)    R medial frontalis:  5 unit(s)    L temporalis injection amount:  20 unit(s)    R temporalis injection amount:  20 unit(s)    Procerus injection amount:  5 unit(s)    L medial occipitalis injection amount:  15 unit(s)    R medial " Chemo infusion ended @ 2037 occipitalis injection amount:  15 unit(s)    L superior cervical paraspinal injection amount:  10 unit(s)    R superior cervical paraspinal injection amount:  15 unit(s)    Cervical Dystonia / Salivary Gland:  L splenius capitis, R splenius capitis, L upper trapezius, R upper trapezius, L sternocleidomastoid and R sternocleidomastoid      L splenius capitis injection amount:  7.5 unit(s)      R splenius capitis injection amount:  7.5 unit(s)      L sternocleidomastoid injection amount:  10 unit(s)      R sternocleidomastoid injection amount:  10 unit(s)      L upper trapezius injection amount:  15 unit(s)      R upper trapezius injection amount:  15 unit(s)   Total Units      Total units used:  200    Total units discarded:  0   Post-procedure details      Chemodenervation:  Neck, excluding muscles of the larynx    Neck Muscle Location::  Bilateral neck muscle    Patient tolerance of procedure:  Tolerated well, no immediate complications   Comments       5 units temporalis  All medically necessary

## 2024-06-12 ENCOUNTER — TELEPHONE (OUTPATIENT)
Dept: NEUROLOGY | Facility: CLINIC | Age: 69
End: 2024-06-12

## 2024-06-12 NOTE — TELEPHONE ENCOUNTER
"José'alexis  6/12/24 1:15 PM:    \"Marcelina Jones, [birth year]. I was in to see Paulette yesterday for my botox injection and she put in an order also for the injectable every 28 days. I'm sorry, I forgot the name of it and I received the message from the pharmacy, [facility] the send away that they need a prior authorization for them to go ahead and to be able to, to send it to me. So That if you could just let me know that you were able to get touch with them. And then I know we have to wait to see if they approved the injection.\"    MARJORIE# [phone]  -----------------------------------------  LOV 6/11/24 for Botox    Pt stated that   Galcanezumab-gnlm 120 MG/ML SOAJ loading dose and maintenance doses need PAs.  "

## 2024-06-17 ENCOUNTER — TELEMEDICINE (OUTPATIENT)
Dept: PSYCHIATRY | Facility: CLINIC | Age: 69
End: 2024-06-17
Payer: MEDICARE

## 2024-06-17 DIAGNOSIS — F41.8 DEPRESSION WITH ANXIETY: ICD-10-CM

## 2024-06-17 DIAGNOSIS — F33.9 MAJOR DEPRESSION, RECURRENT, CHRONIC (HCC): Primary | ICD-10-CM

## 2024-06-17 DIAGNOSIS — F41.1 GAD (GENERALIZED ANXIETY DISORDER): ICD-10-CM

## 2024-06-17 PROCEDURE — 90834 PSYTX W PT 45 MINUTES: CPT

## 2024-06-17 NOTE — TELEPHONE ENCOUNTER
Called [facility]. No live rep avail    Pt made aware of the approval and states that [facility] reached out to her this morning. Her copay is $175. They talked about copay assistance to lower the copay cost. She will apply for this to see if she's eligible. Nothing further is needed from our office at this time.
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
## 2024-06-17 NOTE — TELEPHONE ENCOUNTER
Per CMM-Your request has been approved. Authorization Expiration Date: September 12, 2024.     Approval letter scanned in media

## 2024-06-24 ENCOUNTER — TELEMEDICINE (OUTPATIENT)
Dept: PSYCHIATRY | Facility: CLINIC | Age: 69
End: 2024-06-24

## 2024-06-24 DIAGNOSIS — F33.9 MAJOR DEPRESSION, RECURRENT, CHRONIC (HCC): Primary | ICD-10-CM

## 2024-06-24 DIAGNOSIS — F41.1 GAD (GENERALIZED ANXIETY DISORDER): ICD-10-CM

## 2024-06-24 NOTE — PSYCH
No Call. No Show. No Charge    Aniya Jones no showed 06/24/24 appointment , staff called and left message to reschedule appointment     Treatment Plan not due at this session.

## 2024-07-01 ENCOUNTER — TELEMEDICINE (OUTPATIENT)
Dept: PSYCHIATRY | Facility: CLINIC | Age: 69
End: 2024-07-01
Payer: MEDICARE

## 2024-07-01 DIAGNOSIS — F41.1 GAD (GENERALIZED ANXIETY DISORDER): ICD-10-CM

## 2024-07-01 DIAGNOSIS — F33.9 MAJOR DEPRESSION, RECURRENT, CHRONIC (HCC): Primary | ICD-10-CM

## 2024-07-01 PROCEDURE — 90834 PSYTX W PT 45 MINUTES: CPT

## 2024-07-01 NOTE — PSYCH
Virtual Regular Visit    Verification of patient location:    Patient is located at Home in the following state in which I hold an active license [place]      Assessment/Plan:    Problem List Items Addressed This Visit       Major depression, recurrent, chronic (HCC) - Primary    SY (generalized anxiety disorder)       Goals addressed in session: Goal 1          Reason for visit is No chief complaint on file.       Encounter provider Lady Soto LCSW      Recent Visits  No visits were found meeting these conditions.  Showing recent visits within past 7 days and meeting all other requirements  Today's Visits  Date Type Provider Dept   07/01/24 Telemedicine Lady Soto LCSW [facility]   Showing today's visits and meeting all other requirements  Future Appointments  No visits were found meeting these conditions.  Showing future appointments within next 150 days and meeting all other requirements       The patient was identified by name and date of birth. Aniya Jones was informed that this is a telemedicine visit and that the visit is being conducted throughthe [employer] platform. She agrees to proceed..  My office door was closed. No one else was in the room.  She acknowledged consent and understanding of privacy and security of the video platform. The patient has agreed to participate and understands they can discontinue the visit at any time.    Patient is aware this is a billable service.     Subjective  Aniya Jones is a 69 y.o. female  .      HPI     Past Medical History:   Diagnosis Date    Anxiety     Arthritis     Cancer (HCC)     Skin    Depression     Diabetes (HCC)     GERD (gastroesophageal reflux disease)     Hyperlipidemia     Hypertension     Migraine        Past Surgical History:   Procedure Laterality Date    ANKLE SURGERY      CARPAL TUNNEL RELEASE Right     ROTATOR CUFF REPAIR         Current Outpatient Medications   Medication Sig Dispense Refill     acetaminophen (TYLENOL) 325 mg tablet Take 3 tablets (975 mg total) by mouth every 8 (eight) hours  0    B COMPLEX VITAMINS PO Take 1 tablet by mouth daily      Baclofen 5 MG TABS Take by mouth Take 1-2 tabs TID prn      bisacodyl (Dulcolax) 10 mg suppository Insert 10 mg into the rectum as needed for constipation (Patient not taking: Reported on 2/1/2024)      buPROPion (WELLBUTRIN) 75 mg tablet Take 1 tablet (75 mg total) by mouth in the morning 30 tablet 1    busPIRone (BUSPAR) 10 mg tablet Take 1 tablet (10 mg total) by mouth 2 (two) times a day 60 tablet 1    calcium carbonate (OS-MIKE) 600 MG tablet Take 600 mg by mouth 2 (two) times a day      Cholecalciferol 50 MCG (2000 UT) CAPS Take 1 capsule by mouth daily      clonazePAM (KlonoPIN) 0.5 mg tablet Take 1 tablet (0.5 mg total) by mouth 3 (three) times a day 90 tablet 1    dexamethasone (DECADRON) 2 mg tablet Take 1 tablet (2 mg total) by mouth daily with breakfast 5 tablet 0    divalproex sodium (DEPAKOTE ER) 500 mg 24 hr tablet Take 1 tablet (500 mg total) by mouth daily 5 tablet 0    DULoxetine (Cymbalta) 30 mg delayed release capsule Take 1 capsule (30 mg total) by mouth daily at bedtime 30 capsule 1    DULoxetine (CYMBALTA) 60 mg delayed release capsule Take 1 capsule (60 mg total) by mouth daily at bedtime 30 capsule 1    esomeprazole (NexIUM) 20 mg capsule Take 20 mg by mouth daily in the early morning       Galcanezumab-gnlm 120 MG/ML SOAJ Inject 120 mg under the skin every 28 days 3 mL 4    Lidocaine-Menthol 4-1 % CREA Apply 1 Dose topically if needed Patch 5%      magnesium oxide (MAG-OX) 400 mg tablet Take 1 tablet (400 mg total) by mouth 2 (two) times a day 180 tablet 3    metFORMIN (GLUCOPHAGE) 500 mg tablet Take 0.5 tablets (250 mg total) by mouth 2 (two) times a day with meals 60 tablet 3    Movantik 25 MG tablet Take 1 tablet (25 mg total) by mouth in the morning 30 tablet 1    multivitamin (THERAGRAN) TABS Take 1 tablet by mouth daily       naloxone (NARCAN) 4 mg/0.1 mL nasal spray Administer 1 spray into a nostril. If no response after 2-3 minutes, give another dose in the other nostril using a new spray. 1 each 1    OnabotulinumtoxinA (BOTOX IM) Inject into a muscle      oxyCODONE (ROXICODONE) 10 MG TABS Take 10 mg by mouth every 8 (eight) hours as needed      polyethylene glycol (MIRALAX) 17 g packet Take 17 g by mouth daily at bedtime      potassium chloride (K-DUR,KLOR-CON) 20 mEq tablet Take 1 tablet (20 mEq total) by mouth daily 90 tablet 3    prochlorperazine (COMPAZINE) 10 mg tablet Take 1 tablet (10 mg total) by mouth every 6 (six) hours as needed (migraine) 10 tablet 0    Riboflavin (Vitamin B-2) 100 MG TABS 2 in the morning and 2 at bedtime 360 tablet 3    rosuvastatin (CRESTOR) 20 MG tablet Take 1 tablet (20 mg total) by mouth daily 90 tablet 3    Ubrogepant (Ubrelvy) 100 MG tablet Take 1 tablet (100 mg total) by mouth if needed (migraine) Take 1 tablet (100 mg) one time as needed for migraine. May repeat one additional tablet (100 mg) at least two hours after the first dose. Do not use more than two doses per day 48 tablet 1     No current facility-administered medications for this visit.        No Known Allergies    Review of Systems    Video Exam    There were no vitals filed for this visit.    Physical Exam     Behavioral Health Psychotherapy Progress Note    Psychotherapy Provided: Individual Psychotherapy     1. Major depression, recurrent, chronic (HCC)        2. SY (generalized anxiety disorder)            Goals addressed in session: Goal 1     DATA: Marcelina shared she feels better physically somewhat but is feeling depressed and unmotivated and wants to hide inside her house and feels like a disappointment to herself and her family.   Therapist gave her education on depression and how it may be affecting her state of mind and behaviors.  Therapist suggested she talk with her psychiatrist about her medication and possibly  "adjusting it and she said she does not want more medication.  Therapist suggested possibly getting a  to help her with exploring housing or social opportunities or the partial program to help with stabilization.  She said she was not interested in these but would think about them.  Therapist encouraged her to engage in behavioral activation, doing some things in the community to see if they help her to feel better.  She agreed to try this.  During this session, this clinician used the following therapeutic modalities: Client-centered Therapy and Dialectical Behavior Therapy    Substance Abuse was not addressed during this session. If the client is diagnosed with a co-occurring substance use disorder, please indicate any changes in the frequency or amount of use: . Stage of change for addressing substance use diagnoses: No substance use/Not applicable    ASSESSMENT:  Art Jones presents with a Euthymic/ normal mood.     her affect is Normal range and intensity, which is congruent, with her mood and the content of the session. The client has made progress on their goals.     Art Jones presents with a none risk of suicide, none risk of self-harm, and none risk of harm to others.    For any risk assessment that surpasses a \"low\" rating, a safety plan must be developed.    A safety plan was indicated: no  If yes, describe in detail     PLAN: Between sessions, Art Jones will utilize behavioral activation. At the next session, the therapist will use Client-centered Therapy and Dialectical Behavior Therapy to address depression.    Behavioral Health Treatment Plan and Discharge Planning: Art Jones is aware of and agrees to continue to work on their treatment plan. They have identified and are working toward their discharge goals. yes    Visit start and stop times:    07/01/24  Start Time: 1500  Stop Time: 1552  Total Visit Time: 52 minutes        "

## 2024-07-15 ENCOUNTER — TELEMEDICINE (OUTPATIENT)
Dept: PSYCHIATRY | Facility: CLINIC | Age: 69
End: 2024-07-15
Payer: MEDICARE

## 2024-07-15 DIAGNOSIS — F33.9 MAJOR DEPRESSION, RECURRENT, CHRONIC (HCC): Primary | ICD-10-CM

## 2024-07-15 DIAGNOSIS — F41.1 GAD (GENERALIZED ANXIETY DISORDER): ICD-10-CM

## 2024-07-15 PROCEDURE — 90834 PSYTX W PT 45 MINUTES: CPT

## 2024-07-15 NOTE — PSYCH
Virtual Regular Visit    Verification of patient location:    Patient is located at Home in the following state in which I hold an active license [place]      Assessment/Plan:    Problem List Items Addressed This Visit       Major depression, recurrent, chronic (HCC) - Primary    SY (generalized anxiety disorder)       Goals addressed in session: Goal 1          Reason for visit is No chief complaint on file.       Encounter provider Lady Soto LCSW      Recent Visits  No visits were found meeting these conditions.  Showing recent visits within past 7 days and meeting all other requirements  Today's Visits  Date Type Provider Dept   07/15/24 Telemedicine Lady Soto LCSW [facility]   Showing today's visits and meeting all other requirements  Future Appointments  No visits were found meeting these conditions.  Showing future appointments within next 150 days and meeting all other requirements       The patient was identified by name and date of birth. Aniya Jones was informed that this is a telemedicine visit and that the visit is being conducted throughthe [employer] platform. She agrees to proceed..  My office door was closed. No one else was in the room.  She acknowledged consent and understanding of privacy and security of the video platform. The patient has agreed to participate and understands they can discontinue the visit at any time.    Patient is aware this is a billable service.     Subjective  Aniya Jones is a 69 y.o. female  .      HPI     Past Medical History:   Diagnosis Date    Anxiety     Arthritis     Cancer (HCC)     Skin    Depression     Diabetes (HCC)     GERD (gastroesophageal reflux disease)     Hyperlipidemia     Hypertension     Migraine        Past Surgical History:   Procedure Laterality Date    ANKLE SURGERY      CARPAL TUNNEL RELEASE Right     ROTATOR CUFF REPAIR         Current Outpatient Medications   Medication Sig Dispense Refill     acetaminophen (TYLENOL) 325 mg tablet Take 3 tablets (975 mg total) by mouth every 8 (eight) hours  0    B COMPLEX VITAMINS PO Take 1 tablet by mouth daily      Baclofen 5 MG TABS Take by mouth Take 1-2 tabs TID prn      bisacodyl (Dulcolax) 10 mg suppository Insert 10 mg into the rectum as needed for constipation (Patient not taking: Reported on 2/1/2024)      buPROPion (WELLBUTRIN) 75 mg tablet Take 1 tablet (75 mg total) by mouth in the morning 30 tablet 1    busPIRone (BUSPAR) 10 mg tablet Take 1 tablet (10 mg total) by mouth 2 (two) times a day 60 tablet 1    calcium carbonate (OS-MIKE) 600 MG tablet Take 600 mg by mouth 2 (two) times a day      Cholecalciferol 50 MCG (2000 UT) CAPS Take 1 capsule by mouth daily      clonazePAM (KlonoPIN) 0.5 mg tablet Take 1 tablet (0.5 mg total) by mouth 3 (three) times a day 90 tablet 1    dexamethasone (DECADRON) 2 mg tablet Take 1 tablet (2 mg total) by mouth daily with breakfast 5 tablet 0    divalproex sodium (DEPAKOTE ER) 500 mg 24 hr tablet Take 1 tablet (500 mg total) by mouth daily 5 tablet 0    DULoxetine (Cymbalta) 30 mg delayed release capsule Take 1 capsule (30 mg total) by mouth daily at bedtime 30 capsule 1    DULoxetine (CYMBALTA) 60 mg delayed release capsule Take 1 capsule (60 mg total) by mouth daily at bedtime 30 capsule 1    esomeprazole (NexIUM) 20 mg capsule Take 20 mg by mouth daily in the early morning       Galcanezumab-gnlm 120 MG/ML SOAJ Inject 120 mg under the skin every 28 days 3 mL 4    Lidocaine-Menthol 4-1 % CREA Apply 1 Dose topically if needed Patch 5%      magnesium oxide (MAG-OX) 400 mg tablet Take 1 tablet (400 mg total) by mouth 2 (two) times a day 180 tablet 3    metFORMIN (GLUCOPHAGE) 500 mg tablet Take 0.5 tablets (250 mg total) by mouth 2 (two) times a day with meals 60 tablet 3    Movantik 25 MG tablet Take 1 tablet (25 mg total) by mouth in the morning 30 tablet 1    multivitamin (THERAGRAN) TABS Take 1 tablet by mouth daily       naloxone (NARCAN) 4 mg/0.1 mL nasal spray Administer 1 spray into a nostril. If no response after 2-3 minutes, give another dose in the other nostril using a new spray. 1 each 1    OnabotulinumtoxinA (BOTOX IM) Inject into a muscle      oxyCODONE (ROXICODONE) 10 MG TABS Take 10 mg by mouth every 8 (eight) hours as needed      polyethylene glycol (MIRALAX) 17 g packet Take 17 g by mouth daily at bedtime      potassium chloride (K-DUR,KLOR-CON) 20 mEq tablet Take 1 tablet (20 mEq total) by mouth daily 90 tablet 3    prochlorperazine (COMPAZINE) 10 mg tablet Take 1 tablet (10 mg total) by mouth every 6 (six) hours as needed (migraine) 10 tablet 0    Riboflavin (Vitamin B-2) 100 MG TABS 2 in the morning and 2 at bedtime 360 tablet 3    rosuvastatin (CRESTOR) 20 MG tablet Take 1 tablet (20 mg total) by mouth daily 90 tablet 3    Ubrogepant (Ubrelvy) 100 MG tablet Take 1 tablet (100 mg total) by mouth if needed (migraine) Take 1 tablet (100 mg) one time as needed for migraine. May repeat one additional tablet (100 mg) at least two hours after the first dose. Do not use more than two doses per day 48 tablet 1     No current facility-administered medications for this visit.        No Known Allergies    Review of Systems    Video Exam    There were no vitals filed for this visit.    Physical Exam     Behavioral Health Psychotherapy Progress Note    Psychotherapy Provided: Individual Psychotherapy     1. Major depression, recurrent, chronic (HCC)        2. SY (generalized anxiety disorder)            Goals addressed in session: Goal 1     DATA: Marcelina shared she has been having some allergy symptoms recently that have been difficult for her, and it's been too hot to get outside or go out much outside of the house.  She said she has been spending her time mostly by watching tv series, and has been thinking about doing some decluttering in her house to prepare to downsize to a smaller house.  Therapist encouraged open  "communication of thoughts and feelings about her losses. She shared she misses her mom and has been thinking about her memories of her recently.  She said she needs to get to the bank to sign her resources over to her kids.  Therapist encouraged her to think about the things she has to do that have to do with death in terms of past trauma and encouraging her to challenge automatic thoughts of being overwhelmed and incapable of doing some of her tasks.   Therapist also encouraged her to have someone go with her for moral support to the bank and other tasks possibly.  She talked about her earlier life of feeling lack of self esteem growing up and having a lot of stress from her job and surviving with her child as a young mother.  She shared she dreams about her past, being down the shore with her son and her ex .  She said she hasn't worked through some things from her past which is why she keeps dreaming about those times.  Therapist encouraged her to sit with her feelings and accept them as a part of grieving.    During this session, this clinician used the following therapeutic modalities: Bereavement Therapy and Client-centered Therapy    Substance Abuse was not addressed during this session. If the client is diagnosed with a co-occurring substance use disorder, please indicate any changes in the frequency or amount of use: . Stage of change for addressing substance use diagnoses: No substance use/Not applicable    ASSESSMENT:  Art Jones presents with a Euthymic/ normal mood.     her affect is Normal range and intensity, which is congruent, with her mood and the content of the session. The client has made progress on their goals.     Art Jones presents with a none risk of suicide, none risk of self-harm, and none risk of harm to others.    For any risk assessment that surpasses a \"low\" rating, a safety plan must be developed.    A safety plan was indicated: no  If yes, describe in detail "     PLAN: Between sessions, Art Jones will utilize support system to help her get tasks done. At the next session, the therapist will use Bereavement Therapy and Client-centered Therapy to address depression.    Behavioral Health Treatment Plan and Discharge Planning: Art Jones is aware of and agrees to continue to work on their treatment plan. They have identified and are working toward their discharge goals. yes    Visit start and stop times:    07/15/24  Start Time: 1500  Stop Time: 1552  Total Visit Time: 52 minutes

## 2024-07-29 ENCOUNTER — TELEMEDICINE (OUTPATIENT)
Dept: PSYCHIATRY | Facility: CLINIC | Age: 69
End: 2024-07-29
Payer: MEDICARE

## 2024-07-29 DIAGNOSIS — F41.1 GAD (GENERALIZED ANXIETY DISORDER): ICD-10-CM

## 2024-07-29 DIAGNOSIS — F41.8 DEPRESSION WITH ANXIETY: ICD-10-CM

## 2024-07-29 DIAGNOSIS — F33.9 MAJOR DEPRESSION, RECURRENT, CHRONIC (HCC): Primary | ICD-10-CM

## 2024-07-29 PROCEDURE — 90832 PSYTX W PT 30 MINUTES: CPT

## 2024-07-29 NOTE — PSYCH
Virtual Regular Visit    Verification of patient location:    Patient is located at Home in the following state in which I hold an active license [place]      Assessment/Plan:    Problem List Items Addressed This Visit       Depression with anxiety    Major depression, recurrent, chronic (HCC) - Primary    SY (generalized anxiety disorder)       Goals addressed in session: Goal 1          Reason for visit is No chief complaint on file.       Encounter provider Lady Soto LCSW      Recent Visits  No visits were found meeting these conditions.  Showing recent visits within past 7 days and meeting all other requirements  Today's Visits  Date Type Provider Dept   07/29/24 Telemedicine Lady Soto LCSW [facility]   Showing today's visits and meeting all other requirements  Future Appointments  No visits were found meeting these conditions.  Showing future appointments within next 150 days and meeting all other requirements       The patient was identified by name and date of birth. Aniya Jones was informed that this is a telemedicine visit and that the visit is being conducted throughthe [employer] platform. She agrees to proceed..  My office door was closed. No one else was in the room.  She acknowledged consent and understanding of privacy and security of the video platform. The patient has agreed to participate and understands they can discontinue the visit at any time.    Patient is aware this is a billable service.     Subjective  Aniya Jones is a 69 y.o. female  .      HPI     Past Medical History:   Diagnosis Date    Anxiety     Arthritis     Cancer (HCC)     Skin    Depression     Diabetes (HCC)     GERD (gastroesophageal reflux disease)     Hyperlipidemia     Hypertension     Migraine        Past Surgical History:   Procedure Laterality Date    ANKLE SURGERY      CARPAL TUNNEL RELEASE Right     ROTATOR CUFF REPAIR         Current Outpatient Medications   Medication Sig  Dispense Refill    acetaminophen (TYLENOL) 325 mg tablet Take 3 tablets (975 mg total) by mouth every 8 (eight) hours  0    B COMPLEX VITAMINS PO Take 1 tablet by mouth daily      Baclofen 5 MG TABS Take by mouth Take 1-2 tabs TID prn      bisacodyl (Dulcolax) 10 mg suppository Insert 10 mg into the rectum as needed for constipation (Patient not taking: Reported on 2/1/2024)      buPROPion (WELLBUTRIN) 75 mg tablet Take 1 tablet (75 mg total) by mouth in the morning 30 tablet 1    busPIRone (BUSPAR) 10 mg tablet Take 1 tablet (10 mg total) by mouth 2 (two) times a day 60 tablet 1    calcium carbonate (OS-MIKE) 600 MG tablet Take 600 mg by mouth 2 (two) times a day      Cholecalciferol 50 MCG (2000 UT) CAPS Take 1 capsule by mouth daily      clonazePAM (KlonoPIN) 0.5 mg tablet Take 1 tablet (0.5 mg total) by mouth 3 (three) times a day 90 tablet 1    dexamethasone (DECADRON) 2 mg tablet Take 1 tablet (2 mg total) by mouth daily with breakfast 5 tablet 0    divalproex sodium (DEPAKOTE ER) 500 mg 24 hr tablet Take 1 tablet (500 mg total) by mouth daily 5 tablet 0    DULoxetine (Cymbalta) 30 mg delayed release capsule Take 1 capsule (30 mg total) by mouth daily at bedtime 30 capsule 1    DULoxetine (CYMBALTA) 60 mg delayed release capsule Take 1 capsule (60 mg total) by mouth daily at bedtime 30 capsule 1    esomeprazole (NexIUM) 20 mg capsule Take 20 mg by mouth daily in the early morning       Galcanezumab-gnlm 120 MG/ML SOAJ Inject 120 mg under the skin every 28 days 3 mL 4    Lidocaine-Menthol 4-1 % CREA Apply 1 Dose topically if needed Patch 5%      magnesium oxide (MAG-OX) 400 mg tablet Take 1 tablet (400 mg total) by mouth 2 (two) times a day 180 tablet 3    metFORMIN (GLUCOPHAGE) 500 mg tablet Take 0.5 tablets (250 mg total) by mouth 2 (two) times a day with meals 60 tablet 3    Movantik 25 MG tablet Take 1 tablet (25 mg total) by mouth in the morning 30 tablet 1    multivitamin (THERAGRAN) TABS Take 1 tablet by  mouth daily      naloxone (NARCAN) 4 mg/0.1 mL nasal spray Administer 1 spray into a nostril. If no response after 2-3 minutes, give another dose in the other nostril using a new spray. 1 each 1    OnabotulinumtoxinA (BOTOX IM) Inject into a muscle      oxyCODONE (ROXICODONE) 10 MG TABS Take 10 mg by mouth every 8 (eight) hours as needed      polyethylene glycol (MIRALAX) 17 g packet Take 17 g by mouth daily at bedtime      potassium chloride (K-DUR,KLOR-CON) 20 mEq tablet Take 1 tablet (20 mEq total) by mouth daily 90 tablet 3    prochlorperazine (COMPAZINE) 10 mg tablet Take 1 tablet (10 mg total) by mouth every 6 (six) hours as needed (migraine) 10 tablet 0    Riboflavin (Vitamin B-2) 100 MG TABS 2 in the morning and 2 at bedtime 360 tablet 3    rosuvastatin (CRESTOR) 20 MG tablet Take 1 tablet (20 mg total) by mouth daily 90 tablet 3    Ubrogepant (Ubrelvy) 100 MG tablet Take 1 tablet (100 mg total) by mouth if needed (migraine) Take 1 tablet (100 mg) one time as needed for migraine. May repeat one additional tablet (100 mg) at least two hours after the first dose. Do not use more than two doses per day 48 tablet 1     No current facility-administered medications for this visit.        No Known Allergies    Review of Systems    Video Exam    There were no vitals filed for this visit.    Physical Exam     Behavioral Health Psychotherapy Progress Note    Psychotherapy Provided: Individual Psychotherapy     1. Major depression, recurrent, chronic (HCC)        2. Depression with anxiety        3. SY (generalized anxiety disorder)            Goals addressed in session: Goal 1     DATA: Behavioral Health Psychotherapy Progress Note    Psychotherapy Provided: Individual Psychotherapy     1. Major depression, recurrent, chronic (HCC)        2. Depression with anxiety        3. SY (generalized anxiety disorder)            Goals addressed in session: Goal 1     DATA: Marcelina had difficulty getting on the video link and  "was able to at 3:31pm after some help getting on the link.She shared her son may be getting a divorce because of some long standing issues.  She is upset and doesn't know what to do about the situation.She is afraid for him financially and that he might not be able to pay for his living expenses. She openly communicated her thoughts and feelings about her son's issues and what her fears are about it.  Therapist encouraged her to think about how she can realistically help with her son's situation and with her sister who needs surgery, and to realize what she does not have control over or has limited control over.    During this session, this clinician used the following therapeutic modalities: Client-centered Therapy    Substance Abuse was not addressed during this session. If the client is diagnosed with a co-occurring substance use disorder, please indicate any changes in the frequency or amount of use: . Stage of change for addressing substance use diagnoses: No substance use/Not applicable    ASSESSMENT:  Art Jones presents with a Euthymic/ normal mood.     her affect is Normal range and intensity, which is congruent, with her mood and the content of the session. The client has made progress on their goals.     Art Jones presents with a none risk of suicide, none risk of self-harm, and none risk of harm to others.    For any risk assessment that surpasses a \"low\" rating, a safety plan must be developed.    A safety plan was indicated: no  If yes, describe in detail     PLAN: Between sessions, Art Jones will utilize positive thinking. At the next session, the therapist will use Client-centered Therapy and Supportive Psychotherapy to address depression.    Behavioral Health Treatment Plan and Discharge Planning: Art Jones is aware of and agrees to continue to work on their treatment plan. They have identified and are working toward their discharge goals. yes    Visit start and stop " times:                Visit start and stop times:    07/29/24  Start Time: 1531  Stop Time: 1557  Total Visit Time: 26 minutes

## 2024-08-02 DIAGNOSIS — F33.9 MAJOR DEPRESSION, RECURRENT, CHRONIC (HCC): ICD-10-CM

## 2024-08-02 DIAGNOSIS — F41.1 GAD (GENERALIZED ANXIETY DISORDER): ICD-10-CM

## 2024-08-02 RX ORDER — DULOXETIN HYDROCHLORIDE 30 MG/1
30 CAPSULE, DELAYED RELEASE ORAL
Qty: 30 CAPSULE | Refills: 1 | Status: SHIPPED | OUTPATIENT
Start: 2024-08-02

## 2024-08-02 RX ORDER — BUPROPION HYDROCHLORIDE 75 MG/1
75 TABLET ORAL DAILY
Qty: 30 TABLET | Refills: 1 | Status: SHIPPED | OUTPATIENT
Start: 2024-08-02 | End: 2024-08-08

## 2024-08-02 RX ORDER — DULOXETIN HYDROCHLORIDE 60 MG/1
60 CAPSULE, DELAYED RELEASE ORAL
Qty: 30 CAPSULE | Refills: 1 | Status: SHIPPED | OUTPATIENT
Start: 2024-08-02

## 2024-08-02 RX ORDER — BUSPIRONE HYDROCHLORIDE 10 MG/1
10 TABLET ORAL 2 TIMES DAILY
Qty: 60 TABLET | Refills: 1 | Status: SHIPPED | OUTPATIENT
Start: 2024-08-02

## 2024-08-06 RX ORDER — CLONAZEPAM 0.5 MG/1
0.5 TABLET ORAL 3 TIMES DAILY
Qty: 90 TABLET | Refills: 1 | Status: SHIPPED | OUTPATIENT
Start: 2024-08-06

## 2024-08-08 ENCOUNTER — TELEMEDICINE (OUTPATIENT)
Dept: PSYCHIATRY | Facility: CLINIC | Age: 69
End: 2024-08-08
Payer: MEDICARE

## 2024-08-08 DIAGNOSIS — F33.9 MAJOR DEPRESSION, RECURRENT, CHRONIC (HCC): ICD-10-CM

## 2024-08-08 DIAGNOSIS — F41.1 GAD (GENERALIZED ANXIETY DISORDER): ICD-10-CM

## 2024-08-08 PROCEDURE — 99214 OFFICE O/P EST MOD 30 MIN: CPT

## 2024-08-08 RX ORDER — BUPROPION HYDROCHLORIDE 150 MG/1
150 TABLET ORAL DAILY
Qty: 90 TABLET | Refills: 0 | Status: SHIPPED | OUTPATIENT
Start: 2024-08-08
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
## 2024-08-09 ENCOUNTER — TELEPHONE (OUTPATIENT)
Dept: PSYCHIATRY | Facility: CLINIC | Age: 69
End: 2024-08-09

## 2024-08-09 DIAGNOSIS — K59.00 CONSTIPATION, UNSPECIFIED CONSTIPATION TYPE: ICD-10-CM

## 2024-08-09 RX ORDER — NALOXEGOL OXALATE 25 MG/1
25 TABLET, FILM COATED ORAL DAILY
Qty: 30 TABLET | Refills: 1 | Status: SHIPPED | OUTPATIENT
Start: 2024-08-09

## 2024-08-09 NOTE — TELEPHONE ENCOUNTER
Spoke with client, who is happy with the counselor she is seeing through [facility].  Clt removed from Wait list for in person services.

## 2024-08-09 NOTE — TELEPHONE ENCOUNTER
Medication:     Movantik        Dose/Frequency:  Take 1 tablet (25 mg total) by mouth in the morning     Quantity: 30    Pharmacy:  [facility] [place] [facility] - [address]     Office:   [x] PCP/Provider -   [] Speciality/Provider -     Does the patient have enough for 3 days?   [x] Yes   [] No - Send as HP to POD

## 2024-08-15 ENCOUNTER — TELEMEDICINE (OUTPATIENT)
Dept: PSYCHIATRY | Facility: CLINIC | Age: 69
End: 2024-08-15
Payer: MEDICARE

## 2024-08-15 DIAGNOSIS — Z63.4 BEREAVEMENT DUE TO LIFE EVENT: ICD-10-CM

## 2024-08-15 DIAGNOSIS — F41.1 GAD (GENERALIZED ANXIETY DISORDER): ICD-10-CM

## 2024-08-15 DIAGNOSIS — F33.9 MAJOR DEPRESSION, RECURRENT, CHRONIC (HCC): Primary | ICD-10-CM

## 2024-08-15 PROCEDURE — 90834 PSYTX W PT 45 MINUTES: CPT

## 2024-08-15 SDOH — SOCIAL STABILITY - SOCIAL INSECURITY: DISSAPEARANCE AND DEATH OF FAMILY MEMBER: Z63.4

## 2024-08-15 NOTE — PSYCH
Virtual Regular Visit    Verification of patient location:    Patient is located at Home in the following state in which I hold an active license [place]      Assessment/Plan:    Problem List Items Addressed This Visit       Major depression, recurrent, chronic (HCC) - Primary    SY (generalized anxiety disorder)    Bereavement due to life event       Goals addressed in session: Goal 1          Reason for visit is No chief complaint on file.       Encounter provider Lady Soto LCSW      Recent Visits  No visits were found meeting these conditions.  Showing recent visits within past 7 days and meeting all other requirements  Today's Visits  Date Type Provider Dept   08/15/24 Telemedicine Lady Soto LCSW [facility]   Showing today's visits and meeting all other requirements  Future Appointments  No visits were found meeting these conditions.  Showing future appointments within next 150 days and meeting all other requirements       The patient was identified by name and date of birth. Aniya Jones was informed that this is a telemedicine visit and that the visit is being conducted throughthe [employer] platform. She agrees to proceed..  My office door was closed. No one else was in the room.  She acknowledged consent and understanding of privacy and security of the video platform. The patient has agreed to participate and understands they can discontinue the visit at any time.    Patient is aware this is a billable service.     Subjective  Aniya Jones is a 69 y.o. female  .      HPI     Past Medical History:   Diagnosis Date    Anxiety     Arthritis     Cancer (HCC)     Skin    Depression     Diabetes (HCC)     GERD (gastroesophageal reflux disease)     Hyperlipidemia     Hypertension     Migraine        Past Surgical History:   Procedure Laterality Date    ANKLE SURGERY      CARPAL TUNNEL RELEASE Right     ROTATOR CUFF REPAIR         Current Outpatient Medications   Medication  Sig Dispense Refill    acetaminophen (TYLENOL) 325 mg tablet Take 3 tablets (975 mg total) by mouth every 8 (eight) hours  0    B COMPLEX VITAMINS PO Take 1 tablet by mouth daily      Baclofen 5 MG TABS Take by mouth Take 1-2 tabs TID prn      bisacodyl (Dulcolax) 10 mg suppository Insert 10 mg into the rectum as needed for constipation (Patient not taking: Reported on 2/1/2024)      buPROPion (Wellbutrin XL) 150 mg 24 hr tablet Take 1 tablet (150 mg total) by mouth daily 90 tablet 0    busPIRone (BUSPAR) 10 mg tablet Take 1 tablet (10 mg total) by mouth 2 (two) times a day 60 tablet 1    calcium carbonate (OS-MIKE) 600 MG tablet Take 600 mg by mouth 2 (two) times a day      Cholecalciferol 50 MCG (2000 UT) CAPS Take 1 capsule by mouth daily      clonazePAM (KlonoPIN) 0.5 mg tablet Take 1 tablet (0.5 mg total) by mouth 3 (three) times a day 90 tablet 1    dexamethasone (DECADRON) 2 mg tablet Take 1 tablet (2 mg total) by mouth daily with breakfast 5 tablet 0    divalproex sodium (DEPAKOTE ER) 500 mg 24 hr tablet Take 1 tablet (500 mg total) by mouth daily 5 tablet 0    DULoxetine (Cymbalta) 30 mg delayed release capsule Take 1 capsule (30 mg total) by mouth daily at bedtime 30 capsule 1    DULoxetine (CYMBALTA) 60 mg delayed release capsule Take 1 capsule (60 mg total) by mouth daily at bedtime 30 capsule 1    esomeprazole (NexIUM) 20 mg capsule Take 20 mg by mouth daily in the early morning       Galcanezumab-gnlm 120 MG/ML SOAJ Inject 120 mg under the skin every 28 days 3 mL 4    Lidocaine-Menthol 4-1 % CREA Apply 1 Dose topically if needed Patch 5%      magnesium oxide (MAG-OX) 400 mg tablet Take 1 tablet (400 mg total) by mouth 2 (two) times a day 180 tablet 3    metFORMIN (GLUCOPHAGE) 500 mg tablet Take 0.5 tablets (250 mg total) by mouth 2 (two) times a day with meals 60 tablet 3    Movantik 25 MG tablet Take 1 tablet (25 mg total) by mouth in the morning 30 tablet 1    multivitamin (THERAGRAN) TABS Take 1  tablet by mouth daily      naloxone (NARCAN) 4 mg/0.1 mL nasal spray Administer 1 spray into a nostril. If no response after 2-3 minutes, give another dose in the other nostril using a new spray. 1 each 1    OnabotulinumtoxinA (BOTOX IM) Inject into a muscle      oxyCODONE (ROXICODONE) 10 MG TABS Take 10 mg by mouth every 8 (eight) hours as needed      polyethylene glycol (MIRALAX) 17 g packet Take 17 g by mouth daily at bedtime      potassium chloride (K-DUR,KLOR-CON) 20 mEq tablet Take 1 tablet (20 mEq total) by mouth daily 90 tablet 3    prochlorperazine (COMPAZINE) 10 mg tablet Take 1 tablet (10 mg total) by mouth every 6 (six) hours as needed (migraine) 10 tablet 0    Riboflavin (Vitamin B-2) 100 MG TABS 2 in the morning and 2 at bedtime 360 tablet 3    rosuvastatin (CRESTOR) 20 MG tablet Take 1 tablet (20 mg total) by mouth daily 90 tablet 3    Ubrogepant (Ubrelvy) 100 MG tablet Take 1 tablet (100 mg total) by mouth if needed (migraine) Take 1 tablet (100 mg) one time as needed for migraine. May repeat one additional tablet (100 mg) at least two hours after the first dose. Do not use more than two doses per day 48 tablet 1     No current facility-administered medications for this visit.        No Known Allergies    Review of Systems    Video Exam    There were no vitals filed for this visit.    Physical Exam     Behavioral Health Psychotherapy Progress Note    Psychotherapy Provided: Individual Psychotherapy     1. Major depression, recurrent, chronic (HCC)        2. Bereavement due to life event        3. SY (generalized anxiety disorder)            Goals addressed in session: Goal 1     DATA: Marcelina shared her psychiatrist upped her wellbutrin at her last session and she said she is hopeful it will help her.  She is considering TMS treatments if medications don't work.  She shared her brother talked to her about money she will get from her mother that will help with her dental work she needs to get done.   "She talked about her sister was not nice to her about her backing out of going to see her brother En in personal care because of not feeling well.  She talked drawing the line with her sister and told her not to talk to her again in an unkind way.  Therapist encouraged open communication of her thoughts and feelings about her family dynamics and validated her feelings about being depressed.  She said she would try and get out of the house to improve her mood.  During this session, this clinician used the following therapeutic modalities: Client-centered Therapy    Substance Abuse was not addressed during this session. If the client is diagnosed with a co-occurring substance use disorder, please indicate any changes in the frequency or amount of use: . Stage of change for addressing substance use diagnoses: No substance use/Not applicable    ASSESSMENT:  Art Jones presents with a Euthymic/ normal mood.     her affect is Normal range and intensity, which is congruent, with her mood and the content of the session. The client has made progress on their goals.     Art Jones presents with a none risk of suicide, none risk of self-harm, and none risk of harm to others.    For any risk assessment that surpasses a \"low\" rating, a safety plan must be developed.    A safety plan was indicated: no  If yes, describe in detail     PLAN: Between sessions, Art Jones will get out of the house to improve her mood. At the next session, the therapist will use Client-centered Therapy to address depression.    Behavioral Health Treatment Plan and Discharge Planning: Art Jones is aware of and agrees to continue to work on their treatment plan. They have identified and are working toward their discharge goals. yes    Visit start and stop times:    08/15/24  Start Time: 1400  Stop Time: 1452  Total Visit Time: 52 minutes        "

## 2024-08-21 ENCOUNTER — APPOINTMENT (EMERGENCY)
Dept: RADIOLOGY | Facility: HOSPITAL | Age: 69
DRG: 563 | End: 2024-08-21
Payer: MEDICARE

## 2024-08-21 ENCOUNTER — HOSPITAL ENCOUNTER (INPATIENT)
Facility: HOSPITAL | Age: 69
LOS: 3 days | Discharge: NURSING, REHAB OR LONG-TERM CARE | DRG: 563 | End: 2024-08-25
Attending: EMERGENCY MEDICINE | Admitting: STUDENT IN AN ORGANIZED HEALTH CARE EDUCATION/TRAINING PROGRAM
Payer: MEDICARE

## 2024-08-21 DIAGNOSIS — S92.309A: ICD-10-CM

## 2024-08-21 DIAGNOSIS — S92.309A METATARSAL FRACTURE: ICD-10-CM

## 2024-08-21 DIAGNOSIS — L50.9 URTICARIA: Primary | ICD-10-CM

## 2024-08-21 DIAGNOSIS — R26.2 AMBULATORY DYSFUNCTION: ICD-10-CM

## 2024-08-21 PROBLEM — T78.40XA ALLERGIC REACTION: Status: ACTIVE | Noted: 2024-08-21

## 2024-08-21 LAB
BACTERIA UR QL AUTO: ABNORMAL /HPF
BILIRUB UR QL STRIP: NEGATIVE
CLARITY UR: ABNORMAL
COLOR UR: YELLOW
GLUCOSE UR STRIP-MCNC: NEGATIVE MG/DL
GRAN CASTS #/AREA URNS LPF: ABNORMAL /[LPF]
HGB UR QL STRIP.AUTO: NEGATIVE
HYALINE CASTS #/AREA URNS LPF: ABNORMAL /LPF
KETONES UR STRIP-MCNC: ABNORMAL MG/DL
LEUKOCYTE ESTERASE UR QL STRIP: NEGATIVE
MUCOUS THREADS UR QL AUTO: ABNORMAL
NITRITE UR QL STRIP: NEGATIVE
NON-SQ EPI CELLS URNS QL MICRO: ABNORMAL /HPF
OTHER STN SPEC: ABNORMAL
PH UR STRIP.AUTO: 6 [PH]
PROT UR STRIP-MCNC: ABNORMAL MG/DL
RBC #/AREA URNS AUTO: ABNORMAL /HPF
SP GR UR STRIP.AUTO: 1.01 (ref 1–1.03)
UROBILINOGEN UR STRIP-ACNC: <2 MG/DL
WBC #/AREA URNS AUTO: ABNORMAL /HPF

## 2024-08-21 PROCEDURE — 81001 URINALYSIS AUTO W/SCOPE: CPT | Performed by: EMERGENCY MEDICINE

## 2024-08-21 PROCEDURE — 99223 1ST HOSP IP/OBS HIGH 75: CPT | Performed by: PHYSICIAN ASSISTANT

## 2024-08-21 PROCEDURE — 99285 EMERGENCY DEPT VISIT HI MDM: CPT

## 2024-08-21 PROCEDURE — 93005 ELECTROCARDIOGRAM TRACING: CPT

## 2024-08-21 PROCEDURE — 73630 X-RAY EXAM OF FOOT: CPT

## 2024-08-21 PROCEDURE — 99285 EMERGENCY DEPT VISIT HI MDM: CPT | Performed by: EMERGENCY MEDICINE

## 2024-08-21 RX ORDER — CLONAZEPAM 0.5 MG/1
0.5 TABLET ORAL 3 TIMES DAILY
Status: DISCONTINUED | OUTPATIENT
Start: 2024-08-21 | End: 2024-08-25 | Stop reason: HOSPADM

## 2024-08-21 RX ORDER — PANTOPRAZOLE SODIUM 20 MG/1
20 TABLET, DELAYED RELEASE ORAL
Status: DISCONTINUED | OUTPATIENT
Start: 2024-08-22 | End: 2024-08-25 | Stop reason: HOSPADM

## 2024-08-21 RX ORDER — ACETAMINOPHEN 325 MG/1
975 TABLET ORAL EVERY 8 HOURS SCHEDULED
Status: DISCONTINUED | OUTPATIENT
Start: 2024-08-21 | End: 2024-08-25 | Stop reason: HOSPADM

## 2024-08-21 RX ORDER — PREDNISONE 20 MG/1
40 TABLET ORAL ONCE
Status: COMPLETED | OUTPATIENT
Start: 2024-08-21 | End: 2024-08-21

## 2024-08-21 RX ORDER — BUSPIRONE HYDROCHLORIDE 10 MG/1
10 TABLET ORAL 2 TIMES DAILY
Status: DISCONTINUED | OUTPATIENT
Start: 2024-08-21 | End: 2024-08-25 | Stop reason: HOSPADM

## 2024-08-21 RX ORDER — BUPROPION HYDROCHLORIDE 150 MG/1
150 TABLET ORAL DAILY
Status: DISCONTINUED | OUTPATIENT
Start: 2024-08-22 | End: 2024-08-25 | Stop reason: HOSPADM

## 2024-08-21 RX ORDER — SENNOSIDES 8.6 MG
1 TABLET ORAL
Status: DISCONTINUED | OUTPATIENT
Start: 2024-08-21 | End: 2024-08-23

## 2024-08-21 RX ORDER — POLYETHYLENE GLYCOL 3350 17 G/17G
17 POWDER, FOR SOLUTION ORAL
Status: DISCONTINUED | OUTPATIENT
Start: 2024-08-21 | End: 2024-08-23

## 2024-08-21 RX ORDER — DULOXETIN HYDROCHLORIDE 30 MG/1
90 CAPSULE, DELAYED RELEASE ORAL
Status: DISCONTINUED | OUTPATIENT
Start: 2024-08-21 | End: 2024-08-25 | Stop reason: HOSPADM

## 2024-08-21 RX ORDER — INSULIN LISPRO 100 [IU]/ML
1-5 INJECTION, SOLUTION INTRAVENOUS; SUBCUTANEOUS
Status: DISCONTINUED | OUTPATIENT
Start: 2024-08-22 | End: 2024-08-25 | Stop reason: HOSPADM

## 2024-08-21 RX ORDER — ATORVASTATIN CALCIUM 40 MG/1
40 TABLET, FILM COATED ORAL
Status: DISCONTINUED | OUTPATIENT
Start: 2024-08-22 | End: 2024-08-25 | Stop reason: HOSPADM

## 2024-08-21 RX ORDER — OXYCODONE HYDROCHLORIDE 10 MG/1
10 TABLET ORAL 3 TIMES DAILY
Status: DISCONTINUED | OUTPATIENT
Start: 2024-08-21 | End: 2024-08-25 | Stop reason: HOSPADM

## 2024-08-21 RX ORDER — MAGNESIUM HYDROXIDE/ALUMINUM HYDROXICE/SIMETHICONE 120; 1200; 1200 MG/30ML; MG/30ML; MG/30ML
30 SUSPENSION ORAL EVERY 6 HOURS PRN
Status: DISCONTINUED | OUTPATIENT
Start: 2024-08-21 | End: 2024-08-25 | Stop reason: HOSPADM

## 2024-08-21 RX ORDER — ENOXAPARIN SODIUM 100 MG/ML
40 INJECTION SUBCUTANEOUS DAILY
Status: DISCONTINUED | OUTPATIENT
Start: 2024-08-22 | End: 2024-08-25 | Stop reason: HOSPADM

## 2024-08-21 RX ORDER — SODIUM CHLORIDE, SODIUM GLUCONATE, SODIUM ACETATE, POTASSIUM CHLORIDE, MAGNESIUM CHLORIDE, SODIUM PHOSPHATE, DIBASIC, AND POTASSIUM PHOSPHATE .53; .5; .37; .037; .03; .012; .00082 G/100ML; G/100ML; G/100ML; G/100ML; G/100ML; G/100ML; G/100ML
75 INJECTION, SOLUTION INTRAVENOUS CONTINUOUS
Status: DISCONTINUED | OUTPATIENT
Start: 2024-08-22 | End: 2024-08-22

## 2024-08-21 RX ORDER — PREDNISONE 20 MG/1
40 TABLET ORAL DAILY
Status: DISCONTINUED | OUTPATIENT
Start: 2024-08-22 | End: 2024-08-22

## 2024-08-21 RX ORDER — ONDANSETRON 2 MG/ML
4 INJECTION INTRAMUSCULAR; INTRAVENOUS EVERY 6 HOURS PRN
Status: DISCONTINUED | OUTPATIENT
Start: 2024-08-21 | End: 2024-08-25 | Stop reason: HOSPADM

## 2024-08-21 RX ORDER — MAGNESIUM OXIDE 400 MG/1
400 TABLET ORAL 2 TIMES DAILY
Status: DISCONTINUED | OUTPATIENT
Start: 2024-08-22 | End: 2024-08-21

## 2024-08-21 RX ORDER — HYDROMORPHONE HCL IN WATER/PF 6 MG/30 ML
0.2 PATIENT CONTROLLED ANALGESIA SYRINGE INTRAVENOUS EVERY 4 HOURS PRN
Status: DISCONTINUED | OUTPATIENT
Start: 2024-08-21 | End: 2024-08-25 | Stop reason: HOSPADM

## 2024-08-21 RX ORDER — DULOXETIN HYDROCHLORIDE 30 MG/1
30 CAPSULE, DELAYED RELEASE ORAL
Status: DISCONTINUED | OUTPATIENT
Start: 2024-08-21 | End: 2024-08-21 | Stop reason: SDUPTHER

## 2024-08-21 RX ORDER — BACLOFEN 10 MG/1
5 TABLET ORAL 3 TIMES DAILY PRN
Status: DISCONTINUED | OUTPATIENT
Start: 2024-08-21 | End: 2024-08-25 | Stop reason: HOSPADM

## 2024-08-21 RX ORDER — DIPHENHYDRAMINE HCL 25 MG
25 TABLET ORAL EVERY 6 HOURS PRN
Status: DISCONTINUED | OUTPATIENT
Start: 2024-08-21 | End: 2024-08-22

## 2024-08-21 RX ORDER — LANOLIN ALCOHOL/MO/W.PET/CERES
400 CREAM (GRAM) TOPICAL 2 TIMES DAILY
Status: DISCONTINUED | OUTPATIENT
Start: 2024-08-22 | End: 2024-08-25 | Stop reason: HOSPADM

## 2024-08-21 RX ORDER — RIBOFLAVIN (VITAMIN B2) 100 MG
200 TABLET ORAL 2 TIMES DAILY
Status: DISCONTINUED | OUTPATIENT
Start: 2024-08-21 | End: 2024-08-21

## 2024-08-21 RX ADMIN — ACETAMINOPHEN 975 MG: 325 TABLET ORAL at 23:47

## 2024-08-21 RX ADMIN — CLONAZEPAM 0.5 MG: 0.5 TABLET ORAL at 23:50

## 2024-08-21 RX ADMIN — OXYCODONE HYDROCHLORIDE 10 MG: 10 TABLET ORAL at 23:48

## 2024-08-21 RX ADMIN — PREDNISONE 40 MG: 20 TABLET ORAL at 17:16

## 2024-08-21 RX ADMIN — DULOXETINE HYDROCHLORIDE 90 MG: 30 CAPSULE, DELAYED RELEASE ORAL at 23:48

## 2024-08-21 RX ADMIN — POLYETHYLENE GLYCOL 3350 17 G: 17 POWDER, FOR SOLUTION ORAL at 23:51

## 2024-08-21 RX ADMIN — SODIUM CHLORIDE, SODIUM GLUCONATE, SODIUM ACETATE, POTASSIUM CHLORIDE, MAGNESIUM CHLORIDE, SODIUM PHOSPHATE, DIBASIC, AND POTASSIUM PHOSPHATE 75 ML/HR: .53; .5; .37; .037; .03; .012; .00082 INJECTION, SOLUTION INTRAVENOUS at 23:55

## 2024-08-21 RX ADMIN — BUSPIRONE HYDROCHLORIDE 10 MG: 10 TABLET ORAL at 23:49

## 2024-08-21 NOTE — ED NOTES
Patient reports inability to provide urine sample at this time.      Jennifer Doss RN  08/21/24 5008

## 2024-08-21 NOTE — ED PROVIDER NOTES
History  Chief Complaint   Patient presents with    Allergic Reaction     Took new brain fog medication from [facility] on Saturday and Sunday woke up with itchy rash, rash continues, no benadryl for last 2 days    Ankle Injury     Fell yesterday when tripping over own feet, injured L ankle, no head strike, no loss of consciousness, takes 81mg aspirin     HPI  Patient is a 69-year-old female presents with allergic reaction and left ankle injury.  States that she fell yesterday due to tripping and injured her left foot.  Denies any head strike and no loss of consciousness.  Patient does take daily aspirin.  Denies any weakness, numbness distal to the injured site.  No prior fractures or dislocation.  Patient also reports that she started having diffuse pruritic rash that started after taking some medication from [facility].  States that contains ginkgo biloba.  Otherwise patient denies any wheezing, shortness of breath, difficulty swallowing, tongue swelling.  Patient has used Benadryl which relieved her itchiness however decided come in for evaluation.  Has not taken the reported medication since the outbreak of the rash.  Prior to Admission Medications   Prescriptions Last Dose Informant Patient Reported? Taking?   B COMPLEX VITAMINS PO   Yes Yes   Sig: Take 1 tablet by mouth daily   Baclofen 5 MG TABS   Yes Yes   Sig: Take by mouth Take 1-2 tabs TID prn   DULoxetine (CYMBALTA) 60 mg delayed release capsule   No Yes   Sig: Take 1 capsule (60 mg total) by mouth daily at bedtime   DULoxetine (Cymbalta) 30 mg delayed release capsule   No Yes   Sig: Take 1 capsule (30 mg total) by mouth daily at bedtime   Galcanezumab-gnlm 120 MG/ML SOAJ Not Taking  No No   Sig: Inject 120 mg under the skin every 28 days   Patient not taking: Reported on 8/21/2024   Lidocaine-Menthol 4-1 % CREA   Yes Yes   Sig: Apply 1 Dose topically if needed Patch 5%   Movantik 25 MG tablet   No Yes   Sig: Take 1 tablet (25 mg total) by mouth in the morning    OnabotulinumtoxinA (BOTOX IM) More than a month  Yes No   Sig: Inject into a muscle   Riboflavin (Vitamin B-2) 100 MG TABS   No Yes   Si in the morning and 2 at bedtime   Ubrogepant (Ubrelvy) 100 MG tablet   No Yes   Sig: Take 1 tablet (100 mg total) by mouth if needed (migraine) Take 1 tablet (100 mg) one time as needed for migraine. May repeat one additional tablet (100 mg) at least two hours after the first dose. Do not use more than two doses per day   acetaminophen (TYLENOL) 325 mg tablet   No Yes   Sig: Take 3 tablets (975 mg total) by mouth every 8 (eight) hours   bisacodyl (Dulcolax) 10 mg suppository   Yes Yes   Sig: Insert 10 mg into the rectum as needed for constipation   buPROPion (Wellbutrin XL) 150 mg 24 hr tablet   No Yes   Sig: Take 1 tablet (150 mg total) by mouth daily   busPIRone (BUSPAR) 10 mg tablet   No Yes   Sig: Take 1 tablet (10 mg total) by mouth 2 (two) times a day   calcium carbonate (OS-MIKE) 600 MG tablet   Yes Yes   Sig: Take 600 mg by mouth 2 (two) times a day   clonazePAM (KlonoPIN) 0.5 mg tablet   No Yes   Sig: Take 1 tablet (0.5 mg total) by mouth 3 (three) times a day   esomeprazole (NexIUM) 20 mg capsule   Yes Yes   Sig: Take 20 mg by mouth daily in the early morning    magnesium oxide (MAG-OX) 400 mg tablet   No Yes   Sig: Take 1 tablet (400 mg total) by mouth 2 (two) times a day   metFORMIN (GLUCOPHAGE) 500 mg tablet   No Yes   Sig: Take 0.5 tablets (250 mg total) by mouth 2 (two) times a day with meals   multivitamin (THERAGRAN) TABS   Yes Yes   Sig: Take 1 tablet by mouth daily   naloxone (NARCAN) 4 mg/0.1 mL nasal spray Not Taking  No No   Sig: Administer 1 spray into a nostril. If no response after 2-3 minutes, give another dose in the other nostril using a new spray.   Patient not taking: Reported on 2024   oxyCODONE (ROXICODONE) 10 MG TABS   Yes Yes   Sig: Take 10 mg by mouth every 8 (eight) hours as needed   polyethylene glycol (MIRALAX) 17 g packet   Yes Yes    Sig: Take 17 g by mouth daily at bedtime   potassium chloride (K-DUR,KLOR-CON) 20 mEq tablet   No Yes   Sig: Take 1 tablet (20 mEq total) by mouth daily   rosuvastatin (CRESTOR) 20 MG tablet   No Yes   Sig: Take 1 tablet (20 mg total) by mouth daily      Facility-Administered Medications: None       Past Medical History:   Diagnosis Date    Anxiety     Arthritis     Cancer (HCC)     Skin    Depression     Diabetes (HCC)     GERD (gastroesophageal reflux disease)     Hyperlipidemia     Hypertension     Migraine        Past Surgical History:   Procedure Laterality Date    ANKLE SURGERY      CARPAL TUNNEL RELEASE Right     ROTATOR CUFF REPAIR         Family History   Problem Relation Age of Onset    Mental illness Mother     Hyperlipidemia Mother     Hypertension Mother     Allergies Mother     Migraines Mother     Anxiety disorder Mother     Osteoporosis Mother     Hyperlipidemia Father     Heart disease Father     Cancer Father         Bladder    Migraines Sister     Hyperlipidemia Brother     Multiple sclerosis Brother     Hyperlipidemia Brother     Migraines Brother         Rare    Migraines Son         Rare    Colon cancer Maternal Grandmother     Heart attack Maternal Grandfather     Peripheral vascular disease Paternal Grandmother     Diabetes Paternal Grandmother     Asthma Paternal Grandfather     Diabetes Paternal Grandfather     Heart attack Paternal Grandfather      I have reviewed and agree with the history as documented.    E-Cigarette/Vaping    E-Cigarette Use Never User      E-Cigarette/Vaping Substances    Nicotine No     THC No     CBD No     Flavoring No     Other No     Unknown No      Social History     Tobacco Use    Smoking status: Former     Current packs/day: 0.00     Types: Cigarettes     Quit date:      Years since quittin.6    Smokeless tobacco: Never   Vaping Use    Vaping status: Never Used   Substance Use Topics    Alcohol use: Not Currently    Drug use: Never       Review of  Systems   Constitutional:  Negative for chills, diaphoresis, fever and unexpected weight change.   HENT:  Negative for ear pain and sore throat.    Eyes:  Negative for visual disturbance.   Respiratory:  Negative for cough, chest tightness and shortness of breath.    Cardiovascular:  Negative for chest pain and leg swelling.   Gastrointestinal:  Negative for abdominal distention, abdominal pain, constipation, diarrhea, nausea and vomiting.   Endocrine: Negative.    Genitourinary:  Negative for difficulty urinating and dysuria.   Musculoskeletal:  Positive for arthralgias.   Skin:  Positive for rash.   Allergic/Immunologic: Negative.    Neurological: Negative.    Hematological: Negative.    Psychiatric/Behavioral: Negative.     All other systems reviewed and are negative.      Physical Exam  Physical Exam  Vitals and nursing note reviewed.   Constitutional:       General: She is not in acute distress.     Appearance: Normal appearance. She is not ill-appearing.   HENT:      Head: Normocephalic and atraumatic.      Right Ear: External ear normal.      Left Ear: External ear normal.      Nose: Nose normal.      Mouth/Throat:      Mouth: Mucous membranes are moist.      Pharynx: Oropharynx is clear.   Eyes:      General: No scleral icterus.        Right eye: No discharge.         Left eye: No discharge.      Extraocular Movements: Extraocular movements intact.      Conjunctiva/sclera: Conjunctivae normal.      Pupils: Pupils are equal, round, and reactive to light.   Cardiovascular:      Rate and Rhythm: Normal rate and regular rhythm.      Pulses: Normal pulses.      Heart sounds: Normal heart sounds.   Pulmonary:      Effort: Pulmonary effort is normal.      Breath sounds: Normal breath sounds.   Abdominal:      General: Abdomen is flat. Bowel sounds are normal. There is no distension.      Palpations: Abdomen is soft.      Tenderness: There is no abdominal tenderness. There is no guarding or rebound.    Musculoskeletal:         General: Tenderness (Left mid foot dorsal surface) present. Normal range of motion.      Cervical back: Normal range of motion and neck supple.   Skin:     General: Skin is warm and dry.      Capillary Refill: Capillary refill takes less than 2 seconds.      Findings: Rash (Diffuse raised rash on the torso as well as her face) present.   Neurological:      General: No focal deficit present.      Mental Status: She is alert and oriented to person, place, and time. Mental status is at baseline.   Psychiatric:         Mood and Affect: Mood normal.         Behavior: Behavior normal.         Thought Content: Thought content normal.         Judgment: Judgment normal.         Vital Signs  ED Triage Vitals [08/21/24 1650]   Temperature Pulse Respirations Blood Pressure SpO2   99.7 °F (37.6 °C) 102 18 135/70 95 %      Temp Source Heart Rate Source Patient Position - Orthostatic VS BP Location FiO2 (%)   Oral Monitor Lying Right arm --      Pain Score       8           Vitals:    08/21/24 2030 08/21/24 2100 08/21/24 2229 08/22/24 0702   BP: 129/59 109/54 117/62 121/62   Pulse: 104 99 97 86   Patient Position - Orthostatic VS:    Lying         Visual Acuity      ED Medications  Medications   buPROPion (WELLBUTRIN XL) 24 hr tablet 150 mg (150 mg Oral Given 8/22/24 0959)   busPIRone (BUSPAR) tablet 10 mg (10 mg Oral Given 8/22/24 0959)   clonazePAM (KlonoPIN) tablet 0.5 mg (0.5 mg Oral Given 8/22/24 0959)   DULoxetine (CYMBALTA) delayed release capsule 90 mg (90 mg Oral Given 8/21/24 2348)   pantoprazole (PROTONIX) EC tablet 20 mg (20 mg Oral Given 8/22/24 0508)   polyethylene glycol (MIRALAX) packet 17 g (17 g Oral Given 8/21/24 2351)   atorvastatin (LIPITOR) tablet 40 mg (has no administration in time range)   oxyCODONE (ROXICODONE) immediate release tablet 10 mg (10 mg Oral Given 8/22/24 0507)   baclofen tablet 5 mg (has no administration in time range)   acetaminophen (TYLENOL) tablet 975 mg (975  mg Oral Given 8/22/24 0508)   oxyCODONE (ROXICODONE) split tablet 2.5 mg (has no administration in time range)   senna (SENOKOT) tablet 8.6 mg (has no administration in time range)   ondansetron (ZOFRAN) injection 4 mg (has no administration in time range)   aluminum-magnesium hydroxide-simethicone (MAALOX) oral suspension 30 mL (has no administration in time range)   enoxaparin (LOVENOX) subcutaneous injection 40 mg (40 mg Subcutaneous Given 8/22/24 1003)   HYDROmorphone HCl (DILAUDID) injection 0.2 mg (has no administration in time range)   insulin lispro (HumALOG/ADMELOG) 100 units/mL subcutaneous injection 1-5 Units ( Subcutaneous Not Given 8/22/24 1124)   magnesium Oxide (MAG-OX) tablet 400 mg (400 mg Oral Given 8/22/24 0959)   methylPREDNISolone sodium succinate (Solu-MEDROL) injection 40 mg (40 mg Intravenous Given 8/22/24 1000)   diphenhydrAMINE (BENADRYL) injection 25 mg (25 mg Intravenous Given 8/22/24 1000)   predniSONE tablet 40 mg (40 mg Oral Given 8/21/24 1716)       Diagnostic Studies  Results Reviewed       Procedure Component Value Units Date/Time    Urine Microscopic [[id number]]  (Abnormal) Collected: 08/21/24 2001    Lab Status: Final result Specimen: Urine, Other Updated: 08/21/24 2052     RBC, UA 0-1 /hpf      WBC, UA 2-4 /hpf      Epithelial Cells Moderate /hpf      Bacteria, UA Moderate /hpf      MUCUS THREADS Occasional     Hyaline Casts, UA 20-30 /lpf      Granular Casts, UA 25-50     OTHER OBSERVATIONS Transitional Epithelial Cells    UA (URINE) with reflex to Scope [[id number]]  (Abnormal) Collected: 08/21/24 2001    Lab Status: Final result Specimen: Urine, Other Updated: 08/21/24 2009     Color, UA Yellow     Clarity, UA Slightly Cloudy     Specific Gravity, UA 1.015     pH, UA 6.0     Leukocytes, UA Negative     Nitrite, UA Negative     Protein, UA 30 (1+) mg/dl      Glucose, UA Negative mg/dl      Ketones, UA 10 (1+) mg/dl      Urobilinogen, UA <2.0 mg/dl      Bilirubin, UA Negative      Occult Blood, UA Negative                   XR foot 3+ views LEFT   ED Interpretation by Ricardo Maurer MD (08/21 2108)   Fracture of the third fourth metacarpal bone of the proximal aspect      Final Result by Lio Monsalve MD (08/22 0648)      Acute fractures in the proximal second third and fourth metatarsals.         Computerized Assisted Algorithm (CAA) may have been used to analyze all applicable images.         Workstation performed: [id number]                    Procedures  Procedures         ED Course                                 SBIRT 22yo+      Flowsheet Row Most Recent Value   Initial Alcohol Screen: US AUDIT-C     1. How often do you have a drink containing alcohol? 0 Filed at: 08/21/2024 1717   2. How many drinks containing alcohol do you have on a typical day you are drinking?  0 Filed at: 08/21/2024 1717   3a. Male UNDER 65: How often do you have five or more drinks on one occasion? 0 Filed at: 08/21/2024 1717   3b. FEMALE Any Age, or MALE 65+: How often do you have 4 or more drinks on one occassion? 0 Filed at: 08/21/2024 1717   Audit-C Score 0 Filed at: 08/21/2024 1717   SADIE: How many times in the past year have you...    Used an illegal drug or used a prescription medication for non-medical reasons? Never Filed at: 08/21/2024 1717                      Medical Decision Making  6-year-old female presents with urticarial rash as well as left foot injury  Will obtain foot x-ray to assess for possible fracture versus dislocation  Urticarial rash to be treated with steroids  No signs of cellulitis or purpuric or petechial rash  Patient also endorsed later on that she was having some color changes in her urine and wanted to be evaluated for UTI  Urinalysis was ordered  X-ray reveals multiple metatarsal fracture at the base concerning for possible ligamentous injury.  Podiatry was consulted and recommended patient be placed in a cam boot and crutches with nonweightbearing.  If unable patient is  able to do partial weightbearing per podiatry  Due to patient unable to carry her cell via crutches due to her chronic shoulder pain medicine was consulted for possible ambulatory dysfunction and PT treatment  Medicine contacted podiatry and was told that patient can be placed in a walker.  Patient did a trial of ambulation via walker however failed and so patient was admitted to medicine for further management and care      Problems Addressed:  Ambulatory dysfunction: acute illness or injury  Metatarsal fracture: acute illness or injury  Urticaria: acute illness or injury    Amount and/or Complexity of Data Reviewed  Labs: ordered.  Radiology: ordered and independent interpretation performed.    Risk  Prescription drug management.  Decision regarding hospitalization.                 Disposition  Final diagnoses:   Urticaria   Metatarsal fracture   Ambulatory dysfunction     Time reflects when diagnosis was documented in both MDM as applicable and the Disposition within this note       Time User Action Codes Description Comment    8/21/2024  9:28 PM Ricardo Maurer [L50.9] Urticaria     8/21/2024  9:28 PM Ricardo Maurer [S92.309A] Metatarsal fracture     8/21/2024  9:29 PM Ricardo Maurer [R26.2] Ambulatory dysfunction     8/21/2024 11:24 PM Kathy Milner Add [S92.309A] Fracture of multiple metatarsal bones           ED Disposition       ED Disposition   Admit    Condition   Stable    Date/Time   Wed Aug 21, 2024 2128    Comment   Case was discussed with PAMELLA and the patient's admission status was agreed to be Admission Status: observation status to the service of Dr. Silva .               Follow-up Information    None         Current Discharge Medication List        CONTINUE these medications which have NOT CHANGED    Details   acetaminophen (TYLENOL) 325 mg tablet Take 3 tablets (975 mg total) by mouth every 8 (eight) hours  Refills: 0    Associated Diagnoses: Traumatic epidural hematoma with loss of  consciousness, initial encounter ([facility])      B COMPLEX VITAMINS PO Take 1 tablet by mouth daily      Baclofen 5 MG TABS Take by mouth Take 1-2 tabs TID prn      bisacodyl (Dulcolax) 10 mg suppository Insert 10 mg into the rectum as needed for constipation      buPROPion (Wellbutrin XL) 150 mg 24 hr tablet Take 1 tablet (150 mg total) by mouth daily  Qty: 90 tablet, Refills: 0    Associated Diagnoses: Major depression, recurrent, chronic ([facility])      busPIRone (BUSPAR) 10 mg tablet Take 1 tablet (10 mg total) by mouth 2 (two) times a day  Qty: 60 tablet, Refills: 1    Associated Diagnoses: SY (generalized anxiety disorder)      calcium carbonate (OS-MIKE) 600 MG tablet Take 600 mg by mouth 2 (two) times a day      clonazePAM (KlonoPIN) 0.5 mg tablet Take 1 tablet (0.5 mg total) by mouth 3 (three) times a day  Qty: 90 tablet, Refills: 1    Associated Diagnoses: SY (generalized anxiety disorder)      !! DULoxetine (Cymbalta) 30 mg delayed release capsule Take 1 capsule (30 mg total) by mouth daily at bedtime  Qty: 30 capsule, Refills: 1    Comments: To add to 60 mg for total of 90 mg hs  Associated Diagnoses: SY (generalized anxiety disorder); Major depression, recurrent, chronic ([facility])      !! DULoxetine (CYMBALTA) 60 mg delayed release capsule Take 1 capsule (60 mg total) by mouth daily at bedtime  Qty: 30 capsule, Refills: 1    Associated Diagnoses: SY (generalized anxiety disorder); Major depression, recurrent, chronic ([facility])      esomeprazole (NexIUM) 20 mg capsule Take 20 mg by mouth daily in the early morning       Lidocaine-Menthol 4-1 % CREA Apply 1 Dose topically if needed Patch 5%      magnesium oxide (MAG-OX) 400 mg tablet Take 1 tablet (400 mg total) by mouth 2 (two) times a day  Qty: 180 tablet, Refills: 3    Associated Diagnoses: Chronic migraine without aura without status migrainosus, not intractable      metFORMIN (GLUCOPHAGE) 500 mg tablet Take 0.5 tablets (250 mg total) by mouth 2 (two) times a day  with meals  Qty: 60 tablet, Refills: 3    Associated Diagnoses: Type 2 diabetes mellitus with other specified complication, without long-term current use of insulin (HCC)      Movantik 25 MG tablet Take 1 tablet (25 mg total) by mouth in the morning  Qty: 30 tablet, Refills: 1    Associated Diagnoses: Constipation, unspecified constipation type      multivitamin (THERAGRAN) TABS Take 1 tablet by mouth daily      oxyCODONE (ROXICODONE) 10 MG TABS Take 10 mg by mouth every 8 (eight) hours as needed      polyethylene glycol (MIRALAX) 17 g packet Take 17 g by mouth daily at bedtime      potassium chloride (K-DUR,KLOR-CON) 20 mEq tablet Take 1 tablet (20 mEq total) by mouth daily  Qty: 90 tablet, Refills: 3    Associated Diagnoses: Hypertension, essential      Riboflavin (Vitamin B-2) 100 MG TABS 2 in the morning and 2 at bedtime  Qty: 360 tablet, Refills: 3    Associated Diagnoses: Chronic migraine without aura without status migrainosus, not intractable      rosuvastatin (CRESTOR) 20 MG tablet Take 1 tablet (20 mg total) by mouth daily  Qty: 90 tablet, Refills: 3    Associated Diagnoses: Hypercholesterolemia      Ubrogepant (Ubrelvy) 100 MG tablet Take 1 tablet (100 mg total) by mouth if needed (migraine) Take 1 tablet (100 mg) one time as needed for migraine. May repeat one additional tablet (100 mg) at least two hours after the first dose. Do not use more than two doses per day  Qty: 48 tablet, Refills: 1    Comments: 48 tabs is a 90 day supply  Associated Diagnoses: Chronic migraine without aura without status migrainosus, not intractable      Galcanezumab-gnlm 120 MG/ML SOAJ Inject 120 mg under the skin every 28 days  Qty: 3 mL, Refills: 4    Associated Diagnoses: Chronic migraine without aura without status migrainosus, not intractable      naloxone (NARCAN) 4 mg/0.1 mL nasal spray Administer 1 spray into a nostril. If no response after 2-3 minutes, give another dose in the other nostril using a new spray.  Qty: 1  each, Refills: 1    Associated Diagnoses: Continuous opioid dependence (HCC)      OnabotulinumtoxinA (BOTOX IM) Inject into a muscle       !! - Potential duplicate medications found. Please discuss with provider.          No discharge procedures on file.    PDMP Review         Value Time User    PDMP Reviewed  Yes 2/1/2024  4:46 PM FE Andersen            ED Provider  Electronically Signed by             Ricardo Maurer MD  08/22/24 4638

## 2024-08-22 LAB
ANION GAP SERPL CALCULATED.3IONS-SCNC: 8 MMOL/L (ref 4–13)
ATRIAL RATE: 103 BPM
BUN SERPL-MCNC: 24 MG/DL (ref 5–25)
CALCIUM SERPL-MCNC: 9 MG/DL (ref 8.4–10.2)
CHLORIDE SERPL-SCNC: 98 MMOL/L (ref 96–108)
CO2 SERPL-SCNC: 27 MMOL/L (ref 21–32)
CREAT SERPL-MCNC: 0.94 MG/DL (ref 0.6–1.3)
ERYTHROCYTE [DISTWIDTH] IN BLOOD BY AUTOMATED COUNT: 15.5 % (ref 11.6–15.1)
EST. AVERAGE GLUCOSE BLD GHB EST-MCNC: 131 MG/DL
GFR SERPL CREATININE-BSD FRML MDRD: 61 ML/MIN/1.73SQ M
GLUCOSE SERPL-MCNC: 101 MG/DL (ref 65–140)
GLUCOSE SERPL-MCNC: 114 MG/DL (ref 65–140)
GLUCOSE SERPL-MCNC: 121 MG/DL (ref 65–140)
GLUCOSE SERPL-MCNC: 141 MG/DL (ref 65–140)
GLUCOSE SERPL-MCNC: 180 MG/DL (ref 65–140)
HBA1C MFR BLD: 6.2 %
HCT VFR BLD AUTO: 32.1 % (ref 34.8–46.1)
HGB BLD-MCNC: 10.5 G/DL (ref 11.5–15.4)
MCH RBC QN AUTO: 28.1 PG (ref 26.8–34.3)
MCHC RBC AUTO-ENTMCNC: 32.7 G/DL (ref 31.4–37.4)
MCV RBC AUTO: 86 FL (ref 82–98)
P AXIS: 64 DEGREES
PLATELET # BLD AUTO: 313 THOUSANDS/UL (ref 149–390)
PMV BLD AUTO: 9.3 FL (ref 8.9–12.7)
POTASSIUM SERPL-SCNC: 4.3 MMOL/L (ref 3.5–5.3)
PR INTERVAL: 134 MS
QRS AXIS: 62 DEGREES
QRSD INTERVAL: 74 MS
QT INTERVAL: 328 MS
QTC INTERVAL: 429 MS
RBC # BLD AUTO: 3.74 MILLION/UL (ref 3.81–5.12)
SODIUM SERPL-SCNC: 133 MMOL/L (ref 135–147)
T WAVE AXIS: 48 DEGREES
VENTRICULAR RATE: 103 BPM
WBC # BLD AUTO: 4.57 THOUSAND/UL (ref 4.31–10.16)

## 2024-08-22 PROCEDURE — 97163 PT EVAL HIGH COMPLEX 45 MIN: CPT

## 2024-08-22 PROCEDURE — 97530 THERAPEUTIC ACTIVITIES: CPT

## 2024-08-22 PROCEDURE — 83036 HEMOGLOBIN GLYCOSYLATED A1C: CPT | Performed by: STUDENT IN AN ORGANIZED HEALTH CARE EDUCATION/TRAINING PROGRAM

## 2024-08-22 PROCEDURE — 85027 COMPLETE CBC AUTOMATED: CPT | Performed by: PHYSICIAN ASSISTANT

## 2024-08-22 PROCEDURE — 80048 BASIC METABOLIC PNL TOTAL CA: CPT | Performed by: PHYSICIAN ASSISTANT

## 2024-08-22 PROCEDURE — 97167 OT EVAL HIGH COMPLEX 60 MIN: CPT

## 2024-08-22 PROCEDURE — 93010 ELECTROCARDIOGRAM REPORT: CPT | Performed by: INTERNAL MEDICINE

## 2024-08-22 PROCEDURE — 97760 ORTHOTIC MGMT&TRAING 1ST ENC: CPT

## 2024-08-22 PROCEDURE — 82948 REAGENT STRIP/BLOOD GLUCOSE: CPT

## 2024-08-22 PROCEDURE — 99222 1ST HOSP IP/OBS MODERATE 55: CPT | Performed by: PODIATRIST

## 2024-08-22 PROCEDURE — 99233 SBSQ HOSP IP/OBS HIGH 50: CPT | Performed by: STUDENT IN AN ORGANIZED HEALTH CARE EDUCATION/TRAINING PROGRAM

## 2024-08-22 RX ORDER — DIPHENHYDRAMINE HYDROCHLORIDE 50 MG/ML
25 INJECTION INTRAMUSCULAR; INTRAVENOUS EVERY 6 HOURS PRN
Status: DISCONTINUED | OUTPATIENT
Start: 2024-08-22 | End: 2024-08-24

## 2024-08-22 RX ORDER — METHYLPREDNISOLONE SODIUM SUCCINATE 40 MG/ML
40 INJECTION, POWDER, LYOPHILIZED, FOR SOLUTION INTRAMUSCULAR; INTRAVENOUS EVERY 8 HOURS SCHEDULED
Status: DISCONTINUED | OUTPATIENT
Start: 2024-08-22 | End: 2024-08-23

## 2024-08-22 RX ADMIN — PANTOPRAZOLE SODIUM 20 MG: 20 TABLET, DELAYED RELEASE ORAL at 05:08

## 2024-08-22 RX ADMIN — ACETAMINOPHEN 975 MG: 325 TABLET ORAL at 05:08

## 2024-08-22 RX ADMIN — POLYETHYLENE GLYCOL 3350 17 G: 17 POWDER, FOR SOLUTION ORAL at 21:51

## 2024-08-22 RX ADMIN — BUPROPION HYDROCHLORIDE 150 MG: 150 TABLET, EXTENDED RELEASE ORAL at 09:59

## 2024-08-22 RX ADMIN — ACETAMINOPHEN 975 MG: 325 TABLET ORAL at 21:50

## 2024-08-22 RX ADMIN — Medication 400 MG: at 09:59

## 2024-08-22 RX ADMIN — METHYLPREDNISOLONE SODIUM SUCCINATE 40 MG: 40 INJECTION, POWDER, FOR SOLUTION INTRAMUSCULAR; INTRAVENOUS at 21:51

## 2024-08-22 RX ADMIN — OXYCODONE HYDROCHLORIDE 10 MG: 10 TABLET ORAL at 21:51

## 2024-08-22 RX ADMIN — METHYLPREDNISOLONE SODIUM SUCCINATE 40 MG: 40 INJECTION, POWDER, FOR SOLUTION INTRAMUSCULAR; INTRAVENOUS at 14:17

## 2024-08-22 RX ADMIN — CLONAZEPAM 0.5 MG: 0.5 TABLET ORAL at 21:51

## 2024-08-22 RX ADMIN — Medication 400 MG: at 18:12

## 2024-08-22 RX ADMIN — METHYLPREDNISOLONE SODIUM SUCCINATE 40 MG: 40 INJECTION, POWDER, FOR SOLUTION INTRAMUSCULAR; INTRAVENOUS at 10:00

## 2024-08-22 RX ADMIN — OXYCODONE HYDROCHLORIDE 10 MG: 10 TABLET ORAL at 05:07

## 2024-08-22 RX ADMIN — CLONAZEPAM 0.5 MG: 0.5 TABLET ORAL at 16:00

## 2024-08-22 RX ADMIN — DULOXETINE HYDROCHLORIDE 90 MG: 30 CAPSULE, DELAYED RELEASE ORAL at 21:50

## 2024-08-22 RX ADMIN — DIPHENHYDRAMINE HYDROCHLORIDE 25 MG: 50 INJECTION, SOLUTION INTRAMUSCULAR; INTRAVENOUS at 10:00

## 2024-08-22 RX ADMIN — BUSPIRONE HYDROCHLORIDE 10 MG: 10 TABLET ORAL at 18:12

## 2024-08-22 RX ADMIN — OXYCODONE HYDROCHLORIDE 10 MG: 10 TABLET ORAL at 14:16

## 2024-08-22 RX ADMIN — BUSPIRONE HYDROCHLORIDE 10 MG: 10 TABLET ORAL at 09:59

## 2024-08-22 RX ADMIN — ATORVASTATIN CALCIUM 40 MG: 40 TABLET, FILM COATED ORAL at 16:00

## 2024-08-22 RX ADMIN — ACETAMINOPHEN 975 MG: 325 TABLET ORAL at 14:17

## 2024-08-22 RX ADMIN — CLONAZEPAM 0.5 MG: 0.5 TABLET ORAL at 09:59

## 2024-08-22 RX ADMIN — SENNOSIDES 8.6 MG: 8.6 TABLET, FILM COATED ORAL at 22:07

## 2024-08-22 RX ADMIN — ENOXAPARIN SODIUM 40 MG: 40 INJECTION SUBCUTANEOUS at 10:03

## 2024-08-22 NOTE — CASE MANAGEMENT
Case Management Assessment & Discharge Planning Note    Patient name Aniya Jones  Location /-01 MRN [id number]  : 1955 Date 2024       Current Admission Date: 2024  Current Admission Diagnosis:Fracture of multiple metatarsal bones   Patient Active Problem List    Diagnosis Date Noted Date Diagnosed    Fracture of multiple metatarsal bones 2024     Ambulatory dysfunction 2024     Allergic reaction 2024     At risk for delirium 2023     Advance care planning 2023     Diastolic dysfunction 2023     Scalp hematoma 2023     Epidural hemorrhage without loss of consciousness (HCC) 2023     Worsening headaches 2023     Uncontrolled morning headache 2023     Snores 2023     JAMES (obstructive sleep apnea) 2023     Fall 2023     Abnormal MRI 2023     Facet arthritis of lumbosacral region 2023    Hypercholesterolemia 2023    Hiatal hernia 2023    Drug-induced constipation 2023     Continuous opioid dependence (HCC) 2023     Bereavement due to life event 2022     Fatty liver disease, nonalcoholic 2022    SY (generalized anxiety disorder) 2022     Eating disorder 10/14/2022     Primary localized osteoarthritis of right knee 2022     Other insomnia 2020    Headache, chronic migraine without aura 2020     Cervicalgia 2020     Cervical dystonia 2020     Medication overuse headache 2020     Depression with anxiety 2020     Chronic midline low back pain with bilateral sciatica 2020     Bilateral occipital neuralgia 2020     Major depression, recurrent, chronic (HCC) 2018    Migraine without status migrainosus, not intractable 2018    Chronic GERD 2018    Type 2 diabetes mellitus with other specified complication,  without long-term current use of insulin (HCC) 01/31/2018 09/07/2023    Hypertension, essential 01/31/2018 09/07/2023    Chronic neck pain 01/31/2018 09/07/2023    Fibromyalgia 01/31/2018 09/07/2023      LOS (days): 0  Geometric Mean LOS (GMLOS) (days):   Days to GMLOS:     OBJECTIVE:              Current admission status: Observation       Preferred Pharmacy:   [id number] - [place] - [address]  [address]  [address]  [place] [address]  Phone: [phone] Fax: [phone]    Primary Care Provider: Jojo Merrill DO    Primary Insurance: MEDICARE  Secondary Insurance: BLUE CROSS    ASSESSMENT:  Active Health Care Proxies       Darryl Molina Health Care Representative - Brother   Primary Phone: [phone] (Mobile)                 Advance Directives  Does patient have a Health Care POA?: No  Was patient offered paperwork?: Yes (declined)  Does patient have Advance Directives?: No  Was patient offered paperwork?: Yes (declined)    Obs Notice Signed: 08/21/24    Readmission Root Cause  30 Day Readmission: No    Patient Information  Admitted from:: Home  Mental Status: Alert  During Assessment patient was accompanied by: Not accompanied during assessment  Assessment information provided by:: Patient  Primary Caregiver: Self  Support Systems: None  County of Residence: [place]  What city do you live in?: [place]  Home entry access options. Select all that apply.: Stairs  Number of steps to enter home.: 2  Type of Current Residence: [facility]  Living Arrangements: Lives Alone  Is patient a ?: No    Activities of Daily Living Prior to Admission  Functional Status: Independent  Completes ADLs independently?: Yes  Ambulates independently?: Yes  Does patient use assisted devices?: Yes  Assisted Devices (DME) used: Walker, Straight Cane  Does patient currently own DME?: Yes  What DME does the patient currently own?: Straight Cane, Walker  Does patient have a history of  Outpatient Therapy (PT/OT)?: No  Does the patient have a history of Short-Term Rehab?: No  Does patient have a history of HHC?: Yes  Does patient currently have HHC?: No         Patient Information Continued  Income Source: Pension/assisted  Does patient have prescription coverage?: Yes  Does patient receive dialysis treatments?: No  Does patient have a history of substance abuse?: No  Does patient have a history of Mental Health Diagnosis?: Yes  Is patient receiving treatment for mental health?: Yes  Has patient received inpatient treatment related to mental health in the last 2 years?: No         Means of Transportation  Means of Transport to Appts:: Drives Self      Social Determinants of Health (SDOH)      Flowsheet Row Most Recent Value   Housing Stability    In the last 12 months, was there a time when you were not able to pay the mortgage or rent on time? N   In the past 12 months, how many times have you moved where you were living? 0   At any time in the past 12 months, were you homeless or living in a shelter (including now)? N   Transportation Needs    In the past 12 months, has lack of transportation kept you from medical appointments or from getting medications? no   Food Insecurity    Within the past 12 months, you worried that your food would run out before you got the money to buy more. Never true   Within the past 12 months, the food you bought just didn't last and you didn't have money to get more. Never true   Utilities    In the past 12 months has the electric, gas, oil, or water company threatened to shut off services in your home? No            DISCHARGE DETAILS:    Discharge planning discussed with:: patient        CM contacted family/caregiver?: No- see comments (patient reports little support from brother and sister, son lives in [place])           Met with patient to review the role of CM and discuss discharge needs patient may have prior to discharge.  Patient reports living alone in  a ranch home with 2 STEPHY. She is independent of ADL's and uses a RW and SPC.  She reports falling on Tuesday and is is having difficulty being minimal weight bearing on left. She reports little support from family and having no friends. She is worried about returning home alone. Provided support. Encouraged her to attend adult activity center to expose self to making new contacts/friends.   Wait PT/OT assessment. Patient with h/o depression and is currently seeing psychiatric and counselor . Discharge plans TBD.

## 2024-08-22 NOTE — CASE MANAGEMENT
Case Management Discharge Planning Note    Patient name Aniya Jones  Location /-01 MRN [id number]  : 1955 Date 2024       Current Admission Date: 2024  Current Admission Diagnosis:Fracture of multiple metatarsal bones   Patient Active Problem List    Diagnosis Date Noted Date Diagnosed    Fracture of multiple metatarsal bones 2024     Ambulatory dysfunction 2024     Allergic reaction 2024     At risk for delirium 2023     Advance care planning 2023     Diastolic dysfunction 2023     Scalp hematoma 2023     Epidural hemorrhage without loss of consciousness (HCC) 2023     Worsening headaches 2023     Uncontrolled morning headache 2023     Snores 2023     JAMES (obstructive sleep apnea) 2023     Fall 2023     Abnormal MRI 2023     Facet arthritis of lumbosacral region 2023    Hypercholesterolemia 2023    Hiatal hernia 2023    Drug-induced constipation 2023     Continuous opioid dependence (HCC) 2023     Bereavement due to life event 2022     Fatty liver disease, nonalcoholic 2022    SY (generalized anxiety disorder) 2022     Eating disorder 10/14/2022     Primary localized osteoarthritis of right knee 2022     Other insomnia 2020    Headache, chronic migraine without aura 2020     Cervicalgia 2020     Cervical dystonia 2020     Medication overuse headache 2020     Depression with anxiety 2020     Chronic midline low back pain with bilateral sciatica 2020     Bilateral occipital neuralgia 2020     Major depression, recurrent, chronic (HCC) 2018    Migraine without status migrainosus, not intractable 2018    Chronic GERD 2018    Type 2 diabetes mellitus with other specified complication, without  long-term current use of insulin (HCC) 01/31/2018 09/07/2023    Hypertension, essential 01/31/2018 09/07/2023    Chronic neck pain 01/31/2018 09/07/2023    Fibromyalgia 01/31/2018 09/07/2023      LOS (days): 0  Geometric Mean LOS (GMLOS) (days): 2.8  Days to GMLOS:2.6     OBJECTIVE:  Risk of Unplanned Readmission Score: 22.17         Current admission status: Inpatient   Preferred Pharmacy:   [id number] - [place] - [address]  [address]  [place] [address]  Phone: [phone] Fax: [phone]    Primary Care Provider: Jojo Merrill DO    Primary Insurance: MEDICARE  Secondary Insurance: BLUE CROSS    DISCHARGE DETAILS:  Met with patient to discuss PT/OT's recommendation for SNF rehab and patient is agreeable.  Referral to SNF's within a 15 mile radius made.via Aidin.  Wait availability and patient preference. CM to follow.

## 2024-08-22 NOTE — ASSESSMENT & PLAN NOTE
Home regimen: Oxycodone 10mg TID, baclofen 3 times daily as needed, and duloxetine 90 Mg at bedtime  PDMP reviewed - oxycodone 10mg 90 tabs filled on 8/15  Continue

## 2024-08-22 NOTE — ASSESSMENT & PLAN NOTE
"Developed diffuse urticardia after starting supplement for \"brain fog\" containing gingko balboa, as well as magnesium and B12 supplements ordered from [facility] on 8/11- rash responded well to benadryl   Initiated solu medrol tid and IV benadryl  "

## 2024-08-22 NOTE — ASSESSMENT & PLAN NOTE
Home regimen: BuSpar 10 Mg twice daily, duloxetine 90 Mg at bedtime, bupropion 150 Mg daily  Continue home medication

## 2024-08-22 NOTE — ASSESSMENT & PLAN NOTE
Lab Results   Component Value Date    HGBA1C 5.7 (H) 05/16/2024       Recent Labs     08/22/24  0658   POCGLU 114       Blood Sugar Average: Last 72 hrs:  (P) [id number] shows excellent control   Hold metformin while inpatient - placed on SSI while inpatient

## 2024-08-22 NOTE — PHYSICAL THERAPY NOTE
PHYSICAL THERAPY EVALUATION NOTE      Patient Name: Aniya Jones  Today's Date: 2024    AGE:   69 y.o.  Mrn:   [id number]  ADMIT DX:  Allergic reaction [T78.40XA]  Urticaria [L50.9]  Metatarsal fracture [S92.309A]  Ambulatory dysfunction [R26.2]    Past Medical History:   Diagnosis Date    Anxiety     Arthritis     Cancer (HCC)     Skin    Depression     Diabetes (HCC)     GERD (gastroesophageal reflux disease)     Hyperlipidemia     Hypertension     Migraine      Length Of Stay: 0  PHYSICAL THERAPY EVALUATION :   Patient's identity confirmed via 2 patient identifiers (full name and ) at start of session       24 1425   PT Last Visit   PT Visit Date 24   Note Type   Note type Evaluation;Orthotic Management/Training   Type of Brace   Brace Applied Cam Walker Boot Standard  (Size M (pt was fit w/ a XL in the ED))   Pain Assessment   Pain Assessment Tool 0-10   Pain Score 10 - Worst Possible Pain   Pain Location/Orientation Orientation: Left;Location: Foot   Patient's Stated Pain Goal No pain   Hospital Pain Intervention(s) Repositioned;Ambulation/increased activity;Emotional support   Restrictions/Precautions   Weight Bearing Precautions Per Order Yes   LLE Weight Bearing Per Order NWB  (heel touch to pivot only)   Braces or Orthoses CAM Boot  (LLE)   Other Precautions Chair Alarm;Bed Alarm;WBS;Fall Risk;Pain   Home Living   Type of Home House   Home Layout One level;Stairs to enter with rails  (2 STEPHY)   Home Equipment Walker;Cane   Additional Comments Pt reports using SPC at baseline   Prior Function   Level of [place] Independent with ADLs;Independent with functional mobility   Lives With Alone   Falls in the last 6 months 1 to 4  (1; but pt reports having balance issues PTA)   Vocational Retired  (RN)   General   Family/Caregiver Present No   Cognition   Overall Cognitive Status WFL   Arousal/Participation  Alert   Orientation Level Oriented X4   Memory Within functional limits   Following Commands Follows multistep commands with increased time or repetition   Comments Pt agreeable to participate in PT evaluation   RLE Assessment   RLE Assessment WFL   Strength RLE   RLE Overall Strength 4-/5   LLE Assessment   LLE Assessment WFL   Strength LLE   LLE Overall Strength 3/5   Bed Mobility   Supine to Sit 4  Minimal assistance   Additional items Assist x 1;LE management   Sit to Supine 5  Supervision   Additional items Assist x 1   Additional Comments Pt sitting EOB upon arrival. Donned CAM boot. Pt needed to return supine due to throbbing of foot while setting up recliner chair   Transfers   Sit to Stand 3  Moderate assistance   Additional items Assist x 1;Increased time required;Verbal cues   Stand to Sit 3  Moderate assistance   Additional items Assist x 1;Increased time required;Verbal cues   Stand pivot 3  Moderate assistance   Additional items Assist x 1;Increased time required;Verbal cues  (w/ RW)   Additional Comments Pt is able to appropriately perform heel touch WB in CAM boot w/ RW to pivot to recliner chair   Balance   Static Sitting Good   Static Standing Poor   Activity Tolerance   Activity Tolerance Patient limited by pain   Medical Staff Made Aware Dr. Jovanny Lawrence DPM, CM [place]   Nurse Made Aware OSVALDO Lieberman   Assessment   Problem List Decreased strength;Decreased endurance;Impaired balance;Decreased mobility;Orthopedic restrictions;Pain   Assessment Aniya Jones is a 69 y.o. Female who presents to [facility] on 8/21/2024 from home w/ c/o fall and diagnosis of fracture of multiple metatarsal bones. Orders for PT eval and treat received, w/ activity orders of  heel touch to transfer LLE  and fall precautions. Pt presents w/ comorbidities of depression w/ anxiety, DMII, chronic migraines, HTN, fibromyalgia, JAMES, pt reports hx of shoulder dysfunctions as well. At baseline, pt mobilizes modified I w/ SPC, and reports  1 falls in the last 6 months. Upon evaluation, pt presents w/ the following deficits: weakness, impaired balance, and pain limiting functional mobility. Upon eval, pt requires supervision for bed mobility, mod A x 1 for transfers. Based on this PT evaluation today, patient's discharge recommendation is for Level II - Moderate Rehab Resource Intensity. During this admission, pt would benefit from continued skilled inpatient PT in the acute care setting in order to address the abovementioned deficits to maximize function and mobility before DC from acute care.   Barriers to Discharge   (Requiring mod A x 1 to transfer, no social support, STEPHY)   Goals   Patient Goals to have less pain   STG Expiration Date 09/01/24   Short Term Goal #1 Patient will: Perform all bed mobility tasks modified independent to improve pt's independence w/ repositioning for decrease risk of skin breakdown, Perform all transfers w/ supervision consistently from various height surfaces in order to improve I w/ engagement w/ real-world environments/situations, Navigate 2 stairs w/ supervision with unilateral handrail to either improve independence w/ entering home and/or so patient can fully access living areas in home, Propel in wheelchair for at least 100 ft w/ w/ supervision over tile surface in order to be able to use WC as alternate means of mobility, and Pt will be able to don/doff CAM boot w/ w/ supervision in order to demonstrate understanding and increase chance for compliance   PT Treatment Day 0   Plan   Treatment/Interventions Functional transfer training;LE strengthening/ROM;Elevations;Therapeutic exercise;Endurance training;Patient/family training;Equipment eval/education;Bed mobility;Gait training   PT Frequency 2-3x/wk   Discharge Recommendation   Rehab Resource Intensity Level, PT II (Moderate Resource Intensity)   Equipment Recommended Walker;Wheelchair   Wheelchair Package Recommended Lightweight   AM-PAC Basic Mobility  Inpatient   Turning in Flat Bed Without Bedrails 3   Lying on Back to Sitting on Edge of Flat Bed Without Bedrails 3   Moving Bed to Chair 3   Standing Up From Chair Using Arms 2   Walk in Room 2   Climb 3-5 Stairs With Railing 1   Basic Mobility Inpatient Raw Score 14   Basic Mobility Standardized Score 35.55   [facility] Highest Level Of Mobility   -[facility] Goal 4: Move to chair/commode   -HL Achieved 4: Move to chair/commode   End of Consult   Patient Position at End of Consult Bedside chair;Bed/Chair alarm activated;All needs within reach         The patient's AM-PAC Basic Mobility Inpatient Short Form Raw Score is 14, Standardized Score is 35.55. A standardized score less than 38.32 (raw score of 16) suggests the patient may benefit from discharge to post-acute rehabilitation services which may not coincide with above PT recommendations. However please refer to therapist recommendation for discharge planning given other factors that may influence destination.    Pt would benefit from skilled inpatient PT during this admission in order to facilitate progress towards goals to maximize functional independence    Melissa Gross PT, DPT

## 2024-08-22 NOTE — PLAN OF CARE
Problem: Potential for Falls  Goal: Patient will remain free of falls  Description: INTERVENTIONS:  - Educate patient/family on patient safety including physical limitations  - Instruct patient to call for assistance with activity   - Consult OT/PT to assist with strengthening/mobility   - Keep Call bell within reach  - Keep bed low and locked with side rails adjusted as appropriate  - Keep care items and personal belongings within reach  - Initiate and maintain comfort rounds  - Make Fall Risk Sign visible to staff  - Offer Toileting every xx Hours, in advance of need  - Initiate/Maintain xxalarm  - Obtain necessary fall risk management equipment: x  - Apply yellow socks and bracelet for high fall risk patients  - Consider moving patient to room near nurses station  8/22/2024 1446 by Luisana Cooley RN  Outcome: Progressing  8/22/2024 1446 by Luisana Cooley RN  Outcome: Progressing     Problem: PAIN - ADULT  Goal: Verbalizes/displays adequate comfort level or baseline comfort level  Description: Interventions:  - Encourage patient to monitor pain and request assistance  - Assess pain using appropriate pain scale  - Administer analgesics based on type and severity of pain and evaluate response  - Implement non-pharmacological measures as appropriate and evaluate response  - Consider cultural and social influences on pain and pain management  - Notify physician/advanced practitioner if interventions unsuccessful or patient reports new pain  Outcome: Progressing     Problem: INFECTION - ADULT  Goal: Absence or prevention of progression during hospitalization  Description: INTERVENTIONS:  - Assess and monitor for signs and symptoms of infection  - Monitor lab/diagnostic results  - Monitor all insertion sites, i.e. indwelling lines, tubes, and drains  - Monitor endotracheal if appropriate and nasal secretions for changes in amount and color  - [place] appropriate cooling/warming therapies per order  - Administer  medications as ordered  - Instruct and encourage patient and family to use good hand hygiene technique  - Identify and instruct in appropriate isolation precautions for identified infection/condition  Outcome: Progressing     Problem: SAFETY ADULT  Goal: Patient will remain free of falls  Description: INTERVENTIONS:  - Educate patient/family on patient safety including physical limitations  - Instruct patient to call for assistance with activity   - Consult OT/PT to assist with strengthening/mobility   - Keep Call bell within reach  - Keep bed low and locked with side rails adjusted as appropriate  - Keep care items and personal belongings within reach  - Initiate and maintain comfort rounds  - Make Fall Risk Sign visible to staff  - Offer Toileting every x Hours, in advance of need  - Initiate/Maintain xxalarm  - Obtain necessary fall risk management equipment: xx  - Apply yellow socks and bracelet for high fall risk patients  - Consider moving patient to room near nurses station  8/22/2024 1446 by Luisana Cooley RN  Outcome: Progressing  8/22/2024 1446 by Luisana Cooley RN  Outcome: Progressing  Goal: Maintain or return to baseline ADL function  Description: INTERVENTIONS:  -  Assess patient's ability to carry out ADLs; assess patient's baseline for ADL function and identify physical deficits which impact ability to perform ADLs (bathing, care of mouth/teeth, toileting, grooming, dressing, etc.)  - Assess/evaluate cause of self-care deficits   - Assess range of motion  - Assess patient's mobility; develop plan if impaired  - Assess patient's need for assistive devices and provide as appropriate  - Encourage maximum independence but intervene and supervise when necessary  - Involve family in performance of ADLs  - Assess for home care needs following discharge   - Consider OT consult to assist with ADL evaluation and planning for discharge  - Provide patient education as appropriate  Outcome: Progressing  Goal:  Maintains/Returns to pre admission functional level  Description: INTERVENTIONS:  - Perform AM-PAC 6 Click Basic Mobility/ Daily Activity assessment daily.  - Set and communicate daily mobility goal to care team and patient/family/caregiver.   - Collaborate with rehabilitation services on mobility goals if consulted  - Perform Range of Motion x times a day.  - Reposition patient every xx hours.  - Dangle patient xxx times a day  - Stand patient xx times a day  - Ambulate patient x times a day  - Out of bed to chair x times a day   - Out of bed for meals xx times a day  - Out of bed for toileting  - Record patient progress and toleration of activity level   Outcome: Progressing     Problem: DISCHARGE PLANNING  Goal: Discharge to home or other facility with appropriate resources  Description: INTERVENTIONS:  - Identify barriers to discharge w/patient and caregiver  - Arrange for needed discharge resources and transportation as appropriate  - Identify discharge learning needs (meds, wound care, etc.)  - Arrange for interpretive services to assist at discharge as needed  - Refer to Case Management Department for coordinating discharge planning if the patient needs post-hospital services based on physician/advanced practitioner order or complex needs related to functional status, cognitive ability, or social support system  Outcome: Progressing

## 2024-08-22 NOTE — PLAN OF CARE
Problem: OCCUPATIONAL THERAPY ADULT  Goal: Performs self-care activities at highest level of function for planned discharge setting.  See evaluation for individualized goals.  Description: Treatment Interventions: ADL retraining, Functional transfer training, UE strengthening/ROM, Endurance training, Patient/family training, Equipment evaluation/education, Compensatory technique education, Continued evaluation, Energy conservation, Activityengagement          See flowsheet documentation for full assessment, interventions and recommendations.   Note: Limitation: Decreased ADL status, Decreased UE strength, Decreased Safe judgement during ADL, Decreased endurance, Decreased self-care trans, Decreased high-level ADLs  Prognosis: Good  Assessment: Pt is a 69 y.o. female seen for OT evaluation s/p admission to [facility] on 8/21/2024 due to fall resulting in Closed minimally displaced fracture of second metatarsal base left foot  Closed nondisplaced fracture of third and fourth metatarsals left foot. Diagnosed with Fracture of multiple metatarsal bones. PMH of migraines, depression, anxiety, and chronic pain who presents with full body rash and left foot pain Personal and env factors supporting pt at time of IE include (I) PLOF and FFSU. Pt was living alone in a 1STH, 2 STEPHY at [facility] level for I/ADLs, SPC for ambulation +  prior to admission.  Pt presents today with heel touch weightbearing with CAM boot on left lower extremity orders, balance deficits and standing, + Pain and decreased activity tolerance.  Upon initial evaluation, pt appears to be performing below baseline functional status. Personal and env factors inhibiting engagement in occupations include limited social support, inaccessible home environment, inaccessibility to community, difficulty completing ADLs, and difficulty completing IADLs.   Due to pt's current functional limitations and medical complications pt is functioning below  baseline. Patient would benefit from OT services within the acute care setting to maximize level of functional independence in the following areas self-care transfers, functional mobility, and ADLs. From OT standpoint, recommendation at time of D/C would be Level II.     Rehab Resource Intensity Level, OT: II (Moderate Resource Intensity)

## 2024-08-22 NOTE — ASSESSMENT & PLAN NOTE
S/p mechanical fall on 8/20   Podiatry recommending NWB initially, however pt unable to use crutches s/d to chronic shoulder pain there OK with minimal weight bearing to LLE and CAM boot - pt unable to do this, prompting admission   Podiatry consulted   N.p.o. at midnight in case possibly operative

## 2024-08-22 NOTE — ASSESSMENT & PLAN NOTE
Unable to minimally weight bear on LLE with walker   PT, OT, and CM consult for possible short term rehab?

## 2024-08-22 NOTE — ASSESSMENT & PLAN NOTE
Lab Results   Component Value Date    HGBA1C 6.2 (H) 08/22/2024       Recent Labs     08/24/24  0806 08/24/24  1234 08/24/24  1633 08/25/24  0720   POCGLU 132 92 91 91       Blood Sugar Average: Last 72 hrs:  (P) [id number] shows excellent control   Hold metformin while inpatient - placed on SSI while inpatient

## 2024-08-22 NOTE — ASSESSMENT & PLAN NOTE
Unable to minimally weight bear on LLE with walker   PT, OT, recommending Level 2 care  Pt to go to [facility]

## 2024-08-22 NOTE — PROGRESS NOTES
"Vitamin B-2 Tabs order discontinued per [employer] \"Herbal Supplements and Alternative Medications\" policy    "

## 2024-08-22 NOTE — PHYSICAL THERAPY NOTE
PHYSICAL THERAPY CANCELLATION NOTE      Patient Name: Aniya Jones  Today's Date: 8/22/2024 08/22/24 0852   PT Last Visit   PT Visit Date 08/22/24   Note Type   Note type Cancelled Session   Additional Comments PT orders received, chart review performed. Pt is currently awaiting podiatry consult for WBS, need for CAM boot, and/or if surgical intervention needed. Recommend RN staff mobilize pt NWB and hopping w/ RW at this time to be conservative. PT will f/u after POC from podiatry.       Melissa Gross, PT, DPT

## 2024-08-22 NOTE — PROGRESS NOTES
"[facility]  Progress Note  Name: Aniya Jones I  MRN: [id number]  Unit/Bed#: -Jaz I Date of Admission: 8/21/2024   Date of Service: 8/22/2024  Hospital Day: 0    Assessment & Plan   * Fracture of multiple metatarsal bones  Assessment & Plan  S/p mechanical fall on 8/20   Podiatry recommending NWB initially, however pt unable to use crutches s/d to chronic shoulder pain there OK with minimal weight bearing to LLE and CAM boot - pt unable to do this, prompting admission   Podiatry consulted recommendations appreciated    Ambulatory dysfunction  Assessment & Plan  Unable to minimally weight bear on LLE with walker   PT, OT, and CM consult for possible short term rehab?    Allergic reaction  Assessment & Plan  Developed diffuse urticardia after starting supplement for \"brain fog\" containing gingko balboa, as well as magnesium and B12 supplements ordered from [facility] [employer] [facility] on 8/11- rash responded well to benadryl   Initiated solu medrol tid and IV benadryl    Type 2 diabetes mellitus with other specified complication, without long-term current use of insulin (HCC)  Assessment & Plan  Lab Results   Component Value Date    HGBA1C 5.7 (H) 05/16/2024       Recent Labs     08/22/24  0658   POCGLU 114       Blood Sugar Average: Last 72 hrs:  (P) [id number] shows excellent control   Hold metformin while inpatient - placed on SSI while inpatient     Continuous opioid dependence (HCC)  Assessment & Plan  Home regimen: Oxycodone 10mg TID, baclofen 3 times daily as needed, and duloxetine 90 Mg at bedtime  PDMP reviewed - oxycodone 10mg 90 tabs filled on 8/15  Continue     Depression with anxiety  Assessment & Plan  Home regimen: BuSpar 10 Mg twice daily, duloxetine 90 Mg at bedtime, bupropion 150 Mg daily  Continue home medication               VTE Pharmacologic Prophylaxis: VTE Score: 3 Moderate Risk (Score 3-4) - Pharmacological DVT Prophylaxis Ordered: enoxaparin (Lovenox).    Mobility:   Basic " Mobility Inpatient Raw Score: 16  JH-HLM Goal: 5: Stand one or more mins  JH-HLM Achieved: 2: Bed activities/Dependent transfer  JH-HLM Goal NOT achieved. Continue with multidisciplinary rounding and encourage appropriate mobility to improve upon JH-HLM goals.    Patient Centered Rounds: I performed bedside rounds with nursing staff today.   Discussions with Specialists or Other Care Team Provider: Podiatry, CM, Pt, OT    Education and Discussions with Family / Patient: Patient declined call to .     Total Time Spent on Date of Encounter in care of patient: 60 mins. This time was spent on one or more of the following: performing physical exam; counseling and coordination of care; obtaining or reviewing history; documenting in the medical record; reviewing/ordering tests, medications or procedures; communicating with other healthcare professionals and discussing with patient's family/caregivers.    Current Length of Stay: 0 day(s)  Current Patient Status: Observation   Certification Statement: The patient will continue to require additional inpatient hospital stay due to metatarsal fx  Discharge Plan: Anticipate discharge in 24-48 hrs to discharge location to be determined pending rehab evaluations.    Code Status: Level 1 - Full Code    Subjective:   Aniya was seen and examined at bedside.  No acute events overnight.  Discussed plan of care.  All questions and concerns were answered and addressed.  Patient significantly irritated due to hives and is constantly itching.  Will discontinue prednisone and initiate Solu-Medrol 3 times daily along with IV Benadryl.  Discussed with podiatry patient in cam boot.  Patient to be evaluated fully with podiatry later.    Objective:     Vitals:   Temp (24hrs), Av.9 °F (36.6 °C), Min:96.8 °F (36 °C), Max:99.7 °F (37.6 °C)    Temp:  [96.8 °F (36 °C)-99.7 °F (37.6 °C)] 96.8 °F (36 °C)  HR:  [] 86  Resp:  [18-20] 18  BP: (109-135)/(54-73) 121/62  SpO2:  [92  %-97 %] 92 %  Body mass index is 28.84 kg/m².     Input and Output Summary (last 24 hours):   No intake or output data in the 24 hours ending 08/22/24 1020    Physical Exam:   Physical Exam  Vitals and nursing note reviewed.   Constitutional:       General: She is not in acute distress.     Appearance: She is not ill-appearing.   HENT:      Head: Normocephalic and atraumatic.   Cardiovascular:      Rate and Rhythm: Normal rate and regular rhythm.      Pulses: Normal pulses.      Heart sounds: Normal heart sounds.   Pulmonary:      Effort: Pulmonary effort is normal.      Breath sounds: Normal breath sounds.   Abdominal:      General: Abdomen is flat. Bowel sounds are normal.      Palpations: Abdomen is soft.   Musculoskeletal:      Right lower leg: No edema.      Left lower leg: No edema.      Comments: LLE in boot   Skin:     General: Skin is warm.      Findings: Rash present.      Comments: hives   Neurological:      General: No focal deficit present.      Mental Status: She is alert and oriented to person, place, and time.          Additional Data:     Labs:  Results from last 7 days   Lab Units 08/22/24  0532   WBC Thousand/uL 4.57   HEMOGLOBIN g/dL 10.5*   HEMATOCRIT % 32.1*   PLATELETS Thousands/uL 313     Results from last 7 days   Lab Units 08/22/24  0532   SODIUM mmol/L 133*   POTASSIUM mmol/L 4.3   CHLORIDE mmol/L 98   CO2 mmol/L 27   BUN mg/dL 24   CREATININE mg/dL 0.94   ANION GAP mmol/L 8   CALCIUM mg/dL 9.0   GLUCOSE RANDOM mg/dL 121         Results from last 7 days   Lab Units 08/22/24  0658   POC GLUCOSE mg/dl 114               Lines/Drains:  Invasive Devices       Peripheral Intravenous Line  Duration             Peripheral IV 08/21/24 Right Forearm <1 day                          Imaging: No pertinent imaging reviewed.    Recent Cultures (last 7 days):         Last 24 Hours Medication List:   Current Facility-Administered Medications   Medication Dose Route Frequency Provider Last Rate     acetaminophen  975 mg Oral Q8H [facility] Kathy Milner PA-C      aluminum-magnesium hydroxide-simethicone  30 mL Oral Q6H PRN Kathy Milner PA-C      atorvastatin  40 mg Oral Daily With Dinner Kathy Milner PA-C      baclofen  5 mg Oral TID PRN Kathy Milner PA-C      buPROPion  150 mg Oral Daily Kathy Milner PA-C      busPIRone  10 mg Oral BID Kathy Milner PA-C      clonazePAM  0.5 mg Oral TID Kathy Milner PA-C      diphenhydrAMINE  25 mg Intravenous Q6H PRN Yen Peraza MD      DULoxetine  90 mg Oral HS Kathy Milner PA-C      enoxaparin  40 mg Subcutaneous Daily Kathy Milner PA-C      HYDROmorphone  0.2 mg Intravenous Q4H PRN Kathy Milner PA-C      insulin lispro  1-5 Units Subcutaneous TID AC Kathy Milner PA-C      magnesium Oxide  400 mg Oral BID Kathy Milner PA-C      methylPREDNISolone sodium succinate  40 mg Intravenous Q8H [facility] Yen Peraza MD      ondansetron  4 mg Intravenous Q6H PRN Kathy Milner PA-C      oxyCODONE  10 mg Oral TID Kathy Milner PA-C      oxyCODONE  2.5 mg Oral Q4H PRN Kathy Milner PA-C      pantoprazole  20 mg Oral Early Morning Kathy Milner PA-C      polyethylene glycol  17 g Oral HS Kathy Milner PA-C      senna  1 tablet Oral HS PRN Kathy Milner PA-C          Today, Patient Was Seen By: Yen Peraza MD    **Please Note: This note may have been constructed using a voice recognition system.**

## 2024-08-22 NOTE — OCCUPATIONAL THERAPY NOTE
Occupational Therapy Evaluation     Patient Name: Aniya Jonse  Today's Date: 8/22/2024  Problem List  Principal Problem:    Fracture of multiple metatarsal bones  Active Problems:    Depression with anxiety    Continuous opioid dependence (HCC)    Type 2 diabetes mellitus with other specified complication, without long-term current use of insulin (HCC)    Ambulatory dysfunction    Allergic reaction    Past Medical History  Past Medical History:   Diagnosis Date    Anxiety     Arthritis     Cancer (HCC)     Skin    Depression     Diabetes (HCC)     GERD (gastroesophageal reflux disease)     Hyperlipidemia     Hypertension     Migraine      Past Surgical History  Past Surgical History:   Procedure Laterality Date    ANKLE SURGERY      CARPAL TUNNEL RELEASE Right     ROTATOR CUFF REPAIR               08/22/24 1417   OT Last Visit   OT Visit Date 08/22/24   Note Type   Note type Evaluation   Pain Assessment   Pain Assessment Tool 0-10   Pain Score 10 - Worst Possible Pain   Pain Location/Orientation Orientation: Left;Location: Foot   Multiple Pain Sites No   Restrictions/Precautions   Weight Bearing Precautions Per Order Yes   LLE Weight Bearing Per Order Other  (Minimal WB LLE with CAM boot)   Other Precautions Chair Alarm;Bed Alarm;Multiple lines;Fall Risk   Home Living   Type of Home House   Home Layout One level;Performs ADLs on one level;Able to live on main level with bedroom/bathroom;Stairs to enter with rails  (2 STEPHY)   Bathroom Shower/Tub Tub/shower unit   Bathroom Equipment Shower chair  (does not utilize)   Bathroom Accessibility Accessible   Home Equipment Walker;Cane   Prior Function   Level of [place] Independent with ADLs;Independent with functional mobility;Independent with IADLS   Lives With (S)  Alone   Receives Help From   (no assistance available)   IADLs Independent with driving;Independent with meal prep;Independent with medication management   Vocational Retired  (LPN)   Lifestyle    Autonomy Patient lives alone in a one-story home 2 steps to enter, was independent for all I/ADLs, independent ambulated with single-point cane, +  prior to admission.  Patient reports she does not have any assistance available from outside sources   Service to Others Retired LPN   Intrinsic Gratification Reports limited interest in activities since spouse passed away   General   Family/Caregiver Present No   ADL   Eating Assistance 7  Independent   Grooming Assistance 5  Supervision/Setup   Grooming Deficit Setup   UB Bathing Assistance 5  Supervision/Setup   UB Bathing Deficit Setup   LB Bathing Assistance 4  Minimal Assistance   UB Dressing Assistance 6  Modified independent   UB Dressing Deficit Setup   LB Dressing Assistance 4  Minimal Assistance   LB Dressing Deficit Setup;Steadying;Requires assistive device for steadying;Verbal cueing;Supervision/safety;Increased time to complete   Bed Mobility   Supine to Sit 4  Minimal assistance   Additional items Verbal cues;HOB elevated;Bedrails;Assist x 1   Sit to Supine 5  Supervision   Additional items Assist x 1;Verbal cues;Increased time required   Transfers   Stand to Sit 3  Moderate assistance   Additional items Assist x 1;Verbal cues   Stand pivot 3  Moderate assistance   Additional items Assist x 1;Verbal cues   Sit pivot 3  Moderate assistance   Additional items Increased time required;Verbal cues;Assist x 1   Activity Tolerance   Activity Tolerance Patient limited by pain   Nurse Made Aware PT Melissa   RUE Assessment   RUE Assessment WFL   LUE Assessment   LUE Assessment WFL   Cognition   Overall Cognitive Status WFL   Arousal/Participation Alert;Cooperative   Attention Within functional limits   Orientation Level Oriented X4   Following Commands Follows one step commands without difficulty   Assessment   Limitation Decreased ADL status;Decreased UE strength;Decreased Safe judgement during ADL;Decreased endurance;Decreased self-care trans;Decreased  high-level ADLs   Prognosis Good   Assessment Pt is a 69 y.o. female seen for OT evaluation s/p admission to [facility] on 8/21/2024 due to fall resulting in Closed minimally displaced fracture of second metatarsal base left foot  Closed nondisplaced fracture of third and fourth metatarsals left foot. Diagnosed with Fracture of multiple metatarsal bones. PMH of migraines, depression, anxiety, and chronic pain who presents with full body rash and left foot pain Personal and env factors supporting pt at time of IE include (I) PLOF and FFSU. Pt was living alone in a 1STH, 2 STEPHY at [facility] level for I/ADLs, SPC for ambulation +  prior to admission.  Pt presents today with heel touch weightbearing with CAM boot on left lower extremity orders, balance deficits and standing, + Pain and decreased activity tolerance.  Upon initial evaluation, pt appears to be performing below baseline functional status. Personal and env factors inhibiting engagement in occupations include limited social support, inaccessible home environment, inaccessibility to community, difficulty completing ADLs, and difficulty completing IADLs.   Due to pt's current functional limitations and medical complications pt is functioning below baseline. Patient would benefit from OT services within the acute care setting to maximize level of functional independence in the following areas self-care transfers, functional mobility, and ADLs. From OT standpoint, recommendation at time of D/C would be Level II.   Goals   LTG Time Frame 10-14   Plan   Treatment Interventions ADL retraining;Functional transfer training;UE strengthening/ROM;Endurance training;Patient/family training;Equipment evaluation/education;Compensatory technique education;Continued evaluation;Energy conservation;Activityengagement   Goal Expiration Date 09/05/24   OT Treatment Day 0   OT Frequency 3-5x/wk   Discharge Recommendation   Rehab Resource Intensity Level, OT II  (Moderate Resource Intensity)   AM-PAC Daily Activity Inpatient   Lower Body Dressing 2   Bathing 3   Toileting 2   Upper Body Dressing 4   Grooming 4   Eating 4   Daily Activity Raw Score 19   Daily Activity Standardized Score (Calc for Raw Score >=11) 40.22   AM-PAC Applied Cognition Inpatient   Following a Speech/Presentation 4   Understanding Ordinary Conversation 4   Taking Medications 4   Remembering Where Things Are Placed or Put Away 4   Remembering List of 4-5 Errands 4   Taking Care of Complicated Tasks 4   Applied Cognition Raw Score 24   Applied Cognition Standardized Score 62.21   Additional Treatment Session   Start Time 1431   End Time 1444   Treatment Assessment Patient seen for Occupational Therapy follow-up session with emphasis on functional transfers safety education, cam boot education on donning/doffing of boot, and ADLs.  Patient was living alone in a one-story home 2 steps to enter at independent level for all I/ADLs, utilized single-point cane for ambulation and  + prior to admission.  Patient presents today with pain in left foot, balance deficits and standing, generalized weakness, decreased endurance and weightbearing orders for heel touch weightbearing with Cam boot for left lower extremity.  Patient was able to complete supine to sit with min assist for left lower extremity, sit to stand with mod assist and sit to supine at supervision level with bed rails.  Bed to recliner transfer completed with mod assist of 1 utilizing rolling walker and verbal cues.  Patient is independent for self-feeding and grooming tasks in sitting position, she requires min/mod assist for lower extremity ADLs and is independent for upper body ADLs.  Patient needs to be totally independent for all functional tasks as she lives alone and has no support system to assist after discharge.  Patient remains below baseline level of function and continues to benefit from occupational therapy in acute hospital  care setting with recommended DC level II.   End of Consult   Education Provided Yes   Patient Position at End of Consult Bedside chair;Bed/Chair alarm activated;All needs within reach   Nurse Communication Nurse aware of consult     This session, pt required and most appropriately benefited from skilled OT/PT co-treatment due to extensive physical assistance of SKILLED therapists, significant regression from functional baseline, and decreased activity tolerance. OT and PT goals were addressed separately as seen in documentation.      GOALS:      -Patient will perform grooming tasks oral hygiene standing at sink with overall Mod I in order to increase overall independence    -Patient will be Mod I with LB dressing with use of AE and AD as needed in order to increase (I) with ADLs    -Patient will be Mod I with LB bathing with use of AE and AD as needed in order to increase (I) with ADLs    -Patient will complete toileting w/ Mod I w/ G hygiene/thoroughness in order to reduce caregiver burden    -Patient will demonstrate Mod I with bed mobility for ability to manage own comfort and initiate OOB tasks.     -Patient will perform functional transfers with Mod I to/from all surfaces using DME following WB precautions in order to increase (I) with functional tasks    -Patient will tolerate therapeutic activities for greater than 30 min, in order to increase tolerance for functional activities.     -Patient will increase OOB/sitting tolerance to 2-4 hours per day to increase participation in self-care and leisure tasks with no s/s of exertion.     -Patient will participate in 10m UE therex to increase overall stamina/activity tolerance for purposeful tasks    -Patient will demonstrate standing for 3 min in order to increase active participation in functional activities    -Patient will demonstrate 100% adherence to NWB status during all functional activities w/o cues from therapist     -Patient will demonstrate proper  management of cam boot with no VC in order to achieve protection and safety of joint

## 2024-08-22 NOTE — ASSESSMENT & PLAN NOTE
S/p mechanical fall on 8/20   Podiatry recommending NWB initially, however pt unable to use crutches s/d to chronic shoulder pain there OK with minimal weight bearing to LLE and CAM boot - pt unable to do this, prompting admission   Podiatry consulted recommendations appreciated

## 2024-08-22 NOTE — ASSESSMENT & PLAN NOTE
"Developed diffuse urticardia after starting supplement for \"brain fog\" containing gingko balboa, as well as magnesium and B12 supplements ordered from [facility] on 8/11- rash responded well to benadryl   Dc'd solu medrol to bid and IV benadryl  Continue atarax prn and hydrocortisone cream prn  "

## 2024-08-22 NOTE — PLAN OF CARE
Problem: PHYSICAL THERAPY ADULT  Goal: Performs mobility at highest level of function for planned discharge setting.  See evaluation for individualized goals.  Description: Treatment/Interventions: Functional transfer training, LE strengthening/ROM, Elevations, Therapeutic exercise, Endurance training, Patient/family training, Equipment eval/education, Bed mobility, Gait training  Equipment Recommended: Walker, Wheelchair       See flowsheet documentation for full assessment, interventions and recommendations.  8/22/2024 1546 by Melissa Gross, PT  Note:    Problem List: Decreased strength, Decreased endurance, Impaired balance, Decreased mobility, Orthopedic restrictions, Pain  Assessment: Aniya Jones is a 69 y.o. Female who presents to [facility] on 8/21/2024 from home w/ c/o fall and diagnosis of fracture of multiple metatarsal bones. Orders for PT eval and treat received, w/ activity orders of  heel touch to transfer LLE  and fall precautions. Pt presents w/ comorbidities of depression w/ anxiety, DMII, chronic migraines, HTN, fibromyalgia, JAMES, pt reports hx of shoulder dysfunctions as well. At baseline, pt mobilizes modified I w/ SPC, and reports 1 falls in the last 6 months. Upon evaluation, pt presents w/ the following deficits: weakness, impaired balance, and pain limiting functional mobility. Upon eval, pt requires supervision for bed mobility, mod A x 1 for transfers. Based on this PT evaluation today, patient's discharge recommendation is for Level II - Moderate Rehab Resource Intensity. During this admission, pt would benefit from continued skilled inpatient PT in the acute care setting in order to address the abovementioned deficits to maximize function and mobility before DC from acute care.  Barriers to Discharge:  (Requiring mod A x 1 to transfer, no social support, STEPHY)     Rehab Resource Intensity Level, PT: II (Moderate Resource Intensity)    See flowsheet documentation for full assessment.

## 2024-08-22 NOTE — ASSESSMENT & PLAN NOTE
"Developed diffuse urticardia after starting supplement for \"brain fog\" containing gingko balboa, as well as magnesium and B12 supplements ordered from [facility] on 8/11- rash responded well to benadryl   Continue prednisone burst and benadryl PRN   "

## 2024-08-22 NOTE — DISCHARGE INSTR - AVS FIRST PAGE
You are to follow-up with your PCP in 1 to 2 weeks    You are to follow-up with podiatry    You will be discharged with the following:  Atarax 25 mg to be taken as needed for itching maximum 3 times a day  Hydrocortisone cream 1% to be applied as needed for rash and itching          You have fractures of metatarsals 2, 3, 4 of your left foot, no surgery is necessary.  Please maintain use of your cam boot at all times, you may remove to shower if you have a shower chair and can remain seated.  You may put a little bit of weight on the heel for pivot and transferring, otherwise try not to put any weight or pressure on your left foot  Use a walker for balance.  Follow-up with either Dr. Cooper or Dr. Douglass at [facility] podiatry in [place] within 4 to 6 weeks of discharge

## 2024-08-22 NOTE — ASSESSMENT & PLAN NOTE
"Lab Results   Component Value Date    HGBA1C 5.7 (H) 05/16/2024       No results for input(s): \"POCGLU\" in the last 72 hours.    Blood Sugar Average: Last 72 hrs:  hgbA1c shows excellent control   Hold metformin while inpatient - placed on SSI while inpatient   "

## 2024-08-22 NOTE — H&P
"[facility]  H&P  Name: Aniya Jones 69 y.o. female I MRN: [id number]  Unit/Bed#: MS Ang[facility]-01 I Date of Admission: 8/21/2024   Date of Service: 8/22/2024 I Hospital Day: 0      Assessment & Plan   * Fracture of multiple metatarsal bones  Assessment & Plan  S/p mechanical fall on 8/20   Podiatry recommending NWB initially, however pt unable to use crutches s/d to chronic shoulder pain there OK with minimal weight bearing to LLE and CAM boot - pt unable to do this, prompting admission   Podiatry consulted   N.p.o. at midnight in case possibly operative    Ambulatory dysfunction  Assessment & Plan  Unable to minimally weight bear on LLE with walker   PT, OT, and CM consult for possible short term rehab?    Allergic reaction  Assessment & Plan  Developed diffuse urticardia after starting supplement for \"brain fog\" containing gingko balboa, as well as magnesium and B12 supplements ordered from [facility] on 8/11- rash responded well to benadryl   Continue prednisone burst and benadryl PRN     Type 2 diabetes mellitus with other specified complication, without long-term current use of insulin (HCC)  Assessment & Plan  Lab Results   Component Value Date    HGBA1C 5.7 (H) 05/16/2024       No results for input(s): \"POCGLU\" in the last 72 hours.    Blood Sugar Average: Last 72 hrs:  hgbA1c shows excellent control   Hold metformin while inpatient - placed on SSI while inpatient     Continuous opioid dependence (HCC)  Assessment & Plan  Home regimen: Oxycodone 10mg TID, baclofen 3 times daily as needed, and duloxetine 90 Mg at bedtime  PDMP reviewed - oxycodone 10mg 90 tabs filled on 8/15  Continue     Depression with anxiety  Assessment & Plan  Home regimen: BuSpar 10 Mg twice daily, duloxetine 90 Mg at bedtime, bupropion 150 Mg daily  Continue home medication           VTE Pharmacologic Prophylaxis: VTE Score: 3 Moderate Risk (Score 3-4) - Pharmacological DVT Prophylaxis Ordered: enoxaparin " "(Lovenox).  Code Status: Level 1 - Full Code   Discussion with family:  Wishes for her brother to be called tomorrow once seen by podiatry and therapy.     Anticipated Length of Stay: Patient will be admitted on an observation basis with an anticipated length of stay of less than 2 midnights secondary to fracture of multiple metatarsal bones, allergic reaction, ambulatory dysfunction.    Chief Complaint: \" I broke out in a rash\"    History of Present Illness:  Aniya Jones is a 69 y.o. female with a PMH of migraines, depression, anxiety, and chronic pain who presents with full body rash and left foot pain.  Patient reports she developed diffuse urticaria on 8/11 after starting multiple new supplements she had ordered on [employer].  She was using Benadryl to help with the pain and itching, but then developed some lightheadedness and nausea from taking the Benadryl.  Yesterday, she experienced a mechanical fall.  She experienced immediate pain onset in her left foot after the fall.  Denies any other acute symptoms.    Review of Systems:  Review of Systems   Constitutional:  Negative for chills and fever.   HENT:  Negative for congestion.    Respiratory:  Negative for cough and shortness of breath.    Cardiovascular:  Negative for chest pain and leg swelling.   Gastrointestinal:  Positive for nausea. Negative for abdominal pain, constipation, diarrhea and vomiting.   Musculoskeletal:  Positive for arthralgias (Left foot pain) and gait problem.   Skin:  Positive for rash.   Neurological:  Positive for light-headedness. Negative for weakness and numbness.   All other systems reviewed and are negative.      Past Medical and Surgical History:   Past Medical History:   Diagnosis Date    Anxiety     Arthritis     Cancer (HCC)     Skin    Depression     Diabetes (HCC)     GERD (gastroesophageal reflux disease)     Hyperlipidemia     Hypertension     Migraine        Past Surgical History:   Procedure Laterality Date    ANKLE " SURGERY      CARPAL TUNNEL RELEASE Right     ROTATOR CUFF REPAIR         Meds/Allergies:  Prior to Admission medications    Medication Sig Start Date End Date Taking? Authorizing Provider   acetaminophen (TYLENOL) 325 mg tablet Take 3 tablets (975 mg total) by mouth every 8 (eight) hours 11/13/23  Yes Ashlee Rosas PA-C   B COMPLEX VITAMINS PO Take 1 tablet by mouth daily   Yes Historical Provider, MD   Baclofen 5 MG TABS Take by mouth Take 1-2 tabs TID prn   Yes Historical Provider, MD   bisacodyl (Dulcolax) 10 mg suppository Insert 10 mg into the rectum as needed for constipation   Yes Historical Provider, MD   buPROPion (Wellbutrin XL) 150 mg 24 hr tablet Take 1 tablet (150 mg total) by mouth daily 8/8/24  Yes FE Verma   busPIRone (BUSPAR) 10 mg tablet Take 1 tablet (10 mg total) by mouth 2 (two) times a day 8/2/24  Yes FE Verma   calcium carbonate (OS-MIKE) 600 MG tablet Take 600 mg by mouth 2 (two) times a day   Yes Historical Provider, MD   clonazePAM (KlonoPIN) 0.5 mg tablet Take 1 tablet (0.5 mg total) by mouth 3 (three) times a day 8/6/24  Yes FE Verma   DULoxetine (Cymbalta) 30 mg delayed release capsule Take 1 capsule (30 mg total) by mouth daily at bedtime 8/2/24  Yes FE Verma   DULoxetine (CYMBALTA) 60 mg delayed release capsule Take 1 capsule (60 mg total) by mouth daily at bedtime 8/2/24  Yes FE Verma   esomeprazole (NexIUM) 20 mg capsule Take 20 mg by mouth daily in the early morning    Yes Historical Provider, MD   Lidocaine-Menthol 4-1 % CREA Apply 1 Dose topically if needed Patch 5%   Yes Historical Provider, MD   magnesium oxide (MAG-OX) 400 mg tablet Take 1 tablet (400 mg total) by mouth 2 (two) times a day 10/28/22  Yes Paulette Trevino PA-C   metFORMIN (GLUCOPHAGE) 500 mg tablet Take 0.5 tablets (250 mg total) by mouth 2 (two) times a day with meals 5/17/24  Yes FE Andersen   Movantik 25 MG tablet Take 1 tablet (25 mg total) by mouth in the  morning 8/9/24  Yes FE Andersen   multivitamin (THERAGRAN) TABS Take 1 tablet by mouth daily   Yes Historical Provider, MD   oxyCODONE (ROXICODONE) 10 MG TABS Take 10 mg by mouth every 8 (eight) hours as needed 1/4/24  Yes Historical Provider, MD   polyethylene glycol (MIRALAX) 17 g packet Take 17 g by mouth daily at bedtime 1/31/18  Yes Historical Provider, MD   potassium chloride (K-DUR,KLOR-CON) 20 mEq tablet Take 1 tablet (20 mEq total) by mouth daily 9/19/23  Yes Jojo Fly, DO   Riboflavin (Vitamin B-2) 100 MG TABS 2 in the morning and 2 at bedtime 10/28/22  Yes Paulette Trevino PA-C   rosuvastatin (CRESTOR) 20 MG tablet Take 1 tablet (20 mg total) by mouth daily 9/19/23  Yes Jojo Merrill, DO   Ubrogepant (Ubrelvy) 100 MG tablet Take 1 tablet (100 mg total) by mouth if needed (migraine) Take 1 tablet (100 mg) one time as needed for migraine. May repeat one additional tablet (100 mg) at least two hours after the first dose. Do not use more than two doses per day 4/18/24  Yes Paulette Trevino PA-C   Cholecalciferol 50 MCG (2000 UT) CAPS Take 1 capsule by mouth daily  Patient not taking: Reported on 8/21/2024    Historical Provider, MD   dexamethasone (DECADRON) 2 mg tablet Take 1 tablet (2 mg total) by mouth daily with breakfast  Patient not taking: Reported on 8/21/2024 4/18/24   Paulette Trevino PA-C   divalproex sodium (DEPAKOTE ER) 500 mg 24 hr tablet Take 1 tablet (500 mg total) by mouth daily  Patient not taking: Reported on 8/21/2024 4/18/24   Paulette Trevino PA-C   Galcanezumab-gnlm 120 MG/ML SOAJ Inject 120 mg under the skin every 28 days  Patient not taking: Reported on 8/21/2024 6/11/24   Paulette Trevino PA-C   naloxone (NARCAN) 4 mg/0.1 mL nasal spray Administer 1 spray into a nostril. If no response after 2-3 minutes, give another dose in the other nostril using a new spray.  Patient not taking: Reported on 8/21/2024 11/27/23 11/26/24  Vikram Griggs, DO   OnabotulinumtoxinA (BOTOX IM) Inject into a muscle    " Historical Provider, MD   prochlorperazine (COMPAZINE) 10 mg tablet Take 1 tablet (10 mg total) by mouth every 6 (six) hours as needed (migraine)  Patient not taking: Reported on 2024   Paulette Trevnio PA-C     I have reviewed home medications with patient personally.    Allergies: No Known Allergies    Social History:  Marital Status:    Occupation: Retired  Patient Pre-hospital Living Situation: Home, Alone lives in 1 level ranc[place]  Patient Pre-hospital Level of Mobility: walks with cane  Patient Pre-hospital Diet Restrictions: None  Substance Use History:   Social History     Substance and Sexual Activity   Alcohol Use Not Currently     Social History     Tobacco Use   Smoking Status Former    Current packs/day: 0.00    Types: Cigarettes    Quit date:     Years since quittin.6   Smokeless Tobacco Never     Social History     Substance and Sexual Activity   Drug Use Never       Family History:  Family History   Problem Relation Age of Onset    Mental illness Mother     Hyperlipidemia Mother     Hypertension Mother     Allergies Mother     Migraines Mother     Anxiety disorder Mother     Osteoporosis Mother     Hyperlipidemia Father     Heart disease Father     Cancer Father         Bladder    Migraines Sister     Hyperlipidemia Brother     Multiple sclerosis Brother     Hyperlipidemia Brother     Migraines Brother         Rare    Migraines Son         Rare    Colon cancer Maternal Grandmother     Heart attack Maternal Grandfather     Peripheral vascular disease Paternal Grandmother     Diabetes Paternal Grandmother     Asthma Paternal Grandfather     Diabetes Paternal Grandfather     Heart attack Paternal Grandfather        Physical Exam:     Vitals:   Blood Pressure: 117/62 (24)  Pulse: 97 (24)  Temperature: (!) 97.3 °F (36.3 °C) (24)  Temp Source: Oral (24)  Respirations: 18 (24)  Height: 5' 5\" (165.1 cm) (24)  Weight - " Scale: 78.6 kg (173 lb 4.5 oz) (08/21/24 2229)  SpO2: 92 % (08/21/24 2229)    Physical Exam  Vitals and nursing note reviewed.   Constitutional:       General: She is not in acute distress.     Appearance: Normal appearance. She is not ill-appearing.      Comments: Conversational   HENT:      Head: Normocephalic.      Nose: Nose normal.      Mouth/Throat:      Mouth: Mucous membranes are moist.   Eyes:      Conjunctiva/sclera: Conjunctivae normal.   Cardiovascular:      Rate and Rhythm: Normal rate and regular rhythm.   Pulmonary:      Effort: Pulmonary effort is normal.      Breath sounds: Normal breath sounds.   Abdominal:      Palpations: Abdomen is soft.   Musculoskeletal:      Cervical back: Normal range of motion.      Comments: Left foot in Cam boot.  Able to wiggle toes   Skin:     General: Skin is warm.      Comments: Multiple areas of urticaria that appear to be drying on extremities and trunk.  No open wounds or purulent drainage.   Neurological:      General: No focal deficit present.      Mental Status: She is alert.   Psychiatric:         Thought Content: Thought content normal.          Additional Data:     Lab Results:                  Lab Results   Component Value Date    HGBA1C 5.7 (H) 05/16/2024    HGBA1C 5.3 02/01/2024    HGBA1C 6.5 (H) 07/11/2022           Lines/Drains:  Invasive Devices       Peripheral Intravenous Line  Duration             Peripheral IV 08/21/24 Right Forearm <1 day                        Imaging: Personally reviewed the following imaging: XR showing fracture of 3rd-5th metatarsal shafts  XR foot 3+ views LEFT   ED Interpretation by Ricardo Maurer MD (08/21 2108)   Fracture of the third fourth metacarpal bone of the proximal aspect          EKG and Other Studies Reviewed on Admission:   EKG: Personally Reviewed. Sinus tachycardia with nonspecific ST changes. Normal QTc.    ** Please Note: This note has been constructed using a voice recognition system. **

## 2024-08-22 NOTE — CONSULTS
[facility] Podiatry - Consultation    Patient Information:   Aniya Jones 69 y.o. female MRN: [id number]  Unit/Bed#: -01 Encounter: [id number]  PCP: Jojo Merrill DO  Date of Admission:  8/21/2024  Date of Consultation: 08/22/24  Requesting Physician: Yen Peraza MD      ASSESSMENT:    Aniya Jones is a 69 y.o. female with:    Closed minimally displaced fracture of second metatarsal base left foot  Closed nondisplaced fracture of third and fourth metatarsals left foot  Biliary dysfunction, well-controlled type 2 diabetes, continues opioid dependency, depression with anxiety    PLAN:    Initial inpatient hospital consultation and bilateral pedal examination at bedside.  I personally reviewed patient's medical records, ED visit note, primary team H&P, x-ray images of left foot from yesterday, PT evaluation.  At this time her second metatarsal fracture is minimally displaced and is stable, no surgical intervention is necessary, patient will require immobilization, placed in cam boot, she may heel touch for pivot transfer, use walker, weightbearing and PT orders were placed.  Patient with extra-large cam boot in place which was placed in the ED, I have reordered a medium cam boot and relayed this information to PT.  They will see her shortly.  Patient is stable for discharge from a podiatric perspective, she is concerned about going home, she does not have any help and feels extremely unstable, I have asked her to see how she does with a proper fitting cam boot, if at that time she is still unstable then short-term rehab might be warranted.  I defer this to primary team and case management.  At this time podiatry will sign off, patient will follow-up in office in 4 to 6 weeks.  Rest of care per primary team.        SUBJECTIVE    History of Present Illness:    Aniya Jones is a 69 y.o. female with past medical history of allergic reaction to over-the-counter supplement for brain fog, type 2 diabetes, continuous  opioid dependence, depression with anxiety who presented yesterday to [facility] ED after a fall for evaluation and care of left ankle injury, she states she fell yesterday due to tripping and injured her foot, upon x-ray it was noted she has second, third, fourth metatarsal fractures, she was placed in a cam boot but was unable to use crutches and felt unstable, patient was admitted for ambulatory dysfunction, we have been consulted for care of her fractures.    Review of Systems:    Constitutional: Negative.    HENT: Negative.    Eyes: Negative.    Respiratory: Negative.    Cardiovascular: Negative.    Gastrointestinal: Negative.    Musculoskeletal: Left foot pain  Skin: Rash from medication  Neurological: Chronic brain fog  Psych: Negative.     Past Medical and Surgical History:     Past Medical History:   Diagnosis Date    Anxiety     Arthritis     Cancer (HCC)     Skin    Depression     Diabetes (HCC)     GERD (gastroesophageal reflux disease)     Hyperlipidemia     Hypertension     Migraine        Past Surgical History:   Procedure Laterality Date    ANKLE SURGERY      CARPAL TUNNEL RELEASE Right     ROTATOR CUFF REPAIR         Meds/Allergies:      Medications Prior to Admission:     acetaminophen (TYLENOL) 325 mg tablet    B COMPLEX VITAMINS PO    Baclofen 5 MG TABS    bisacodyl (Dulcolax) 10 mg suppository    buPROPion (Wellbutrin XL) 150 mg 24 hr tablet    busPIRone (BUSPAR) 10 mg tablet    calcium carbonate (OS-MIKE) 600 MG tablet    clonazePAM (KlonoPIN) 0.5 mg tablet    DULoxetine (Cymbalta) 30 mg delayed release capsule    DULoxetine (CYMBALTA) 60 mg delayed release capsule    esomeprazole (NexIUM) 20 mg capsule    Lidocaine-Menthol 4-1 % CREA    magnesium oxide (MAG-OX) 400 mg tablet    metFORMIN (GLUCOPHAGE) 500 mg tablet    Movantik 25 MG tablet    multivitamin (THERAGRAN) TABS    oxyCODONE (ROXICODONE) 10 MG TABS    polyethylene glycol (MIRALAX) 17 g packet    potassium chloride  "(K-DUR,KLOR-CON) 20 mEq tablet    Riboflavin (Vitamin B-2) 100 MG TABS    rosuvastatin (CRESTOR) 20 MG tablet    Ubrogepant (Ubrelvy) 100 MG tablet    Galcanezumab-gnlm 120 MG/ML SOAJ    naloxone (NARCAN) 4 mg/0.1 mL nasal spray    OnabotulinumtoxinA (BOTOX IM)    No Known Allergies    Social History:     Marital Status:     Substance Use History:   Social History     Substance and Sexual Activity   Alcohol Use Not Currently     Social History     Tobacco Use   Smoking Status Former    Current packs/day: 0.00    Types: Cigarettes    Quit date:     Years since quittin.6   Smokeless Tobacco Never     Social History     Substance and Sexual Activity   Drug Use Never       Family History:    Family History   Problem Relation Age of Onset    Mental illness Mother     Hyperlipidemia Mother     Hypertension Mother     Allergies Mother     Migraines Mother     Anxiety disorder Mother     Osteoporosis Mother     Hyperlipidemia Father     Heart disease Father     Cancer Father         Bladder    Migraines Sister     Hyperlipidemia Brother     Multiple sclerosis Brother     Hyperlipidemia Brother     Migraines Brother         Rare    Migraines Son         Rare    Colon cancer Maternal Grandmother     Heart attack Maternal Grandfather     Peripheral vascular disease Paternal Grandmother     Diabetes Paternal Grandmother     Asthma Paternal Grandfather     Diabetes Paternal Grandfather     Heart attack Paternal Grandfather          OBJECTIVE:    Vitals:   Blood Pressure: 121/62 (24)  Pulse: 86 (24)  Temperature: (!) 96.8 °F (36 °C) (24)  Temp Source: Temporal (24)  Respirations: 18 (24)  Height: 5' 5\" (165.1 cm) (24)  Weight - Scale: 78.6 kg (173 lb 4.5 oz) (24)  SpO2: 92 % (24)    Physical Exam    General Appearance: Alert, cooperative, no distress.  HEENT: Head normocephalic, atraumatic, without obvious " "abnormality.  Psychiatric: AAOx3  Lower Extremity:  Vascular:   Pedal pulses are present. CRT < 3 seconds at the digits. +1/4 edema noted at bilateral lower extremities. Pedal hair is present. Skin temperature is WNL bilaterally.    Musculoskeletal:  MMT is 5/5 in all muscle compartments bilaterally. ROM at the 1st MPJ and ankle joint are WNL bilaterally with the leg extended. Pain on palpation of  left foot and ankle. ROM left is guarded, left foot with edema and ecchymosis. No gross deformities noted.     Dermatological:  Lower extremity wounds as noted below:          Additional Data:     Lab Results: I have personally reviewed pertinent labs including:    Results from last 7 days   Lab Units 08/22/24  0532   WBC Thousand/uL 4.57   HEMOGLOBIN g/dL 10.5*   HEMATOCRIT % 32.1*   PLATELETS Thousands/uL 313     Results from last 7 days   Lab Units 08/22/24  0532   POTASSIUM mmol/L 4.3   CHLORIDE mmol/L 98   CO2 mmol/L 27   BUN mg/dL 24   CREATININE mg/dL 0.94   CALCIUM mg/dL 9.0           Cultures: I have personally reviewed pertinent cultures including:              Imaging: I have personally reviewed pertinent films in PACS.  Pathology, and Other Studies: I have personally reviewed pertinent reports.        ** Please Note: Portions of the record may have been created with voice recognition software. Occasional wrong word or \"sound a like\" substitutions may have occurred due to the inherent limitations of voice recognition software. Read the chart carefully and recognize, using context, where substitutions have occurred. **   "

## 2024-08-22 NOTE — ASSESSMENT & PLAN NOTE
S/p mechanical fall on 8/20   Podiatry recommending NWB initially, however pt unable to use crutches s/d to chronic shoulder pain there OK with minimal weight bearing to LLE and CAM boot - pt unable to do this, prompting admission   No need for procedure. Recommending minimal weight bearing with cam boot and walker or scooter    F/u with podiatry outpt

## 2024-08-23 LAB
ALBUMIN SERPL BCG-MCNC: 3.7 G/DL (ref 3.5–5)
ALP SERPL-CCNC: 44 U/L (ref 34–104)
ALT SERPL W P-5'-P-CCNC: 32 U/L (ref 7–52)
ANION GAP SERPL CALCULATED.3IONS-SCNC: 6 MMOL/L (ref 4–13)
AST SERPL W P-5'-P-CCNC: 28 U/L (ref 13–39)
BASOPHILS # BLD AUTO: 0 THOUSANDS/ÂΜL (ref 0–0.1)
BASOPHILS NFR BLD AUTO: 0 % (ref 0–1)
BILIRUB SERPL-MCNC: 0.29 MG/DL (ref 0.2–1)
BUN SERPL-MCNC: 21 MG/DL (ref 5–25)
CALCIUM SERPL-MCNC: 9.3 MG/DL (ref 8.4–10.2)
CHLORIDE SERPL-SCNC: 103 MMOL/L (ref 96–108)
CO2 SERPL-SCNC: 31 MMOL/L (ref 21–32)
CREAT SERPL-MCNC: 0.78 MG/DL (ref 0.6–1.3)
EOSINOPHIL # BLD AUTO: 0 THOUSAND/ÂΜL (ref 0–0.61)
EOSINOPHIL NFR BLD AUTO: 0 % (ref 0–6)
ERYTHROCYTE [DISTWIDTH] IN BLOOD BY AUTOMATED COUNT: 15.8 % (ref 11.6–15.1)
GFR SERPL CREATININE-BSD FRML MDRD: 77 ML/MIN/1.73SQ M
GLUCOSE SERPL-MCNC: 134 MG/DL (ref 65–140)
GLUCOSE SERPL-MCNC: 137 MG/DL (ref 65–140)
GLUCOSE SERPL-MCNC: 148 MG/DL (ref 65–140)
GLUCOSE SERPL-MCNC: 172 MG/DL (ref 65–140)
HCT VFR BLD AUTO: 29.7 % (ref 34.8–46.1)
HGB BLD-MCNC: 9.5 G/DL (ref 11.5–15.4)
IMM GRANULOCYTES # BLD AUTO: 0.02 THOUSAND/UL (ref 0–0.2)
IMM GRANULOCYTES NFR BLD AUTO: 1 % (ref 0–2)
LYMPHOCYTES # BLD AUTO: 0.5 THOUSANDS/ÂΜL (ref 0.6–4.47)
LYMPHOCYTES NFR BLD AUTO: 12 % (ref 14–44)
MAGNESIUM SERPL-MCNC: 2.2 MG/DL (ref 1.9–2.7)
MCH RBC QN AUTO: 27.8 PG (ref 26.8–34.3)
MCHC RBC AUTO-ENTMCNC: 32 G/DL (ref 31.4–37.4)
MCV RBC AUTO: 87 FL (ref 82–98)
MONOCYTES # BLD AUTO: 0.23 THOUSAND/ÂΜL (ref 0.17–1.22)
MONOCYTES NFR BLD AUTO: 5 % (ref 4–12)
NEUTROPHILS # BLD AUTO: 3.48 THOUSANDS/ÂΜL (ref 1.85–7.62)
NEUTS SEG NFR BLD AUTO: 82 % (ref 43–75)
NRBC BLD AUTO-RTO: 0 /100 WBCS
PLATELET # BLD AUTO: 274 THOUSANDS/UL (ref 149–390)
PMV BLD AUTO: 9.3 FL (ref 8.9–12.7)
POTASSIUM SERPL-SCNC: 4.5 MMOL/L (ref 3.5–5.3)
PROT SERPL-MCNC: 6.4 G/DL (ref 6.4–8.4)
RBC # BLD AUTO: 3.42 MILLION/UL (ref 3.81–5.12)
SODIUM SERPL-SCNC: 140 MMOL/L (ref 135–147)
WBC # BLD AUTO: 4.23 THOUSAND/UL (ref 4.31–10.16)

## 2024-08-23 PROCEDURE — 82948 REAGENT STRIP/BLOOD GLUCOSE: CPT

## 2024-08-23 PROCEDURE — 80053 COMPREHEN METABOLIC PANEL: CPT | Performed by: STUDENT IN AN ORGANIZED HEALTH CARE EDUCATION/TRAINING PROGRAM

## 2024-08-23 PROCEDURE — 85025 COMPLETE CBC W/AUTO DIFF WBC: CPT | Performed by: STUDENT IN AN ORGANIZED HEALTH CARE EDUCATION/TRAINING PROGRAM

## 2024-08-23 PROCEDURE — 83735 ASSAY OF MAGNESIUM: CPT | Performed by: STUDENT IN AN ORGANIZED HEALTH CARE EDUCATION/TRAINING PROGRAM

## 2024-08-23 PROCEDURE — 99233 SBSQ HOSP IP/OBS HIGH 50: CPT | Performed by: STUDENT IN AN ORGANIZED HEALTH CARE EDUCATION/TRAINING PROGRAM

## 2024-08-23 RX ORDER — METHYLPREDNISOLONE SODIUM SUCCINATE 40 MG/ML
40 INJECTION, POWDER, LYOPHILIZED, FOR SOLUTION INTRAMUSCULAR; INTRAVENOUS EVERY 12 HOURS SCHEDULED
Status: DISCONTINUED | OUTPATIENT
Start: 2024-08-23 | End: 2024-08-24

## 2024-08-23 RX ORDER — BENZOCAINE/MENTHOL 6 MG-10 MG
LOZENGE MUCOUS MEMBRANE 4 TIMES DAILY PRN
Status: DISCONTINUED | OUTPATIENT
Start: 2024-08-23 | End: 2024-08-25 | Stop reason: HOSPADM

## 2024-08-23 RX ORDER — POLYETHYLENE GLYCOL 3350 17 G/17G
17 POWDER, FOR SOLUTION ORAL 2 TIMES DAILY
Status: DISCONTINUED | OUTPATIENT
Start: 2024-08-23 | End: 2024-08-25 | Stop reason: HOSPADM

## 2024-08-23 RX ORDER — DOCUSATE SODIUM 100 MG/1
100 CAPSULE, LIQUID FILLED ORAL 2 TIMES DAILY
Status: DISCONTINUED | OUTPATIENT
Start: 2024-08-23 | End: 2024-08-25 | Stop reason: HOSPADM

## 2024-08-23 RX ORDER — SENNOSIDES 8.6 MG
2 TABLET ORAL
Status: DISCONTINUED | OUTPATIENT
Start: 2024-08-23 | End: 2024-08-25 | Stop reason: HOSPADM

## 2024-08-23 RX ADMIN — POLYETHYLENE GLYCOL 3350 17 G: 17 POWDER, FOR SOLUTION ORAL at 16:43

## 2024-08-23 RX ADMIN — ACETAMINOPHEN 975 MG: 325 TABLET ORAL at 21:52

## 2024-08-23 RX ADMIN — CLONAZEPAM 0.5 MG: 0.5 TABLET ORAL at 16:41

## 2024-08-23 RX ADMIN — ENOXAPARIN SODIUM 40 MG: 40 INJECTION SUBCUTANEOUS at 09:37

## 2024-08-23 RX ADMIN — Medication 400 MG: at 09:35

## 2024-08-23 RX ADMIN — POLYETHYLENE GLYCOL 3350 17 G: 17 POWDER, FOR SOLUTION ORAL at 09:35

## 2024-08-23 RX ADMIN — SENNOSIDES 17.2 MG: 8.6 TABLET, FILM COATED ORAL at 21:52

## 2024-08-23 RX ADMIN — OXYCODONE HYDROCHLORIDE 10 MG: 10 TABLET ORAL at 13:38

## 2024-08-23 RX ADMIN — ATORVASTATIN CALCIUM 40 MG: 40 TABLET, FILM COATED ORAL at 16:41

## 2024-08-23 RX ADMIN — DOCUSATE SODIUM 100 MG: 100 CAPSULE, LIQUID FILLED ORAL at 09:35

## 2024-08-23 RX ADMIN — DIPHENHYDRAMINE HYDROCHLORIDE 25 MG: 50 INJECTION, SOLUTION INTRAMUSCULAR; INTRAVENOUS at 23:00

## 2024-08-23 RX ADMIN — ACETAMINOPHEN 975 MG: 325 TABLET ORAL at 05:25

## 2024-08-23 RX ADMIN — DULOXETINE HYDROCHLORIDE 90 MG: 30 CAPSULE, DELAYED RELEASE ORAL at 21:52

## 2024-08-23 RX ADMIN — METHYLPREDNISOLONE SODIUM SUCCINATE 40 MG: 40 INJECTION, POWDER, FOR SOLUTION INTRAMUSCULAR; INTRAVENOUS at 16:41

## 2024-08-23 RX ADMIN — METHYLPREDNISOLONE SODIUM SUCCINATE 40 MG: 40 INJECTION, POWDER, FOR SOLUTION INTRAMUSCULAR; INTRAVENOUS at 05:25

## 2024-08-23 RX ADMIN — BUSPIRONE HYDROCHLORIDE 10 MG: 10 TABLET ORAL at 09:35

## 2024-08-23 RX ADMIN — ACETAMINOPHEN 975 MG: 325 TABLET ORAL at 13:38

## 2024-08-23 RX ADMIN — OXYCODONE HYDROCHLORIDE 10 MG: 10 TABLET ORAL at 05:25

## 2024-08-23 RX ADMIN — BUPROPION HYDROCHLORIDE 150 MG: 150 TABLET, EXTENDED RELEASE ORAL at 09:35

## 2024-08-23 RX ADMIN — CLONAZEPAM 0.5 MG: 0.5 TABLET ORAL at 09:35

## 2024-08-23 RX ADMIN — PANTOPRAZOLE SODIUM 20 MG: 20 TABLET, DELAYED RELEASE ORAL at 05:25

## 2024-08-23 RX ADMIN — OXYCODONE HYDROCHLORIDE 10 MG: 10 TABLET ORAL at 21:52

## 2024-08-23 RX ADMIN — Medication 400 MG: at 16:42

## 2024-08-23 RX ADMIN — DOCUSATE SODIUM 100 MG: 100 CAPSULE, LIQUID FILLED ORAL at 16:42

## 2024-08-23 RX ADMIN — BUSPIRONE HYDROCHLORIDE 10 MG: 10 TABLET ORAL at 16:43

## 2024-08-23 RX ADMIN — CLONAZEPAM 0.5 MG: 0.5 TABLET ORAL at 21:53

## 2024-08-23 NOTE — PLAN OF CARE
Problem: Potential for Falls  Goal: Patient will remain free of falls  Description: INTERVENTIONS:  - Educate patient/family on patient safety including physical limitations  - Instruct patient to call for assistance with activity   - Consult OT/PT to assist with strengthening/mobility   - Keep Call bell within reach  - Keep bed low and locked with side rails adjusted as appropriate  - Keep care items and personal belongings within reach  - Initiate and maintain comfort rounds  - Make Fall Risk Sign visible to staff    - Apply yellow socks and bracelet for high fall risk patients  - Consider moving patient to room near nurses station  8/23/2024 1034 by Lucie Graves RN  Outcome: Progressing  8/23/2024 1034 by Lucie Graves RN  Outcome: Progressing     Problem: PAIN - ADULT  Goal: Verbalizes/displays adequate comfort level or baseline comfort level  Description: Interventions:  - Encourage patient to monitor pain and request assistance  - Assess pain using appropriate pain scale  - Administer analgesics based on type and severity of pain and evaluate response  - Implement non-pharmacological measures as appropriate and evaluate response  - Consider cultural and social influences on pain and pain management  - Notify physician/advanced practitioner if interventions unsuccessful or patient reports new pain  8/23/2024 1034 by Lucie Graves RN  Outcome: Progressing  8/23/2024 1034 by Lucie Graves RN  Outcome: Progressing     Problem: SAFETY ADULT  Goal: Patient will remain free of falls  Description: INTERVENTIONS:  - Educate patient/family on patient safety including physical limitations  - Instruct patient to call for assistance with activity   - Consult OT/PT to assist with strengthening/mobility   - Keep Call bell within reach  - Keep bed low and locked with side rails adjusted as appropriate  - Keep care items and personal belongings within reach  - Initiate and maintain comfort rounds  - Make  Fall Risk Sign visible to staff  - Apply yellow socks and bracelet for high fall risk patients  - Consider moving patient to room near nurses station  8/23/2024 1034 by Lucie Graves RN  Outcome: Progressing  8/23/2024 1034 by Lucie Graves, RN  Outcome: Progressing

## 2024-08-23 NOTE — PROGRESS NOTES
"[facility]  Progress Note  Name: Aniya Jones I  MRN: [id number]  Unit/Bed#: -Jaz I Date of Admission: 8/21/2024   Date of Service: 8/23/2024 I Hospital Day: 1    Assessment & Plan   * Fracture of multiple metatarsal bones  Assessment & Plan  S/p mechanical fall on 8/20   Podiatry recommending NWB initially, however pt unable to use crutches s/d to chronic shoulder pain there OK with minimal weight bearing to LLE and CAM boot - pt unable to do this, prompting admission   No need for procedure. Recommending minimal weight bearing with cam boot and walker or scooter    F/u with podiatry outpt    Ambulatory dysfunction  Assessment & Plan  Unable to minimally weight bear on LLE with walker   PT, OT, recommending Level 2 care  CM working on placement    Allergic reaction  Assessment & Plan  Developed diffuse urticardia after starting supplement for \"brain fog\" containing gingko balboa, as well as magnesium and B12 supplements ordered from [employer] on 8/11- rash responded well to benadryl   Decreased solu medrol to bid and continue IV benadryl  Adding hydocortisone    Type 2 diabetes mellitus with other specified complication, without long-term current use of insulin (HCC)  Assessment & Plan  Lab Results   Component Value Date    HGBA1C 6.2 (H) 08/22/2024       Recent Labs     08/22/24  1552 08/22/24  2107 08/23/24  0708 08/23/24  1119   POCGLU 141* 180* 148* 134       Blood Sugar Average: Last 72 hrs:  (P) [id number] shows excellent control   Hold metformin while inpatient - placed on SSI while inpatient     Continuous opioid dependence (HCC)  Assessment & Plan  Home regimen: Oxycodone 10mg TID, baclofen 3 times daily as needed, and duloxetine 90 Mg at bedtime  PDMP reviewed - oxycodone 10mg 90 tabs filled on 8/15  Continue     Depression with anxiety  Assessment & Plan  Home regimen: BuSpar 10 Mg twice daily, duloxetine 90 Mg at bedtime, bupropion 150 Mg daily  Continue " home medication               VTE Pharmacologic Prophylaxis: VTE Score: 3 Moderate Risk (Score 3-4) - Pharmacological DVT Prophylaxis Ordered: enoxaparin (Lovenox).    Mobility:   Basic Mobility Inpatient Raw Score: 20  JH-HLM Goal: 6: Walk 10 steps or more  JH-HLM Achieved: 4: Move to chair/commode  JH-HLM Goal NOT achieved. Continue with multidisciplinary rounding and encourage appropriate mobility to improve upon JH-HLM goals.    Patient Centered Rounds: I performed bedside rounds with nursing staff today.   Discussions with Specialists or Other Care Team Provider: Podiatry, CM, Pt, OT    Education and Discussions with Family / Patient: Patient declined call to .     Total Time Spent on Date of Encounter in care of patient: 60 mins. This time was spent on one or more of the following: performing physical exam; counseling and coordination of care; obtaining or reviewing history; documenting in the medical record; reviewing/ordering tests, medications or procedures; communicating with other healthcare professionals and discussing with patient's family/caregivers.    Current Length of Stay: 1 day(s)  Current Patient Status: Inpatient   Certification Statement: The patient will continue to require additional inpatient hospital stay due to metatarsal fx  Discharge Plan: Anticipate discharge in 48 hrs to rehab facility.    Code Status: Level 1 - Full Code    Subjective:   Aniya was seen and examined at bedside.  No acute events overnight.  Discussed plan of care.  All questions and concerns were answered and addressed.  Continues to have itchiness however improved.  Weaned Solu-Medrol to twice daily.  Case discussed with podiatry patient to follow-up with podiatry outpatient appropriate boot placed.    Objective:     Vitals:   Temp (24hrs), Av °F (36.7 °C), Min:96.8 °F (36 °C), Max:98.6 °F (37 °C)    Temp:  [96.8 °F (36 °C)-98.6 °F (37 °C)] 98.6 °F (37 °C)  HR:  [72-94] 72  BP: (107-142)/(61-79)  135/77  SpO2:  [92 %-97 %] 95 %  Body mass index is 28.84 kg/m².     Input and Output Summary (last 24 hours):     Intake/Output Summary (Last 24 hours) at 8/23/2024 1252  Last data filed at 8/23/2024 0837  Gross per 24 hour   Intake 1200 ml   Output --   Net 1200 ml       Physical Exam:   Physical Exam  Vitals and nursing note reviewed.   Constitutional:       General: She is not in acute distress.     Appearance: She is not ill-appearing.   HENT:      Head: Normocephalic and atraumatic.   Cardiovascular:      Rate and Rhythm: Normal rate and regular rhythm.      Pulses: Normal pulses.      Heart sounds: Normal heart sounds.   Pulmonary:      Effort: Pulmonary effort is normal.      Breath sounds: Normal breath sounds.   Abdominal:      General: Abdomen is flat. Bowel sounds are normal.      Palpations: Abdomen is soft.   Musculoskeletal:      Right lower leg: No edema.      Left lower leg: No edema.      Comments: LLE in boot   Skin:     General: Skin is warm.      Findings: Rash present.      Comments: hives   Neurological:      General: No focal deficit present.      Mental Status: She is alert and oriented to person, place, and time.          Additional Data:     Labs:  Results from last 7 days   Lab Units 08/23/24  0318   WBC Thousand/uL 4.23*   HEMOGLOBIN g/dL 9.5*   HEMATOCRIT % 29.7*   PLATELETS Thousands/uL 274   SEGS PCT % 82*   LYMPHO PCT % 12*   MONO PCT % 5   EOS PCT % 0     Results from last 7 days   Lab Units 08/23/24  0318   SODIUM mmol/L 140   POTASSIUM mmol/L 4.5   CHLORIDE mmol/L 103   CO2 mmol/L 31   BUN mg/dL 21   CREATININE mg/dL 0.78   ANION GAP mmol/L 6   CALCIUM mg/dL 9.3   ALBUMIN g/dL 3.7   TOTAL BILIRUBIN mg/dL 0.29   ALK PHOS U/L 44   ALT U/L 32   AST U/L 28   GLUCOSE RANDOM mg/dL 172*         Results from last 7 days   Lab Units 08/23/24  1119 08/23/24  0708 08/22/24  2107 08/22/24  1552 08/22/24  1122 08/22/24  0658   POC GLUCOSE mg/dl 134 148* 180* 141* 101 114     Results from last  7 days   Lab Units 08/22/24  0532   HEMOGLOBIN A1C % 6.2*           Lines/Drains:  Invasive Devices       Peripheral Intravenous Line  Duration             Peripheral IV 08/23/24 Left Antecubital <1 day                          Imaging: No pertinent imaging reviewed.    Recent Cultures (last 7 days):         Last 24 Hours Medication List:   Current Facility-Administered Medications   Medication Dose Route Frequency Provider Last Rate    acetaminophen  975 mg Oral Q8H RAHAT Kathy Milner PA-C      aluminum-magnesium hydroxide-simethicone  30 mL Oral Q6H PRN Kathy Milner PA-C      atorvastatin  40 mg Oral Daily With Dinner SWAPNIL RendonC      baclofen  5 mg Oral TID PRN Kathy Milner PA-C      buPROPion  150 mg Oral Daily Kathy Milner PA-C      busPIRone  10 mg Oral BID ALIDA Rendon-SCAR      clonazePAM  0.5 mg Oral TID Kathy Milner PA-C      diphenhydrAMINE  25 mg Intravenous Q6H PRN Yen Peraza MD      docusate sodium  100 mg Oral BID Yen Peraza MD      DULoxetine  90 mg Oral HS Kathy Milner PA-C      enoxaparin  40 mg Subcutaneous Daily Kathy Milner PA-C      hydrocortisone   Topical 4x Daily PRN Yen Peraza MD      HYDROmorphone  0.2 mg Intravenous Q4H PRN Kathy Milner PA-C      insulin lispro  1-5 Units Subcutaneous TID AC Kathy Milner PA-C      magnesium Oxide  400 mg Oral BID Kathy Milner PA-C      methylPREDNISolone sodium succinate  40 mg Intravenous Q12H [facility] Yen Peraza MD      ondansetron  4 mg Intravenous Q6H PRN Kathy Milner PA-C      oxyCODONE  10 mg Oral TID Kathy Milner PA-C      oxyCODONE  2.5 mg Oral Q4H PRN Kathy Milner PA-C      pantoprazole  20 mg Oral Early Morning Kathy Milner PA-C      polyethylene glycol  17 g Oral BID Yen Peraza MD      senna  2 tablet Oral HS Yen Peraza MD          Today, Patient Was Seen By: Yen Peraza MD    **Please Note: This note may have been  constructed using a voice recognition system.**

## 2024-08-23 NOTE — PROGRESS NOTES
Patient:  LUKAS ARORA    MRN:  [id number]    Aidin Request ID:  [id number]    Level of care reserved:  Skilled Nursing Facility    Partner Reserved:  [place] [address] [phone]    Clinical needs requested:    Geography searched:  15 miles around [id number]    Start of Service:    Request sent:  3:22pm EDT on 8/22/2024 by Layne Barrios    Partner reserved:  4:39pm EDT on 8/23/2024 by Layne Barrios    Choice list shared:  4:38pm EDT on 8/23/2024 by Layne Barrios

## 2024-08-23 NOTE — PLAN OF CARE
Problem: Potential for Falls  Goal: Patient will remain free of falls  Description: INTERVENTIONS:  - Educate patient/family on patient safety including physical limitations  - Instruct patient to call for assistance with activity   - Consult OT/PT to assist with strengthening/mobility   - Keep Call bell within reach  - Keep bed low and locked with side rails adjusted as appropriate  - Keep care items and personal belongings within reach  - Initiate and maintain comfort rounds  - Make Fall Risk Sign visible to staff  - Offer Toileting every 2 Hours, in advance of need  - Initiate/Maintain alarm  - Obtain necessary fall risk management equipment:  Problem: PAIN - ADULT  Goal: Verbalizes/displays adequate comfort level or baseline comfort level  Description: Interventions:  - Encourage patient to monitor pain and request assistance  - Assess pain using appropriate pain scale  - Administer analgesics based on type and severity of pain and evaluate response  - Implement non-pharmacological measures as appropriate and evaluate response  - Consider cultural and social influences on pain and pain management  - Notify physician/advanced practitioner if interventions unsuccessful or patient reports new pain  Outcome: Progressing     Problem: INFECTION - ADULT  Goal: Absence or prevention of progression during hospitalization  Description: INTERVENTIONS:  - Assess and monitor for signs and symptoms of infection  - Monitor lab/diagnostic results  - Monitor all insertion sites, i.e. indwelling lines, tubes, and drains  - Monitor endotracheal if appropriate and nasal secretions for changes in amount and color  - [place] appropriate cooling/warming therapies per order  - Administer medications as ordered  - Instruct and encourage patient and family to use good hand hygiene technique  - Identify and instruct in appropriate isolation precautions for identified infection/condition  Outcome: Progressing     Problem: SAFETY  ADULT  Goal: Patient will remain free of falls  Description: INTERVENTIONS:  - Educate patient/family on patient safety including physical limitations  - Instruct patient to call for assistance with activity   - Consult OT/PT to assist with strengthening/mobility   - Keep Call bell within reach  - Keep bed low and locked with side rails adjusted as appropriate  - Keep care items and personal belongings within reach  - Initiate and maintain comfort rounds  - Make Fall Risk Sign visible to staff  - Offer Toileting every 2 Hours, in advance of need  - Initiate/Maintain alarm  - Obtain necessary fall risk management equipment  - Apply yellow socks and bracelet for high fall risk patients  - Consider moving patient to room near nurses station  Outcome: Progressing  Goal: Maintain or return to baseline ADL function  Description: INTERVENTIONS:  -  Assess patient's ability to carry out ADLs; assess patient's baseline for ADL function and identify physical deficits which impact ability to perform ADLs (bathing, care of mouth/teeth, toileting, grooming, dressing, etc.)  - Assess/evaluate cause of self-care deficits   - Assess range of motion  - Assess patient's mobility; develop plan if impaired  - Assess patient's need for assistive devices and provide as appropriate  - Encourage maximum independence but intervene and supervise when necessary  - Involve family in performance of ADLs  - Assess for home care needs following discharge   - Consider OT consult to assist with ADL evaluation and planning for discharge  - Provide patient education as appropriate  Outcome: Progressing  Goal: Maintains/Returns to pre admission functional level  Description: INTERVENTIONS:  - Perform AM-PAC 6 Click Basic Mobility/ Daily Activity assessment daily.  - Set and communicate daily mobility goal to care team and patient/family/caregiver.   - Collaborate with rehabilitation services on mobility goals if consulted  - Perform Range of Motion 3  times a day.  - Reposition patient every 3 hours.  - Dangle patient 3 times a day  - Stand patient 3 times a day  - Ambulate patient 3 times a day  - Out of bed to chair 3 times a day   - Out of bed for meals 3  Problem: SAFETY ADULT  Goal: Patient will remain free of falls  Description: INTERVENTIONS:  - Educate patient/family on patient safety including physical limitations  - Instruct patient to call for assistance with activity   - Consult OT/PT to assist with strengthening/mobility   - Keep Call bell within reach  - Keep bed low and locked with side rails adjusted as appropriate  - Keep care items and personal belongings within reach  - Initiate and maintain comfort rounds  - Make Fall Risk Sign visible to staff  - Offer Toileting every 2 Hours, in advance of need  - Initiate/Maintain alarm  - Obtain necessary fall risk management equipment:   - Apply yellow socks and bracelet for high fall risk patients  - Consider moving patient to room near nurses station  Outcome: Progressing  Goal: Maintain or return to baseline ADL function  Description: INTERVENTIONS:  -  Assess patient's ability to carry out ADLs; assess patient's baseline for ADL function and identify physical deficits which impact ability to perform ADLs (bathing, care of mouth/teeth, toileting, grooming, dressing, etc.)  - Assess/evaluate cause of self-care deficits   - Assess range of motion  - Assess patient's mobility; develop plan if impaired  - Assess patient's need for assistive devices and provide as appropriate  - Encourage maximum independence but intervene and supervise when necessary  - Involve family in performance of ADLs  - Assess for home care needs following discharge   - Consider OT consult to assist with ADL evaluation and planning for discharge  - Provide patient education as appropriate  Outcome: Progressing  Goal: Maintains/Returns to pre admission functional level  Description: INTERVENTIONS:  - Perform AM-PAC 6 Click Basic  Mobility/ Daily Activity assessment daily.  - Set and communicate daily mobility goal to care team and patient/family/caregiver.   - Collaborate with rehabilitation services on mobility goals if consulted  - Perform Range of Motion 3 times a day.  - Reposition patient every 3 hours.  - Dangle patient 3 times a day  - Stand patient 3 times a day  - Ambulate patient 3 times a day  - Out of bed to chair 3 times a day   - Out of bed for meals 3  Problem: DISCHARGE PLANNING  Goal: Discharge to home or other facility with appropriate resources  Description: INTERVENTIONS:  - Identify barriers to discharge w/patient and caregiver  - Arrange for needed discharge resources and transportation as appropriate  - Identify discharge learning needs (meds, wound care, etc.)  - Arrange for interpretive services to assist at discharge as needed  - Refer to Case Management Department for coordinating discharge planning if the patient needs post-hospital services based on physician/advanced practitioner order or complex needs related to functional status, cognitive ability, or social support system  Outcome: Progressing    times a day  - Out of bed for toileting  - Record patient progress and toleration of activity level   Outcome: Progressing    times a day  - Out of bed for toileting  - Record patient progress and toleration of activity level   Outcome: Progressing     - Apply yellow socks and bracelet for high fall risk patients  - Consider moving patient to room near nurses station  Outcome: Progressing

## 2024-08-23 NOTE — CASE MANAGEMENT
Case Management Discharge Planning Note    Patient name Aniya Jones  Location /-01 MRN [id number]  : 1955 Date 2024       Current Admission Date: 2024  Current Admission Diagnosis:Fracture of multiple metatarsal bones   Patient Active Problem List    Diagnosis Date Noted Date Diagnosed    Fracture of multiple metatarsal bones 2024     Ambulatory dysfunction 2024     Allergic reaction 2024     At risk for delirium 2023     Advance care planning 2023     Diastolic dysfunction 2023     Scalp hematoma 2023     Epidural hemorrhage without loss of consciousness (HCC) 2023     Worsening headaches 2023     Uncontrolled morning headache 2023     Snores 2023     JAMES (obstructive sleep apnea) 2023     Fall 2023     Abnormal MRI 2023     Facet arthritis of lumbosacral region 2023    Hypercholesterolemia 2023    Hiatal hernia 2023    Drug-induced constipation 2023     Continuous opioid dependence (HCC) 2023     Bereavement due to life event 2022     Fatty liver disease, nonalcoholic 2022    SY (generalized anxiety disorder) 2022     Eating disorder 10/14/2022     Primary localized osteoarthritis of right knee 2022     Other insomnia 2020    Headache, chronic migraine without aura 2020     Cervicalgia 2020     Cervical dystonia 2020     Medication overuse headache 2020     Depression with anxiety 2020     Chronic midline low back pain with bilateral sciatica 2020     Bilateral occipital neuralgia 2020     Major depression, recurrent, chronic (HCC) 2018    Migraine without status migrainosus, not intractable 2018    Chronic GERD 2018    Type 2 diabetes mellitus with other specified complication, without  long-term current use of insulin (HCC) 01/31/2018 09/07/2023    Hypertension, essential 01/31/2018 09/07/2023    Chronic neck pain 01/31/2018 09/07/2023    Fibromyalgia 01/31/2018 09/07/2023      LOS (days): 1  Geometric Mean LOS (GMLOS) (days): 2.8  Days to GMLOS:1.5     OBJECTIVE:  Risk of Unplanned Readmission Score: 22.75         Current admission status: Inpatient   Preferred Pharmacy:   [id number] - [place] - [address]  [address]  [facility] [address]  Phone: [phone] Fax: [phone]    Primary Care Provider: Jojo Merrill DO    Primary Insurance: MEDICARE  Secondary Insurance: BLUE CROSS    DISCHARGE DETAILS:     List of accepting SNF given to patient. [facility]. A bed will be available on Sunday.

## 2024-08-24 LAB
ANION GAP SERPL CALCULATED.3IONS-SCNC: 8 MMOL/L (ref 4–13)
BUN SERPL-MCNC: 22 MG/DL (ref 5–25)
CALCIUM SERPL-MCNC: 9.4 MG/DL (ref 8.4–10.2)
CHLORIDE SERPL-SCNC: 103 MMOL/L (ref 96–108)
CO2 SERPL-SCNC: 29 MMOL/L (ref 21–32)
CREAT SERPL-MCNC: 0.83 MG/DL (ref 0.6–1.3)
ERYTHROCYTE [DISTWIDTH] IN BLOOD BY AUTOMATED COUNT: 15.9 % (ref 11.6–15.1)
GFR SERPL CREATININE-BSD FRML MDRD: 71 ML/MIN/1.73SQ M
GLUCOSE SERPL-MCNC: 117 MG/DL (ref 65–140)
GLUCOSE SERPL-MCNC: 132 MG/DL (ref 65–140)
GLUCOSE SERPL-MCNC: 91 MG/DL (ref 65–140)
GLUCOSE SERPL-MCNC: 92 MG/DL (ref 65–140)
HCT VFR BLD AUTO: 31.1 % (ref 34.8–46.1)
HGB BLD-MCNC: 10.1 G/DL (ref 11.5–15.4)
MCH RBC QN AUTO: 28.5 PG (ref 26.8–34.3)
MCHC RBC AUTO-ENTMCNC: 32.5 G/DL (ref 31.4–37.4)
MCV RBC AUTO: 88 FL (ref 82–98)
PLATELET # BLD AUTO: 321 THOUSANDS/UL (ref 149–390)
PMV BLD AUTO: 9.3 FL (ref 8.9–12.7)
POTASSIUM SERPL-SCNC: 4.3 MMOL/L (ref 3.5–5.3)
RBC # BLD AUTO: 3.54 MILLION/UL (ref 3.81–5.12)
SODIUM SERPL-SCNC: 140 MMOL/L (ref 135–147)
WBC # BLD AUTO: 7.28 THOUSAND/UL (ref 4.31–10.16)

## 2024-08-24 PROCEDURE — 82948 REAGENT STRIP/BLOOD GLUCOSE: CPT

## 2024-08-24 PROCEDURE — 80048 BASIC METABOLIC PNL TOTAL CA: CPT | Performed by: STUDENT IN AN ORGANIZED HEALTH CARE EDUCATION/TRAINING PROGRAM

## 2024-08-24 PROCEDURE — 99232 SBSQ HOSP IP/OBS MODERATE 35: CPT | Performed by: STUDENT IN AN ORGANIZED HEALTH CARE EDUCATION/TRAINING PROGRAM

## 2024-08-24 PROCEDURE — 85027 COMPLETE CBC AUTOMATED: CPT | Performed by: STUDENT IN AN ORGANIZED HEALTH CARE EDUCATION/TRAINING PROGRAM

## 2024-08-24 RX ORDER — HYDROXYZINE HYDROCHLORIDE 25 MG/1
25 TABLET, FILM COATED ORAL EVERY 6 HOURS PRN
Status: DISCONTINUED | OUTPATIENT
Start: 2024-08-24 | End: 2024-08-25 | Stop reason: HOSPADM

## 2024-08-24 RX ADMIN — BUSPIRONE HYDROCHLORIDE 10 MG: 10 TABLET ORAL at 09:18

## 2024-08-24 RX ADMIN — ENOXAPARIN SODIUM 40 MG: 40 INJECTION SUBCUTANEOUS at 09:18

## 2024-08-24 RX ADMIN — Medication 400 MG: at 09:18

## 2024-08-24 RX ADMIN — Medication 400 MG: at 18:06

## 2024-08-24 RX ADMIN — DOCUSATE SODIUM 100 MG: 100 CAPSULE, LIQUID FILLED ORAL at 09:18

## 2024-08-24 RX ADMIN — PANTOPRAZOLE SODIUM 20 MG: 20 TABLET, DELAYED RELEASE ORAL at 05:53

## 2024-08-24 RX ADMIN — BUPROPION HYDROCHLORIDE 150 MG: 150 TABLET, EXTENDED RELEASE ORAL at 09:18

## 2024-08-24 RX ADMIN — OXYCODONE HYDROCHLORIDE 10 MG: 10 TABLET ORAL at 21:08

## 2024-08-24 RX ADMIN — CLONAZEPAM 0.5 MG: 0.5 TABLET ORAL at 15:26

## 2024-08-24 RX ADMIN — DOCUSATE SODIUM 100 MG: 100 CAPSULE, LIQUID FILLED ORAL at 18:34

## 2024-08-24 RX ADMIN — BUSPIRONE HYDROCHLORIDE 10 MG: 10 TABLET ORAL at 18:05

## 2024-08-24 RX ADMIN — CLONAZEPAM 0.5 MG: 0.5 TABLET ORAL at 21:08

## 2024-08-24 RX ADMIN — OXYCODONE HYDROCHLORIDE 10 MG: 10 TABLET ORAL at 05:51

## 2024-08-24 RX ADMIN — ATORVASTATIN CALCIUM 40 MG: 40 TABLET, FILM COATED ORAL at 15:27

## 2024-08-24 RX ADMIN — OXYCODONE HYDROCHLORIDE 10 MG: 10 TABLET ORAL at 15:26

## 2024-08-24 RX ADMIN — HYDROXYZINE HYDROCHLORIDE 25 MG: 25 TABLET ORAL at 12:40

## 2024-08-24 RX ADMIN — POLYETHYLENE GLYCOL 3350 17 G: 17 POWDER, FOR SOLUTION ORAL at 09:18

## 2024-08-24 RX ADMIN — ACETAMINOPHEN 975 MG: 325 TABLET ORAL at 21:07

## 2024-08-24 RX ADMIN — BACLOFEN 5 MG: 10 TABLET ORAL at 12:36

## 2024-08-24 RX ADMIN — ACETAMINOPHEN 975 MG: 325 TABLET ORAL at 15:26

## 2024-08-24 RX ADMIN — POLYETHYLENE GLYCOL 3350 17 G: 17 POWDER, FOR SOLUTION ORAL at 18:06

## 2024-08-24 RX ADMIN — DULOXETINE HYDROCHLORIDE 90 MG: 30 CAPSULE, DELAYED RELEASE ORAL at 21:08

## 2024-08-24 RX ADMIN — ACETAMINOPHEN 975 MG: 325 TABLET ORAL at 05:51

## 2024-08-24 RX ADMIN — CLONAZEPAM 0.5 MG: 0.5 TABLET ORAL at 09:18

## 2024-08-24 RX ADMIN — SENNOSIDES 17.2 MG: 8.6 TABLET, FILM COATED ORAL at 21:08

## 2024-08-24 NOTE — PROGRESS NOTES
"[facility]  Progress Note  Name: Aniya Jones I  MRN: [id number]  Unit/Bed#: [facility] I Date of Admission: 8/21/2024   Date of Service: 8/24/2024 I Hospital Day: 2    Assessment & Plan   * Fracture of multiple metatarsal bones  Assessment & Plan  S/p mechanical fall on 8/20   Podiatry recommending NWB initially, however pt unable to use crutches s/d to chronic shoulder pain there OK with minimal weight bearing to LLE and CAM boot - pt unable to do this, prompting admission   No need for procedure. Recommending minimal weight bearing with cam boot and walker or scooter    F/u with podiatry outpt    Ambulatory dysfunction  Assessment & Plan  Unable to minimally weight bear on LLE with walker   PT, OT, recommending Level 2 care  Pt to go to [facility]    Allergic reaction  Assessment & Plan  Developed diffuse urticardia after starting supplement for \"brain fog\" containing gingko balboa, as well as magnesium and B12 supplements ordered from [place] on 8/11- rash responded well to benadryl   Dc'd solu medrol to bid and IV benadryl  Transitioned to atarax prn   Adding hydocortisone    Type 2 diabetes mellitus with other specified complication, without long-term current use of insulin (HCC)  Assessment & Plan  Lab Results   Component Value Date    HGBA1C 6.2 (H) 08/22/2024       Recent Labs     08/22/24  1552 08/22/24  2107 08/23/24  0708 08/23/24  1119   POCGLU 141* 180* 148* 134       Blood Sugar Average: Last 72 hrs:  (P) [id number] shows excellent control   Hold metformin while inpatient - placed on SSI while inpatient     Continuous opioid dependence (HCC)  Assessment & Plan  Home regimen: Oxycodone 10mg TID, baclofen 3 times daily as needed, and duloxetine 90 Mg at bedtime  PDMP reviewed - oxycodone 10mg 90 tabs filled on 8/15  Continue     Depression with anxiety  Assessment & Plan  Home regimen: BuSpar 10 Mg twice daily, duloxetine 90 Mg at bedtime, " bupropion 150 Mg daily  Continue home medication               VTE Pharmacologic Prophylaxis: VTE Score: 3 Moderate Risk (Score 3-4) - Pharmacological DVT Prophylaxis Ordered: enoxaparin (Lovenox).    Mobility:   Basic Mobility Inpatient Raw Score: 19  JH-HLM Goal: 6: Walk 10 steps or more  JH-HLM Achieved: 6: Walk 10 steps or more  JH-HLM Goal NOT achieved. Continue with multidisciplinary rounding and encourage appropriate mobility to improve upon JH-HLM goals.    Patient Centered Rounds: I performed bedside rounds with nursing staff today.   Discussions with Specialists or Other Care Team Provider: Podiatry, CM, Pt, OT    Education and Discussions with Family / Patient: Patient declined call to .     Total Time Spent on Date of Encounter in care of patient: 54 mins. This time was spent on one or more of the following: performing physical exam; counseling and coordination of care; obtaining or reviewing history; documenting in the medical record; reviewing/ordering tests, medications or procedures; communicating with other healthcare professionals and discussing with patient's family/caregivers.    Current Length of Stay: 2 day(s)  Current Patient Status: Inpatient   Certification Statement: The patient will continue to require additional inpatient hospital stay due to metatarsal fx  Discharge Plan: Anticipate discharge tomorrow to rehab facility.    Code Status: Level 1 - Full Code    Subjective:   Aniya was seen and examined at bedside.  No acute events overnight.  Discussed plan of care.  All questions and concerns were answered and addressed.  Patient continues to have itchiness however significantly improved DC'd Solu-Medrol and Benadryl transition to Atarax as needed we will continue to monitor.  PT OT evaluated the patient recommending level 2 care.  [facility] to go [facility]    Objective:     Vitals:   Temp (24hrs), Av.9 °F (36.1 °C), Min:96.8 °F (36 °C), Max:97.2 °F (36.2  °C)    Temp:  [96.8 °F (36 °C)-97.2 °F (36.2 °C)] 96.8 °F (36 °C)  HR:  [71-83] 71  BP: (154-165)/(86-94) 159/87  SpO2:  [94 %-97 %] 94 %  Body mass index is 28.84 kg/m².     Input and Output Summary (last 24 hours):     Intake/Output Summary (Last 24 hours) at 8/24/2024 1139  Last data filed at 8/24/2024 1006  Gross per 24 hour   Intake 840 ml   Output --   Net 840 ml       Physical Exam:   Physical Exam  Vitals and nursing note reviewed.   Constitutional:       General: She is not in acute distress.     Appearance: She is not ill-appearing.   HENT:      Head: Normocephalic and atraumatic.   Cardiovascular:      Rate and Rhythm: Normal rate and regular rhythm.      Pulses: Normal pulses.      Heart sounds: Normal heart sounds.   Pulmonary:      Effort: Pulmonary effort is normal.      Breath sounds: Normal breath sounds.   Abdominal:      General: Abdomen is flat. Bowel sounds are normal.      Palpations: Abdomen is soft.   Musculoskeletal:      Right lower leg: No edema.      Left lower leg: No edema.      Comments: LLE in boot   Skin:     General: Skin is warm.      Findings: No rash.   Neurological:      General: No focal deficit present.      Mental Status: She is alert and oriented to person, place, and time.          Additional Data:     Labs:  Results from last 7 days   Lab Units 08/24/24  0601 08/23/24  0318   WBC Thousand/uL 7.28 4.23*   HEMOGLOBIN g/dL 10.1* 9.5*   HEMATOCRIT % 31.1* 29.7*   PLATELETS Thousands/uL 321 274   SEGS PCT %  --  82*   LYMPHO PCT %  --  12*   MONO PCT %  --  5   EOS PCT %  --  0     Results from last 7 days   Lab Units 08/24/24  0601 08/23/24  0318   SODIUM mmol/L 140 140   POTASSIUM mmol/L 4.3 4.5   CHLORIDE mmol/L 103 103   CO2 mmol/L 29 31   BUN mg/dL 22 21   CREATININE mg/dL 0.83 0.78   ANION GAP mmol/L 8 6   CALCIUM mg/dL 9.4 9.3   ALBUMIN g/dL  --  3.7   TOTAL BILIRUBIN mg/dL  --  0.29   ALK PHOS U/L  --  44   ALT U/L  --  32   AST U/L  --  28   GLUCOSE RANDOM mg/dL 117  172*         Results from last 7 days   Lab Units 08/24/24  0806 08/23/24  1640 08/23/24  1119 08/23/24  0708 08/22/24  2107 08/22/24  1552 08/22/24  1122 08/22/24  0658   POC GLUCOSE mg/dl 132 137 134 148* 180* 141* 101 114     Results from last 7 days   Lab Units 08/22/24  0532   HEMOGLOBIN A1C % 6.2*           Lines/Drains:  Invasive Devices       Peripheral Intravenous Line  Duration             Peripheral IV 08/23/24 Left Antecubital 1 day                          Imaging: No pertinent imaging reviewed.    Recent Cultures (last 7 days):         Last 24 Hours Medication List:   Current Facility-Administered Medications   Medication Dose Route Frequency Provider Last Rate    acetaminophen  975 mg Oral Q8H RAHAT Kathy Milner PA-C      aluminum-magnesium hydroxide-simethicone  30 mL Oral Q6H PRN Kathy Milner PA-C      atorvastatin  40 mg Oral Daily With Dinner Kathy Milner PA-C      baclofen  5 mg Oral TID PRN Kathy Milner PA-C      buPROPion  150 mg Oral Daily Kathy Milner PA-C      busPIRone  10 mg Oral BID Kathy Milner PA-C      clonazePAM  0.5 mg Oral TID Kathy Milner PA-C      docusate sodium  100 mg Oral BID Yen Peraza MD      DULoxetine  90 mg Oral HS Kathy Milner PA-C      enoxaparin  40 mg Subcutaneous Daily Kathy Milner PA-C      hydrocortisone   Topical 4x Daily PRN Yen Peraza MD      HYDROmorphone  0.2 mg Intravenous Q4H PRN Kathy Milner PA-C      hydrOXYzine HCL  25 mg Oral Q6H PRN Yen Peraza MD      insulin lispro  1-5 Units Subcutaneous TID AC Kathy Milner PA-C      magnesium Oxide  400 mg Oral BID Kathy Milner PA-C      ondansetron  4 mg Intravenous Q6H PRN Kathy Milner PA-C      oxyCODONE  10 mg Oral TID Kathy Milner PA-C      oxyCODONE  2.5 mg Oral Q4H PRN Kathy Milner PA-C      pantoprazole  20 mg Oral Early Morning Kathy Milner PA-C      polyethylene glycol  17 g Oral BID Yen Peraza,  MD      senna  2 tablet Oral HS Yen Peraza MD          Today, Patient Was Seen By: Yen Peraza MD    **Please Note: This note may have been constructed using a voice recognition system.**

## 2024-08-24 NOTE — PLAN OF CARE
Problem: PAIN - ADULT  Goal: Verbalizes/displays adequate comfort level or baseline comfort level  Description: Interventions:  - Encourage patient to monitor pain and request assistance  - Assess pain using appropriate pain scale  - Administer analgesics based on type and severity of pain and evaluate response  - Implement non-pharmacological measures as appropriate and evaluate response  - Consider cultural and social influences on pain and pain management  - Notify physician/advanced practitioner if interventions unsuccessful or patient reports new pain  Outcome: Progressing     Problem: INFECTION - ADULT  Goal: Absence or prevention of progression during hospitalization  Description: INTERVENTIONS:  - Assess and monitor for signs and symptoms of infection  - Monitor lab/diagnostic results  - Monitor all insertion sites, i.e. indwelling lines, tubes, and drains  - Monitor endotracheal if appropriate and nasal secretions for changes in amount and color  - [place] appropriate cooling/warming therapies per order  - Administer medications as ordered  - Instruct and encourage patient and family to use good hand hygiene technique  - Identify and instruct in appropriate isolation precautions for identified infection/condition  Outcome: Progressing     Problem: SAFETY ADULT  Goal: Maintain or return to baseline ADL function  Description: INTERVENTIONS:  -  Assess patient's ability to carry out ADLs; assess patient's baseline for ADL function and identify physical deficits which impact ability to perform ADLs (bathing, care of mouth/teeth, toileting, grooming, dressing, etc.)  - Assess/evaluate cause of self-care deficits   - Assess range of motion  - Assess patient's mobility; develop plan if impaired  - Assess patient's need for assistive devices and provide as appropriate  - Encourage maximum independence but intervene and supervise when necessary  - Involve family in performance of ADLs  - Assess for home care  needs following discharge   - Consider OT consult to assist with ADL evaluation and planning for discharge  - Provide patient education as appropriate  Outcome: Progressing     Problem: DISCHARGE PLANNING  Goal: Discharge to home or other facility with appropriate resources  Description: INTERVENTIONS:  - Identify barriers to discharge w/patient and caregiver  - Arrange for needed discharge resources and transportation as appropriate  - Identify discharge learning needs (meds, wound care, etc.)  - Arrange for interpretive services to assist at discharge as needed  - Refer to Case Management Department for coordinating discharge planning if the patient needs post-hospital services based on physician/advanced practitioner order or complex needs related to functional status, cognitive ability, or social support system  Outcome: Progressing

## 2024-08-24 NOTE — CASE MANAGEMENT
Case Management Discharge Planning Note    Patient name Aniya Jones  Location /-01 MRN [id number]  : 1955 Date 2024       Current Admission Date: 2024  Current Admission Diagnosis:Fracture of multiple metatarsal bones   Patient Active Problem List    Diagnosis Date Noted Date Diagnosed    Fracture of multiple metatarsal bones 2024     Ambulatory dysfunction 2024     Allergic reaction 2024     At risk for delirium 2023     Advance care planning 2023     Diastolic dysfunction 2023     Scalp hematoma 2023     Epidural hemorrhage without loss of consciousness (HCC) 2023     Worsening headaches 2023     Uncontrolled morning headache 2023     Snores 2023     JAMES (obstructive sleep apnea) 2023     Fall 2023     Abnormal MRI 2023     Facet arthritis of lumbosacral region 2023    Hypercholesterolemia 2023    Hiatal hernia 2023    Drug-induced constipation 2023     Continuous opioid dependence (HCC) 2023     Bereavement due to life event 2022     Fatty liver disease, nonalcoholic 2022    SY (generalized anxiety disorder) 2022     Eating disorder 10/14/2022     Primary localized osteoarthritis of right knee 2022     Other insomnia 2020    Headache, chronic migraine without aura 2020     Cervicalgia 2020     Cervical dystonia 2020     Medication overuse headache 2020     Depression with anxiety 2020     Chronic midline low back pain with bilateral sciatica 2020     Bilateral occipital neuralgia 2020     Major depression, recurrent, chronic (HCC) 2018    Migraine without status migrainosus, not intractable 2018    Chronic GERD 2018    Type 2 diabetes mellitus with other specified complication, without  long-term current use of insulin (HCC) 01/31/2018 09/07/2023    Hypertension, essential 01/31/2018 09/07/2023    Chronic neck pain 01/31/2018 09/07/2023    Fibromyalgia 01/31/2018 09/07/2023      LOS (days): 2  Geometric Mean LOS (GMLOS) (days): 2.8  Days to GMLOS:0.6     OBJECTIVE:  Risk of Unplanned Readmission Score: 22.78         Current admission status: Inpatient   Preferred Pharmacy:   [id number] - [place], [place] - [address]  [address]  [facility] [address]  Phone: [phone] Fax: [phone]    Primary Care Provider: Jojo Merrill DO    Primary Insurance: MEDICARE  Secondary Insurance: BLUE CROSS    DISCHARGE DETAILS:     ASHISH SORIANO Van can transport patient to [facility] on Sunday at 1 pm.  Notified  patient, Jamia of admission at [facility] at [phone] and Clarice, her nurse of transport time.

## 2024-08-24 NOTE — CASE MANAGEMENT
Case Management Discharge Planning Note    Patient name Aniya Jones  Location /-01 MRN [id number]  : 1955 Date 2024       Current Admission Date: 2024  Current Admission Diagnosis:Fracture of multiple metatarsal bones   Patient Active Problem List    Diagnosis Date Noted Date Diagnosed    Fracture of multiple metatarsal bones 2024     Ambulatory dysfunction 2024     Allergic reaction 2024     At risk for delirium 2023     Advance care planning 2023     Diastolic dysfunction 2023     Scalp hematoma 2023     Epidural hemorrhage without loss of consciousness (HCC) 2023     Worsening headaches 2023     Uncontrolled morning headache 2023     Snores 2023     JAMES (obstructive sleep apnea) 2023     Fall 2023     Abnormal MRI 2023     Facet arthritis of lumbosacral region 2023    Hypercholesterolemia 2023    Hiatal hernia 2023    Drug-induced constipation 2023     Continuous opioid dependence (HCC) 2023     Bereavement due to life event 2022     Fatty liver disease, nonalcoholic 2022    SY (generalized anxiety disorder) 2022     Eating disorder 10/14/2022     Primary localized osteoarthritis of right knee 2022     Other insomnia 2020    Headache, chronic migraine without aura 2020     Cervicalgia 2020     Cervical dystonia 2020     Medication overuse headache 2020     Depression with anxiety 2020     Chronic midline low back pain with bilateral sciatica 2020     Bilateral occipital neuralgia 2020     Major depression, recurrent, chronic (HCC) 2018    Migraine without status migrainosus, not intractable 2018    Chronic GERD 2018    Type 2 diabetes mellitus with other specified complication, without  long-term current use of insulin (HCC) 01/31/2018 09/07/2023    Hypertension, essential 01/31/2018 09/07/2023    Chronic neck pain 01/31/2018 09/07/2023    Fibromyalgia 01/31/2018 09/07/2023      LOS (days): 2  Geometric Mean LOS (GMLOS) (days): 2.8  Days to GMLOS:0.8     OBJECTIVE:  Risk of Unplanned Readmission Score: 22.73         Current admission status: Inpatient   Preferred Pharmacy:   [id number] - [place], [place] - [address]  [address]  [address]  [facility] [address]  Phone: [phone] Fax: [phone]    Primary Care Provider: Jojo Merrill DO    Primary Insurance: MEDICARE  Secondary Insurance: BLUE CROSS    DISCHARGE DETAILS:     Spoke with Jamia of [facility] at [phone] and patient is approved for STR there, [facility] on [address]. Met wit  patient, she is agreeable to rehab at [facility] and will need WC van transportation. Patient made aware of cost of transport and she is agreeable. Arranged WC van to transport at 1 pm on Sunday.    Report can be called to [phone]                                                                                IMM Given (Date):: 08/24/24  IMM Given to:: Patient

## 2024-08-25 VITALS
RESPIRATION RATE: 18 BRPM | TEMPERATURE: 97 F | HEIGHT: 65 IN | SYSTOLIC BLOOD PRESSURE: 130 MMHG | BODY MASS INDEX: 28.87 KG/M2 | DIASTOLIC BLOOD PRESSURE: 85 MMHG | OXYGEN SATURATION: 94 % | HEART RATE: 72 BPM | WEIGHT: 173.28 LBS

## 2024-08-25 PROBLEM — T78.40XA ALLERGIC REACTION: Status: RESOLVED | Noted: 2024-08-21 | Resolved: 2024-08-25

## 2024-08-25 LAB
GLUCOSE SERPL-MCNC: 87 MG/DL (ref 65–140)
GLUCOSE SERPL-MCNC: 91 MG/DL (ref 65–140)

## 2024-08-25 PROCEDURE — 99239 HOSP IP/OBS DSCHRG MGMT >30: CPT | Performed by: STUDENT IN AN ORGANIZED HEALTH CARE EDUCATION/TRAINING PROGRAM

## 2024-08-25 PROCEDURE — 82948 REAGENT STRIP/BLOOD GLUCOSE: CPT

## 2024-08-25 RX ORDER — HYDROXYZINE HYDROCHLORIDE 25 MG/1
25 TABLET, FILM COATED ORAL EVERY 6 HOURS PRN
Start: 2024-08-25

## 2024-08-25 RX ORDER — BENZOCAINE/MENTHOL 6 MG-10 MG
LOZENGE MUCOUS MEMBRANE 4 TIMES DAILY PRN
Qty: 45 G | Refills: 0 | Status: SHIPPED | OUTPATIENT
Start: 2024-08-25

## 2024-08-25 RX ORDER — OXYCODONE HYDROCHLORIDE 10 MG/1
10 TABLET ORAL EVERY 8 HOURS PRN
Qty: 10 TABLET | Refills: 0 | Status: SHIPPED | OUTPATIENT
Start: 2024-08-25

## 2024-08-25 RX ADMIN — ENOXAPARIN SODIUM 40 MG: 40 INJECTION SUBCUTANEOUS at 09:34

## 2024-08-25 RX ADMIN — ACETAMINOPHEN 975 MG: 325 TABLET ORAL at 06:03

## 2024-08-25 RX ADMIN — DOCUSATE SODIUM 100 MG: 100 CAPSULE, LIQUID FILLED ORAL at 09:33

## 2024-08-25 RX ADMIN — BUSPIRONE HYDROCHLORIDE 10 MG: 10 TABLET ORAL at 09:33

## 2024-08-25 RX ADMIN — Medication 2.5 MG: at 11:14

## 2024-08-25 RX ADMIN — PANTOPRAZOLE SODIUM 20 MG: 20 TABLET, DELAYED RELEASE ORAL at 06:02

## 2024-08-25 RX ADMIN — Medication 400 MG: at 09:33

## 2024-08-25 RX ADMIN — CLONAZEPAM 0.5 MG: 0.5 TABLET ORAL at 09:33

## 2024-08-25 RX ADMIN — BUPROPION HYDROCHLORIDE 150 MG: 150 TABLET, EXTENDED RELEASE ORAL at 09:33

## 2024-08-25 RX ADMIN — OXYCODONE HYDROCHLORIDE 10 MG: 10 TABLET ORAL at 12:42

## 2024-08-25 RX ADMIN — UBROGEPANT 100 MG: 100 TABLET ORAL at 06:28

## 2024-08-25 RX ADMIN — POLYETHYLENE GLYCOL 3350 17 G: 17 POWDER, FOR SOLUTION ORAL at 09:34

## 2024-08-25 RX ADMIN — OXYCODONE HYDROCHLORIDE 10 MG: 10 TABLET ORAL at 06:02

## 2024-08-25 NOTE — PLAN OF CARE
Problem: Potential for Falls  Goal: Patient will remain free of falls  Description: INTERVENTIONS:  - Educate patient/family on patient safety including physical limitations  - Instruct patient to call for assistance with activity   - Consult OT/PT to assist with strengthening/mobility   - Keep Call bell within reach  - Keep bed low and locked with side rails adjusted as appropriate  - Keep care items and personal belongings within reach  - Initiate and maintain comfort rounds  - Make Fall Risk Sign visible to staff  - Apply yellow socks and bracelet for high fall risk patients  - Consider moving patient to room near nurses station  Outcome: Progressing     Problem: PAIN - ADULT  Goal: Verbalizes/displays adequate comfort level or baseline comfort level  Description: Interventions:  - Encourage patient to monitor pain and request assistance  - Assess pain using appropriate pain scale  - Administer analgesics based on type and severity of pain and evaluate response  - Implement non-pharmacological measures as appropriate and evaluate response  - Consider cultural and social influences on pain and pain management  - Notify physician/advanced practitioner if interventions unsuccessful or patient reports new pain  Outcome: Progressing     Problem: SAFETY ADULT  Goal: Patient will remain free of falls  Description: INTERVENTIONS:  - Educate patient/family on patient safety including physical limitations  - Instruct patient to call for assistance with activity   - Consult OT/PT to assist with strengthening/mobility   - Keep Call bell within reach  - Keep bed low and locked with side rails adjusted as appropriate  - Keep care items and personal belongings within reach  - Initiate and maintain comfort rounds  - Make Fall Risk Sign visible to staff    - Apply yellow socks and bracelet for high fall risk patients  - Consider moving patient to room near nurses station  Outcome: Progressing

## 2024-08-25 NOTE — DISCHARGE SUMMARY
"[facility]  Discharge- Aniya Jones 1955, 69 y.o. female MRN: [id number]  Unit/Bed#: [facility] Encounter: [id number]  Primary Care Provider: Jojo Merrill DO   Date and time admitted to hospital: 8/21/2024  4:47 PM    * Fracture of multiple metatarsal bones  Assessment & Plan  S/p mechanical fall on 8/20   Podiatry recommending NWB initially, however pt unable to use crutches s/d to chronic shoulder pain there OK with minimal weight bearing to LLE and CAM boot - pt unable to do this, prompting admission   No need for procedure. Recommending minimal weight bearing with cam boot and walker or scooter    F/u with podiatry outpt    Ambulatory dysfunction  Assessment & Plan  Unable to minimally weight bear on LLE with walker   PT, OT, recommending Level 2 care  Pt to go to [facility]    Allergic reaction-resolved as of 8/25/2024  Assessment & Plan  Developed diffuse urticardia after starting supplement for \"brain fog\" containing gingko balboa, as well as magnesium and B12 supplements ordered from [employer] on 8/11- rash responded well to benadryl   Dc'd solu medrol to bid and IV benadryl  Continue atarax prn and hydrocortisone cream prn    Type 2 diabetes mellitus with other specified complication, without long-term current use of insulin (HCC)  Assessment & Plan  Lab Results   Component Value Date    HGBA1C 6.2 (H) 08/22/2024       Recent Labs     08/24/24  0806 08/24/24  1234 08/24/24  1633 08/25/24  0720   POCGLU 132 92 91 91       Blood Sugar Average: Last 72 hrs:  (P) [id number] shows excellent control   Hold metformin while inpatient - placed on SSI while inpatient     Continuous opioid dependence (HCC)  Assessment & Plan  Home regimen: Oxycodone 10mg TID, baclofen 3 times daily as needed, and duloxetine 90 Mg at bedtime  PDMP reviewed - oxycodone 10mg 90 tabs filled on 8/15  Continue     Depression with anxiety  Assessment & Plan  Home regimen: BuSpar 10 Mg " twice daily, duloxetine 90 Mg at bedtime, bupropion 150 Mg daily  Continue home medication        Medical Problems       Resolved Problems  Date Reviewed: 2/1/2024            Resolved    Allergic reaction 8/25/2024     Resolved by  Yen Peraza MD        Discharging Physician / Practitioner: Yen Peraza MD  PCP: Jojo Merrill DO  Admission Date:   Admission Orders (From admission, onward)       Ordered        08/22/24 1037  INPATIENT ADMISSION  Once            08/21/24 2130  Place in Observation  Once                          Discharge Date: 08/25/24    Consultations During Hospital Stay:  Podiatry  PT  OT  CM    Procedures Performed:   XR foot 3+ views LEFT   ED Interpretation   Fracture of the third fourth metacarpal bone of the proximal aspect      Final Result      Acute fractures in the proximal second third and fourth metatarsals.         Computerized Assisted Algorithm (CAA) may have been used to analyze all applicable images.         Workstation performed: [id number]               Significant Findings / Test Results:   See above    Incidental Findings:   none       Test Results Pending at Discharge (will require follow up):   none     Outpatient Tests Requested:  none    Complications:  none    Reason for Admission: Fracture multiple metatarsal bones    Hospital Course:   Aniya Jones is a 69 y.o. female patient who originally presented to the hospital on 8/21/2024 due to a mechanical fall.  In the ED imaging showing fracture of multiple metatarsal bones.  Cam boot was placed podiatry was consulted.  Patient also noted to have an allergic reaction on admission and patient was started on prednisone and Benadryl.  Patient continued to have itching the following day patient was placed on Solu-Medrol and IV Benadryl.  Podiatry evaluated the patient did not recommend any surgical procedures at this time.  Cam boot was suggested to her size.  Pain has been well-controlled.  She has otherwise remained  "hemodynamically stable afebrile in no acute respiratory distress.  Patient continues to have some itching although Solu-Medrol has been discontinued and rash has resolved.  Patient transition to Atarax which has been helping her itching.  Her PCP and podiatry.  She will be discharged with Atarax 25 mg 3 times daily as needed and hydrocortisone cream as needed    Hospital Course: No notes on file        Please see above list of diagnoses and related plan for additional information.     Condition at Discharge: stable    Discharge Day Visit / Exam:   Subjective:  Aniya was seen and examined at bedside.  No acute events overnight.  Discussed plan of care.  All questions and concerns were answered and addressed.  Has no acute complaints at this time her itching and allergic reaction has significantly improved.  Vitals: Blood Pressure: 130/85 (08/25/24 0722)  Pulse: 72 (08/25/24 0722)  Temperature: (!) 97 °F (36.1 °C) (08/25/24 0722)  Temp Source: Temporal (08/25/24 0722)  Respirations: 18 (08/21/24 2229)  Height: 5' 5\" (165.1 cm) (08/21/24 2229)  Weight - Scale: 78.6 kg (173 lb 4.5 oz) (08/21/24 2229)  SpO2: 94 % (08/25/24 0722)  Exam:   Physical Exam   Vitals and nursing note reviewed.   Constitutional:       General: She is not in acute distress.     Appearance: She is not ill-appearing.   HENT:      Head: Normocephalic and atraumatic.   Cardiovascular:      Rate and Rhythm: Normal rate and regular rhythm.      Pulses: Normal pulses.      Heart sounds: Normal heart sounds.   Pulmonary:      Effort: Pulmonary effort is normal.      Breath sounds: Normal breath sounds.   Abdominal:      General: Abdomen is flat. Bowel sounds are normal.      Palpations: Abdomen is soft.   Musculoskeletal:      Right lower leg: No edema.      Left lower leg: No edema.      Comments: LLE in boot   Skin:     General: Skin is warm.      Findings: No rash.   Neurological:      General: No focal deficit present.      Mental Status: She is alert " and oriented to person, place, and time.        Discussion with Family: Patient declined call to .     Discharge instructions/Information to patient and family:   See after visit summary for information provided to patient and family.      Provisions for Follow-Up Care:  See after visit summary for information related to follow-up care and any pertinent home health orders.      Mobility at time of Discharge:   Basic Mobility Inpatient Raw Score: 19  JH-HLM Goal: 6: Walk 10 steps or more  JH-HLM Achieved: 6: Walk 10 steps or more  HLM Goal achieved. Continue to encourage appropriate mobility.     Disposition:   Other Skilled Nursing Facility at [facility]    Planned Readmission: yes     Discharge Statement:  I spent 40 minutes discharging the patient. This time was spent on the day of discharge. I had direct contact with the patient on the day of discharge. Greater than 50% of the total time was spent examining patient, answering all patient questions, arranging and discussing plan of care with patient as well as directly providing post-discharge instructions.  Additional time then spent on discharge activities.    Discharge Medications:  See after visit summary for reconciled discharge medications provided to patient and/or family.      **Please Note: This note may have been constructed using a voice recognition system**

## 2024-08-25 NOTE — PLAN OF CARE
Problem: Potential for Falls  Goal: Patient will remain free of falls  Description: INTERVENTIONS:  - Educate patient/family on patient safety including physical limitations  - Instruct patient to call for assistance with activity   - Consult OT/PT to assist with strengthening/mobility   - Keep Call bell within reach  - Keep bed low and locked with side rails adjusted as appropriate  - Keep care items and personal belongings within reach  - Initiate and maintain comfort rounds  - Make Fall Risk Sign visible to staff  - Offer Toileting every x Hours, in advance of need  - Initiate/Maintain xalarm  - Obtain necessary fall risk management equipment: x  - Apply yellow socks and bracelet for high fall risk patients  - Consider moving patient to room near nurses station  Outcome: Progressing     Problem: PAIN - ADULT  Goal: Verbalizes/displays adequate comfort level or baseline comfort level  Description: Interventions:  - Encourage patient to monitor pain and request assistance  - Assess pain using appropriate pain scale  - Administer analgesics based on type and severity of pain and evaluate response  - Implement non-pharmacological measures as appropriate and evaluate response  - Consider cultural and social influences on pain and pain management  - Notify physician/advanced practitioner if interventions unsuccessful or patient reports new pain  Outcome: Progressing     Problem: INFECTION - ADULT  Goal: Absence or prevention of progression during hospitalization  Description: INTERVENTIONS:  - Assess and monitor for signs and symptoms of infection  - Monitor lab/diagnostic results  - Monitor all insertion sites, i.e. indwelling lines, tubes, and drains  - Monitor endotracheal if appropriate and nasal secretions for changes in amount and color  - [place] appropriate cooling/warming therapies per order  - Administer medications as ordered  - Instruct and encourage patient and family to use good hand hygiene  technique  - Identify and instruct in appropriate isolation precautions for identified infection/condition  Outcome: Progressing     Problem: SAFETY ADULT  Goal: Patient will remain free of falls  Description: INTERVENTIONS:  - Educate patient/family on patient safety including physical limitations  - Instruct patient to call for assistance with activity   - Consult OT/PT to assist with strengthening/mobility   - Keep Call bell within reach  - Keep bed low and locked with side rails adjusted as appropriate  - Keep care items and personal belongings within reach  - Initiate and maintain comfort rounds  - Make Fall Risk Sign visible to staff  - Offer Toileting every x Hours, in advance of need  - Initiate/Maintain bedalarm  - Obtain necessary fall risk management equipment: x  - Apply yellow socks and bracelet for high fall risk patients  - Consider moving patient to room near nurses station  Outcome: Progressing  Goal: Maintain or return to baseline ADL function  Description: INTERVENTIONS:  -  Assess patient's ability to carry out ADLs; assess patient's baseline for ADL function and identify physical deficits which impact ability to perform ADLs (bathing, care of mouth/teeth, toileting, grooming, dressing, etc.)  - Assess/evaluate cause of self-care deficits   - Assess range of motion  - Assess patient's mobility; develop plan if impaired  - Assess patient's need for assistive devices and provide as appropriate  - Encourage maximum independence but intervene and supervise when necessary  - Involve family in performance of ADLs  - Assess for home care needs following discharge   - Consider OT consult to assist with ADL evaluation and planning for discharge  - Provide patient education as appropriate  Outcome: Progressing  Goal: Maintains/Returns to pre admission functional level  Description: INTERVENTIONS:  - Perform AM-PAC 6 Click Basic Mobility/ Daily Activity assessment daily.  - Set and communicate daily  mobility goal to care team and patient/family/caregiver.   - Collaborate with rehabilitation services on mobility goals if consulted  - Perform Range of Motion x times a day.  - Reposition patient every x hours.  - Dangle patient x times a day  - Stand patient x times a day  - Ambulate patient x times a day  - Out of bed to chair x times a day   - Out of bed for meals x times a day  - Out of bed for toileting  - Record patient progress and toleration of activity level   Outcome: Progressing     Problem: DISCHARGE PLANNING  Goal: Discharge to home or other facility with appropriate resources  Description: INTERVENTIONS:  - Identify barriers to discharge w/patient and caregiver  - Arrange for needed discharge resources and transportation as appropriate  - Identify discharge learning needs (meds, wound care, etc.)  - Arrange for interpretive services to assist at discharge as needed  - Refer to Case Management Department for coordinating discharge planning if the patient needs post-hospital services based on physician/advanced practitioner order or complex needs related to functional status, cognitive ability, or social support system  Outcome: Progressing

## 2024-08-25 NOTE — NURSING NOTE
Meds obtained from pharmacy by this RN.  Meds in sealed valuables bag x2. Receiving facility made aware.

## 2024-08-27 ENCOUNTER — TELEPHONE (OUTPATIENT)
Dept: NEUROLOGY | Facility: CLINIC | Age: 69
End: 2024-08-27

## 2024-08-27 NOTE — TELEPHONE ENCOUNTER
Pt called is at Rehab in [facility] because she fell. She is concern about her botox appointments. She is going to be there couple weeks.   She wants to talk to Paulette how she can get her botox.

## 2024-08-28 ENCOUNTER — PATIENT OUTREACH (OUTPATIENT)
Dept: CASE MANAGEMENT | Facility: OTHER | Age: 69
End: 2024-08-28

## 2024-08-28 DIAGNOSIS — I10 HYPERTENSION, ESSENTIAL: ICD-10-CM

## 2024-08-28 DIAGNOSIS — E11.69 TYPE 2 DIABETES MELLITUS WITH OTHER SPECIFIED COMPLICATION, WITHOUT LONG-TERM CURRENT USE OF INSULIN (HCC): Primary | ICD-10-CM

## 2024-09-05 ENCOUNTER — PATIENT OUTREACH (OUTPATIENT)
Dept: CASE MANAGEMENT | Facility: OTHER | Age: 69
End: 2024-09-05

## 2024-09-05 NOTE — PROGRESS NOTES
Call placed to [facility] and spoke with [facility] [place] confirmed the patient is currently admitted to their facility for STR no LCD at this time. This Admin Coordinator will continue to monitor via chart review.

## 2024-09-10 ENCOUNTER — TELEPHONE (OUTPATIENT)
Dept: PSYCHIATRY | Facility: CLINIC | Age: 69
End: 2024-09-10

## 2024-09-10 ENCOUNTER — PROCEDURE VISIT (OUTPATIENT)
Dept: NEUROLOGY | Facility: CLINIC | Age: 69
End: 2024-09-10
Payer: MEDICARE

## 2024-09-10 VITALS
DIASTOLIC BLOOD PRESSURE: 81 MMHG | BODY MASS INDEX: 28.82 KG/M2 | WEIGHT: 173 LBS | HEART RATE: 74 BPM | TEMPERATURE: 98.1 F | HEIGHT: 65 IN | OXYGEN SATURATION: 99 % | SYSTOLIC BLOOD PRESSURE: 124 MMHG

## 2024-09-10 DIAGNOSIS — G43.709 CHRONIC MIGRAINE WITHOUT AURA WITHOUT STATUS MIGRAINOSUS, NOT INTRACTABLE: Primary | ICD-10-CM

## 2024-09-10 PROCEDURE — 64616 CHEMODENERV MUSC NECK DYSTON: CPT | Performed by: PHYSICIAN ASSISTANT

## 2024-09-10 NOTE — PROGRESS NOTES
"Universal Protocol   procedure performed by consultantConsent: Verbal consent obtained. Written consent obtained.  Risks and benefits: risks, benefits and alternatives were discussed  Consent given by: patient  Time out: Immediately prior to procedure a \"time out\" was called to verify the correct patient, procedure, equipment, support staff and site/side marked as required.  Patient understanding: patient states understanding of the procedure being performed  Patient consent: the patient's understanding of the procedure matches consent given  Procedure consent: procedure consent matches procedure scheduled  Relevant documents: relevant documents present and verified  Patient identity confirmed: verbally with patient      Chemodenervation     Date/Time  9/10/2024 2:00 PM     Performed by  Paulette Trevino PA-C   Authorized by  Paulette Trevino PA-C     Pre-procedure details      Prepped With: Alcohol     Anesthesia  (see MAR for exact dosages):     Anesthesia method:  None   Procedure details      Position:  Upright   Botox      Botox Type:  Type A    Brand:  Botox    mL's of Botulinum Toxin:  200    Final Concentration per CC:  50 units    Needle Gauge:  30 G 2.5 inch   Procedures      Botox Procedures: cervical dystonia and chronic headache      Indications: spasmodic torticollis and migraines     Injection Location      Head / Face:  L superior cervical paraspinal, R superior cervical paraspinal, L , R , L frontalis, R frontalis, L medial occipitalis, R medial occipitalis, L temporalis, R temporalis and procerus    L  injection amount:  5 unit(s)    R  injection amount:  5 unit(s)    L lateral frontalis:  5 unit(s)    R lateral frontalis:  5 unit(s)    L medial frontalis:  5 unit(s)    R medial frontalis:  5 unit(s)    L temporalis injection amount:  20 unit(s)    R temporalis injection amount:  20 unit(s)    Procerus injection amount:  5 unit(s)    L medial occipitalis injection " amount:  15 unit(s)    R medial occipitalis injection amount:  15 unit(s)    L superior cervical paraspinal injection amount:  10 unit(s)    R superior cervical paraspinal injection amount:  15 unit(s)    Cervical Dystonia / Salivary Gland:  L splenius capitis, R splenius capitis, L upper trapezius, R upper trapezius, L sternocleidomastoid and R sternocleidomastoid      L splenius capitis injection amount:  7.5 unit(s)      R splenius capitis injection amount:  7.5 unit(s)      L sternocleidomastoid injection amount:  10 unit(s)      R sternocleidomastoid injection amount:  10 unit(s)      L upper trapezius injection amount:  15 unit(s)      R upper trapezius injection amount:  15 unit(s)   Total Units      Total units used:  200    Total units discarded:  0   Post-procedure details      Chemodenervation:  Neck, excluding muscles of the larynx    Neck Muscle Location::  Bilateral neck muscle    Patient tolerance of procedure:  Tolerated well, no immediate complications   Comments       5 units temporalis  All medically necessary

## 2024-09-10 NOTE — TELEPHONE ENCOUNTER
Writer called to confirm 24 appointment with Prosper Orozco . Client is still in rehab for her foot injury and will call back to reschedule once she is out (release date unknown as of now). Client does not have MA and she will be virtual, writer is confident at least a Friday slot would be open the week she gets out.     Client also made aware that some of her yearly paperwork has . Writer sent the two that did not  as well so that they line up next year to sign all at once.

## 2024-09-12 ENCOUNTER — PATIENT OUTREACH (OUTPATIENT)
Dept: CASE MANAGEMENT | Facility: OTHER | Age: 69
End: 2024-09-12

## 2024-09-12 NOTE — PROGRESS NOTES
Call placed to [facility] and spoke with [facility] who confirmed the patient is currently admitted to their facility for STR no LCD at this time. This Admin Coordinator will continue to monitor via chart review.

## 2024-09-17 ENCOUNTER — TELEPHONE (OUTPATIENT)
Age: 69
End: 2024-09-17

## 2024-09-17 NOTE — TELEPHONE ENCOUNTER
Liyah from [facility] calling to see if Dr. Merrill will be willing to sign home orders for the patient as she will be getting home care.     Please call to discuss. Thank you!

## 2024-09-18 ENCOUNTER — PATIENT OUTREACH (OUTPATIENT)
Dept: CASE MANAGEMENT | Facility: OTHER | Age: 69
End: 2024-09-18

## 2024-09-18 DIAGNOSIS — Z71.89 COMPLEX CARE COORDINATION: Primary | ICD-10-CM

## 2024-09-18 NOTE — TELEPHONE ENCOUNTER
Only option was to leave a VM--Liyah's #--Noemi.  Secure line.  Left VM with all details.  Ef will sign.

## 2024-09-18 NOTE — TELEPHONE ENCOUNTER
Liyah called back. She is really hoping for this to be done today. She asked to move this over to high priority.

## 2024-09-18 NOTE — TELEPHONE ENCOUNTER
Gela from [facility] called again, she did say that [facility] did order home care. Gela was asking if Dr Merrill would sign the orders before pts next appt. Please advise

## 2024-09-18 NOTE — TELEPHONE ENCOUNTER
See call:  [facility] placed the orders. Noemi asking if you will sign them before her next apt? Which is with [facility] on 9/25/24

## 2024-09-18 NOTE — PROGRESS NOTES
Update obtained from [facility] the patient discharged 9/17/24 to Home. Call placed to [facility] who confirmed the discharge. I have removed myself off of the care team and sent a inbasket to the appropriate care  to notifying them of the SNF discharge and HRR Referal.  Ambulatory referral placed for complex care management.  Call placed to  and requested DC paperwork.

## 2024-09-19 ENCOUNTER — TELEMEDICINE (OUTPATIENT)
Dept: PSYCHIATRY | Facility: CLINIC | Age: 69
End: 2024-09-19
Payer: MEDICARE

## 2024-09-19 ENCOUNTER — PATIENT OUTREACH (OUTPATIENT)
Dept: CASE MANAGEMENT | Facility: OTHER | Age: 69
End: 2024-09-19

## 2024-09-19 DIAGNOSIS — Z63.4 BEREAVEMENT DUE TO LIFE EVENT: ICD-10-CM

## 2024-09-19 DIAGNOSIS — F41.1 GAD (GENERALIZED ANXIETY DISORDER): ICD-10-CM

## 2024-09-19 DIAGNOSIS — F33.9 MAJOR DEPRESSION, RECURRENT, CHRONIC (HCC): Primary | ICD-10-CM

## 2024-09-19 PROCEDURE — 90834 PSYTX W PT 45 MINUTES: CPT

## 2024-09-19 SDOH — SOCIAL STABILITY - SOCIAL INSECURITY: DISSAPEARANCE AND DEATH OF FAMILY MEMBER: Z63.4

## 2024-09-19 NOTE — PSYCH
Virtual Regular Visit    Verification of patient location:    Patient is located at Home in the following state in which I hold an active license [place]      Assessment/Plan:    Problem List Items Addressed This Visit       Major depression, recurrent, chronic (HCC) - Primary    SY (generalized anxiety disorder)    Bereavement due to life event       Goals addressed in session: Goal 1          Reason for visit is No chief complaint on file.       Encounter provider Lady Soto LCSW      Recent Visits  No visits were found meeting these conditions.  Showing recent visits within past 7 days and meeting all other requirements  Today's Visits  Date Type Provider Dept   09/19/24 Telemedicine Lady Soto LCSW [facility]   Showing today's visits and meeting all other requirements  Future Appointments  No visits were found meeting these conditions.  Showing future appointments within next 150 days and meeting all other requirements       The patient was identified by name and date of birth. Ainya Jones was informed that this is a telemedicine visit and that the visit is being conducted throughthe [employer] platform. She agrees to proceed..  My office door was closed. No one else was in the room.  She acknowledged consent and understanding of privacy and security of the video platform. The patient has agreed to participate and understands they can discontinue the visit at any time.    Patient is aware this is a billable service.     Subjective  Aniya Jones is a 69 y.o. female Behavioral Health Psychotherapy Progress Note    Psychotherapy Provided: Individual Psychotherapy     1. Major depression, recurrent, chronic (HCC)        2. SY (generalized anxiety disorder)        3. Bereavement due to life event            Goals addressed in session: Goal 1     DATA: Marcelina did not get onto the video link so therapist called her on the phone to check in with how she is doing.  She said she  "is in good spirits after breaking her foot and is getting some services at home with nurses.  She is thinking about moving to [facility] possibly to live or to volunteer since she likes this place.  She said she has been having a good time with talking with her brother who is in the same place where she was.  She said she has been able to make some microwaveable meals to eat and is getting around her house in her wheelchair fairly well.  She said it was good to get out and to socialize with others at the facility.  Therapist encouraged Art to start to schedule things to go out and do as well as projects around the house to continue with her improved mood.  During this session, this clinician used the following therapeutic modalities: Client-centered Therapy and Dialectical Behavior Therapy    Substance Abuse was not addressed during this session. If the client is diagnosed with a co-occurring substance use disorder, please indicate any changes in the frequency or amount of use: . Stage of change for addressing substance use diagnoses: No substance use/Not applicable    ASSESSMENT:  Art Jones presents with a Euthymic/ normal mood.     her affect is Normal range and intensity, which is congruent, with her mood and the content of the session. The client has made progress on their goals.     Art Jones presents with a none risk of suicide, none risk of self-harm, and none risk of harm to others.    For any risk assessment that surpasses a \"low\" rating, a safety plan must be developed.    A safety plan was indicated: no  If yes, describe in detail     PLAN: Between sessions, Art Jones will utilize behavioral activation to improve mood . At the next session, the therapist will use Client-centered Therapy and Dialectical Behavior Therapy to address depression.    Behavioral Health Treatment Plan and Discharge Planning: Art Jones is aware of and agrees to continue to work on their " treatment plan. They have identified and are working toward their discharge goals. yes    Visit start and stop times:    09/19/24  Start Time: 1400  Stop Time: 1450  Total Visit Time: 50 minutes .      HPI     Past Medical History:   Diagnosis Date    Anxiety     Arthritis     Cancer (HCC)     Skin    Depression     Diabetes (HCC)     GERD (gastroesophageal reflux disease)     Hyperlipidemia     Hypertension     Migraine        Past Surgical History:   Procedure Laterality Date    ANKLE SURGERY      CARPAL TUNNEL RELEASE Right     ROTATOR CUFF REPAIR         Current Outpatient Medications   Medication Sig Dispense Refill    acetaminophen (TYLENOL) 325 mg tablet Take 3 tablets (975 mg total) by mouth every 8 (eight) hours  0    B COMPLEX VITAMINS PO Take 1 tablet by mouth daily      Baclofen 5 MG TABS Take by mouth Take 1-2 tabs TID prn      buPROPion (Wellbutrin XL) 150 mg 24 hr tablet Take 1 tablet (150 mg total) by mouth daily 90 tablet 0    busPIRone (BUSPAR) 10 mg tablet Take 1 tablet (10 mg total) by mouth 2 (two) times a day 60 tablet 1    clonazePAM (KlonoPIN) 0.5 mg tablet Take 1 tablet (0.5 mg total) by mouth 3 (three) times a day 90 tablet 1    DULoxetine (CYMBALTA) 60 mg delayed release capsule Take 1 capsule (60 mg total) by mouth daily at bedtime 30 capsule 1    esomeprazole (NexIUM) 20 mg capsule Take 20 mg by mouth daily in the early morning       hydrocortisone 1 % cream Apply topically 4 (four) times a day as needed for irritation or rash 45 g 0    hydrOXYzine HCL (ATARAX) 25 mg tablet Take 1 tablet (25 mg total) by mouth every 6 (six) hours as needed for itching      Lidocaine-Menthol 4-1 % CREA Apply 1 Dose topically if needed Patch 5%      metFORMIN (GLUCOPHAGE) 500 mg tablet Take 0.5 tablets (250 mg total) by mouth 2 (two) times a day with meals 60 tablet 3    Movantik 25 MG tablet Take 1 tablet (25 mg total) by mouth in the morning 30 tablet 1    multivitamin (THERAGRAN) TABS Take 1 tablet by  mouth daily      naloxone (NARCAN) 4 mg/0.1 mL nasal spray Administer 1 spray into a nostril. If no response after 2-3 minutes, give another dose in the other nostril using a new spray. 1 each 1    OnabotulinumtoxinA (BOTOX IM) Inject into a muscle      oxyCODONE (ROXICODONE) 10 MG TABS Take 1 tablet (10 mg total) by mouth every 8 (eight) hours as needed for moderate pain Max Daily Amount: 30 mg 10 tablet 0    polyethylene glycol (MIRALAX) 17 g packet Take 17 g by mouth daily at bedtime      rosuvastatin (CRESTOR) 20 MG tablet Take 1 tablet (20 mg total) by mouth daily 90 tablet 3    Ubrogepant (Ubrelvy) 100 MG tablet Take 1 tablet (100 mg total) by mouth if needed (migraine) Take 1 tablet (100 mg) one time as needed for migraine. May repeat one additional tablet (100 mg) at least two hours after the first dose. Do not use more than two doses per day 48 tablet 1     Current Facility-Administered Medications   Medication Dose Route Frequency Provider Last Rate Last Admin    Botulinum Toxin Type A SOLR 200 Units  200 Units Infiltration Q3 Months    200 Units at 09/10/24 1452        No Known Allergies    Review of Systems    Video Exam    There were no vitals filed for this visit.    Physical Exam

## 2024-09-19 NOTE — PROGRESS NOTES
Outpatient Care Management Note:    New HRR handoff received. Patient was hospitalized from 8/21-8/25/24 with fractures of multiple metatarsal bones post fall. She is to be minimally weight bearing with CAM boot. She was discharged to [facility] on 8/26 and discharged to home on 9/17.     Voice mail message left for Art, with my contact information, introducing myself and requesting a call back.

## 2024-09-20 ENCOUNTER — TELEPHONE (OUTPATIENT)
Age: 69
End: 2024-09-20

## 2024-09-20 NOTE — TELEPHONE ENCOUNTER
Jojo PT with [facility] called regarding eval that she did on patient today. Patient had fall last evening. No injuries. She fell asleep in her w.c and then fell forward out of it. They are not going to be picking her up for PT at this time because scott was able to demonstrate appropriate transfers safely. Patient is also still non weight bearing. The  is going to be trying to help with transportation as at this time she will not have a way to get to annual wellness visit.    sore throat

## 2024-09-23 ENCOUNTER — TELEPHONE (OUTPATIENT)
Age: 69
End: 2024-09-23

## 2024-09-23 NOTE — TELEPHONE ENCOUNTER
Patient has couple of questions she was discharged from Nursing home on 9/17. She has AWV on 9/25 that can be converted to TCM if needed. Please call patient. Patient might not have ride to come to the office.  She is calling them as well.    Please call she has so many questions.    Thank you!!

## 2024-09-24 NOTE — TELEPHONE ENCOUNTER
Pt called regarding her appt tomorrow 9/25; she is still waiting to see if this appt will need to be switched to a TCM or rescheduled.     Pt also stated that she's in a wheelchair and has a boot on her foot therefore cannot drive. Please contact pt to advise if accommodations can be made for a [employer] that can accommodate a wheelchair or if appts will need to be rescheduled at a later time.

## 2024-09-24 NOTE — TELEPHONE ENCOUNTER
It is too late for TCM as we did not make call in time. We were unaware of d/c. I called [employer] and they can not accommodate wheelchair or assist patient in any way. Per Malini, we can do a virtual AWV. Pt is aware and we will call her ahead of appt to get chart ready for visit. Pt has iphone and # is [phone]. She will also try to get in to [facility], she currently has herself locked out. CR

## 2024-09-25 ENCOUNTER — TELEMEDICINE (OUTPATIENT)
Dept: FAMILY MEDICINE CLINIC | Facility: HOSPITAL | Age: 69
End: 2024-09-25
Payer: MEDICARE

## 2024-09-25 VITALS
DIASTOLIC BLOOD PRESSURE: 70 MMHG | WEIGHT: 160 LBS | HEART RATE: 88 BPM | BODY MASS INDEX: 26.63 KG/M2 | SYSTOLIC BLOOD PRESSURE: 120 MMHG

## 2024-09-25 DIAGNOSIS — I10 HYPERTENSION, ESSENTIAL: ICD-10-CM

## 2024-09-25 DIAGNOSIS — Z00.00 MEDICARE ANNUAL WELLNESS VISIT, SUBSEQUENT: Primary | ICD-10-CM

## 2024-09-25 DIAGNOSIS — Z12.11 SCREENING FOR COLON CANCER: ICD-10-CM

## 2024-09-25 DIAGNOSIS — E11.69 TYPE 2 DIABETES MELLITUS WITH OTHER SPECIFIED COMPLICATION, WITHOUT LONG-TERM CURRENT USE OF INSULIN (HCC): ICD-10-CM

## 2024-09-25 DIAGNOSIS — F11.20 CONTINUOUS OPIOID DEPENDENCE (HCC): ICD-10-CM

## 2024-09-25 DIAGNOSIS — R26.2 AMBULATORY DYSFUNCTION: ICD-10-CM

## 2024-09-25 DIAGNOSIS — F41.1 GAD (GENERALIZED ANXIETY DISORDER): ICD-10-CM

## 2024-09-25 DIAGNOSIS — Z12.31 ENCOUNTER FOR SCREENING MAMMOGRAM FOR MALIGNANT NEOPLASM OF BREAST: ICD-10-CM

## 2024-09-25 DIAGNOSIS — F41.8 DEPRESSION WITH ANXIETY: ICD-10-CM

## 2024-09-25 DIAGNOSIS — S92.309A: ICD-10-CM

## 2024-09-25 PROCEDURE — 99213 OFFICE O/P EST LOW 20 MIN: CPT | Performed by: NURSE PRACTITIONER

## 2024-09-25 PROCEDURE — G0439 PPPS, SUBSEQ VISIT: HCPCS | Performed by: NURSE PRACTITIONER

## 2024-09-25 RX ORDER — PHENOL 1.4 %
600 AEROSOL, SPRAY (ML) MUCOUS MEMBRANE 2 TIMES DAILY WITH MEALS
COMMUNITY

## 2024-09-25 RX ORDER — CALCIUM CARBONATE 300MG(750)
400 TABLET,CHEWABLE ORAL 2 TIMES DAILY
COMMUNITY

## 2024-09-25 RX ORDER — AMOXICILLIN 250 MG
1 CAPSULE ORAL
COMMUNITY

## 2024-09-25 RX ORDER — DULOXETIN HYDROCHLORIDE 30 MG/1
30 CAPSULE, DELAYED RELEASE ORAL
COMMUNITY

## 2024-09-25 RX ORDER — IBUPROFEN 600 MG/1
600 TABLET, FILM COATED ORAL EVERY 8 HOURS PRN
COMMUNITY

## 2024-09-25 RX ORDER — BISACODYL 5 MG/1
5 TABLET, DELAYED RELEASE ORAL DAILY PRN
COMMUNITY

## 2024-09-25 RX ORDER — GALCANEZUMAB 120 MG/ML
120 INJECTION, SOLUTION SUBCUTANEOUS
COMMUNITY

## 2024-09-25 RX ORDER — ASPIRIN 81 MG/1
81 TABLET, CHEWABLE ORAL DAILY
COMMUNITY

## 2024-09-25 NOTE — PATIENT INSTRUCTIONS
Check A1C, BMP and CBCD   Please reschedule eye exam.   Schedule colonoscopy and mammogram    Medicare Preventive Visit Patient Instructions  Thank you for completing your Welcome to Medicare Visit or Medicare Annual Wellness Visit today. Your next wellness visit will be due in one year (9/26/2025).  The screening/preventive services that you may require over the next 5-10 years are detailed below. Some tests may not apply to you based off risk factors and/or age. Screening tests ordered at today's visit but not completed yet may show as past due. Also, please note that scanned in results may not display below.  Preventive Screenings:  Service Recommendations Previous Testing/Comments   Colorectal Cancer Screening  * Colonoscopy    * Fecal Occult Blood Test (FOBT)/Fecal Immunochemical Test (FIT)  * Fecal DNA/Cologuard Test  * Flexible Sigmoidoscopy Age: 45-75 years old   Colonoscopy: every 10 years (may be performed more frequently if at higher risk)  OR  FOBT/FIT: every 1 year  OR  Cologuard: every 3 years  OR  Sigmoidoscopy: every 5 years  Screening may be recommended earlier than age 45 if at higher risk for colorectal cancer. Also, an individualized decision between you and your healthcare provider will decide whether screening between the ages of 76-85 would be appropriate. Colonoscopy: Not on file  FOBT/FIT: Not on file  Cologuard: Not on file  Sigmoidoscopy: Not on file    Screening Current     Breast Cancer Screening Age: 40+ years old  Frequency: every 1-2 years  Not required if history of left and right mastectomy Mammogram: 11/02/2022    Screening Current   Cervical Cancer Screening Between the ages of 21-29, pap smear recommended once every 3 years.   Between the ages of 30-65, can perform pap smear with HPV co-testing every 5 years.   Recommendations may differ for women with a history of total hysterectomy, cervical cancer, or abnormal pap smears in past. Pap Smear: Not on file    Screening Not  Indicated   Hepatitis C Screening Once for adults born between 1945 and 1965  More frequently in patients at high risk for Hepatitis C Hep C Antibody: Not on file        Diabetes Screening 1-2 times per year if you're at risk for diabetes or have pre-diabetes Fasting glucose: 85 mg/dL (5/16/2024)  A1C: 6.2 % (8/22/2024)  Screening Not Indicated  History Diabetes   Cholesterol Screening Once every 5 years if you don't have a lipid disorder. May order more often based on risk factors. Lipid panel: 05/16/2024    Screening Not Indicated  History Lipid Disorder     Other Preventive Screenings Covered by Medicare:  Abdominal Aortic Aneurysm (AAA) Screening: covered once if your at risk. You're considered to be at risk if you have a family history of AAA.  Lung Cancer Screening: covers low dose CT scan once per year if you meet all of the following conditions: (1) Age 55-77; (2) No signs or symptoms of lung cancer; (3) Current smoker or have quit smoking within the last 15 years; (4) You have a tobacco smoking history of at least 20 pack years (packs per day multiplied by number of years you smoked); (5) You get a written order from a healthcare provider.  Glaucoma Screening: covered annually if you're considered high risk: (1) You have diabetes OR (2) Family history of glaucoma OR (3)  aged 50 and older OR (4)  American aged 65 and older  Osteoporosis Screening: covered every 2 years if you meet one of the following conditions: (1) You're estrogen deficient and at risk for osteoporosis based off medical history and other findings; (2) Have a vertebral abnormality; (3) On glucocorticoid therapy for more than 3 months; (4) Have primary hyperparathyroidism; (5) On osteoporosis medications and need to assess response to drug therapy.   Last bone density test (DXA Scan): 05/21/2024.  HIV Screening: covered annually if you're between the age of 15-65. Also covered annually if you are younger than 15 and  older than 65 with risk factors for HIV infection. For pregnant patients, it is covered up to 3 times per pregnancy.    Immunizations:  Immunization Recommendations   Influenza Vaccine Annual influenza vaccination during flu season is recommended for all persons aged >= 6 months who do not have contraindications   Pneumococcal Vaccine   * Pneumococcal conjugate vaccine = PCV13 (Prevnar 13), PCV15 (Vaxneuvance), PCV20 (Prevnar 20)  * Pneumococcal polysaccharide vaccine = PPSV23 (Pneumovax) Adults 19-63 yo with certain risk factors or if 65+ yo  If never received any pneumonia vaccine: recommend Prevnar 20 (PCV20)  Give PCV20 if previously received 1 dose of PCV13 or PPSV23   Hepatitis B Vaccine 3 dose series if at intermediate or high risk (ex: diabetes, end stage renal disease, liver disease)   Respiratory syncytial virus (RSV) Vaccine - COVERED BY MEDICARE PART D  * RSVPreF3 (Arexvy) CDC recommends that adults 60 years of age and older may receive a single dose of RSV vaccine using shared clinical decision-making (SCDM)   Tetanus (Td) Vaccine - COST NOT COVERED BY MEDICARE PART B Following completion of primary series, a booster dose should be given every 10 years to maintain immunity against tetanus. Td may also be given as tetanus wound prophylaxis.   Tdap Vaccine - COST NOT COVERED BY MEDICARE PART B Recommended at least once for all adults. For pregnant patients, recommended with each pregnancy.   Shingles Vaccine (Shingrix) - COST NOT COVERED BY MEDICARE PART B  2 shot series recommended in those 19 years and older who have or will have weakened immune systems or those 50 years and older     Health Maintenance Due:      Topic Date Due    Hepatitis C Screening  Never done    Breast Cancer Screening: Mammogram  11/02/2023    Colorectal Cancer Screening  07/01/2025     Immunizations Due:      Topic Date Due    COVID-19 Vaccine (4 - 2023-24 season) 09/01/2024    Influenza Vaccine (1) 09/01/2024     Advance  Directives   What are advance directives?  Advance directives are legal documents that state your wishes and plans for medical care. These plans are made ahead of time in case you lose your ability to make decisions for yourself. Advance directives can apply to any medical decision, such as the treatments you want, and if you want to donate organs.   What are the types of advance directives?  There are many types of advance directives, and each state has rules about how to use them. You may choose a combination of any of the following:  Living will:  This is a written record of the treatment you want. You can also choose which treatments you do not want, which to limit, and which to stop at a certain time. This includes surgery, medicine, IV fluid, and tube feedings.   Durable power of  for healthcare (DPAHC):  This is a written record that states who you want to make healthcare choices for you when you are unable to make them for yourself. This person, called a proxy, is usually a family member or a friend. You may choose more than 1 proxy.  Do not resuscitate (DNR) order:  A DNR order is used in case your heart stops beating or you stop breathing. It is a request not to have certain forms of treatment, such as CPR. A DNR order may be included in other types of advance directives.  Medical directive:  This covers the care that you want if you are in a coma, near death, or unable to make decisions for yourself. You can list the treatments you want for each condition. Treatment may include pain medicine, surgery, blood transfusions, dialysis, IV or tube feedings, and a ventilator (breathing machine).  Values history:  This document has questions about your views, beliefs, and how you feel and think about life. This information can help others choose the care that you would choose.  Why are advance directives important?  An advance directive helps you control your care. Although spoken wishes may be used, it  is better to have your wishes written down. Spoken wishes can be misunderstood, or not followed. Treatments may be given even if you do not want them. An advance directive may make it easier for your family to make difficult choices about your care.   Fall Prevention    Fall prevention  includes ways to make your home and other areas safer. It also includes ways you can move more carefully to prevent a fall. Health conditions that cause changes in your blood pressure, vision, or muscle strength and coordination may increase your risk for falls. Medicines may also increase your risk for falls if they make you dizzy, weak, or sleepy.   Fall prevention tips:   Stand or sit up slowly.    Use assistive devices as directed.    Wear shoes that fit well and have soles that .    Wear a personal alarm.    Stay active.    Manage your medical conditions.    Home Safety Tips:  Add items to prevent falls in the bathroom.    Keep paths clear.    Install bright lights in your home.    Keep items you use often on shelves within reach.    Paint or place reflective tape on the edges of your stairs.    Weight Management   Why it is important to manage your weight:  Being overweight increases your risk of health conditions such as heart disease, high blood pressure, type 2 diabetes, and certain types of cancer. It can also increase your risk for osteoarthritis, sleep apnea, and other respiratory problems. Aim for a slow, steady weight loss. Even a small amount of weight loss can lower your risk of health problems.  How to lose weight safely:  A safe and healthy way to lose weight is to eat fewer calories and get regular exercise. You can lose up about 1 pound a week by decreasing the number of calories you eat by 500 calories each day.   Healthy meal plan for weight management:  A healthy meal plan includes a variety of foods, contains fewer calories, and helps you stay healthy. A healthy meal plan includes the following:  Eat  whole-grain foods more often.  A healthy meal plan should contain fiber. Fiber is the part of grains, fruits, and vegetables that is not broken down by your body. Whole-grain foods are healthy and provide extra fiber in your diet. Some examples of whole-grain foods are whole-wheat breads and pastas, oatmeal, brown rice, and bulgur.  Eat a variety of vegetables every day.  Include dark, leafy greens such as spinach, kale, kaylah greens, and mustard greens. Eat yellow and orange vegetables such as carrots, sweet potatoes, and winter squash.   Eat a variety of fruits every day.  Choose fresh or canned fruit (canned in its own juice or light syrup) instead of juice. Fruit juice has very little or no fiber.  Eat low-fat dairy foods.  Drink fat-free (skim) milk or 1% milk. Eat fat-free yogurt and low-fat cottage cheese. Try low-fat cheeses such as mozzarella and other reduced-fat cheeses.  Choose meat and other protein foods that are low in fat.  Choose beans or other legumes such as split peas or lentils. Choose fish, skinless poultry (chicken or turkey), or lean cuts of red meat (beef or pork). Before you cook meat or poultry, cut off any visible fat.   Use less fat and oil.  Try baking foods instead of frying them. Add less fat, such as margarine, sour cream, regular salad dressing and mayonnaise to foods. Eat fewer high-fat foods. Some examples of high-fat foods include french fries, doughnuts, ice cream, and cakes.  Eat fewer sweets.  Limit foods and drinks that are high in sugar. This includes candy, cookies, regular soda, and sweetened drinks.  Exercise:  Exercise at least 30 minutes per day on most days of the week. Some examples of exercise include walking, biking, dancing, and swimming. You can also fit in more physical activity by taking the stairs instead of the elevator or parking farther away from stores. Ask your healthcare provider about the best exercise plan for you.   Narcotic (Opioid) Safety    Use  narcotics safely:  Take prescribed narcotics exactly as directed  Do not give narcotics to others or take narcotics that belong to someone else  Do not mix narcotics without medicines or alcohol  Do not drive or operate heavy machinery after you take the narcotic  Monitor for side effects and notify your healthcare provider if you experienced side effects such as nausea, sleepiness, itching, or trouble thinking clearly.    Manage constipation:    Constipation is the most common side effect of narcotic medicine. Constipation is when you have hard, dry bowel movements, or you go longer than usual between bowel movements. Tell your healthcare provider about all changes in your bowel movements while you are taking narcotics. He or she may recommend laxative medicine to help you have a bowel movement. He or she may also change the kind of narcotic you are taking, or change when you take it. The following are more ways you can prevent or relieve constipation:    Drink liquids as directed.  You may need to drink extra liquids to help soften and move your bowels. Ask how much liquid to drink each day and which liquids are best for you.  Eat high-fiber foods.  This may help decrease constipation by adding bulk to your bowel movements. High-fiber foods include fruits, vegetables, whole-grain breads and cereals, and beans. Your healthcare provider or dietitian can help you create a high-fiber meal plan. Your provider may also recommend a fiber supplement if you cannot get enough fiber from food.  Exercise regularly.  Regular physical activity can help stimulate your intestines. Walking is a good exercise to prevent or relieve constipation. Ask which exercises are best for you.  Schedule a time each day to have a bowel movement.  This may help train your body to have regular bowel movements. Bend forward while you are on the toilet to help move the bowel movement out. Sit on the toilet for at least 10 minutes, even if you do  not have a bowel movement.    Store narcotics safely:   Store narcotics where others cannot easily get them.  Keep them in a locked cabinet or secure area. Do not  keep them in a purse or other bag you carry with you. A person may be looking for something else and find the narcotics.  Make sure narcotics are stored out of the reach of children.  A child can easily overdose on narcotics. Narcotics may look like candy to a small child.    The best way to dispose of narcotics:      The laws vary by country and area. In the [place], the best way is to return the narcotics through a take-back program. This program is offered by the [place] Drug Enforcement Agency ([employer]). The following are options for using the program:  Take the narcotics to a [employer] collection site.  The site is often a law enforcement center. Call your local law enforcement center for scheduled take-back days in your area. You will be given information on where to go if the collection site is in a different location.  Take the narcotics to an approved pharmacy or hospital.  A pharmacy or hospital may be set up as a collection site. You will need to ask if it is a [employer] collection site if you were not directed there. A pharmacy or doctor's office may not be able to take back narcotics unless it is a [employer] site.  Use a mail-back system.  This means you are given containers to put the narcotics into. You will then mail them in the containers.  Use a take-back drop box.  This is a place to leave the narcotics at any time. People and animals will not be able to get into the box. Your local law enforcement agency can tell you where to find a drop box in your area.    Other ways to manage pain:   Ask your healthcare provider about non-narcotic medicines to control pain.  Nonprescription medicines include NSAIDs (such as ibuprofen) and acetaminophen. Prescription medicines include muscle relaxers, antidepressants, and steroids.  Pain may be managed without any  medicines.  Some ways to relieve pain include massage, aromatherapy, or meditation. Physical or occupational therapy may also help.    For more information:   Drug Enforcement Administration  [address] [place] [address]  Phone: 8- 244 - 313-4008  Web Address: [email or web address]_disposal/    [place] Food and Drug Administration  [address] [place] [address]  Phone: 8- 111 - 949-2190  Web Address: http://www.fda.gov     © Copyright [employer] 2018 Information is for End User's use only and may not be sold, redistributed or otherwise used for commercial purposes. All illustrations and images included in CareNotes® are the copyrighted property of [employer] or [employer]

## 2024-09-25 NOTE — PROGRESS NOTES
Ambulatory Visit  Name: Aniya Jones      : 1955      MRN: [id number]  Encounter Provider: FE Nowak  Encounter Date: 2024   Encounter department: [facility] [address]    Assessment & Plan  Medicare annual wellness visit, subsequent         SY (generalized anxiety disorder)  She reports persistent chronic recurrent depression as well as anxiety.  She continues psychiatric as well as psychotherapy through [facility] next appointment is scheduled for next week.  Unrelenting depression anxiety exacerbated by her mother's passing in March and then her best friend dying in May.  She is starting to realize that she would benefit from community living and socialization on a consistent basis.  She is adherent to Cymbalta 90 mg daily, Wellbutrin 150 mg daily, clonazepam 0.5 mg 3 times daily, BuSpar 10 mg twice daily.       Type 2 diabetes mellitus with other specified complication, without long-term current use of insulin ([facility])    Lab Results   Component Value Date    HGBA1C 6.2 (H) 2024     History of diabetes she is using her metformin 250 twice daily.  She was unable to obtain her A1c as directed earlier this year after decreasing the dose.  She denies any signs or symptoms of hypoglycemia checks her sugar sporadically and notes its anywhere from .      Orders:  •  Hemoglobin A1C; Future  •  Basic metabolic panel; Future    Depression with anxiety  Depression Screening Follow-up Plan: Patient's depression screening was positive with a PHQ-9 score of 15. Patient assessed for underlying major depression. They have no active suicidal ideations. Brief counseling provided and recommend additional follow-up/re-evaluation next office visit. Continue regular follow-up with their psychologist/therapist/psychiatrist who is managing their mental health condition(s). Patient with underlying depression and was advised to continue current medications as  prescribed.    Orders:  •  Basic metabolic panel; Future    Continuous opioid dependence (HCC)         Ambulatory dysfunction         Fracture of multiple metatarsal bones  Recently hospitalized from 8/21/2024 through 8/25/2024 due to a mechanical fall.  She suffered multiple metatarsal fractures of her left foot.  She was placed in a cam boot with nonweightbearing status.  Her stay was complicated by an allergic reaction in which she needed steroids and Benadryl.  She was stabilized and discharged to [facility] where she completed therapy and was discharged home with home health services on 9/17/2024  She reports the nights that she got home she was sleeping in her chair and got up and fell landing on her knees and left hip.  She did not seek care at that time and was able to pull herself up with the use of a nearby chair.  She reports chronic knee and hip pain unchanged from baseline.  She is getting PT OT through [facility] health services.  She reports she got a motorized scooter but is not able to use it due to her knee pain thus she is using a wheelchair to ambulate throughout her home.  She is adhering to her nonweightbearing status and is able to stand and pivot.  She reports daily showers in which she checks her skin no breakdown left lower extremity.  She has a neighbor who checks in on her often however she lives alone.    Podiatry follow-up scheduled 10/10/2024.  Orders:  •  CBC and differential; Future    Screening for colon cancer    Orders:  •  Ambulatory Referral to Gastroenterology; Future    Encounter for screening mammogram for malignant neoplasm of breast    Orders:  •  Mammo screening bilateral w 3d and cad; Future    Hypertension, essential  Hx HTN.  Not currently on any medication.  Bp 120/70 electrolytes and kidney function stable on 8/24/24.              Preventive health issues were discussed with patient, and age appropriate screening tests were ordered as noted in patient's After Visit  Summary. Personalized health advice and appropriate referrals for health education or preventive services given if needed, as noted in patient's After Visit Summary.    History of Present Illness     Presents for AWV.       Patient Care Team:  Jojo Merrill DO as PCP - General (Internal Medicine)  Jacki Macias, RN as RN Care Manager (Care Coordination)    Review of Systems   Constitutional: Negative.  Negative for activity change, appetite change, chills, fatigue, fever and unexpected weight change.   HENT: Negative.  Negative for congestion, ear pain, postnasal drip and sinus pain.    Eyes:  Positive for visual disturbance.   Respiratory: Negative.  Negative for chest tightness and shortness of breath.    Cardiovascular: Negative.  Negative for chest pain, palpitations and leg swelling.   Gastrointestinal: Negative.  Negative for constipation and diarrhea.        Bowels moving with laxative/softeners on board.    Endocrine: Negative.    Genitourinary: Negative.  Negative for difficulty urinating, dysuria, frequency and urgency.   Musculoskeletal:  Positive for arthralgias and gait problem.   Skin: Negative.  Negative for wound.   Allergic/Immunologic: Negative.    Neurological:  Positive for weakness. Negative for dizziness, light-headedness and numbness.   Hematological: Negative.    Psychiatric/Behavioral:  Positive for dysphoric mood. Negative for sleep disturbance.      Medical History Reviewed by provider this encounter:  Tobacco  Allergies  Meds  Problems  Med Hx  Surg Hx  Fam Hx       Annual Wellness Visit Questionnaire   Aniya is here for her Subsequent Wellness visit.     Health Risk Assessment:   Patient rates overall health as good. Patient feels that their physical health rating is same. Patient is dissatisfied with their life. Eyesight was rated as slightly worse. Hearing was rated as same. Patient feels that their emotional and mental health rating is much worse. Patients states they are never,  rarely angry. Patient states they are sometimes unusually tired/fatigued. Pain experienced in the last 7 days has been a lot. Patient's pain rating has been 8/10. Patient states that she has experienced no weight loss or gain in last 6 months. Needs to reschedule eye exam    Depression Screening:   PHQ-9 Score: 15      Fall Risk Screening:   In the past year, patient has experienced: history of falling in past year    Number of falls: 2 or more  Injured during fall?: Yes    Feels unsteady when standing or walking?: Yes    Worried about falling?: No      Urinary Incontinence Screening:   Patient has not leaked urine accidently in the last six months.     Home Safety:  Patient does not have trouble with stairs inside or outside of their home. Patient has working smoke alarms and has working carbon monoxide detector. Home safety hazards include: none.     Nutrition:   Current diet is Regular and Frequent junk food.     Medications:   Patient is currently taking over-the-counter supplements. OTC medications include: see medication list. Patient is able to manage medications.     Activities of Daily Living (ADLs)/Instrumental Activities of Daily Living (IADLs):   Walk and transfer into and out of bed and chair?: Yes  Dress and groom yourself?: Yes    Bathe or shower yourself?: Yes    Feed yourself? Yes  Do your laundry/housekeeping?: Yes  Manage your money, pay your bills and track your expenses?: Yes  Make your own meals?: Yes    Do your own shopping?: Yes    Previous Hospitalizations:   Any hospitalizations or ED visits within the last 12 months?: Yes    How many hospitalizations have you had in the last year?: 1-2    Advance Care Planning:   Living will: Yes    Durable POA for healthcare: Yes    Advanced directive: Yes    Advanced directive counseling given: Yes      PREVENTIVE SCREENINGS      Cardiovascular Screening:    General: History Lipid Disorder and Screening Current      Diabetes Screening:     General:  History Diabetes and Risks and Benefits Discussed      Colorectal Cancer Screening:     General: Screening Current    Due for: Colonoscopy - Low Risk      Breast Cancer Screening:     General: Risks and Benefits Discussed    Due for: Mammogram        Cervical Cancer Screening:    General: Screening Not Indicated      Osteoporosis Screening:    General: Screening Current      Lung Cancer Screening:     General: Screening Not Indicated    Screening, Brief Intervention, and Referral to Treatment (SBIRT)    Screening  Typical number of drinks in a day: 0    Single Item Drug Screening:  How often have you used an illegal drug (including marijuana) or a prescription medication for non-medical reasons in the past year? never    Single Item Drug Screen Score: 0  Interpretation: Negative screen for possible drug use disorder    Review of Current Opioid Use    Opioid Risk Tool (ORT) Interpretation: Complete Opioid Risk Tool (ORT)    Other Counseling Topics:   Car/seat belt/driving safety, skin self-exam, sunscreen and calcium and vitamin D intake and regular weightbearing exercise.     Social Determinants of Health     Financial Resource Strain: Low Risk  (6/11/2023)    [facility]    Overall Financial Resource Strain (CARDIA)    • Difficulty of Paying Living Expenses: Not hard at all   Food Insecurity: No Food Insecurity (9/25/2024)    Hunger Vital Sign    • Worried About Running Out of Food in the Last Year: Never true    • Ran Out of Food in the Last Year: Never true   Transportation Needs: No Transportation Needs (9/25/2024)    PRAPARE - Transportation    • Lack of Transportation (Medical): No    • Lack of Transportation (Non-Medical): No   Housing Stability: Low Risk  (9/25/2024)    Housing Stability Vital Sign    • Unable to Pay for Housing in the Last Year: No    • Number of Times Moved in the Last Year: 0    • Homeless in the Last Year: No   Utilities: Not  At Risk (9/25/2024)    [facility] Utilities    • Threatened with loss of utilities: No     No results found.    Objective     /70   Pulse 88   Wt 72.6 kg (160 lb)   BMI 26.63 kg/m²     Physical Exam  Constitutional:       General: She is awake.   HENT:      Head: Normocephalic and atraumatic.      Nose: Nose normal.   Eyes:      Conjunctiva/sclera: Conjunctivae normal.   Cardiovascular:      Rate and Rhythm: Normal rate.   Pulmonary:      Effort: Pulmonary effort is normal.      Breath sounds: Normal breath sounds.   Musculoskeletal:         General: Signs of injury present.      Cervical back: Normal range of motion.      Right lower leg: No edema.      Left lower leg: No edema.      Comments: LLE CAM boot   Skin:     General: Skin is warm.   Neurological:      Mental Status: She is alert and oriented to person, place, and time. Mental status is at baseline.      Gait: Gait abnormal.   Psychiatric:         Mood and Affect: Mood is depressed.         Behavior: Behavior is slowed. Behavior is cooperative.         Thought Content: Thought content normal.       Virtual AWV Consent    Verification of patient location:    Patient is located at Home in the following state in which I hold an active license [place]    The patient was identified by name and date of birth. Aniya Jones was informed that this is a telemedicine visit and that the visit is being conducted through the Epic Embedded platform. She agrees to proceed..  My office door was closed. No one else was in the room.  She acknowledged consent and understanding of privacy and security of the video platform. The patient has agreed to participate and understands they can discontinue the visit at any time.    Patient is aware this is a billable service.     Reason for visit is AWV and transition of care    Encounter provider FE Nowak    Provider located at [facility] [address]  [address] [place] [address]  [facility] [place] [address]      Recent Visits  No visits were found meeting these conditions.  Showing recent visits within past 7 days and meeting all other requirements  Today's Visits  Date Type Provider Dept   09/25/24 Telemedicine FE Andersen  [facility] [address]   Showing today's visits and meeting all other requirements  Future Appointments  No visits were found meeting these conditions.  Showing future appointments within next 150 days and meeting all other requirements           Visit Time  Total Visit Duration: 45 minutes

## 2024-09-25 NOTE — ASSESSMENT & PLAN NOTE
Lab Results   Component Value Date    HGBA1C 6.2 (H) 08/22/2024     History of diabetes she is using her metformin 250 twice daily.  She was unable to obtain her A1c as directed earlier this year after decreasing the dose.  She denies any signs or symptoms of hypoglycemia checks her sugar sporadically and notes its anywhere from .      Orders:    Hemoglobin A1C; Future    Basic metabolic panel; Future    
Depression Screening Follow-up Plan: Patient's depression screening was positive with a PHQ-9 score of 15. Patient assessed for underlying major depression. They have no active suicidal ideations. Brief counseling provided and recommend additional follow-up/re-evaluation next office visit. Continue regular follow-up with their psychologist/therapist/psychiatrist who is managing their mental health condition(s). Patient with underlying depression and was advised to continue current medications as prescribed.    Orders:    Basic metabolic panel; Future    
Hx HTN.  Not currently on any medication.  Bp 120/70 electrolytes and kidney function stable on 8/24/24.           
Recently hospitalized from 8/21/2024 through 8/25/2024 due to a mechanical fall.  She suffered multiple metatarsal fractures of her left foot.  She was placed in a cam boot with nonweightbearing status.  Her stay was complicated by an allergic reaction in which she needed steroids and Benadryl.  She was stabilized and discharged to [facility] where she completed therapy and was discharged home with home health services on 9/17/2024  She reports the nights that she got home she was sleeping in her chair and got up and fell landing on her knees and left hip.  She did not seek care at that time and was able to pull herself up with the use of a nearby chair.  She reports chronic knee and hip pain unchanged from baseline.  She is getting PT OT through [facility] home health services.  She reports she got a motorized scooter but is not able to use it due to her knee pain thus she is using a wheelchair to ambulate throughout her home.  She is adhering to her nonweightbearing status and is able to stand and pivot.  She reports daily showers in which she checks her skin no breakdown left lower extremity.  She has a neighbor who checks in on her often however she lives alone.    Podiatry follow-up scheduled 10/10/2024.  Orders:    CBC and differential; Future    
She reports persistent chronic recurrent depression as well as anxiety.  She continues psychiatric as well as psychotherapy through [facility] next appointment is scheduled for next week.  Unrelenting depression anxiety exacerbated by her mother's passing in March and then her best friend dying in May.  She is starting to realize that she would benefit from community living and socialization on a consistent basis.  She is adherent to Cymbalta 90 mg daily, Wellbutrin 150 mg daily, clonazepam 0.5 mg 3 times daily, BuSpar 10 mg twice daily.       
alone

## 2024-09-26 ENCOUNTER — PATIENT OUTREACH (OUTPATIENT)
Dept: CASE MANAGEMENT | Facility: OTHER | Age: 69
End: 2024-09-26

## 2024-09-26 DIAGNOSIS — Z59.82 LACK OF ACCESS TO TRANSPORTATION: ICD-10-CM

## 2024-09-26 DIAGNOSIS — Z71.89 COMPLEX CARE COORDINATION: Primary | ICD-10-CM

## 2024-09-26 SDOH — ECONOMIC STABILITY - TRANSPORTATION SECURITY: TRANSPORTATION INSECURITY: Z59.82

## 2024-09-26 NOTE — PROGRESS NOTES
Outpatient Care Management Note:    Voice mail message left for Art, with my contact information, requesting a call back. (2nd attempt)    Aniya Sheridan called back. She states that she is using a wheelchair for ambulation. She is mostly non weight bearing on her left leg. She is able to pivot and transfer. She was evaluated by [facility] physical therapy and they did not open a case. They felt Aniya sheridan could safely transfer.  They told Art that it could be reopened once she was allowed to bear weight.     Art states she has a transfer bench for the tub and is able to shower independently.  She noted that she needs help in the home with cleaning, shopping, and delivered meals. She needs wheelchair transportation services so she can get to appts. Her next appt is with podiatry on 10/10.  Art also admits to ongoing depression. She sees [facility] and will call them today to set up her appts.     CM noted that her PCP wanted blood work completed. I gave her the number for mobile lab and she agreed to call.     Art is diabetic. She has a glucometer but has not been able to find her lancets and test strips. She states that she gets her supplies through [employer]. She agreed to call for needed supplies.     Art declined completing a med review noting that she reviewed everything with Malini Mccauley on 9/25.  She denies any questions and states she has all medications.     Art has my contact information. She will call with any questions.

## 2024-09-27 ENCOUNTER — PATIENT OUTREACH (OUTPATIENT)
Dept: CASE MANAGEMENT | Facility: OTHER | Age: 69
End: 2024-09-27

## 2024-09-27 NOTE — PROGRESS NOTES
"Covering KISHORE OLMOS received a referral from RN NICKOLAS regarding need for social work follow up. KISHORE OLMOS completed assessment to determine needs.     KISHORE OLMOS inquired patient's ability to complete ADLs independently.. Patient states she is independent with all ADLs and does not require any home health aide assistance. Patient does sometimes feel she could use help with cleaning, however, patient states she does not feel comfortable with a stranger being in her home to assist with this. Patient sometimes has difficulty using her wheelchair at home-pt states she will likely explore senior housing in a few years.     Patient spoke about her depression and states this is something she has had ongoing for \"all of her life.\" Patient does receive therapy and psychiatry through [facility]. Pt is currently seeing her therapist weekly. Spoke with pt about possibly increasing her sessions to weekly if she feels she could use the additional support. Pt agreed this may be beneficial and will discuss this with her therapist.     Pt denies having any financial barriers. Patient does report difficulty with being able to find the energy/motivation to make meals due to her depression. Pt uses grocery delivery service but inquired about [employer] as an option for meal delivery. Provided pt with contact number for [employer].      Pt uses a wheelchair and currently does not have transportation to get to her appointments. CM referral has already been placed by RN CM for assistance with obtaining [facility].    KISHORE OLMOS will close referral as patient reports having no additional concerns. Provided patient with contact info for KISHORE OLMOS, Yaima SHAIKH, should she have any additional questions or concerns.  "

## 2024-09-30 DIAGNOSIS — G43.709 CHRONIC MIGRAINE WITHOUT AURA WITHOUT STATUS MIGRAINOSUS, NOT INTRACTABLE: Primary | ICD-10-CM

## 2024-09-30 RX ORDER — GALCANEZUMAB 120 MG/ML
120 INJECTION, SOLUTION SUBCUTANEOUS
Qty: 1 ML | Refills: 11 | Status: SHIPPED | OUTPATIENT
Start: 2024-09-30

## 2024-09-30 NOTE — TELEPHONE ENCOUNTER
Patient calling office as she was recently d/c from Rehab and called her pharmacy for a refill for Emgality, they told her that medication was discontinued. It seems the hospital provider d/c Emgality on the day of discharge at the end of Aug.     Patient requesting provider to send a new script for Emgality to her [facility] mail order pharmacy, see pended med. I also reviewed as previous Emgality was only approved until 9/12/24. So this new script will need a Prior Auth completed as well.      Routed to provider to send med to pharmacy if agreeable.

## 2024-10-01 ENCOUNTER — PATIENT OUTREACH (OUTPATIENT)
Dept: CASE MANAGEMENT | Facility: OTHER | Age: 69
End: 2024-10-01

## 2024-10-01 NOTE — PROGRESS NOTES
got referral from the  to assist with transportation. CMOC sent DOMINIQUE Echeverria an IB to be added to the care plan tasks.     CMOC called patient. Introduced self, role, and reason for call.     CMOC had patient confirm the county that she lives in. Patient confirmed pays taxes to [facility].     CMOC explained the application process and that application will be done online via FMR. CMOC will follow along with the progression of the application. CMOC advised unsure if application will be approved in enough time for 10/10 appointment. CMOC did indicate on application that patient does have an appointment within the next 2 weeks.     CMOC did advise will be in contact within a few days to see where the application is at. Patient is worried because cannot miss appointment on 10/10.     FMR Application #[id number]    Next Outreach 10/4

## 2024-10-02 ENCOUNTER — PATIENT OUTREACH (OUTPATIENT)
Dept: CASE MANAGEMENT | Facility: OTHER | Age: 69
End: 2024-10-02

## 2024-10-02 NOTE — PROGRESS NOTES
CMOC received incoming email from [facility] that application was approved for the Senior Shared Ride Program. Customer #[id number]    CMOC called patient to advise. CMOC gave patient phone number to make reservations ([phone]) and hours of operation for reservation office (7 am to 4:30 pm). CMOC explained that patient will get literature in the mail but wanted patient to have it prior so reservation could be made for 10/10 appointment. CMOC advised to tell  of wheelchair for each appointment so that the appropriate vehicle is sent. CMOC also went over fare schedule and mileage.     CMOC encouraged patient to call [facility] [employer] if there are additional questions.     No further outreach needed. CMOC to close.

## 2024-10-03 ENCOUNTER — TELEPHONE (OUTPATIENT)
Dept: LAB | Facility: HOSPITAL | Age: 69
End: 2024-10-03

## 2024-10-03 ENCOUNTER — PATIENT OUTREACH (OUTPATIENT)
Dept: CASE MANAGEMENT | Facility: OTHER | Age: 69
End: 2024-10-03

## 2024-10-03 NOTE — PROGRESS NOTES
Outpatient Care Management Note:    Care manager called Art. She is working on getting her [facility] approved. They are working to have her wheelchair approved. I encouraged her to have a back up plan for her upcoming podiatry appt. She agreed to ask her brother for assistance.     Art did contact mobile lab. They are supposed to call her back tomorrow with a blood draw date.     She is now checking blood sugars. She states they are averaging between 84-94.      Art still has not gotten her emgality. She will follow up with neurology to be sure the prior auth is being addressed.    Art states that she is meeting with her therapist regularly.     CM congratulated Art on getting so many tasks completed. CM will follow up in 2 weeks.

## 2024-10-03 NOTE — TELEPHONE ENCOUNTER
Pt called to f/u on Emgality PA. Previous PA  on 24.    Please submit URGENT PA for Emgality.      Please call pt once determination is recd.

## 2024-10-04 ENCOUNTER — TELEPHONE (OUTPATIENT)
Dept: LAB | Facility: HOSPITAL | Age: 69
End: 2024-10-04

## 2024-10-04 ENCOUNTER — TELEPHONE (OUTPATIENT)
Age: 69
End: 2024-10-04

## 2024-10-08 ENCOUNTER — TELEMEDICINE (OUTPATIENT)
Dept: PSYCHIATRY | Facility: CLINIC | Age: 69
End: 2024-10-08
Payer: MEDICARE

## 2024-10-08 DIAGNOSIS — Z63.4 BEREAVEMENT DUE TO LIFE EVENT: ICD-10-CM

## 2024-10-08 DIAGNOSIS — F33.9 MAJOR DEPRESSION, RECURRENT, CHRONIC (HCC): Primary | ICD-10-CM

## 2024-10-08 DIAGNOSIS — F41.1 GAD (GENERALIZED ANXIETY DISORDER): ICD-10-CM

## 2024-10-08 DIAGNOSIS — F50.810 MILD BINGE-EATING DISORDER: ICD-10-CM

## 2024-10-08 PROCEDURE — 90834 PSYTX W PT 45 MINUTES: CPT

## 2024-10-08 SDOH — SOCIAL STABILITY - SOCIAL INSECURITY: DISSAPEARANCE AND DEATH OF FAMILY MEMBER: Z63.4

## 2024-10-08 NOTE — PSYCH
Virtual Regular Visit    Verification of patient location:    Patient is located at Home in the following state in which I hold an active license [place]      Assessment/Plan:    Problem List Items Addressed This Visit       Major depression, recurrent, chronic (HCC) - Primary    Eating disorder    SY (generalized anxiety disorder)    Bereavement due to life event       Goals addressed in session: Goal 1          Reason for visit is No chief complaint on file.       Encounter provider Lady Soto LCSW      Recent Visits  Date Type Provider Dept   10/03/24 Telephone Jojo Fly, DO [facility]   Showing recent visits within past 7 days and meeting all other requirements  Today's Visits  Date Type Provider Dept   10/08/24 Telemedicine Lady Soto LCSW [facility]   Showing today's visits and meeting all other requirements  Future Appointments  No visits were found meeting these conditions.  Showing future appointments within next 150 days and meeting all other requirements       The patient was identified by name and date of birth. Aniya Jones was informed that this is a telemedicine visit and that the visit is being conducted throughthe Andigilog platform. She agrees to proceed..  My office door was closed. No one else was in the room.  She acknowledged consent and understanding of privacy and security of the video platform. The patient has agreed to participate and understands they can discontinue the visit at any time.    Patient is aware this is a billable service.     Subjective  Aniya Jones is a 69 y.o. female  .      HPI     Past Medical History:   Diagnosis Date    Anxiety     Arthritis     Cancer (HCC)     Skin    Depression     Diabetes (HCC)     GERD (gastroesophageal reflux disease)     Hyperlipidemia     Hypertension     Migraine        Past Surgical History:   Procedure Laterality Date    ANKLE SURGERY      CARPAL TUNNEL RELEASE Right     ROTATOR CUFF REPAIR          Current Outpatient Medications   Medication Sig Dispense Refill    aspirin 81 mg chewable tablet Chew 81 mg daily      B COMPLEX VITAMINS PO Take 1 tablet by mouth daily      Baclofen 5 MG TABS Take by mouth Take 1-2 tabs TID prn      bisacodyl (DULCOLAX) 5 mg EC tablet Take 5 mg by mouth daily as needed for constipation      buPROPion (Wellbutrin XL) 150 mg 24 hr tablet Take 1 tablet (150 mg total) by mouth daily 90 tablet 0    busPIRone (BUSPAR) 10 mg tablet Take 1 tablet (10 mg total) by mouth 2 (two) times a day 60 tablet 1    calcium carbonate (OS-MIKE) 600 MG tablet Take 600 mg by mouth 2 (two) times a day with meals      clonazePAM (KlonoPIN) 0.5 mg tablet Take 1 tablet (0.5 mg total) by mouth 3 (three) times a day 90 tablet 1    DULoxetine (CYMBALTA) 30 mg delayed release capsule Take 30 mg by mouth daily at bedtime With 60 mg      DULoxetine (CYMBALTA) 60 mg delayed release capsule Take 1 capsule (60 mg total) by mouth daily at bedtime 30 capsule 1    esomeprazole (NexIUM) 20 mg capsule Take 20 mg by mouth daily in the early morning       Galcanezumab-gnlm (Emgality) 120 MG/ML SOAJ Inject 120 mg under the skin every 28 days 1 mL 11    ibuprofen (MOTRIN) 600 mg tablet Take 600 mg by mouth every 8 (eight) hours as needed for mild pain      Lidocaine-Menthol 4-1 % CREA Apply 1 Dose topically if needed Patch 5%      Magnesium 400 MG TABS Take 400 mg by mouth 2 (two) times a day      metFORMIN (GLUCOPHAGE) 500 mg tablet Take 0.5 tablets (250 mg total) by mouth 2 (two) times a day with meals 60 tablet 3    Movantik 25 MG tablet Take 1 tablet (25 mg total) by mouth in the morning 30 tablet 1    multivitamin (THERAGRAN) TABS Take 1 tablet by mouth daily      naloxone (NARCAN) 4 mg/0.1 mL nasal spray Administer 1 spray into a nostril. If no response after 2-3 minutes, give another dose in the other nostril using a new spray. (Patient not taking: Reported on 9/25/2024) 1 each 1    OnabotulinumtoxinA (BOTOX IM)  Inject into a muscle      oxyCODONE (ROXICODONE) 10 MG TABS Take 1 tablet (10 mg total) by mouth every 8 (eight) hours as needed for moderate pain Max Daily Amount: 30 mg 10 tablet 0    polyethylene glycol (MIRALAX) 17 g packet Take 17 g by mouth daily at bedtime      Riboflavin 100 MG TABS Take by mouth      rosuvastatin (CRESTOR) 20 MG tablet Take 1 tablet (20 mg total) by mouth daily 90 tablet 3    senna-docusate sodium (SENOKOT S) 8.6-50 mg per tablet Take 1 tablet by mouth daily at bedtime      Ubrogepant (Ubrelvy) 100 MG tablet Take 1 tablet (100 mg total) by mouth if needed (migraine) Take 1 tablet (100 mg) one time as needed for migraine. May repeat one additional tablet (100 mg) at least two hours after the first dose. Do not use more than two doses per day 48 tablet 1     Current Facility-Administered Medications   Medication Dose Route Frequency Provider Last Rate Last Admin    Botulinum Toxin Type A SOLR 200 Units  200 Units Infiltration Q3 Months    200 Units at 09/10/24 1452        No Known Allergies    Review of Systems    Video Exam    There were no vitals filed for this visit.    Physical Exam     Behavioral Health Psychotherapy Progress Note    Psychotherapy Provided: Individual Psychotherapy     1. Major depression, recurrent, chronic (HCC)        2. Mild binge-eating disorder        3. SY (generalized anxiety disorder)        4. Bereavement due to life event            Goals addressed in session: Goal 1     DATA: Marcelina did not get on the session link.  Therapist called her on the phone and alerted her to the link for the session in her email.  Marcelina said she would get on the link but needed some time in getting to the room where she would need to be for the link.  She got on the session at 3:12pm.  She said she is feeling upset over having pain and being in a wheelchair all the time.  She expressed frustration over getting rides to appointments and ordering food online. She will be getting  "physical therapy soon and will have her foot checked to see how it is healing on 10/10.  She expressed frustration with  getting rides as well as getting around her house.  Therapist reminded Art of how social activities improve her mood and asked her to think about how she can continue to be socially active.    During this session, this clinician used the following therapeutic modalities: Client-centered Therapy and Supportive Psychotherapy    Substance Abuse was not addressed during this session. If the client is diagnosed with a co-occurring substance use disorder, please indicate any changes in the frequency or amount of use: . Stage of change for addressing substance use diagnoses: No substance use/Not applicable    ASSESSMENT:  Art Jones presents with a Euthymic/ normal mood.     her affect is Normal range and intensity, which is congruent, with her mood and the content of the session. The client has made progress on their goals.     Art Jones presents with a none risk of suicide, none risk of self-harm, and none risk of harm to others.    For any risk assessment that surpasses a \"low\" rating, a safety plan must be developed.    A safety plan was indicated: no  If yes, describe in detail     PLAN: Between sessions, Art Jones will do some social activities. At the next session, the therapist will use Client-centered Therapy and Supportive Psychotherapy to address depression.    Behavioral Health Treatment Plan and Discharge Planning: Art Jones is aware of and agrees to continue to work on their treatment plan. They have identified and are working toward their discharge goals. yes    Visit start and stop times:    10/08/24  Start Time: 1512  Stop Time: 1559  Total Visit Time: 47 minutes        "

## 2024-10-09 ENCOUNTER — TELEPHONE (OUTPATIENT)
Age: 69
End: 2024-10-09

## 2024-10-09 NOTE — TELEPHONE ENCOUNTER
Caller: Aniya Ferguson Robert    Doctor: Kenneth    Reason for call: Aniya Ferguson needs a new patient appointment.  She has 3 fractures on her left foot.  She cannot get transportation for her appt on Thurs.  Can we slot her in at another time?   She needs two days notice for her transportation. Can someone reach out to her?  Thank you.    Call back#: [phone]

## 2024-10-10 ENCOUNTER — TELEPHONE (OUTPATIENT)
Age: 69
End: 2024-10-10

## 2024-10-10 NOTE — TELEPHONE ENCOUNTER
Patient called asking to speak to [facility]'s Podiatry.  Warm transfer to [facility].

## 2024-10-10 NOTE — TELEPHONE ENCOUNTER
Caller: Patient     Doctor: Kenneth     Reason for call: patient calling back to get scheduled I offered her 10/16/24 @ 10 am with Dr Chaim Douglass she stated she was referred to  can she be accommodated onto the schedule with transportation she needs two days in advance to schedule a ride please contact patient         Call back#: [phone]

## 2024-10-11 ENCOUNTER — TELEPHONE (OUTPATIENT)
Age: 69
End: 2024-10-11

## 2024-10-11 NOTE — TELEPHONE ENCOUNTER
Art states she broke her foot and is in a wheelchair. She needs Virtual appt. Writer scheduled for 10/18/24 at 2:30 pm.

## 2024-10-17 ENCOUNTER — OFFICE VISIT (OUTPATIENT)
Dept: PODIATRY | Facility: CLINIC | Age: 69
End: 2024-10-17
Payer: MEDICARE

## 2024-10-17 ENCOUNTER — PATIENT OUTREACH (OUTPATIENT)
Dept: CASE MANAGEMENT | Facility: OTHER | Age: 69
End: 2024-10-17

## 2024-10-17 VITALS
BODY MASS INDEX: 26.66 KG/M2 | WEIGHT: 160 LBS | DIASTOLIC BLOOD PRESSURE: 80 MMHG | HEIGHT: 65 IN | SYSTOLIC BLOOD PRESSURE: 126 MMHG | HEART RATE: 98 BPM

## 2024-10-17 DIAGNOSIS — S92.345A NONDISPLACED FRACTURE OF FOURTH METATARSAL BONE, LEFT FOOT, INITIAL ENCOUNTER FOR CLOSED FRACTURE: ICD-10-CM

## 2024-10-17 DIAGNOSIS — S92.335A NONDISPLACED FRACTURE OF THIRD METATARSAL BONE, LEFT FOOT, INITIAL ENCOUNTER FOR CLOSED FRACTURE: ICD-10-CM

## 2024-10-17 DIAGNOSIS — S92.325A NONDISPLACED FRACTURE OF SECOND METATARSAL BONE, LEFT FOOT, INITIAL ENCOUNTER FOR CLOSED FRACTURE: Primary | ICD-10-CM

## 2024-10-17 PROCEDURE — 99213 OFFICE O/P EST LOW 20 MIN: CPT | Performed by: PODIATRIST

## 2024-10-17 NOTE — PROGRESS NOTES
Assessment/Plan:   8/21/2024 x-rays personally reviewed showing fractures of the second third and fourth metatarsals proximally.  All fractures are relatively good alignment.  Continue with cam boot for the next 2 weeks partial weightbearing and then transition to sneaker for 2 weeks partial weightbearing and then discontinue walker/crutches.  Follow-up in approximately 6 weeks.  Rx x-ray left foot complete to be completed prior to next visit.  Call if any signs of increasing pain and swelling are noted.     Diagnoses and all orders for this visit:    Nondisplaced fracture of second metatarsal bone, left foot, initial encounter for closed fracture    Nondisplaced fracture of third metatarsal bone, left foot, initial encounter for closed fracture    Nondisplaced fracture of fourth metatarsal bone, left foot, initial encounter for closed fracture      Procedures      Subjective:     Patient ID: Aniya Jones is a 69 y.o. female.    10/17/2024: 69-year-old female seen today for follow-up care of fractures left foot second third and fourth metatarsals.  Initial injury occurred on 8/19/2024.  Went to ED on 8/21/2024.  X-ray showed fracture left second third and fourth metatarsals at the proximal bases.  Was seen in the hospital by Dr. Petty at that point and dispensed a cam boot and instructed to nonweight bear with crutches/walker.  Patient is here today for follow-up care.        Review of Systems   Constitutional: Negative.    HENT: Negative.     Eyes: Negative.    Respiratory: Negative.     Cardiovascular: Negative.    Gastrointestinal: Negative.    Endocrine: Negative.    Genitourinary: Negative.    Musculoskeletal:         Diminished pain and swelling from left midfoot.   Skin: Negative.    Allergic/Immunologic: Negative.    Neurological: Negative.    Hematological: Negative.    Psychiatric/Behavioral: Negative.           Objective:     Physical Exam  Constitutional:       Appearance: Normal appearance.   HENT:       Head: Normocephalic.      Right Ear: External ear normal.      Left Ear: External ear normal.   Eyes:      Pupils: Pupils are equal, round, and reactive to light.   Cardiovascular:      Rate and Rhythm: Normal rate.      Pulses: Normal pulses.   Pulmonary:      Effort: Pulmonary effort is normal.   Musculoskeletal:         General: Swelling and tenderness present.      Cervical back: Normal range of motion.      Comments: Mild tenderness and swelling left midfoot at the metatarsal cuneiform joints.  Second third and fourth metatarsal fractures are clinically stable.  Excellent clinical alignment.   Feet:      Right foot:      Protective Sensation: 10 sites tested.  10 sites sensed.      Skin integrity: Skin integrity normal.      Left foot:      Protective Sensation: 10 sites tested.  10 sites sensed.      Skin integrity: Skin integrity normal.   Skin:     General: Skin is warm and dry.      Capillary Refill: Capillary refill takes 2 to 3 seconds.   Neurological:      General: No focal deficit present.      Mental Status: She is alert.   Psychiatric:         Mood and Affect: Mood normal.

## 2024-10-17 NOTE — PROGRESS NOTES
Outpatient Care Management Note:    Voice mail message left for Marcelina, with my contact information, requesting a call back.

## 2024-10-18 ENCOUNTER — TELEPHONE (OUTPATIENT)
Dept: PSYCHIATRY | Facility: CLINIC | Age: 69
End: 2024-10-18

## 2024-10-18 ENCOUNTER — TELEMEDICINE (OUTPATIENT)
Dept: PSYCHIATRY | Facility: CLINIC | Age: 69
End: 2024-10-18
Payer: MEDICARE

## 2024-10-18 DIAGNOSIS — F41.1 GAD (GENERALIZED ANXIETY DISORDER): ICD-10-CM

## 2024-10-18 DIAGNOSIS — F33.9 MAJOR DEPRESSION, RECURRENT, CHRONIC (HCC): ICD-10-CM

## 2024-10-18 PROCEDURE — 99214 OFFICE O/P EST MOD 30 MIN: CPT

## 2024-10-18 RX ORDER — DULOXETIN HYDROCHLORIDE 60 MG/1
60 CAPSULE, DELAYED RELEASE ORAL
Qty: 30 CAPSULE | Refills: 1 | Status: SHIPPED | OUTPATIENT
Start: 2024-10-18

## 2024-10-18 RX ORDER — CLONAZEPAM 0.5 MG/1
0.5 TABLET ORAL 3 TIMES DAILY
Qty: 90 TABLET | Refills: 1 | Status: SHIPPED | OUTPATIENT
Start: 2024-10-18

## 2024-10-18 RX ORDER — DULOXETIN HYDROCHLORIDE 30 MG/1
30 CAPSULE, DELAYED RELEASE ORAL
Qty: 30 CAPSULE | Refills: 1 | Status: SHIPPED | OUTPATIENT
Start: 2024-10-18

## 2024-10-18 RX ORDER — BUPROPION HYDROCHLORIDE 150 MG/1
150 TABLET ORAL DAILY
Qty: 90 TABLET | Refills: 0 | Status: SHIPPED | OUTPATIENT
Start: 2024-10-18

## 2024-10-18 RX ORDER — BUSPIRONE HYDROCHLORIDE 10 MG/1
10 TABLET ORAL 2 TIMES DAILY
Qty: 60 TABLET | Refills: 1 | Status: SHIPPED | OUTPATIENT
Start: 2024-10-18

## 2024-10-18 NOTE — TELEPHONE ENCOUNTER
Writer called and scheduled 5 week follow up with Prosper Orozco.     Client opted for 5 instead of 6 due to 6 weeks out being Thanksgiving week.
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
## 2024-10-21 ENCOUNTER — TELEPHONE (OUTPATIENT)
Age: 69
End: 2024-10-21

## 2024-10-21 ENCOUNTER — APPOINTMENT (OUTPATIENT)
Dept: LAB | Facility: HOSPITAL | Age: 69
End: 2024-10-21
Payer: MEDICARE

## 2024-10-21 DIAGNOSIS — S92.309A: ICD-10-CM

## 2024-10-21 DIAGNOSIS — F41.8 DEPRESSION WITH ANXIETY: ICD-10-CM

## 2024-10-21 DIAGNOSIS — I10 HYPERTENSION, ESSENTIAL: ICD-10-CM

## 2024-10-21 DIAGNOSIS — E11.69 TYPE 2 DIABETES MELLITUS WITH OTHER SPECIFIED COMPLICATION, WITHOUT LONG-TERM CURRENT USE OF INSULIN (HCC): ICD-10-CM

## 2024-10-21 LAB
ALBUMIN SERPL BCG-MCNC: 3.9 G/DL (ref 3.5–5)
ALP SERPL-CCNC: 65 U/L (ref 34–104)
ALT SERPL W P-5'-P-CCNC: 19 U/L (ref 7–52)
ANION GAP SERPL CALCULATED.3IONS-SCNC: 10 MMOL/L (ref 4–13)
AST SERPL W P-5'-P-CCNC: 24 U/L (ref 13–39)
BASOPHILS # BLD AUTO: 0.05 THOUSANDS/ΜL (ref 0–0.1)
BASOPHILS NFR BLD AUTO: 1 % (ref 0–1)
BILIRUB SERPL-MCNC: 0.32 MG/DL (ref 0.2–1)
BUN SERPL-MCNC: 8 MG/DL (ref 5–25)
CALCIUM SERPL-MCNC: 9.3 MG/DL (ref 8.4–10.2)
CHLORIDE SERPL-SCNC: 104 MMOL/L (ref 96–108)
CHOLEST SERPL-MCNC: 141 MG/DL
CO2 SERPL-SCNC: 31 MMOL/L (ref 21–32)
CREAT SERPL-MCNC: 0.74 MG/DL (ref 0.6–1.3)
EOSINOPHIL # BLD AUTO: 0.25 THOUSAND/ΜL (ref 0–0.61)
EOSINOPHIL NFR BLD AUTO: 4 % (ref 0–6)
ERYTHROCYTE [DISTWIDTH] IN BLOOD BY AUTOMATED COUNT: 14.8 % (ref 11.6–15.1)
EST. AVERAGE GLUCOSE BLD GHB EST-MCNC: 120 MG/DL
GFR SERPL CREATININE-BSD FRML MDRD: 82 ML/MIN/1.73SQ M
GLUCOSE SERPL-MCNC: 78 MG/DL (ref 65–140)
HBA1C MFR BLD: 5.8 %
HCT VFR BLD AUTO: 38.1 % (ref 34.8–46.1)
HDLC SERPL-MCNC: 54 MG/DL
HGB BLD-MCNC: 11.6 G/DL (ref 11.5–15.4)
IMM GRANULOCYTES # BLD AUTO: 0.01 THOUSAND/UL (ref 0–0.2)
IMM GRANULOCYTES NFR BLD AUTO: 0 % (ref 0–2)
LDLC SERPL CALC-MCNC: 61 MG/DL (ref 0–100)
LYMPHOCYTES # BLD AUTO: 1.35 THOUSANDS/ΜL (ref 0.6–4.47)
LYMPHOCYTES NFR BLD AUTO: 24 % (ref 14–44)
MCH RBC QN AUTO: 28.1 PG (ref 26.8–34.3)
MCHC RBC AUTO-ENTMCNC: 30.4 G/DL (ref 31.4–37.4)
MCV RBC AUTO: 92 FL (ref 82–98)
MONOCYTES # BLD AUTO: 0.63 THOUSAND/ΜL (ref 0.17–1.22)
MONOCYTES NFR BLD AUTO: 11 % (ref 4–12)
NEUTROPHILS # BLD AUTO: 3.44 THOUSANDS/ΜL (ref 1.85–7.62)
NEUTS SEG NFR BLD AUTO: 60 % (ref 43–75)
NRBC BLD AUTO-RTO: 0 /100 WBCS
PLATELET # BLD AUTO: 245 THOUSANDS/UL (ref 149–390)
PMV BLD AUTO: 10.8 FL (ref 8.9–12.7)
POTASSIUM SERPL-SCNC: 3.7 MMOL/L (ref 3.5–5.3)
PROT SERPL-MCNC: 6.5 G/DL (ref 6.4–8.4)
RBC # BLD AUTO: 4.13 MILLION/UL (ref 3.81–5.12)
SODIUM SERPL-SCNC: 145 MMOL/L (ref 135–147)
TRIGL SERPL-MCNC: 128 MG/DL
WBC # BLD AUTO: 5.73 THOUSAND/UL (ref 4.31–10.16)

## 2024-10-21 PROCEDURE — 80053 COMPREHEN METABOLIC PANEL: CPT

## 2024-10-21 PROCEDURE — 83036 HEMOGLOBIN GLYCOSYLATED A1C: CPT

## 2024-10-21 PROCEDURE — 85025 COMPLETE CBC W/AUTO DIFF WBC: CPT

## 2024-10-21 PROCEDURE — 80061 LIPID PANEL: CPT

## 2024-10-21 PROCEDURE — 36415 COLL VENOUS BLD VENIPUNCTURE: CPT

## 2024-10-21 NOTE — TELEPHONE ENCOUNTER
Caller: Marcelina     Doctor and/or Office: Dr Thompson/MAGAN     #: [phone]     Escalation: Care Patient changed her mine about Physical therapy would like to go to [place] please fax script to [phone]

## 2024-10-22 DIAGNOSIS — S92.325A NONDISPLACED FRACTURE OF SECOND METATARSAL BONE, LEFT FOOT, INITIAL ENCOUNTER FOR CLOSED FRACTURE: Primary | ICD-10-CM

## 2024-10-22 DIAGNOSIS — S92.335A NONDISPLACED FRACTURE OF THIRD METATARSAL BONE, LEFT FOOT, INITIAL ENCOUNTER FOR CLOSED FRACTURE: ICD-10-CM

## 2024-10-22 DIAGNOSIS — S92.345A NONDISPLACED FRACTURE OF FOURTH METATARSAL BONE, LEFT FOOT, INITIAL ENCOUNTER FOR CLOSED FRACTURE: ICD-10-CM

## 2024-10-22 NOTE — PROGRESS NOTES
Patient request physical therapy for conditioning status post fractures 2 3 4 metatarsals left foot.

## 2024-10-24 ENCOUNTER — PATIENT OUTREACH (OUTPATIENT)
Dept: CASE MANAGEMENT | Facility: OTHER | Age: 69
End: 2024-10-24

## 2024-10-24 NOTE — PROGRESS NOTES
Outpatient Care Management Note:    Care manager called Marcelina. She is beginning to do partial weight bearing with CAM boot.  In 2 weeks, she will transition to wearing a sneaker-partial weight bearing with her walker. She has a follow up podiatry appt on 11/26. She states she will be starting physical therapy after her next podiatry appt.    Marcelina denies any further needs. CM will close the referral. She will call her PCP/podiatry with any concerns.

## 2024-10-25 ENCOUNTER — TELEPHONE (OUTPATIENT)
Age: 69
End: 2024-10-25

## 2024-10-25 NOTE — TELEPHONE ENCOUNTER
Daniela [facility] call about the patient canceling the appointment for today. Due to the patient not feeling swell with sinus and the nurse told her if she like for her to come out and check her and the patient said no. Please let the doctor know. Thank you

## 2024-10-30 ENCOUNTER — TELEMEDICINE (OUTPATIENT)
Dept: PSYCHIATRY | Facility: CLINIC | Age: 69
End: 2024-10-30
Payer: MEDICARE

## 2024-10-30 DIAGNOSIS — F41.1 GAD (GENERALIZED ANXIETY DISORDER): ICD-10-CM

## 2024-10-30 DIAGNOSIS — F41.8 DEPRESSION WITH ANXIETY: ICD-10-CM

## 2024-10-30 DIAGNOSIS — F33.9 MAJOR DEPRESSION, RECURRENT, CHRONIC (HCC): Primary | ICD-10-CM

## 2024-10-30 DIAGNOSIS — Z63.4 BEREAVEMENT DUE TO LIFE EVENT: ICD-10-CM

## 2024-10-30 DIAGNOSIS — F50.810 MILD BINGE-EATING DISORDER: ICD-10-CM

## 2024-10-30 PROCEDURE — 90834 PSYTX W PT 45 MINUTES: CPT

## 2024-10-30 SDOH — SOCIAL STABILITY - SOCIAL INSECURITY: DISSAPEARANCE AND DEATH OF FAMILY MEMBER: Z63.4

## 2024-10-30 NOTE — PSYCH
Virtual Regular Visit    Verification of patient location:    Patient is located at Home in the following state in which I hold an active license [place]      Assessment/Plan:    Problem List Items Addressed This Visit       Depression with anxiety    Major depression, recurrent, chronic (HCC) - Primary    Eating disorder    SY (generalized anxiety disorder)    Bereavement due to life event       Goals addressed in session: Goal 1          Reason for visit is No chief complaint on file.       Encounter provider Lady Soto LCSW      Recent Visits  No visits were found meeting these conditions.  Showing recent visits within past 7 days and meeting all other requirements  Today's Visits  Date Type Provider Dept   10/30/24 Telemedicine Lady Soto LCSW [facility]   Showing today's visits and meeting all other requirements  Future Appointments  No visits were found meeting these conditions.  Showing future appointments within next 150 days and meeting all other requirements       The patient was identified by name and date of birth. Aniya Jones was informed that this is a telemedicine visit and that the visit is being conducted throughthe [employer] platform. She agrees to proceed..  My office door was closed. No one else was in the room.  She acknowledged consent and understanding of privacy and security of the video platform. The patient has agreed to participate and understands they can discontinue the visit at any time.    Patient is aware this is a billable service.     Subjective  Aniya Jones is a 69 y.o. female  .      HPI     Past Medical History:   Diagnosis Date    Anxiety     Arthritis     Cancer (HCC)     Skin    Depression     Diabetes (HCC)     GERD (gastroesophageal reflux disease)     Hyperlipidemia     Hypertension     Migraine        Past Surgical History:   Procedure Laterality Date    ANKLE SURGERY      CARPAL TUNNEL RELEASE Right     ROTATOR CUFF REPAIR          Current Outpatient Medications   Medication Sig Dispense Refill    aspirin 81 mg chewable tablet Chew 81 mg daily      B COMPLEX VITAMINS PO Take 1 tablet by mouth daily      Baclofen 5 MG TABS Take by mouth Take 1-2 tabs TID prn      bisacodyl (DULCOLAX) 5 mg EC tablet Take 5 mg by mouth daily as needed for constipation      buPROPion (Wellbutrin XL) 150 mg 24 hr tablet Take 1 tablet (150 mg total) by mouth daily 90 tablet 0    busPIRone (BUSPAR) 10 mg tablet Take 1 tablet (10 mg total) by mouth 2 (two) times a day 60 tablet 1    calcium carbonate (OS-MIKE) 600 MG tablet Take 600 mg by mouth 2 (two) times a day with meals      clonazePAM (KlonoPIN) 0.5 mg tablet Take 1 tablet (0.5 mg total) by mouth 3 (three) times a day 90 tablet 1    DULoxetine (CYMBALTA) 30 mg delayed release capsule Take 1 capsule (30 mg total) by mouth daily at bedtime With 60 mg 30 capsule 1    DULoxetine (CYMBALTA) 60 mg delayed release capsule Take 1 capsule (60 mg total) by mouth daily at bedtime 30 capsule 1    esomeprazole (NexIUM) 20 mg capsule Take 20 mg by mouth daily in the early morning       Galcanezumab-gnlm (Emgality) 120 MG/ML SOAJ Inject 120 mg under the skin every 28 days 1 mL 11    ibuprofen (MOTRIN) 600 mg tablet Take 600 mg by mouth every 8 (eight) hours as needed for mild pain      Lidocaine-Menthol 4-1 % CREA Apply 1 Dose topically if needed Patch 5%      Magnesium 400 MG TABS Take 400 mg by mouth 2 (two) times a day      metFORMIN (GLUCOPHAGE) 500 mg tablet Take 0.5 tablets (250 mg total) by mouth 2 (two) times a day with meals 60 tablet 3    Movantik 25 MG tablet Take 1 tablet (25 mg total) by mouth in the morning 30 tablet 1    multivitamin (THERAGRAN) TABS Take 1 tablet by mouth daily      OnabotulinumtoxinA (BOTOX IM) Inject into a muscle      oxyCODONE (ROXICODONE) 10 MG TABS Take 1 tablet (10 mg total) by mouth every 8 (eight) hours as needed for moderate pain Max Daily Amount: 30 mg 10 tablet 0     polyethylene glycol (MIRALAX) 17 g packet Take 17 g by mouth daily at bedtime      Riboflavin 100 MG TABS Take by mouth      rosuvastatin (CRESTOR) 20 MG tablet Take 1 tablet (20 mg total) by mouth daily 90 tablet 3    senna-docusate sodium (SENOKOT S) 8.6-50 mg per tablet Take 1 tablet by mouth daily at bedtime      Ubrogepant (Ubrelvy) 100 MG tablet Take 1 tablet (100 mg total) by mouth if needed (migraine) Take 1 tablet (100 mg) one time as needed for migraine. May repeat one additional tablet (100 mg) at least two hours after the first dose. Do not use more than two doses per day 48 tablet 1     Current Facility-Administered Medications   Medication Dose Route Frequency Provider Last Rate Last Admin    Botulinum Toxin Type A SOLR 200 Units  200 Units Infiltration Q3 Months    200 Units at 09/10/24 1452        Allergies   Allergen Reactions    Amoxicillin Dermatitis, Hives, Itching and Rash       Review of Systems    Video Exam    There were no vitals filed for this visit.    Physical Exam     Behavioral Health Psychotherapy Progress Note    Psychotherapy Provided: Individual Psychotherapy     1. Major depression, recurrent, chronic (HCC)        2. Bereavement due to life event        3. Depression with anxiety        4. Mild binge-eating disorder        5. SY (generalized anxiety disorder)            Goals addressed in session: Goal 1     DATA:  Marcelina shared her foot is still in the cast but she can do some weight bearing on it but it's swollen and painful.  She got a call from her son Ta who wakes up with anxiety in the morning.  He talked to her about his past and how it has affected his present.  She said when she  Eduardo she was sad and depressed and this affected him.  He said he did not remember too many good memories of growing up because he was a lonely only child and didn't have friends close to his age.  She said he has always felt out of place because of this.  He expressed his feelings  "with her not to make her feel bad, but to share with her that he felt \"lost\" as a child.  She talked some feelings about this and talked about her history of depression starting at 12 when she carved \"I'm a born loser\" into a bed frame and her mom got mad at her ruining furniture and didn't help her with her depression as well as later on when she was 17 and tried to get help.  She openly communicated about her early life and marriage and how her depression worsened because of a verbally abusive .  She said her depression got worse being  to Eduardo because he put her down and cheated on her.  He gambled and started doing drugs so she decided to divorce him.   She talked about the conversations she is having with Ta and that she wants to listen to him and hear what he has to say about his upbringing, and she is happy to be there for him.  She shared she is helped by talking about her past and processing thoughts and feelings.  During this session, this clinician used the following therapeutic modalities: Client-centered Therapy    Substance Abuse was not addressed during this session. If the client is diagnosed with a co-occurring substance use disorder, please indicate any changes in the frequency or amount of use: . Stage of change for addressing substance use diagnoses: No substance use/Not applicable    ASSESSMENT:  Art Jones presents with a Euthymic/ normal mood.     her affect is Normal range and intensity, which is congruent, with her mood and the content of the session. The client has made progress on their goals.     Art Jones presents with a none risk of suicide, none risk of self-harm, and none risk of harm to others.    For any risk assessment that surpasses a \"low\" rating, a safety plan must be developed.    A safety plan was indicated: no  If yes, describe in detail     PLAN: Between sessions, Art Jones will continue processing her past. At the next session, the therapist " will use Client-centered Therapy to address depression.    Behavioral Health Treatment Plan and Discharge Planning: Art Jones is aware of and agrees to continue to work on their treatment plan. They have identified and are working toward their discharge goals. yes    Visit start and stop times:    10/30/24  Start Time: 1700  Stop Time: 1752  Total Visit Time: 52 minutes

## 2024-11-04 ENCOUNTER — TELEPHONE (OUTPATIENT)
Age: 69
End: 2024-11-04

## 2024-11-04 NOTE — TELEPHONE ENCOUNTER
Caller: Art Jones    Doctor and/or Office: Dr. Cooper/Preston    #: [phone]    Escalation: Care Patient is having a lot of swelling and is in a lot of pain. She thinks something more is going on with her foot and is hoping for an MRI. Please return call. Thank you

## 2024-11-05 ENCOUNTER — APPOINTMENT (OUTPATIENT)
Dept: RADIOLOGY | Facility: CLINIC | Age: 69
End: 2024-11-05
Payer: MEDICARE

## 2024-11-05 DIAGNOSIS — S92.325A NONDISPLACED FRACTURE OF SECOND METATARSAL BONE, LEFT FOOT, INITIAL ENCOUNTER FOR CLOSED FRACTURE: ICD-10-CM

## 2024-11-05 DIAGNOSIS — S92.335A NONDISPLACED FRACTURE OF THIRD METATARSAL BONE, LEFT FOOT, INITIAL ENCOUNTER FOR CLOSED FRACTURE: ICD-10-CM

## 2024-11-05 DIAGNOSIS — S92.345A NONDISPLACED FRACTURE OF FOURTH METATARSAL BONE, LEFT FOOT, INITIAL ENCOUNTER FOR CLOSED FRACTURE: ICD-10-CM

## 2024-11-05 PROCEDURE — 73630 X-RAY EXAM OF FOOT: CPT

## 2024-11-07 ENCOUNTER — TELEPHONE (OUTPATIENT)
Age: 69
End: 2024-11-07

## 2024-11-07 ENCOUNTER — TELEMEDICINE (OUTPATIENT)
Dept: PSYCHIATRY | Facility: CLINIC | Age: 69
End: 2024-11-07
Payer: MEDICARE

## 2024-11-07 DIAGNOSIS — F50.020 MILD BINGE-EATING PURGING TYPE ANOREXIA NERVOSA: ICD-10-CM

## 2024-11-07 DIAGNOSIS — F33.9 MAJOR DEPRESSION, RECURRENT, CHRONIC (HCC): ICD-10-CM

## 2024-11-07 DIAGNOSIS — F41.1 GAD (GENERALIZED ANXIETY DISORDER): Primary | ICD-10-CM

## 2024-11-07 PROCEDURE — 90834 PSYTX W PT 45 MINUTES: CPT

## 2024-11-07 NOTE — BH TREATMENT PLAN
Outpatient Behavioral Health Psychotherapy Treatment Plan    Art Jones  1955     Date of Initial Psychotherapy Assessment: 3/15/2023   Date of Current Treatment Plan: 11/07/24  Treatment Plan Target Date: 11/7/2025  Treatment Plan Expiration Date: 5/7/2025    Diagnosis:   1. SY (generalized anxiety disorder)        2. Major depression, recurrent, chronic (HCC)        3. Mild binge-eating purging type anorexia nervosa            Area(s) of Need: managing depression and anxiety and grief reaction from her  passing and her mother and her friend dying.    Long Term Goal 1 (in the client's own words): I want to manage my depression and anxiety and work through my grief    Stage of Change: Contemplation    Target Date for completion: 6/14/2025     Anticipated therapeutic modalities: client centered, supportive, grief counseling     People identified to complete this goal: Art, therapist, Dr Prosper Orozco      Objective 1: (identify the means of measuring success in meeting the objective): Art with work in therapy to develop coping skills for managing anxiety and depression as well as will work through her grief reaction from her  passing and her mother being sick in a nursing home.      Update 6/3/2024:  Art has had some more losses recently including her mother dying and a good friend in [place].  She is processing her feelings over these losses in therapy    Update 11/7/2024:  Art has been openly communicating thoughts and feelings about her losses.  She has been connecting with others and getting out to do things outside the house to improve her mood           I am currently under the care of a . [facility] provider: yes    My [facility] provider is: Dr Orozco    I am currently taking psychiatric medications: Yes, as prescribed    I feel that I will be ready for discharge from mental health care when I reach the following (measurable goal/objective): When  I can successfully manage my anxiety and depression, and work through my grief reaction.    For children and adults who have a legal guardian:   Has there been any change to custody orders and/or guardianship status? NA. If yes, attach updated documentation.    I have created my Crisis Plan and have been offered a copy of this plan    Behavioral Health Treatment Plan [facility]: Diagnosis and Treatment Plan explained to Art Jones acknowledges an understanding of their diagnosis. Art Jones agrees to this treatment plan.    I have been offered a copy of this Treatment Plan. Yes    Art Jones, 1955, actively participated in the creation of this treatment plan during a virtual session, using the telephone.   Art Jones  provided verbal consent on 11/7/2024 at 3:27 PM. The treatment plan was transcribed into the Electronic Health Record at a later time.

## 2024-11-07 NOTE — PSYCH
Virtual Regular Visit    Verification of patient location:    Patient is located at Home in the following state in which I hold an active license [place]      Assessment/Plan:    Problem List Items Addressed This Visit       Major depression, recurrent, chronic (HCC)    Eating disorder    SY (generalized anxiety disorder) - Primary       Goals addressed in session: Goal 1          Reason for visit is   Chief Complaint   Patient presents with    Virtual Regular Visit          Encounter provider Lady Soto LCSW      Recent Visits  No visits were found meeting these conditions.  Showing recent visits within past 7 days and meeting all other requirements  Today's Visits  Date Type Provider Dept   11/07/24 Telemedicine Lady Soto LCSW [facility]   Showing today's visits and meeting all other requirements  Future Appointments  No visits were found meeting these conditions.  Showing future appointments within next 150 days and meeting all other requirements       The patient was identified by name and date of birth. Aniya Jones was informed that this is a telemedicine visit and that the visit is being conducted throughthe Epic Embedded platform. She agrees to proceed..  My office door was closed. No one else was in the room.  She acknowledged consent and understanding of privacy and security of the video platform. The patient has agreed to participate and understands they can discontinue the visit at any time.    Patient is aware this is a billable service.     Subjective  Aniya Jones is a 69 y.o. female  .      HPI     Past Medical History:   Diagnosis Date    Anxiety     Arthritis     Cancer (HCC)     Skin    Depression     Diabetes (HCC)     GERD (gastroesophageal reflux disease)     Hyperlipidemia     Hypertension     Migraine        Past Surgical History:   Procedure Laterality Date    ANKLE SURGERY      CARPAL TUNNEL RELEASE Right     ROTATOR CUFF REPAIR         Current  Outpatient Medications   Medication Sig Dispense Refill    aspirin 81 mg chewable tablet Chew 81 mg daily      B COMPLEX VITAMINS PO Take 1 tablet by mouth daily      Baclofen 5 MG TABS Take by mouth Take 1-2 tabs TID prn      bisacodyl (DULCOLAX) 5 mg EC tablet Take 5 mg by mouth daily as needed for constipation      buPROPion (Wellbutrin XL) 150 mg 24 hr tablet Take 1 tablet (150 mg total) by mouth daily 90 tablet 0    busPIRone (BUSPAR) 10 mg tablet Take 1 tablet (10 mg total) by mouth 2 (two) times a day 60 tablet 1    calcium carbonate (OS-MIKE) 600 MG tablet Take 600 mg by mouth 2 (two) times a day with meals      clonazePAM (KlonoPIN) 0.5 mg tablet Take 1 tablet (0.5 mg total) by mouth 3 (three) times a day 90 tablet 1    DULoxetine (CYMBALTA) 30 mg delayed release capsule Take 1 capsule (30 mg total) by mouth daily at bedtime With 60 mg 30 capsule 1    DULoxetine (CYMBALTA) 60 mg delayed release capsule Take 1 capsule (60 mg total) by mouth daily at bedtime 30 capsule 1    esomeprazole (NexIUM) 20 mg capsule Take 20 mg by mouth daily in the early morning       Galcanezumab-gnlm (Emgality) 120 MG/ML SOAJ Inject 120 mg under the skin every 28 days 1 mL 11    ibuprofen (MOTRIN) 600 mg tablet Take 600 mg by mouth every 8 (eight) hours as needed for mild pain      Lidocaine-Menthol 4-1 % CREA Apply 1 Dose topically if needed Patch 5%      Magnesium 400 MG TABS Take 400 mg by mouth 2 (two) times a day      metFORMIN (GLUCOPHAGE) 500 mg tablet Take 0.5 tablets (250 mg total) by mouth 2 (two) times a day with meals 60 tablet 3    Movantik 25 MG tablet Take 1 tablet (25 mg total) by mouth in the morning 30 tablet 1    multivitamin (THERAGRAN) TABS Take 1 tablet by mouth daily      OnabotulinumtoxinA (BOTOX IM) Inject into a muscle      oxyCODONE (ROXICODONE) 10 MG TABS Take 1 tablet (10 mg total) by mouth every 8 (eight) hours as needed for moderate pain Max Daily Amount: 30 mg 10 tablet 0    polyethylene glycol  "(MIRALAX) 17 g packet Take 17 g by mouth daily at bedtime      Riboflavin 100 MG TABS Take by mouth      rosuvastatin (CRESTOR) 20 MG tablet Take 1 tablet (20 mg total) by mouth daily 90 tablet 3    senna-docusate sodium (SENOKOT S) 8.6-50 mg per tablet Take 1 tablet by mouth daily at bedtime      Ubrogepant (Ubrelvy) 100 MG tablet Take 1 tablet (100 mg total) by mouth if needed (migraine) Take 1 tablet (100 mg) one time as needed for migraine. May repeat one additional tablet (100 mg) at least two hours after the first dose. Do not use more than two doses per day 48 tablet 1     Current Facility-Administered Medications   Medication Dose Route Frequency Provider Last Rate Last Admin    Botulinum Toxin Type A SOLR 200 Units  200 Units Infiltration Q3 Months    200 Units at 09/10/24 1452        Allergies   Allergen Reactions    Amoxicillin Dermatitis, Hives, Itching and Rash       Review of Systems    Video Exam    There were no vitals filed for this visit.    Physical Exam     Behavioral Health Psychotherapy Progress Note    Psychotherapy Provided: Individual Psychotherapy     1. SY (generalized anxiety disorder)        2. Major depression, recurrent, chronic (HCC)        3. Mild binge-eating purging type anorexia nervosa            Goals addressed in session: Goal 1     DATA: Marcelina said her foot is \"not good\" because her foot is painful and swollen.  She says nobody is listening to her about her pain and swelling in terms of the doctors.  She discovered she has a fracture that had been undiagnosed for 12 weeks.  She shared some thoughts and feelings about feeling unlistened to by health care professionals.  Therapist validated her feelings and encouraged open communication of her thoughts and feelings. She talked about her frustrations regarding her health concerns and her physical pain. She talked about her son Ta and his feelings about how he grew up with his dad and his stepmother and the friction that " "happens with Eduardo blaming everything that went wrong on Art.  She shared she feels guilty at times because she feels she did not pick a good father figure in a boyfriend when she was with different men and it was helpful to talk about these feelings after all these years, because her son Ta has been talking about his feelings about growing up in terms of his loneliness and feeling lost growing up.  She processed her own feelings that have been coming up from him talking to her about this.    During this session, this clinician used the following therapeutic modalities: Client-centered Therapy and Supportive Psychotherapy    Substance Abuse was not addressed during this session. If the client is diagnosed with a co-occurring substance use disorder, please indicate any changes in the frequency or amount of use: . Stage of change for addressing substance use diagnoses: No substance use/Not applicable    ASSESSMENT:  Art Jones presents with a Euthymic/ normal mood.     her affect is Normal range and intensity, which is congruent, with her mood and the content of the session. The client has made progress on their goals.     Art oJnes presents with a none risk of suicide, none risk of self-harm, and none risk of harm to others.    For any risk assessment that surpasses a \"low\" rating, a safety plan must be developed.    A safety plan was indicated: no  If yes, describe in detail     PLAN: Between sessions, Art Jones will utilize behavioral activation for depression. At the next session, the therapist will use Client-centered Therapy and Supportive Psychotherapy to address depression.    Behavioral Health Treatment Plan and Discharge Planning: Art Jones is aware of and agrees to continue to work on their treatment plan. They have identified and are working toward their discharge goals. yes    Visit start and stop times:    11/07/24  Start Time: 1300  Stop Time: 1352  Total Visit Time: 52 " minutes

## 2024-11-07 NOTE — TELEPHONE ENCOUNTER
Caller: Art     Doctor and/or Office: Dr Cooper /MAGAN     #: [phone]    Escalation: Care Patient called in about her xray results  she would like someone to call her the results states about a fracture she did not know she had. She ask if you can call her after 2 today.  I did tell her Dr Cooper is out.

## 2024-11-08 ENCOUNTER — TELEPHONE (OUTPATIENT)
Age: 69
End: 2024-11-08

## 2024-11-08 NOTE — TELEPHONE ENCOUNTER
Caller: Aniya Jones    Doctor: Kenneth    Reason for call: Marcelina is in a lot of pain.  Can someone please reach out to her?  She had an xray done and she would like to know the results.  Can someone reach out to her?  Thank you.      Call back#: [phone]

## 2024-11-12 ENCOUNTER — OFFICE VISIT (OUTPATIENT)
Dept: FAMILY MEDICINE CLINIC | Facility: HOSPITAL | Age: 69
End: 2024-11-12
Payer: MEDICARE

## 2024-11-12 ENCOUNTER — TELEPHONE (OUTPATIENT)
Dept: FAMILY MEDICINE CLINIC | Facility: HOSPITAL | Age: 69
End: 2024-11-12

## 2024-11-12 VITALS
WEIGHT: 160 LBS | BODY MASS INDEX: 26.63 KG/M2 | OXYGEN SATURATION: 97 % | SYSTOLIC BLOOD PRESSURE: 138 MMHG | HEART RATE: 76 BPM | DIASTOLIC BLOOD PRESSURE: 76 MMHG

## 2024-11-12 DIAGNOSIS — E11.69 TYPE 2 DIABETES MELLITUS WITH OTHER SPECIFIED COMPLICATION, WITHOUT LONG-TERM CURRENT USE OF INSULIN (HCC): ICD-10-CM

## 2024-11-12 DIAGNOSIS — I10 HYPERTENSION, ESSENTIAL: ICD-10-CM

## 2024-11-12 DIAGNOSIS — S82.839A CLOSED FRACTURE OF DISTAL END OF FIBULA, UNSPECIFIED FRACTURE MORPHOLOGY, UNSPECIFIED LATERALITY, INITIAL ENCOUNTER: ICD-10-CM
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
## 2024-11-12 DIAGNOSIS — E78.00 HYPERCHOLESTEROLEMIA: ICD-10-CM

## 2024-11-12 PROCEDURE — 99214 OFFICE O/P EST MOD 30 MIN: CPT | Performed by: NURSE PRACTITIONER

## 2024-11-12 PROCEDURE — G2211 COMPLEX E/M VISIT ADD ON: HCPCS | Performed by: NURSE PRACTITIONER

## 2024-11-12 RX ORDER — POLYETHYLENE GLYCOL 3350 17 G/17G
17 POWDER, FOR SOLUTION ORAL DAILY
COMMUNITY
End: 2024-11-12 | Stop reason: SDUPTHER

## 2024-11-12 RX ORDER — LISINOPRIL 2.5 MG/1
2.5 TABLET ORAL DAILY
Qty: 30 TABLET | Refills: 5 | Status: SHIPPED | OUTPATIENT
Start: 2024-11-12

## 2024-11-12 NOTE — ASSESSMENT & PLAN NOTE
Was hospitalized in August due to mechanical fall where she suffered multiple metatarsal fractures of her left foot.  She was placed in a cam boot with nonweightbearing status.  She was discharged home from [facility] mid-September.  She did report in her last office visit that the night she got home she was sleeping in her chair and got up and fell landing on her knees and left hip.  She reports that she did not seek care at that time and was able to get herself up.  She has since completed PT OT and has been advanced to weightbearing as tolerated with Cam boot in place.  She reports persistent left ankle swelling with pain.  Updated left foot x-ray on 11/5/2024 reports nondisplaced transverse fracture of second/third/fourth metatarsals as well as a healing incomplete distal fibular fracture which appears to be new in comparison to prior imaging.  I am unable to ascertain when this fracture occurred.  I have advised her to continue cam boot with weightbearing status as tolerated.  I will obtain fibular x-ray as well as referral to orthopedics for further evaluation/treatment.

## 2024-11-12 NOTE — ASSESSMENT & PLAN NOTE
Lab Results   Component Value Date    HGBA1C 5.8 (H) 10/21/2024     History of diabetes.  Adherence to metformin to 50 mg twice daily.  She is eating 2-3 meals daily and denies any signs symptoms of hypoglycemia.  She optimizes her hydration and nutrition but does admit to sweet treats every once in a while.

## 2024-11-12 NOTE — TELEPHONE ENCOUNTER
Patient states that she has an appointment with a podiatrist on 11/26/2024. But today she was referred to an orthopedist, she is questioning whether or not she should still see the podiatrist. Or hold off until after she sees the ortho doctor?

## 2024-11-12 NOTE — ASSESSMENT & PLAN NOTE
Latest Reference Range & Units 10/21/24 07:10   Cholesterol See Comment mg/dL 141   Triglycerides See Comment mg/dL 128   HDL >=50 mg/dL 54   LDL Calculated 0 - 100 mg/dL 61   History of hypercholesterolemia associated with type 2 diabetes.  She is adherent to rosuvastatin 20 mg p.o. daily.

## 2024-11-12 NOTE — PROGRESS NOTES
[facility]   Samantha MIRAMONTES    Assessment/Plan:   1. Fall, subsequent encounter  -     XR tibia fibula 2 vw left; Future; Expected date: 11/12/2024  -     Ambulatory Referral to Orthopedic Surgery; Future  2. Closed fracture of distal end of fibula, unspecified fracture morphology, unspecified laterality, initial encounter  Assessment & Plan:  Was hospitalized in August due to mechanical fall where she suffered multiple metatarsal fractures of her left foot.  She was placed in a cam boot with nonweightbearing status.  She was discharged home from [facility] mid-September.  She did report in her last office visit that the night she got home she was sleeping in her chair and got up and fell landing on her knees and left hip.  She reports that she did not seek care at that time and was able to get herself up.  She has since completed PT OT and has been advanced to weightbearing as tolerated with Cam boot in place.  She reports persistent left ankle swelling with pain.  Updated left foot x-ray on 11/5/2024 reports nondisplaced transverse fracture of second/third/fourth metatarsals as well as a healing incomplete distal fibular fracture which appears to be new in comparison to prior imaging.  I am unable to ascertain when this fracture occurred.  I have advised her to continue cam boot with weightbearing status as tolerated.  I will obtain fibular x-ray as well as referral to orthopedics for further evaluation/treatment.  Orders:  -     XR tibia fibula 2 vw left; Future; Expected date: 11/12/2024  3. Hypercholesterolemia  Assessment & Plan:   Latest Reference Range & Units 10/21/24 07:10   Cholesterol See Comment mg/dL 141   Triglycerides See Comment mg/dL 128   HDL >=50 mg/dL 54   LDL Calculated 0 - 100 mg/dL 61   History of hypercholesterolemia associated with type 2 diabetes.  She is adherent to rosuvastatin 20 mg p.o. daily.  4. Type 2 diabetes mellitus with other specified complication, without  long-term current use of insulin ([facility])  Assessment & Plan:    Lab Results   Component Value Date    HGBA1C 5.8 (H) 10/21/2024     History of diabetes.  Adherence to metformin to 50 mg twice daily.  She is eating 2-3 meals daily and denies any signs symptoms of hypoglycemia.  She optimizes her hydration and nutrition but does admit to sweet treats every once in a while.  5. Hypertension, essential  Assessment & Plan:  Bp readings 138-142/70-80.  Reports Bp has been elevated at home as well.  Was taken off Bp medications due to orthostasis in the past. Denies chest pressure/pain/dyspnea.  No dizziness with positional changes.    Using ibuprofen 600mg once daily for arthritic pain.   Will start low dose lisinopril 2.5mg daily.  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  Will hold medication for SBp<110   Orders:  -     lisinopril (ZESTRIL) 2.5 mg tablet; Take 1 tablet (2.5 mg total) by mouth daily      Continue CAM boot left leg until evaluation by orthopedics.   Xray fibular fracture.   Start lisinopril 2.5mg daily.  Monitor blood pressure at home.  Hold if Sbp <110  Please reschedule eye exam.   Schedule colonoscopy and mammogram  Return in about 4 weeks (around 12/10/2024) for Recheck.  Patient may call or return to office with any questions or concerns.     ______________________________________________________________________  Subjective:     Patient ID: Aniya Jones is a 69 y.o. female.  Aniya Jones  Chief Complaint   Patient presents with    Follow-up     Here for follow-up.              Falls Plan of Care: balance, strength, and gait training instructions were provided. Recommended assistive device to help with gait and balance. Medications that increase falls were reviewed. Assessed feet and footwear. Completed home PT/OT as well as SNF        The following portions of the patient's history were reviewed and updated as appropriate: allergies, current medications, past family history, past  medical history, past social history, past surgical history, and problem list.    Review of Systems   Constitutional: Negative.  Negative for activity change, appetite change, chills, fatigue and fever.   HENT: Negative.  Negative for congestion, ear pain, postnasal drip and sinus pain.    Eyes:  Positive for visual disturbance.   Respiratory: Negative.  Negative for cough and shortness of breath.    Cardiovascular: Negative.  Negative for chest pain and leg swelling.   Gastrointestinal:  Positive for constipation. Negative for abdominal pain and diarrhea.        Going every 4-5 days with laxatives/softeners   Endocrine: Negative.    Genitourinary: Negative.  Negative for dysuria.   Musculoskeletal:  Positive for arthralgias and gait problem.   Skin: Negative.    Allergic/Immunologic: Negative.  Negative for immunocompromised state.   Neurological:  Negative for dizziness and light-headedness.   Hematological: Negative.    Psychiatric/Behavioral: Negative.           Objective:      Vitals:    11/12/24 1451   BP: 138/76   Pulse: 76   SpO2: 97%      Physical Exam  Vitals and nursing note reviewed.   Constitutional:       Appearance: Normal appearance.   HENT:      Head: Normocephalic and atraumatic.      Right Ear: Tympanic membrane, ear canal and external ear normal.      Left Ear: Tympanic membrane, ear canal and external ear normal.      Nose: Nose normal.      Mouth/Throat:      Mouth: Mucous membranes are moist.      Pharynx: Oropharynx is clear.   Eyes:      Extraocular Movements: Extraocular movements intact.      Conjunctiva/sclera: Conjunctivae normal.      Pupils: Pupils are equal, round, and reactive to light.   Cardiovascular:      Rate and Rhythm: Normal rate and regular rhythm.      Pulses: Normal pulses.      Heart sounds: Normal heart sounds.   Pulmonary:      Effort: Pulmonary effort is normal.      Breath sounds: Normal breath sounds.   Abdominal:      General: Bowel sounds are normal.       "Palpations: Abdomen is soft.   Musculoskeletal:      Cervical back: Normal range of motion and neck supple.      Left ankle: Swelling present. Tenderness present. Decreased range of motion.      Comments: Painful plantar flexion  Able to dorsiflex and invert/zora without pain   Skin:     General: Skin is warm and dry.   Neurological:      General: No focal deficit present.      Mental Status: She is alert and oriented to person, place, and time.      Gait: Gait abnormal.      Comments: Ambulating with RW and CAM boot   Psychiatric:         Mood and Affect: Mood normal.         Behavior: Behavior normal.         Thought Content: Thought content normal.         Judgment: Judgment normal.           Portions of the record may have been created with voice recognition software. Occasional wrong word or \"sound alike\" substitutions may have occurred due to the inherent limitations of voice recognition software. Please review the chart carefully and recognize, using context, where substitutions/typographical errors may have occurred.       "

## 2024-11-12 NOTE — ASSESSMENT & PLAN NOTE
Bp readings 138-142/70-80.  Reports Bp has been elevated at home as well.  Was taken off Bp medications due to orthostasis in the past. Denies chest pressure/pain/dyspnea.  No dizziness with positional changes.    Using ibuprofen 600mg once daily for arthritic pain.   Will start low dose lisinopril 2.5mg daily.  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  Will hold medication for SBp<110

## 2024-11-12 NOTE — PATIENT INSTRUCTIONS
Continue CAM boot left leg until evaluation by orthopedics.   Xray fibular fracture.   Start lisinopril 2.5mg daily.  Monitor blood pressure at home.  Hold if Sbp <110

## 2024-11-18 ENCOUNTER — APPOINTMENT (OUTPATIENT)
Dept: RADIOLOGY | Facility: CLINIC | Age: 69
End: 2024-11-18
Payer: MEDICARE
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
## 2024-11-18 DIAGNOSIS — S82.839A CLOSED FRACTURE OF DISTAL END OF FIBULA, UNSPECIFIED FRACTURE MORPHOLOGY, UNSPECIFIED LATERALITY, INITIAL ENCOUNTER: ICD-10-CM

## 2024-11-18 PROCEDURE — 73590 X-RAY EXAM OF LOWER LEG: CPT

## 2024-11-20 ENCOUNTER — TELEMEDICINE (OUTPATIENT)
Dept: PSYCHIATRY | Facility: CLINIC | Age: 69
End: 2024-11-20
Payer: MEDICARE

## 2024-11-20 DIAGNOSIS — F50.819 BINGE EATING DISORDER, UNSPECIFIED SEVERITY: ICD-10-CM

## 2024-11-20 DIAGNOSIS — F41.8 DEPRESSION WITH ANXIETY: Primary | ICD-10-CM

## 2024-11-20 DIAGNOSIS — F41.1 GAD (GENERALIZED ANXIETY DISORDER): ICD-10-CM

## 2024-11-20 DIAGNOSIS — Z63.4 BEREAVEMENT DUE TO LIFE EVENT: ICD-10-CM

## 2024-11-20 DIAGNOSIS — F33.9 MAJOR DEPRESSION, RECURRENT, CHRONIC (HCC): ICD-10-CM

## 2024-11-20 PROCEDURE — 90834 PSYTX W PT 45 MINUTES: CPT

## 2024-11-20 SDOH — SOCIAL STABILITY - SOCIAL INSECURITY: DISSAPEARANCE AND DEATH OF FAMILY MEMBER: Z63.4
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
## 2024-11-21 ENCOUNTER — RESULTS FOLLOW-UP (OUTPATIENT)
Dept: FAMILY MEDICINE CLINIC | Facility: HOSPITAL | Age: 69
End: 2024-11-21

## 2024-11-22 ENCOUNTER — TELEPHONE (OUTPATIENT)
Dept: PSYCHIATRY | Facility: CLINIC | Age: 69
End: 2024-11-22

## 2024-11-22 ENCOUNTER — TELEMEDICINE (OUTPATIENT)
Dept: PSYCHIATRY | Facility: CLINIC | Age: 69
End: 2024-11-22
Payer: MEDICARE

## 2024-11-22 DIAGNOSIS — F41.1 GAD (GENERALIZED ANXIETY DISORDER): ICD-10-CM

## 2024-11-22 DIAGNOSIS — F33.9 MAJOR DEPRESSION, RECURRENT, CHRONIC (HCC): Primary | ICD-10-CM

## 2024-11-22 PROCEDURE — 99214 OFFICE O/P EST MOD 30 MIN: CPT

## 2024-11-22 NOTE — TELEPHONE ENCOUNTER
Writer called client, client requested call back in about 5 minutes, writer will call back.     Writer called back and scheduled 3 month follow up for 2/21/25 and client is aware of forms due for next appointment.
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
## 2024-11-27 ENCOUNTER — OFFICE VISIT (OUTPATIENT)
Dept: OBGYN CLINIC | Facility: CLINIC | Age: 69
End: 2024-11-27
Payer: MEDICARE

## 2024-11-27 VITALS — BODY MASS INDEX: 29.99 KG/M2 | HEIGHT: 65 IN | WEIGHT: 180 LBS

## 2024-11-27 DIAGNOSIS — E78.00 HYPERCHOLESTEROLEMIA: ICD-10-CM
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
## 2024-11-27 DIAGNOSIS — S93.492A HIGH ANKLE SPRAIN OF LEFT LOWER EXTREMITY, INITIAL ENCOUNTER: ICD-10-CM

## 2024-11-27 DIAGNOSIS — S82.892A CLOSED FRACTURE OF LEFT ANKLE, INITIAL ENCOUNTER: Primary | ICD-10-CM

## 2024-11-27 PROCEDURE — 99204 OFFICE O/P NEW MOD 45 MIN: CPT | Performed by: FAMILY MEDICINE

## 2024-11-27 NOTE — PROGRESS NOTES
1. Closed fracture of left ankle, initial encounter  MRI ankle/heel left  wo contrast      2. Fall, subsequent encounter  Ambulatory Referral to Orthopedic Surgery      3. High ankle sprain of left lower extremity, initial encounter  MRI ankle/heel left  wo contrast        Orders Placed This Encounter   Procedures    MRI ankle/heel left  wo contrast        IMAGING STUDIES: (I personally reviewed images in PACS and report):  Xray left foot 11/5/14:  Nondisplaced transverse fracture base second third and fourth metatarsals.      Xray left tibia fibula 11/18/24:  Findings suggestive of an old healed distal fibular shaft fracture in good alignment. No acute pathology seen. Mild arthritis medially at the knee         ASSESSMENT/PLAN:  Left Ankle Subacute Healing Distal Fibula fracture  Persistent pain concerning for syndesmotic injury  Nondisplaced transverse fractures multiple metatarsals left foot  DOI: August 19, 2024      Repeat X-ray next visit: None    Return for Follow-up after MRI is completed for review.    Patient instructions below verbally summarized in person during encounter:  Patient Instructions   Continue controlled ankle motion boat      __________________________________________________________________________    HISTORY OF PRESENT ILLNESS:  Evaluation of left foot and ankle injury which occurred in August 2024.  Patient was diagnosed initially with foot fractures.  She continues to have pain in her left ankle region and after asking for further evaluation she was given a tibia-fibula x-ray in November 2024 which revealed a subacute healing fracture of the distal fibula.  She has been utilizing controlled ankle motion boot since August 2024 with her initial injury event.  She has been unable to transition out of boot due to pain.          Review of Systems      Following history reviewed and update:    Past Medical History:   Diagnosis Date    Anxiety     Arthritis     Cancer (HCC)     Skin     "Depression     Diabetes (HCC)     GERD (gastroesophageal reflux disease)     Hyperlipidemia     Hypertension     Migraine      Past Surgical History:   Procedure Laterality Date    ANKLE SURGERY      CARPAL TUNNEL RELEASE Right     ROTATOR CUFF REPAIR       Social History   Social History     Substance and Sexual Activity   Alcohol Use Not Currently     Social History     Substance and Sexual Activity   Drug Use Never     Social History     Tobacco Use   Smoking Status Former    Current packs/day: 0.00    Types: Cigarettes    Quit date:     Years since quittin.9    Passive exposure: Past   Smokeless Tobacco Never     Family History   Problem Relation Age of Onset    Mental illness Mother     Hyperlipidemia Mother     Hypertension Mother     Allergies Mother     Migraines Mother     Anxiety disorder Mother     Osteoporosis Mother     Hyperlipidemia Father     Heart disease Father     Cancer Father         Bladder    Migraines Sister     Hyperlipidemia Brother     Multiple sclerosis Brother     Hyperlipidemia Brother     Migraines Brother         Rare    Migraines Son         Rare    Colon cancer Maternal Grandmother     Heart attack Maternal Grandfather     Peripheral vascular disease Paternal Grandmother     Diabetes Paternal Grandmother     Asthma Paternal Grandfather     Diabetes Paternal Grandfather     Heart attack Paternal Grandfather      Allergies   Allergen Reactions    Amoxicillin Dermatitis, Hives, Itching and Rash          Physical Exam  Ht 5' 5\" (1.651 m)   Wt 81.6 kg (180 lb)   BMI 29.95 kg/m²         Ortho Exam  LEFT ANKLE  EXAM  Observation  GAIT: Cam boot with walker    Inspection  Erythema: no  Ecchymosis: no  Edema:  none    Tenderness  Proximal Fibula: no  (Maisonneuve frx)  AiTFL: +  (2cm proximal-medial to tip lateral malleolus 92% sens, 29% spec)  ATFL: no  CFL: no  PTFL: no  Achilles:  no  Deltoid: No  Peroneal: no  Tibialis Anterior: no  Tibialis Posterior: no    Bony " Tenderpoints:  Lateral Malleolus: +  Base of 5th MT: no  Medial Malleolus: no  Navicular: no  Talar dome: No    ROM  Dorsiflexion: intact  Plantarflexion: intact    Tib-Fib Squeeze: negative  (zyhjqgiugjzo-iz-oohpfqooouqmjj squeeze; 26% sens, 88% spec; rule in test)    Calcaneal Squeeze: negative    Dorsiflexion (+) ER Stress Test: negative  (reproduce ATiFL mech; 71% sens, 63% spec)      __________________________________________________________________________  Procedures

## 2024-11-27 NOTE — TELEPHONE ENCOUNTER
Reason for call:   [x] Refill   [] Prior Auth  [] Other:     Office:   [x] PCP/Provider - Jojo Fly, DO   [] Specialty/Provider -     Medication:     rosuvastatin (CRESTOR) 20 MG tablet       Dose/Frequency: 20 mg, Oral, Daily     Quantity: 90    Pharmacy: [place] [facility] [id number] - [place] [facility] - [address]     Does the patient have enough for 3 days?   [x] Yes   [] No - Send as HP to POD

## 2024-11-29 RX ORDER — ROSUVASTATIN CALCIUM 20 MG/1
20 TABLET, COATED ORAL DAILY
Qty: 90 TABLET | Refills: 1 | Status: SHIPPED | OUTPATIENT
Start: 2024-11-29

## 2024-12-04 ENCOUNTER — TELEMEDICINE (OUTPATIENT)
Dept: PSYCHIATRY | Facility: CLINIC | Age: 69
End: 2024-12-04
Payer: MEDICARE

## 2024-12-04 DIAGNOSIS — F41.8 DEPRESSION WITH ANXIETY: ICD-10-CM

## 2024-12-04 DIAGNOSIS — F41.1 GAD (GENERALIZED ANXIETY DISORDER): ICD-10-CM

## 2024-12-04 DIAGNOSIS — F33.9 MAJOR DEPRESSION, RECURRENT, CHRONIC (HCC): Primary | ICD-10-CM

## 2024-12-04 DIAGNOSIS — Z63.4 BEREAVEMENT DUE TO LIFE EVENT: ICD-10-CM

## 2024-12-04 DIAGNOSIS — F50.810 MILD BINGE-EATING DISORDER: ICD-10-CM

## 2024-12-04 PROCEDURE — 90834 PSYTX W PT 45 MINUTES: CPT

## 2024-12-04 SDOH — SOCIAL STABILITY - SOCIAL INSECURITY: DISSAPEARANCE AND DEATH OF FAMILY MEMBER: Z63.4

## 2024-12-04 NOTE — PSYCH
Virtual Regular Visit    Verification of patient location:    Patient is located at Home in the following state in which I hold an active license [place]      Assessment/Plan:    Problem List Items Addressed This Visit       Depression with anxiety    Major depression, recurrent, chronic (HCC) - Primary    Eating disorder    SY (generalized anxiety disorder)    Bereavement due to life event       Goals addressed in session: Goal 1          Reason for visit is   Chief Complaint   Patient presents with    Virtual Regular Visit          Encounter provider Lady Soto LCSW      Recent Visits  No visits were found meeting these conditions.  Showing recent visits within past 7 days and meeting all other requirements  Today's Visits  Date Type Provider Dept   12/04/24 Telemedicine Lady Soto LCSW [facility]   Showing today's visits and meeting all other requirements  Future Appointments  No visits were found meeting these conditions.  Showing future appointments within next 150 days and meeting all other requirements       The patient was identified by name and date of birth. Aniya Jones was informed that this is a telemedicine visit and that the visit is being conducted throughthe Epic Embedded platform. She agrees to proceed..  My office door was closed. No one else was in the room.  She acknowledged consent and understanding of privacy and security of the video platform. The patient has agreed to participate and understands they can discontinue the visit at any time.    Patient is aware this is a billable service.     Subjective  Aniya Jones is a 69 y.o. female  .      HPI     Past Medical History:   Diagnosis Date    Anxiety     Arthritis     Cancer (HCC)     Skin    Depression     Diabetes (HCC)     GERD (gastroesophageal reflux disease)     Hyperlipidemia     Hypertension     Migraine        Past Surgical History:   Procedure Laterality Date    ANKLE SURGERY      CARPAL TUNNEL  RELEASE Right     ROTATOR CUFF REPAIR         Current Outpatient Medications   Medication Sig Dispense Refill    aspirin 81 mg chewable tablet Chew 81 mg daily      B COMPLEX VITAMINS PO Take 1 tablet by mouth daily      Baclofen 5 MG TABS Take by mouth Take 1-2 tabs TID prn      bisacodyl (DULCOLAX) 5 mg EC tablet Take 5 mg by mouth daily as needed for constipation      buPROPion (Wellbutrin XL) 150 mg 24 hr tablet Take 1 tablet (150 mg total) by mouth daily 90 tablet 0    busPIRone (BUSPAR) 10 mg tablet Take 1 tablet (10 mg total) by mouth 2 (two) times a day 60 tablet 1    calcium carbonate (OS-MIKE) 600 MG tablet Take 600 mg by mouth 2 (two) times a day with meals      clonazePAM (KlonoPIN) 0.5 mg tablet Take 1 tablet (0.5 mg total) by mouth 3 (three) times a day 90 tablet 1    DULoxetine (CYMBALTA) 30 mg delayed release capsule Take 1 capsule (30 mg total) by mouth daily at bedtime With 60 mg 30 capsule 1    DULoxetine (CYMBALTA) 60 mg delayed release capsule Take 1 capsule (60 mg total) by mouth daily at bedtime 30 capsule 1    esomeprazole (NexIUM) 20 mg capsule Take 20 mg by mouth daily in the early morning       Galcanezumab-gnlm (Emgality) 120 MG/ML SOAJ Inject 120 mg under the skin every 28 days 1 mL 11    ibuprofen (MOTRIN) 600 mg tablet Take 600 mg by mouth every 8 (eight) hours as needed for mild pain      Lidocaine-Menthol 4-1 % CREA Apply 1 Dose topically if needed Patch 5%      lisinopril (ZESTRIL) 2.5 mg tablet Take 1 tablet (2.5 mg total) by mouth daily 30 tablet 5    Magnesium 400 MG TABS Take 400 mg by mouth 2 (two) times a day      metFORMIN (GLUCOPHAGE) 500 mg tablet Take 0.5 tablets (250 mg total) by mouth 2 (two) times a day with meals 60 tablet 3    Movantik 25 MG tablet Take 1 tablet (25 mg total) by mouth in the morning 30 tablet 1    multivitamin (THERAGRAN) TABS Take 1 tablet by mouth daily      OnabotulinumtoxinA (BOTOX IM) Inject into a muscle      oxyCODONE (ROXICODONE) 10 MG TABS  Take 1 tablet (10 mg total) by mouth every 8 (eight) hours as needed for moderate pain Max Daily Amount: 30 mg 10 tablet 0    polyethylene glycol (MIRALAX) 17 g packet Take 17 g by mouth daily at bedtime      Riboflavin 100 MG TABS Take by mouth      rosuvastatin (CRESTOR) 20 MG tablet Take 1 tablet (20 mg total) by mouth daily 90 tablet 1    senna-docusate sodium (SENOKOT S) 8.6-50 mg per tablet Take 1 tablet by mouth daily at bedtime      Ubrogepant (Ubrelvy) 100 MG tablet Take 1 tablet (100 mg total) by mouth if needed (migraine) Take 1 tablet (100 mg) one time as needed for migraine. May repeat one additional tablet (100 mg) at least two hours after the first dose. Do not use more than two doses per day 48 tablet 1     Current Facility-Administered Medications   Medication Dose Route Frequency Provider Last Rate Last Admin    Botulinum Toxin Type A SOLR 200 Units  200 Units Infiltration Q3 Months    200 Units at 09/10/24 1452        Allergies   Allergen Reactions    Amoxicillin Dermatitis, Hives, Itching and Rash       Review of Systems    Video Exam    There were no vitals filed for this visit.    Physical Exam     Behavioral Health Psychotherapy Progress Note    Psychotherapy Provided: Individual Psychotherapy     1. Major depression, recurrent, chronic (HCC)        2. SY (generalized anxiety disorder)        3. Mild binge-eating disorder        4. Depression with anxiety        5. Bereavement due to life event            Goals addressed in session: Goal 1     DATA: Behavioral Health Psychotherapy Progress Note    Psychotherapy Provided: Individual Psychotherapy     1. Major depression, recurrent, chronic (HCC)        2. SY (generalized anxiety disorder)        3. Mild binge-eating disorder        4. Depression with anxiety        5. Bereavement due to life event            Goals addressed in session: Goal 1     DATA: Marcelina shared she has an MRI coming up in a few weeks.  She said she was too depressed to  go out for Thanksgiving dinner with thinking about her departed  and her mother.  She is having arguments with her stepchildren who are wanting to come and take some of their dad's things.  She is upset that they did not visit her when she was sick or in need but just when they want some things.  She is upset over their treatment of her and doesn't want to let them in to the house if they come around.  Therapist encouraged open expression of thoughts and feelings regarding her loss of her  and mother.  She shared she remembers the way her mother dressed around the holidays.  She shares she worries about having enough money to move to an assisted living place as well as being able to leave money to her son Ta.  Therapist encouraged her to think about pros and cons of moving to [facility] and she did so.  She also shared some thoughts and feelings about her  who , which included some negative and some positive memories.  She shared she will go to her brother and sister in law's for [place].  She shared she is sad because her son is not here for Shabbir.  Therapist talked with Art about spirituality and kinsey and she said she is generally spiritual but does not have a kinsey practice.  Therapist encouraged her to do some things around the house and to socialize more often with friends on the phone to lift her mood.  She said she doesn't have much to say to others.  Therapist encouraged her to call and ask about others' lives.  She shared she might do so.  During this session, this clinician used the following therapeutic modalities: Client-centered Therapy and Dialectical Behavior Therapy    Substance Abuse was not addressed during this session. If the client is diagnosed with a co-occurring substance use disorder, please indicate any changes in the frequency or amount of use: . Stage of change for addressing substance use diagnoses: No substance use/Not applicable    ASSESSMENT:  " Art Jones presents with a Euthymic/ normal mood.     her affect is Normal range and intensity, which is congruent, with her mood and the content of the session. The client has made progress on their goals.     Art Jones presents with a none risk of suicide, none risk of self-harm, and none risk of harm to others.    For any risk assessment that surpasses a \"low\" rating, a safety plan must be developed.    A safety plan was indicated: no  If yes, describe in detail     PLAN: Between sessions, Art Jones will do some socializing and doing something around the house. At the next session, the therapist will use Client-centered Therapy and Dialectical Behavior Therapy to address depression.    Behavioral Health Treatment Plan and Discharge Planning: Art Jones is aware of and agrees to continue to work on their treatment plan. They have identified and are working toward their discharge goals. yes    Visit start and stop times:    12/04/24  Start Time: 1300  Stop Time: 1352  Total Visit Time: 52 minutes    "

## 2024-12-05 ENCOUNTER — TELEPHONE (OUTPATIENT)
Age: 69
End: 2024-12-05

## 2024-12-05 NOTE — TELEPHONE ENCOUNTER
Caller: Patient     Doctor: Yenni     Reason for call: Patient is asking if she can wear the boot for the left ankle/foot during the day too. She said she has more pain and swelling when she does not use it during the day.  Please advise     Call back#: [phone]

## 2024-12-06 NOTE — TELEPHONE ENCOUNTER
Recommend avoiding cam boot if possible. If pain severe then may continue for now and we will discuss mri findings once resulted.

## 2024-12-10 ENCOUNTER — PROCEDURE VISIT (OUTPATIENT)
Dept: NEUROLOGY | Facility: CLINIC | Age: 69
End: 2024-12-10
Payer: MEDICARE

## 2024-12-10 VITALS — DIASTOLIC BLOOD PRESSURE: 72 MMHG | SYSTOLIC BLOOD PRESSURE: 122 MMHG | HEART RATE: 92 BPM | TEMPERATURE: 97.3 F

## 2024-12-10 DIAGNOSIS — G24.3 CERVICAL DYSTONIA: Primary | ICD-10-CM

## 2024-12-10 PROCEDURE — 64616 CHEMODENERV MUSC NECK DYSTON: CPT | Performed by: PHYSICIAN ASSISTANT

## 2024-12-10 NOTE — PROGRESS NOTES
"Universal Protocol   procedure performed by consultantConsent: Verbal consent obtained. Written consent obtained.  Risks and benefits: risks, benefits and alternatives were discussed  Consent given by: patient  Time out: Immediately prior to procedure a \"time out\" was called to verify the correct patient, procedure, equipment, support staff and site/side marked as required.  Patient understanding: patient states understanding of the procedure being performed  Patient consent: the patient's understanding of the procedure matches consent given  Procedure consent: procedure consent matches procedure scheduled  Relevant documents: relevant documents present and verified  Patient identity confirmed: verbally with patient      Chemodenervation     Date/Time  12/10/2024 2:00 PM     Performed by  Paulette Trevino PA-C   Authorized by  Paulette Trevino PA-C     Pre-procedure details      Prepped With: Alcohol     Anesthesia  (see MAR for exact dosages):     Anesthesia method:  None   Procedure details      Position:  Upright   Botox      Botox Type:  Type A    Brand:  Botox    mL's of Botulinum Toxin:  200    Final Concentration per CC:  50 units    Needle Gauge:  30 G 2.5 inch   Procedures      Botox Procedures: cervical dystonia and chronic headache      Indications: spasmodic torticollis and migraines     Injection Location      Head / Face:  L superior cervical paraspinal, R superior cervical paraspinal, L , R , L frontalis, R frontalis, L medial occipitalis, R medial occipitalis, L temporalis, R temporalis and procerus    L  injection amount:  5 unit(s)    R  injection amount:  5 unit(s)    L lateral frontalis:  5 unit(s)    R lateral frontalis:  5 unit(s)    L medial frontalis:  5 unit(s)    R medial frontalis:  5 unit(s)    L temporalis injection amount:  20 unit(s)    R temporalis injection amount:  20 unit(s)    Procerus injection amount:  5 unit(s)    L medial occipitalis injection " amount:  15 unit(s)    R medial occipitalis injection amount:  15 unit(s)    L superior cervical paraspinal injection amount:  10 unit(s)    R superior cervical paraspinal injection amount:  15 unit(s)    Cervical Dystonia / Salivary Gland:  L splenius capitis, R splenius capitis, L upper trapezius, R upper trapezius, L sternocleidomastoid and R sternocleidomastoid      L splenius capitis injection amount:  7.5 unit(s)      R splenius capitis injection amount:  7.5 unit(s)      L sternocleidomastoid injection amount:  10 unit(s)      R sternocleidomastoid injection amount:  10 unit(s)      L upper trapezius injection amount:  15 unit(s)      R upper trapezius injection amount:  15 unit(s)   Total Units      Total units used:  200    Total units discarded:  0   Post-procedure details      Chemodenervation:  Neck, excluding muscles of the larynx    Neck Muscle Location::  Bilateral neck muscle    Patient tolerance of procedure:  Tolerated well, no immediate complications   Comments       5 units temporalis  All medically necessary

## 2024-12-10 NOTE — TELEPHONE ENCOUNTER
S/w patient, she is staying off the Cam as much as possible,  She was able to get her MRI moved up to this Thursday.  She is aware you are out of office next week but is asking if someone could get back to her once the results are in.

## 2024-12-12 ENCOUNTER — HOSPITAL ENCOUNTER (OUTPATIENT)
Dept: MRI IMAGING | Facility: HOSPITAL | Age: 69
End: 2024-12-12
Payer: MEDICARE

## 2024-12-12 DIAGNOSIS — S82.892A CLOSED FRACTURE OF LEFT ANKLE, INITIAL ENCOUNTER: ICD-10-CM

## 2024-12-12 DIAGNOSIS — S93.492A HIGH ANKLE SPRAIN OF LEFT LOWER EXTREMITY, INITIAL ENCOUNTER: ICD-10-CM

## 2024-12-12 PROCEDURE — 73721 MRI JNT OF LWR EXTRE W/O DYE: CPT

## 2024-12-17 ENCOUNTER — OFFICE VISIT (OUTPATIENT)
Dept: FAMILY MEDICINE CLINIC | Facility: HOSPITAL | Age: 69
End: 2024-12-17
Payer: MEDICARE

## 2024-12-17 VITALS
WEIGHT: 162 LBS | SYSTOLIC BLOOD PRESSURE: 132 MMHG | HEART RATE: 94 BPM | BODY MASS INDEX: 26.96 KG/M2 | OXYGEN SATURATION: 97 % | DIASTOLIC BLOOD PRESSURE: 76 MMHG

## 2024-12-17 DIAGNOSIS — K21.9 CHRONIC GERD: ICD-10-CM

## 2024-12-17 DIAGNOSIS — K59.03 DRUG-INDUCED CONSTIPATION: ICD-10-CM

## 2024-12-17 DIAGNOSIS — F11.20 CONTINUOUS OPIOID DEPENDENCE (HCC): ICD-10-CM

## 2024-12-17 DIAGNOSIS — E11.69 TYPE 2 DIABETES MELLITUS WITH OTHER SPECIFIED COMPLICATION, WITHOUT LONG-TERM CURRENT USE OF INSULIN (HCC): ICD-10-CM

## 2024-12-17 DIAGNOSIS — I10 HYPERTENSION, ESSENTIAL: Primary | ICD-10-CM

## 2024-12-17 PROBLEM — M54.2 CERVICALGIA: Status: RESOLVED | Noted: 2020-02-04 | Resolved: 2024-12-17

## 2024-12-17 PROBLEM — Z63.4 BEREAVEMENT DUE TO LIFE EVENT: Status: RESOLVED | Noted: 2022-11-22 | Resolved: 2024-12-17

## 2024-12-17 PROBLEM — S00.03XA SCALP HEMATOMA: Status: RESOLVED | Noted: 2023-11-08 | Resolved: 2024-12-17

## 2024-12-17 LAB
CREAT UR-MCNC: 31 MG/DL
MICROALBUMIN UR-MCNC: <7 MG/L

## 2024-12-17 PROCEDURE — 82043 UR ALBUMIN QUANTITATIVE: CPT | Performed by: NURSE PRACTITIONER

## 2024-12-17 PROCEDURE — 82570 ASSAY OF URINE CREATININE: CPT | Performed by: NURSE PRACTITIONER

## 2024-12-17 PROCEDURE — 99214 OFFICE O/P EST MOD 30 MIN: CPT | Performed by: NURSE PRACTITIONER

## 2024-12-17 PROCEDURE — G2211 COMPLEX E/M VISIT ADD ON: HCPCS | Performed by: NURSE PRACTITIONER

## 2024-12-17 NOTE — ASSESSMENT & PLAN NOTE
History of GERD with medication adherence to Nexium and calcium carbonate.  Reports globus sensation post meals.  Denies regurgitation, dysphagia, epigastric pain.  Overdue for GI consult-encouraged to schedule.

## 2024-12-17 NOTE — ASSESSMENT & PLAN NOTE
Lab Results   Component Value Date    HGBA1C 5.8 (H) 10/21/2024   History of type 2 diabetes with medication adherence to metformin 250 mg p.o. twice daily.  Admits to poor dietary choices at times.  Denies any signs symptoms of hypoglycemia.  Will be getting her diabetic eye exam on Friday, up-to-date with foot exam.  Albumin/creatinine ratio completed at today's office visit.

## 2024-12-17 NOTE — ASSESSMENT & PLAN NOTE
History of hypertension recently restarted on lisinopril 2.5 mg p.o. daily.  Home readings 120-140/70-80.  Denies any chest pressure pain dyspnea associated.  No dizziness with position changes.  No recent falls.  She is taking ibuprofen 600 mg daily due to recent injury of her left foot.  Electrolytes and kidney function stable in October 2024.

## 2024-12-17 NOTE — ASSESSMENT & PLAN NOTE
History of chronic opioid use through [facility] and Thad IRWIN who she will be following up with next week.  PDMP reviewed, no red flags.  Has Narcan at home.  Requested she discuss medication alternatives to treating her chronic pain with less side effects with verbalized understanding.

## 2024-12-17 NOTE — ASSESSMENT & PLAN NOTE
Chronic constipation likely associated with chronic opioid use, sedentary lifestyle with poor dietary choices.  Uses stool softeners/laxatives with some improvement.  Overdue for colonoscopy GI follow-up.  Encouraged to schedule today with verbalized understanding.

## 2024-12-17 NOTE — PROGRESS NOTES
[facility]   Samantha MIRAMONTES    Assessment/Plan:   1. Hypertension, essential  Assessment & Plan:  History of hypertension recently restarted on lisinopril 2.5 mg p.o. daily.  Home readings 120-140/70-80.  Denies any chest pressure pain dyspnea associated.  No dizziness with position changes.  No recent falls.  She is taking ibuprofen 600 mg daily due to recent injury of her left foot.  Electrolytes and kidney function stable in October 2024.  2. Chronic GERD  Assessment & Plan:  History of GERD with medication adherence to Nexium and calcium carbonate.  Reports globus sensation post meals.  Denies regurgitation, dysphagia, epigastric pain.  Overdue for GI consult-encouraged to schedule.  Orders:  -     Ambulatory Referral to Gastroenterology; Future  3. Drug-induced constipation  Assessment & Plan:  Chronic constipation likely associated with chronic opioid use, sedentary lifestyle with poor dietary choices.  Uses stool softeners/laxatives with some improvement.  Overdue for colonoscopy GI follow-up.  Encouraged to schedule today with verbalized understanding.  Orders:  -     Ambulatory Referral to Gastroenterology; Future  4. Continuous opioid dependence (HCC)  Assessment & Plan:  History of chronic opioid use through [facility] and Thad IRWIN who she will be following up with next week.  PDMP reviewed, no red flags.  Has Narcan at home.  Requested she discuss medication alternatives to treating her chronic pain with less side effects with verbalized understanding.  5. Type 2 diabetes mellitus with other specified complication, without long-term current use of insulin (HCC)  Assessment & Plan:    Lab Results   Component Value Date    HGBA1C 5.8 (H) 10/21/2024   History of type 2 diabetes with medication adherence to metformin 250 mg p.o. twice daily.  Admits to poor dietary choices at times.  Denies any signs symptoms of hypoglycemia.  Will be getting her diabetic eye  exam on Friday, up-to-date with foot exam.  Albumin/creatinine ratio completed at today's office visit.  Orders:  -     Albumin / creatinine urine ratio; Future  -     Albumin / creatinine urine ratio      No follow-ups on file.  Patient may call or return to office with any questions or concerns.     ______________________________________________________________________  Subjective:     Patient ID: Aniya Jones is a 69 y.o. female.  Aniya Jones  Chief Complaint   Patient presents with    Follow-up     DM eye exam scheduled for Friday.       No longer wearing CAM boot.  Ambulating with RW.  Still causing considerable pain.   F/u with ortho on 12/30/24  Bp 120-140/80s at home.   Has not scheduled mammogram nor establish with GI per prior recommendations as she wanted to work on improving her left foot pain.  Starting with worsening GERD symptoms with globus sensation and persistent constipation thus will be calling GI.             The following portions of the patient's history were reviewed and updated as appropriate: allergies, current medications, past family history, past medical history, past social history, past surgical history, and problem list.    Review of Systems   Constitutional: Negative.  Negative for activity change, appetite change, chills, fatigue, fever and unexpected weight change.   HENT: Negative.  Negative for congestion, ear pain, postnasal drip and sinus pain.    Eyes:  Positive for visual disturbance.   Respiratory: Negative.  Negative for cough, chest tightness and shortness of breath.    Cardiovascular: Negative.  Negative for chest pain and leg swelling.   Gastrointestinal:  Positive for constipation. Negative for abdominal pain, blood in stool, diarrhea, nausea and vomiting.        BM 3 to 5 days with laxatives/softeners.   Endocrine: Negative.    Genitourinary: Negative.  Negative for difficulty urinating and dysuria.   Musculoskeletal:  Positive for arthralgias and gait problem.   Skin:  Negative.    Allergic/Immunologic: Negative.  Negative for immunocompromised state.   Neurological:  Negative for dizziness and light-headedness.   Hematological: Negative.    Psychiatric/Behavioral: Negative.           Objective:      Vitals:    12/17/24 1520   BP: 132/76   Pulse: 94   SpO2: 97%      Physical Exam  Vitals and nursing note reviewed.   Constitutional:       Appearance: Normal appearance.   HENT:      Head: Normocephalic and atraumatic.      Right Ear: Tympanic membrane, ear canal and external ear normal.      Left Ear: Tympanic membrane, ear canal and external ear normal.      Nose: Nose normal.      Mouth/Throat:      Mouth: Mucous membranes are moist.      Pharynx: Oropharynx is clear.   Eyes:      Extraocular Movements: Extraocular movements intact.      Conjunctiva/sclera: Conjunctivae normal.      Pupils: Pupils are equal, round, and reactive to light.   Cardiovascular:      Rate and Rhythm: Normal rate and regular rhythm.      Pulses: Normal pulses.      Heart sounds: Normal heart sounds.   Pulmonary:      Effort: Pulmonary effort is normal.      Breath sounds: Normal breath sounds.   Abdominal:      General: Bowel sounds are normal.      Palpations: Abdomen is soft.   Musculoskeletal:      Cervical back: Normal range of motion and neck supple.      Left foot: Decreased range of motion. Bony tenderness present. No swelling.   Skin:     General: Skin is warm and dry.   Neurological:      General: No focal deficit present.      Mental Status: She is alert and oriented to person, place, and time.      Gait: Gait abnormal.      Comments: Ambulating with roller walker steady gait.   Psychiatric:         Mood and Affect: Mood normal.         Speech: Speech normal.         Behavior: Behavior normal. Behavior is cooperative.         Thought Content: Thought content normal.         Cognition and Memory: Cognition and memory normal.         Judgment: Judgment normal.           Portions of the record may  "have been created with voice recognition software. Occasional wrong word or \"sound alike\" substitutions may have occurred due to the inherent limitations of voice recognition software. Please review the chart carefully and recognize, using context, where substitutions/typographical errors may have occurred.       "

## 2024-12-18 ENCOUNTER — RESULTS FOLLOW-UP (OUTPATIENT)
Dept: FAMILY MEDICINE CLINIC | Facility: HOSPITAL | Age: 69
End: 2024-12-18

## 2024-12-18 ENCOUNTER — TELEMEDICINE (OUTPATIENT)
Dept: PSYCHIATRY | Facility: CLINIC | Age: 69
End: 2024-12-18
Payer: MEDICARE

## 2024-12-18 DIAGNOSIS — F41.8 DEPRESSION WITH ANXIETY: Primary | ICD-10-CM

## 2024-12-18 DIAGNOSIS — F41.1 GAD (GENERALIZED ANXIETY DISORDER): ICD-10-CM

## 2024-12-18 DIAGNOSIS — F50.810 MILD BINGE-EATING DISORDER: ICD-10-CM

## 2024-12-18 DIAGNOSIS — F33.9 MAJOR DEPRESSION, RECURRENT, CHRONIC (HCC): ICD-10-CM

## 2024-12-18 PROCEDURE — 90834 PSYTX W PT 45 MINUTES: CPT

## 2024-12-18 NOTE — PSYCH
Virtual Regular Visit    Verification of patient location:    Patient is located at Home in the following state in which I hold an active license [place]      Assessment/Plan:    Problem List Items Addressed This Visit       Depression with anxiety - Primary    Major depression, recurrent, chronic (HCC)    Eating disorder    SY (generalized anxiety disorder)       Goals addressed in session: Goal 1     Depression Follow-up Plan Completed: Yes    Reason for visit is No chief complaint on file.       Encounter provider Lady Soto LCSW      Recent Visits  Date Type Provider Dept   12/17/24 Office Visit [facility]   Showing recent visits within past 7 days and meeting all other requirements  Today's Visits  Date Type Provider Dept   12/18/24 Telemedicine Lady Soto LCSW [facility]   Showing today's visits and meeting all other requirements  Future Appointments  No visits were found meeting these conditions.  Showing future appointments within next 150 days and meeting all other requirements       The patient was identified by name and date of birth. Aniya Jones was informed that this is a telemedicine visit and that the visit is being conducted throughthe [employer] platform. She agrees to proceed..  My office door was closed. No one else was in the room.  She acknowledged consent and understanding of privacy and security of the video platform. The patient has agreed to participate and understands they can discontinue the visit at any time.    Patient is aware this is a billable service.     Subjective  Aniya Jones is a 69 y.o. female  .      HPI     Past Medical History:   Diagnosis Date    Anxiety     Arthritis     Cancer (HCC)     Skin    Depression     Diabetes (HCC)     GERD (gastroesophageal reflux disease)     Hyperlipidemia     Hypertension     Migraine        Past Surgical History:   Procedure Laterality Date    ANKLE SURGERY       CARPAL TUNNEL RELEASE Right     ROTATOR CUFF REPAIR         Current Outpatient Medications   Medication Sig Dispense Refill    aspirin 81 mg chewable tablet Chew 81 mg daily      B COMPLEX VITAMINS PO Take 1 tablet by mouth daily      Baclofen 5 MG TABS Take by mouth Take 1-2 tabs TID prn      bisacodyl (DULCOLAX) 5 mg EC tablet Take 5 mg by mouth daily as needed for constipation      buPROPion (Wellbutrin XL) 150 mg 24 hr tablet Take 1 tablet (150 mg total) by mouth daily 90 tablet 0    busPIRone (BUSPAR) 10 mg tablet Take 1 tablet (10 mg total) by mouth 2 (two) times a day 60 tablet 1    calcium carbonate (OS-MIKE) 600 MG tablet Take 600 mg by mouth 2 (two) times a day with meals      clonazePAM (KlonoPIN) 0.5 mg tablet Take 1 tablet (0.5 mg total) by mouth 3 (three) times a day 90 tablet 1    DULoxetine (CYMBALTA) 30 mg delayed release capsule Take 1 capsule (30 mg total) by mouth daily at bedtime With 60 mg 30 capsule 1    DULoxetine (CYMBALTA) 60 mg delayed release capsule Take 1 capsule (60 mg total) by mouth daily at bedtime 30 capsule 1    esomeprazole (NexIUM) 20 mg capsule Take 20 mg by mouth daily in the early morning       Galcanezumab-gnlm (Emgality) 120 MG/ML SOAJ Inject 120 mg under the skin every 28 days 1 mL 11    ibuprofen (MOTRIN) 600 mg tablet Take 600 mg by mouth every 8 (eight) hours as needed for mild pain      Lidocaine-Menthol 4-1 % CREA Apply 1 Dose topically if needed Patch 5%      lisinopril (ZESTRIL) 2.5 mg tablet Take 1 tablet (2.5 mg total) by mouth daily 30 tablet 5    Magnesium 400 MG TABS Take 400 mg by mouth 2 (two) times a day      metFORMIN (GLUCOPHAGE) 500 mg tablet Take 0.5 tablets (250 mg total) by mouth 2 (two) times a day with meals 60 tablet 3    Movantik 25 MG tablet Take 1 tablet (25 mg total) by mouth in the morning (Patient not taking: Reported on 12/17/2024) 30 tablet 1    multivitamin (THERAGRAN) TABS Take 1 tablet by mouth daily      OnabotulinumtoxinA (BOTOX IM)  "Inject into a muscle      oxyCODONE (ROXICODONE) 10 MG TABS Take 1 tablet (10 mg total) by mouth every 8 (eight) hours as needed for moderate pain Max Daily Amount: 30 mg 10 tablet 0    polyethylene glycol (MIRALAX) 17 g packet Take 17 g by mouth daily at bedtime      Riboflavin 100 MG TABS Take by mouth      rosuvastatin (CRESTOR) 20 MG tablet Take 1 tablet (20 mg total) by mouth daily 90 tablet 1    senna-docusate sodium (SENOKOT S) 8.6-50 mg per tablet Take 1 tablet by mouth daily at bedtime      Ubrogepant (Ubrelvy) 100 MG tablet Take 1 tablet (100 mg total) by mouth if needed (migraine) Take 1 tablet (100 mg) one time as needed for migraine. May repeat one additional tablet (100 mg) at least two hours after the first dose. Do not use more than two doses per day 48 tablet 1     Current Facility-Administered Medications   Medication Dose Route Frequency Provider Last Rate Last Admin    Botulinum Toxin Type A SOLR 200 Units  200 Units Infiltration Q3 Months    200 Units at 09/10/24 1452        Allergies   Allergen Reactions    Amoxicillin Dermatitis, Hives, Itching and Rash       Review of Systems    Video Exam    There were no vitals filed for this visit.    Physical Exam     Behavioral Health Psychotherapy Progress Note    Psychotherapy Provided: Individual Psychotherapy     1. Depression with anxiety        2. Major depression, recurrent, chronic (HCC)        3. Mild binge-eating disorder        4. SY (generalized anxiety disorder)            Goals addressed in session: Goal 1     DATA: Art shared she has been busy today because her doctor called and told her she has a tear in her muscle and needs to do a procedure to fix it with red blood cells that help to heal it.  She has been having good talks with her son Ta about the past, and said that he \"is starting to love myself\" now and she is happy about it.   She said she is upset because she can't do much around the house because of her body pains. She " "dreams \"all night\" and it's about when she was younger and she'd go to beach houses of her family.  She enjoys these dreams and does not want to wake up from them.  She shared she has been feeling better lately and has been getting out more often and this is helping her to feel better.  She took a depression screening and scored a 7 which is mild depression.  She said she has a plan to take care of herself to improve her mood including getting up and making sure the house is straightened up and start \"purging\" the house and setting small goals for herself.  During this session, this clinician used the following therapeutic modalities: Client-centered Therapy and Dialectical Behavior Therapy    Substance Abuse was not addressed during this session. If the client is diagnosed with a co-occurring substance use disorder, please indicate any changes in the frequency or amount of use: . Stage of change for addressing substance use diagnoses: No substance use/Not applicable    ASSESSMENT:  Art Jones presents with a Euthymic/ normal mood.     her affect is Normal range and intensity, which is congruent, with her mood and the content of the session. The client has made progress on their goals.     Art Jones presents with a none risk of suicide, none risk of self-harm, and none risk of harm to others.    For any risk assessment that surpasses a \"low\" rating, a safety plan must be developed.    A safety plan was indicated: no  If yes, describe in detail     PLAN: Between sessions, Art Jones will utilize behavioral activation to improve her mood. At the next session, the therapist will use Client-centered Therapy and Dialectical Behavior Therapy to address depression.    Behavioral Health Treatment Plan and Discharge Planning: Art Jones is aware of and agrees to continue to work on their treatment plan. They have identified and are working toward their discharge goals. yes    Depression Follow-up Plan " Completed: Yes    Visit start and stop times:    12/18/24  Start Time: 1400  Stop Time: 1452  Total Visit Time: 52 minutes

## 2024-12-20 LAB
LEFT EYE DIABETIC RETINOPATHY: NORMAL
RIGHT EYE DIABETIC RETINOPATHY: NORMAL

## 2024-12-24 ENCOUNTER — RESULTS FOLLOW-UP (OUTPATIENT)
Dept: FAMILY MEDICINE CLINIC | Facility: HOSPITAL | Age: 69
End: 2024-12-24

## 2025-01-03 ENCOUNTER — TELEPHONE (OUTPATIENT)
Age: 70
End: 2025-01-03

## 2025-01-03 NOTE — TELEPHONE ENCOUNTER
Sarah from [facility] calling to inform PCP that pt's pharmacy was out of her clonazepam. She did not take it from 12/27/24 to 1/2/25 and received a new script and is starting up again.

## 2025-01-08 ENCOUNTER — TELEMEDICINE (OUTPATIENT)
Dept: PSYCHIATRY | Facility: CLINIC | Age: 70
End: 2025-01-08
Payer: MEDICARE

## 2025-01-08 DIAGNOSIS — F41.1 GAD (GENERALIZED ANXIETY DISORDER): ICD-10-CM

## 2025-01-08 DIAGNOSIS — F41.8 DEPRESSION WITH ANXIETY: Primary | ICD-10-CM

## 2025-01-08 DIAGNOSIS — F33.9 MAJOR DEPRESSION, RECURRENT, CHRONIC (HCC): ICD-10-CM

## 2025-01-08 PROCEDURE — 90834 PSYTX W PT 45 MINUTES: CPT

## 2025-01-08 RX ORDER — BUSPIRONE HYDROCHLORIDE 10 MG/1
10 TABLET ORAL 2 TIMES DAILY
Qty: 60 TABLET | Refills: 1 | Status: SHIPPED | OUTPATIENT
Start: 2025-01-08

## 2025-01-08 NOTE — PSYCH
"Behavioral Health Psychotherapy Progress Note    Psychotherapy Provided: Individual Psychotherapy     1. Depression with anxiety        2. Major depression, recurrent, chronic (HCC)        3. SY (generalized anxiety disorder)            Goals addressed in session: Goal 1     DATA: Art shared she went to her brothers for colin and had a good time.  She likes her sister in law who entertains well.  She said her foot is hurting \"all the time\" and she had missed an appointment with her foot doctor. She said it hurts a lot as well as her back and knees.  She did get some work done in the house which helped her feel better.   She had gone through some withdrawal from Klonopin because she had run out of it and the pharmacy didn't have it in time.  Emotionally she feels better overall in the past few weeks.  Therapist did new years meditation with Art and she was able to identify challenges and good moments and said she wants to incorporate more feelings of connection with others by figuring out where to go in assisted living situation and be connected with others in a community.  She said she needs her foot fixed as well as getting out of bed every day, clean up the house on a daily basis, and going to see her family more.  She needs to talk to her doctors about a pain management plan for her foot as well.  Therapist encouraged her to follow through with these plans.    During this session, this clinician used the following therapeutic modalities: Client-centered Therapy and Mindfulness-based Strategies    Substance Abuse was not addressed during this session. If the client is diagnosed with a co-occurring substance use disorder, please indicate any changes in the frequency or amount of use: . Stage of change for addressing substance use diagnoses: No substance use/Not applicable    ASSESSMENT:  Art Jones presents with a Euthymic/ normal mood.     her affect is Normal range and intensity, which is " "congruent, with her mood and the content of the session. The client has made progress on their goals.     Art Jones presents with a none risk of suicide, none risk of self-harm, and none risk of harm to others.    For any risk assessment that surpasses a \"low\" rating, a safety plan must be developed.    A safety plan was indicated: no  If yes, describe in detail     PLAN: Between sessions, Art Jones will utilize behavioral activation for improved mood. At the next session, the therapist will use Client-centered Therapy, EMDR (or other form of bilateral Stimulation), and Mindfulness-based Strategies to address depression, pain.    Behavioral Health Treatment Plan and Discharge Planning: Art Jones is aware of and agrees to continue to work on their treatment plan. They have identified and are working toward their discharge goals. yes    Depression Follow-up Plan Completed: Not applicable    Visit start and stop times:    01/08/25  Start Time: 1400  Stop Time: 1450  Total Visit Time: 50 minutes  Virtual Regular Visit    Verification of patient location:    Patient is located at Home in the following state in which I hold an active license [place]      Assessment/Plan:    Problem List Items Addressed This Visit     Depression with anxiety - Primary    Major depression, recurrent, chronic (HCC)    SY (generalized anxiety disorder)       Goals addressed in session: Goal 1     Depression Follow-up Plan Completed: Not applicable    Reason for visit is   Chief Complaint   Patient presents with   • Virtual Regular Visit          Encounter provider Lady Soto LCSW      Recent Visits  No visits were found meeting these conditions.  Showing recent visits within past 7 days and meeting all other requirements  Today's Visits  Date Type Provider Dept   01/08/25 Telemedicine Lady Soto LCSW [facility]   Showing today's visits and meeting all other requirements  Future " Appointments  No visits were found meeting these conditions.  Showing future appointments within next 150 days and meeting all other requirements       The patient was identified by name and date of birth. Aniya Jones was informed that this is a telemedicine visit and that the visit is being conducted throughthe [employer] platform. She agrees to proceed..  My office door was closed. No one else was in the room.  She acknowledged consent and understanding of privacy and security of the video platform. The patient has agreed to participate and understands they can discontinue the visit at any time.    Patient is aware this is a billable service.     Subjective  Aniya Jones is a 69 y.o. female  .      HPI     Past Medical History:   Diagnosis Date   • Anxiety    • Arthritis    • Cancer (HCC)     Skin   • Depression    • Diabetes (HCC)    • GERD (gastroesophageal reflux disease)    • Hyperlipidemia    • Hypertension    • Migraine        Past Surgical History:   Procedure Laterality Date   • ANKLE SURGERY     • CARPAL TUNNEL RELEASE Right    • ROTATOR CUFF REPAIR         Current Outpatient Medications   Medication Sig Dispense Refill   • aspirin 81 mg chewable tablet Chew 81 mg daily     • B COMPLEX VITAMINS PO Take 1 tablet by mouth daily     • Baclofen 5 MG TABS Take by mouth Take 1-2 tabs TID prn     • bisacodyl (DULCOLAX) 5 mg EC tablet Take 5 mg by mouth daily as needed for constipation     • buPROPion (Wellbutrin XL) 150 mg 24 hr tablet Take 1 tablet (150 mg total) by mouth daily 90 tablet 0   • busPIRone (BUSPAR) 10 mg tablet TAKE ONE TABLET BY MOUTH TWICE A DAY 60 tablet 1   • calcium carbonate (OS-MIKE) 600 MG tablet Take 600 mg by mouth 2 (two) times a day with meals     • clonazePAM (KlonoPIN) 0.5 mg tablet Take 1 tablet (0.5 mg total) by mouth 3 (three) times a day 90 tablet 1   • DULoxetine (CYMBALTA) 30 mg delayed release capsule Take 1 capsule (30 mg total) by mouth daily at bedtime With 60 mg 30 capsule 1    • DULoxetine (CYMBALTA) 60 mg delayed release capsule Take 1 capsule (60 mg total) by mouth daily at bedtime 30 capsule 1   • esomeprazole (NexIUM) 20 mg capsule Take 20 mg by mouth daily in the early morning      • Galcanezumab-gnlm (Emgality) 120 MG/ML SOAJ Inject 120 mg under the skin every 28 days 1 mL 11   • ibuprofen (MOTRIN) 600 mg tablet Take 600 mg by mouth every 8 (eight) hours as needed for mild pain     • Lidocaine-Menthol 4-1 % CREA Apply 1 Dose topically if needed Patch 5%     • lisinopril (ZESTRIL) 2.5 mg tablet Take 1 tablet (2.5 mg total) by mouth daily 30 tablet 5   • Magnesium 400 MG TABS Take 400 mg by mouth 2 (two) times a day     • metFORMIN (GLUCOPHAGE) 500 mg tablet Take 0.5 tablets (250 mg total) by mouth 2 (two) times a day with meals 60 tablet 3   • Movantik 25 MG tablet Take 1 tablet (25 mg total) by mouth in the morning (Patient not taking: Reported on 12/17/2024) 30 tablet 1   • multivitamin (THERAGRAN) TABS Take 1 tablet by mouth daily     • OnabotulinumtoxinA (BOTOX IM) Inject into a muscle     • oxyCODONE (ROXICODONE) 10 MG TABS Take 1 tablet (10 mg total) by mouth every 8 (eight) hours as needed for moderate pain Max Daily Amount: 30 mg 10 tablet 0   • polyethylene glycol (MIRALAX) 17 g packet Take 17 g by mouth daily at bedtime     • Riboflavin 100 MG TABS Take by mouth     • rosuvastatin (CRESTOR) 20 MG tablet Take 1 tablet (20 mg total) by mouth daily 90 tablet 1   • senna-docusate sodium (SENOKOT S) 8.6-50 mg per tablet Take 1 tablet by mouth daily at bedtime     • Ubrogepant (Ubrelvy) 100 MG tablet Take 1 tablet (100 mg total) by mouth if needed (migraine) Take 1 tablet (100 mg) one time as needed for migraine. May repeat one additional tablet (100 mg) at least two hours after the first dose. Do not use more than two doses per day 48 tablet 1     Current Facility-Administered Medications   Medication Dose Route Frequency Provider Last Rate Last Admin   • Botulinum Toxin Type  A SOLR 200 Units  200 Units Infiltration Q3 Months    200 Units at 09/10/24 1452        Allergies   Allergen Reactions   • Amoxicillin Dermatitis, Hives, Itching and Rash       Review of Systems    Video Exam    There were no vitals filed for this visit.    Physical Exam

## 2025-01-09 DIAGNOSIS — E11.69 TYPE 2 DIABETES MELLITUS WITH OTHER SPECIFIED COMPLICATION, WITHOUT LONG-TERM CURRENT USE OF INSULIN (HCC): ICD-10-CM

## 2025-01-10 ENCOUNTER — OFFICE VISIT (OUTPATIENT)
Dept: OBGYN CLINIC | Facility: CLINIC | Age: 70
End: 2025-01-10
Payer: MEDICARE

## 2025-01-10 ENCOUNTER — TELEPHONE (OUTPATIENT)
Age: 70
End: 2025-01-10

## 2025-01-10 VITALS — WEIGHT: 164 LBS | HEIGHT: 65 IN | BODY MASS INDEX: 27.32 KG/M2

## 2025-01-10 DIAGNOSIS — S92.309A: ICD-10-CM

## 2025-01-10 DIAGNOSIS — F33.9 MAJOR DEPRESSION, RECURRENT, CHRONIC (HCC): ICD-10-CM

## 2025-01-10 DIAGNOSIS — F41.1 GAD (GENERALIZED ANXIETY DISORDER): ICD-10-CM

## 2025-01-10 DIAGNOSIS — S86.312A TEAR OF PERONEAL TENDON, LEFT, INITIAL ENCOUNTER: Primary | ICD-10-CM

## 2025-01-10 PROCEDURE — 99214 OFFICE O/P EST MOD 30 MIN: CPT | Performed by: ORTHOPAEDIC SURGERY

## 2025-01-10 RX ORDER — DULOXETIN HYDROCHLORIDE 30 MG/1
30 CAPSULE, DELAYED RELEASE ORAL
Qty: 90 CAPSULE | Refills: 1 | Status: SHIPPED | OUTPATIENT
Start: 2025-01-10

## 2025-01-10 RX ORDER — DULOXETIN HYDROCHLORIDE 60 MG/1
60 CAPSULE, DELAYED RELEASE ORAL
Qty: 90 CAPSULE | Refills: 1 | Status: SHIPPED | OUTPATIENT
Start: 2025-01-10

## 2025-01-10 NOTE — TELEPHONE ENCOUNTER
Reason for call:   [x] Refill   [] Prior Auth  [] Other:     Office:   [] PCP/Provider -   [x] Specialty/Provider - [facility]    Duloxetine 30mg  1 cap hs #90    Duloxetine 60mg  1 cap hs #90    Pharmacy: [address] [facility] [phone]     Does the patient have enough for 3 days?   [] Yes   [x] No - Send as HP to POD

## 2025-01-10 NOTE — PROGRESS NOTES
James R Lachman, M.D.  Attending, Orthopaedic Surgery  Foot and Ankle  [facility]      ORTHOPAEDIC FOOT AND ANKLE CLINIC VISIT     Assessment:     Encounter Diagnoses   Name Primary?    Tear of peroneal tendon, left, initial encounter Yes    Fracture of multiple metatarsal bones          Plan:   The patient verbalized understanding of exam findings and treatment plan. We engaged in the shared decision-making process and treatment options were discussed at length with the patient. Surgical and conservative management discussed today along with risks and benefits.  Patient presents for second opinion regarding peroneal tendon tear  Patient had a fall 5 months ago 8/21/2024 sustained left ankle injury along with fractures 2nd-4th metatarsals of left foot. Was admitted to [facility] and seen by podiatry during admission   Patient was then seen by Dr. Kenneth LEVY Podiatry for management of metatarsal fractures and then transitioned care to Dr. Hyman for ongoing ankle pain.  She had x-rays completed mid-November which demonstrated a subacute distal fibula fracture (unknown of when this occurred)  MRI left ankle was ordered by Dr. Hyman and demonstrated split tear of peroneus brevis and healing distal fibula fracture  Dr. Hyman's team offered PRP injection of peroneal tendons.   Begin formal PT  OTC pain medication as needed, rest, ice, elevation, compression stocking for pain and swelling control   Return in about 6 weeks (around 2/21/2025).      History of Present Illness:   Chief Complaint:   Chief Complaint   Patient presents with    Left Ankle - New Patient Visit     Aniya Jones is a 69 y.o. female who is being seen for left foot and ankle pain. She had a fall 5 months in August 2024, was subsequently admitted to [facility]. Podiatry was consulted during that admission and she was discharged to a physical rehab until mid-September. She was following with podiatry outpatient, then  transitioned to Dr. Hyman, who ordered MRI due to ongoing pain. Pain is localized at midfoot and lateral ankle with minimal radiating and described as sharp and severe. Patient denies numbness, tingling or radicular pain.  Denies history of neuropathy.  Patient does not smoke, does have diabetes and does take blood thinners (baby aspirin daily).  Patient denies family history of anesthesia complications and has not had any complications with anesthesia.     Pain/symptom timing:  Worse during the day when active  Pain/symptom context:  Worse with activites and work  Pain/symptom modifying factors:  Rest makes better, activities make worse  Pain/symptom associated signs/symptoms: none    Prior treatment   NSAIDsYes   Injections Yes - several yrs ago for plantar fascitis   Bracing/Orthotics Yes    Physical Therapy Yes     Orthopedic Surgical History:   See below     Past Medical, Surgical and Social History:  Past Medical History:  has a past medical history of Anxiety, Arthritis, Cancer (HCC), Depression, Diabetes (HCC), GERD (gastroesophageal reflux disease), Head injury (11-4-23), Hyperlipidemia, Hypertension, Liver disease (2021), and Migraine.  Problem List: does not have any pertinent problems on file.  Past Surgical History:  has a past surgical history that includes Ankle surgery; Carpal tunnel release (Right); and Rotator cuff repair.  Family History: family history includes Allergies in her mother; Anxiety disorder in her mother; Asthma in her paternal grandfather; Cancer in her father; Colon cancer in her maternal grandmother; Diabetes in her paternal grandfather and paternal grandmother; Heart attack in her maternal grandfather and paternal grandfather; Heart disease in her father; Hyperlipidemia in her brother, brother, father, and mother; Hypertension in her mother; Mental illness in her mother; Migraines in her brother, mother, sister, and son; Multiple sclerosis in her brother; Neurological  "problems in her brother; Osteoporosis in her mother; Peripheral vascular disease in her paternal grandmother.  Social History:  reports that she quit smoking about 39 years ago. Her smoking use included cigarettes. She has a 15 pack-year smoking history. She has been exposed to tobacco smoke. She has never used smokeless tobacco. She reports that she does not currently use alcohol. She reports that she does not use drugs.  Current Medications: has a current medication list which includes the following prescription(s): aspirin, b complex vitamins, baclofen, bisacodyl, bupropion, buspirone, calcium carbonate, clonazepam, duloxetine, duloxetine, esomeprazole, emgality, ibuprofen, lidocaine-menthol, lisinopril, magnesium, metformin, movantik, multivitamin, onabotulinumtoxina, oxycodone, polyethylene glycol, riboflavin, rosuvastatin, senna-docusate sodium, and ubrogepant, and the following Facility-Administered Medications: botulinum toxin type a.  Allergies: is allergic to amoxicillin.     Review of Systems:  General- denies fever/chills  HEENT- denies hearing loss or sore throat  Eyes- denies eye pain or visual disturbances, denies red eyes  Respiratory- denies cough or SOB  Cardio- denies chest pain or palpitations  GI- denies abdominal pain  Endocrine- denies urinary frequency  Urinary- denies pain with urination  Musculoskeletal- Negative except noted above  Skin- denies rashes or wounds  Neurological- denies dizziness or headache  Psychiatric- denies anxiety or difficulty concentrating    Physical Exam:   Ht 5' 5\" (1.651 m)   Wt 74.4 kg (164 lb)   BMI 27.29 kg/m²   General/Constitutional: No apparent distress: well-nourished and well developed.  Eyes: normal ocular motion  Cardio: RRR, Normal S1S2, No m/r/g  Lymphatic: No appreciable lymphadenopathy  Respiratory: Non-labored breathing, CTA b/l no w/c/r  Vascular: No edema, swelling or tenderness, except as noted in detailed exam.  Integumentary: No impressive " skin lesions present, except as noted in detailed exam.  Neuro: No ataxia or tremors noted  Psych: Normal mood and affect, oriented to person, place and time. Appropriate affect.  Musculoskeletal: Normal, except as noted in detailed exam and in HPI.    Examination    Left    Gait Antalgic with walker   Musculoskeletal Tender to palpation at midfoot and along peroneal tendons distally     Skin Normal.      Nails Normal    Range of Motion  20 degrees dorsiflexion, 30 degrees plantarflexion  Subtalar motion: normal    Stability Stable    Muscle Strength 5/5 tibialis anterior  5/5 gastrocnemius-soleus  5/5 posterior tibialis  3/5 peroneal/eversion strength  5/5 EHL  5/5 FHL    Neurologic Normal    Sensation Intact to light touch throughout sural, saphenous, superficial peroneal, deep peroneal and medial/lateral plantar nerve distributions.  Monroe-Bam 5.07 filament (10g) testing  deferred.    Cardiovascular Brisk capillary refill < 2 seconds,intact DP and PT pulses    Special Tests None      Imaging Studies:   MRI left ankle available for review and demonstrates split tear of peroneus brevis.    Scribe Attestation      I,:  Misty Jordan PA-C am acting as a scribe while in the presence of the attending physician.:       I,:  James R Lachman, MD personally performed the services described in this documentation    as scribed in my presence.:               James R. Lachman, MD  Foot & Ankle Surgery   Department of Orthopaedic Surgery  [facility]      I personally performed the service.    James R. Lachman, MD

## 2025-01-10 NOTE — TELEPHONE ENCOUNTER
Caller: Patient    Doctor: Lachman    Reason for call: The patient questioned if it would be better for her to have home PT rather than going outside the house? The patient questioned what should she do for the fx's of her metatarsal bones and fibula fx? She forgot to ask about treatment at her visit. Please cb to advise    Call back#: [phone]

## 2025-01-10 NOTE — PATIENT INSTRUCTIONS
Begin PT    Rest, activity modification, elevation, compression stocking (20-30 mmHg compression)

## 2025-01-21 DIAGNOSIS — G43.709 CHRONIC MIGRAINE WITHOUT AURA WITHOUT STATUS MIGRAINOSUS, NOT INTRACTABLE: ICD-10-CM

## 2025-01-21 NOTE — TELEPHONE ENCOUNTER
Recd call from [facility] requesting new rx for Ubrelvy. Pt is very low on medication.      LOV - 11/2023  F/U OV - none scheduled - only Botox inj    Script pended below.     Paulette - please sign if agreeable.

## 2025-01-23 ENCOUNTER — RA CDI HCC (OUTPATIENT)
Dept: OTHER | Facility: HOSPITAL | Age: 70
End: 2025-01-23

## 2025-01-29 DIAGNOSIS — F41.1 GAD (GENERALIZED ANXIETY DISORDER): ICD-10-CM

## 2025-01-29 RX ORDER — BUSPIRONE HYDROCHLORIDE 10 MG/1
10 TABLET ORAL 2 TIMES DAILY
Qty: 60 TABLET | Refills: 1 | Status: SHIPPED | OUTPATIENT
Start: 2025-01-29

## 2025-02-04 DIAGNOSIS — F41.1 GAD (GENERALIZED ANXIETY DISORDER): ICD-10-CM

## 2025-02-04 RX ORDER — CLONAZEPAM 0.5 MG/1
0.5 TABLET ORAL 3 TIMES DAILY
Qty: 90 TABLET | Refills: 0 | Status: SHIPPED | OUTPATIENT
Start: 2025-02-04

## 2025-02-04 NOTE — TELEPHONE ENCOUNTER
Reason for call:   [x] Refill   [] Prior Auth  [] Other:     Office:   [] PCP/Provider -   [x] Specialty/Provider -[facility]/  FE Verma     Medication:  clonazePAM (KlonoPIN) 0.5 mg tablet    Dose/Frequency: Take 1 tablet (0.5 mg total) by mouth 3 (three) times a day,     Quantity: 90    Pharmacy: [place] [address] [address] [facility] [phone]    Does the patient have enough for 3 days?   [] Yes   [x] No - Send as HP to POD

## 2025-02-04 NOTE — TELEPHONE ENCOUNTER
Refill cannot be delegated      1 [id number] 01/22/2025 01/22/2025 oxyCODONE HCL (Tablet) 90.0 30 10 MG 45.0 [facility]  Medicare 0 / 0 [place]   1 [id number] 12/30/2024 10/18/2024 clonazePAM (Tablet) 90.0 30 0.5 MG GIANFRANCO LOPEZ [id number] Medicare 1 / 1 [place]   1 [id number] 12/24/2024 12/24/2024 oxyCODONE HCL (Tablet) 90.0 30 10 MG 45.0 [facility]  Medicare 0 / 0 [place]   1 [id number] 11/27/2024 11/26/2024 oxyCODONE HCL (Tablet) 90.0 30 10 MG 45.0 [facility]  Medicare 0 / 0 [place]   1 [id number] 11/22/2024 10/18/2024 clonazePAM (Tablet) 90.0 30 0.5 MG GIANFRANCO LOPEZ [id number] Medicare 0 / 1 [place]   1 [id number] 10/31/2024 10/31/2024 oxyCODONE HCL (Tablet) 90.0 30 10 MG 45.0 [facility]  Medicare 0 / 0 [place]   1 [id number] 10/18/2024 08/06/2024 clonazePAM (Tablet) 90.0 30 0.5 MG GIANFRANCO LOPEZ [id number] Medicare 1 / 1 [place]   1 [id number] 10/03/2024 10/03/2024 oxyCODONE HCL (Tablet) 90.0 30 10 MG 45.0 [facility]  Medicare 0 / 0 [place]   1 [id number] 09/17/2024 08/06/2024 clonazePAM (Tablet) 90.0 30 0.5 MG GIANFRANCO LOPEZ [id number] Medicare 0 / 1 [place]   2 [id number] 09/01/2024 08/29/2024 oxyCODONE HCL (Tablet) 42.0 14 10 MG 45.0 [facility] Private Pay 0 / 0 [place]

## 2025-02-05 ENCOUNTER — EVALUATION (OUTPATIENT)
Dept: PHYSICAL THERAPY | Facility: CLINIC | Age: 70
End: 2025-02-05
Payer: MEDICARE

## 2025-02-05 DIAGNOSIS — S86.312D TEAR OF PERONEAL TENDON, LEFT, SUBSEQUENT ENCOUNTER: ICD-10-CM

## 2025-02-05 PROCEDURE — 97162 PT EVAL MOD COMPLEX 30 MIN: CPT | Performed by: PHYSICAL THERAPIST

## 2025-02-05 PROCEDURE — 97110 THERAPEUTIC EXERCISES: CPT | Performed by: PHYSICAL THERAPIST

## 2025-02-05 NOTE — PROGRESS NOTES
PT Evaluation     Today's date: 2025  Patient name: Aniya Jones  : 1955  MRN: [id number]  Referring provider: Misty Jordan P*  Dx:   Encounter Diagnosis     ICD-10-CM    1. Tear of peroneal tendon, left, initial encounter  S86.312A Ambulatory Referral to Physical Therapy                     Assessment  Impairments: abnormal gait, abnormal or restricted ROM, activity intolerance, impaired physical strength, lacks appropriate home exercise program, pain with function and weight-bearing intolerance    Assessment details: Pt is a 69 y.o. year old female coming to outpatient PT with a diagnosis of L peroneal tendon tear s/p fall. Pt presents with increased pain and TTP, decreased ROM, decreased L ankle strength, and overall decreased functional mobility. Pt would benefit from skilled PT services in order to address these deficits and reach maximum level of function. Thank you kindly for the referral!    Pt was issued a written HEP to be performed on a daily basis.   Barriers to therapy: Fear of leaving the home, migraines, LBP, depression  Understanding of Dx/Px/POC: good     Prognosis: fair    Goals  STG's ( 3-4 weeks)  1. Pt will be independent in HEP  2. Pt will have improved df ROM by 5*-10*  LTG's ( 6- 8 weeks)  1. Improve FOTO score by 6-8 points  2. Pt will have decreased pain to 3/10 at worst  3. Pt will have improved L ankle strength by 1/2 grade  4. Pt will be able to walk with a more normalized gait pattern with SPC    Plan  Patient would benefit from: PT eval and skilled physical therapy  Planned modality interventions: cryotherapy    Planned therapy interventions: joint mobilization, manual therapy, neuromuscular re-education, balance, strengthening, stretching, therapeutic activities, therapeutic exercise, functional ROM exercises and home exercise program    Frequency: 2x week  Duration in weeks: 8  Plan of Care beginning date: 2025  Plan of Care expiration date:  2025  Treatment plan discussed with: PTA and patient        Subjective Evaluation    History of Present Illness  Date of onset: 2024  Mechanism of injury: trauma  Mechanism of injury: Patient had a fall 5 months ago 2024 sustained left ankle injury along with fractures 2nd-4th metatarsals of left foot. Pt had increased L ankle pain and swelling in her L foot. Pt was placed in a CAM boot for several weeks, and she continued to have ankle pain. MRI testing showed a peroneal tendon.  Pt has increased pain and difficulty with walking, standing, and going up and down the steps. Pt went up the steps one at a time. Pt has increase pain when sleeping at night. Pt has numbness in her L great toe since her injury. Pt has a history of R achilles tendon transplant in .    Work: not working; retired  Hobbies: none  Gait: (+) antalgic gait, decreased weight bearing L LE  Patient Goals  Patient goals for therapy: decreased pain, increased strength, independence with ADLs/IADLs and increased motion  Patient goal: to return to normal; to avoid surgery  Pain  At best pain rating: 3  At worst pain ratin  Location: L ankle  Quality: burning, sharp, dull ache and needle-like    Social Support  Steps to enter house: yes (3)  Stairs in house: yes (1 flight to basement)   Lives in: one-story house  Lives with: alone    Employment status: not working  Stress factors comments: death of spouse 4 years ago; depression.      Diagnostic Tests  MRI studies: abnormal      Objective     Neurological Testing     Sensation     Hip   Left Hip   Intact: light touch    Right Hip   Intact: light touch    Ankle/Foot   Left Ankle/Foot   Intact: light touch    Right Ankle/Foot   Intact: light touch     Reflexes   Left   Patellar (L4): normal (2+)  Achilles (S1): normal (2+)    Right   Patellar (L4): normal (2+)  Achilles (S1): normal (2+)    Active Range of Motion   Left Ankle/Foot   Dorsiflexion (ke): 0 degrees   Plantar flexion: 65  degrees   Inversion: 25 degrees   Eversion: 15 degrees     Right Ankle/Foot   Normal active range of motion    Additional Active Range of Motion Details  Mild edema L ankle  (+) TTP L peroneal tendon musculature  SLS; NT  SL HR; NT    Passive Range of Motion   Left Ankle/Foot    Dorsiflexion (ke): 0 degrees   Plantar flexion: WFL  Inversion: 30 degrees   Eversion: 20 degrees     Strength/Myotome Testing     Left Hip   Planes of Motion   Flexion: 4  External rotation: 4  Internal rotation: 4    Right Hip   Planes of Motion   Flexion: 4  External rotation: 4  Internal rotation: 4    Left Knee   Normal strength    Right Knee   Normal strength    Left Ankle/Foot   Dorsiflexion: 4+  Plantar flexion: 3+  Inversion: 4+  Eversion: 4+    Right Ankle/Foot   Normal strength    Additional Strength Details  SL HR; NT            Daily Treatment Diary     Precautions: fall risk  CO-MORBIDITIES: chronic LBP, migraines, depression, sedentary lifestyle,  HEP ACCESS CODE: [id number]  FOTO Completed On:     POC Expires Reeval for Medicare to be completed  Unit Limit Auth Expiration Date PT/OT/STVisit Limit   4/2/25 By visit 10 N/a N/a bomn    Completed on visit                    Auth Status DATE 2/5        Approved Visit # 1         Remaining         MANUAL THERAPY         L ankle/ foot MFR/ PROM         Gentle joint mobs ray 1-5                                             THERAPEUTIC EXERCISE HEP 8' orange tb        Bike for ankle ROM          Standing gastroc stretch          Standing HR          4 way ankle tband          Seated BAPS board          Towel slide: inverson/ dane          Ankle df 1/2 kneeling stretch on table                                                                                          NEUROMUSCULAR REEDUCATION           sidestepping          Tandem walking          Mini squats on airex          Rhomberg on airex                                                                                                               THERAPEUTIC ACTIVITY          Step ups FW          Step ups lat                              GAIT TRAINING                                                  MODALITIES         Cp post tx

## 2025-02-07 ENCOUNTER — TELEPHONE (OUTPATIENT)
Dept: PSYCHIATRY | Facility: CLINIC | Age: 70
End: 2025-02-07

## 2025-02-07 NOTE — TELEPHONE ENCOUNTER
Writer called client to inform her of paperwork due for her appointment in two weeks (VC consent and [facility] New Patient), client is locked out of her MyChart.    Client will call the Helpdesk and call us back if she needs help finding the paperwork after.

## 2025-02-10 ENCOUNTER — TELEPHONE (OUTPATIENT)
Age: 70
End: 2025-02-10

## 2025-02-10 ENCOUNTER — OFFICE VISIT (OUTPATIENT)
Dept: PHYSICAL THERAPY | Facility: CLINIC | Age: 70
End: 2025-02-10
Payer: MEDICARE

## 2025-02-10 DIAGNOSIS — S86.312D TEAR OF PERONEAL TENDON, LEFT, SUBSEQUENT ENCOUNTER: Primary | ICD-10-CM

## 2025-02-10 PROCEDURE — 97140 MANUAL THERAPY 1/> REGIONS: CPT

## 2025-02-10 PROCEDURE — 97112 NEUROMUSCULAR REEDUCATION: CPT

## 2025-02-10 PROCEDURE — 97110 THERAPEUTIC EXERCISES: CPT

## 2025-02-10 NOTE — PROGRESS NOTES
"Daily Note     Today's date: 2/10/2025  Patient name: Aniya Jones  : 1955  MRN: [id number]  Referring provider: Misty Jordan P*  Dx:   Encounter Diagnosis     ICD-10-CM    1. Tear of peroneal tendon, left, subsequent encounter  S86.312D                      Subjective: Pt states feeling okay today, however lost her HEP.      Objective: See treatment diary below      Assessment: Pt performed below TE with fair tolerance, pt endurance was challenged with L1 on stationary bike, L0 pt was able to achieve 6' at L0. In addition, pt was challenged with wt bearing TE however pt is motivated to get better. Printed a HEP for pt, answered pt questions and concerns to pt satisfaction.      Plan: Continue per plan of care.      Daily Treatment Diary     Precautions: fall risk  CO-MORBIDITIES: chronic LBP, migraines, depression, sedentary lifestyle,  HEP ACCESS CODE: [id number]  FOTO Completed On:     POC Expires Reeval for Medicare to be completed  Unit Limit Auth Expiration Date PT/OT/STVisit Limit   25 By visit 10 N/a N/a bomn    Completed on visit                    Auth Status DATE 2/5 2/10       Approved Visit # 1 2        Remaining         MANUAL THERAPY         L ankle/ foot MFR/ PROM  15'       Gentle joint mobs ray 1-5                                             THERAPEUTIC EXERCISE HEP 8' orange tb        Bike for ankle ROM   L0 6'       Standing gastroc stretch on slt bd   L2 20\" 3       Standing HR on slt bd   L2 20       4 way ankle tband          Seated BAPS board          Towel slide: inverson/ dane          Ankle df 1/2 kneeling stretch on table                                                                                          NEUROMUSCULAR REEDUCATION           Sidestepping on foam   3laps       Tandem walking on foam   3laps       Mini squats on airex   20x       Rhomberg on airex          SLS on foam L/R   20\" 3 ea       Tandem stance L/R   20\" 3 ea                                  "                                                      THERAPEUTIC ACTIVITY          Step ups FW          Step ups lat                              GAIT TRAINING                                                  MODALITIES         Cp post tx                               no

## 2025-02-10 NOTE — TELEPHONE ENCOUNTER
Caller: PT    Doctor/Office: Dr Lachman    Call regarding :  Calling to reschedule appt.     Call was transferred to: Ortho scheduling    
Go for blood tests as directed. Your doctor will do lab tests at regular visits to monitor the effects of this medicine. Please follow up with your doctor and keep your health care provider appointments.

## 2025-02-12 ENCOUNTER — OFFICE VISIT (OUTPATIENT)
Dept: PHYSICAL THERAPY | Facility: CLINIC | Age: 70
End: 2025-02-12
Payer: MEDICARE

## 2025-02-12 ENCOUNTER — OFFICE VISIT (OUTPATIENT)
Dept: FAMILY MEDICINE CLINIC | Facility: HOSPITAL | Age: 70
End: 2025-02-12
Payer: MEDICARE

## 2025-02-12 VITALS
HEART RATE: 93 BPM | OXYGEN SATURATION: 96 % | SYSTOLIC BLOOD PRESSURE: 120 MMHG | BODY MASS INDEX: 27.96 KG/M2 | DIASTOLIC BLOOD PRESSURE: 76 MMHG | WEIGHT: 168 LBS

## 2025-02-12 DIAGNOSIS — E11.69 TYPE 2 DIABETES MELLITUS WITH OTHER SPECIFIED COMPLICATION, WITHOUT LONG-TERM CURRENT USE OF INSULIN (HCC): ICD-10-CM

## 2025-02-12 DIAGNOSIS — S86.312D TEAR OF PERONEAL TENDON, LEFT, SUBSEQUENT ENCOUNTER: Primary | ICD-10-CM

## 2025-02-12 DIAGNOSIS — F11.20 CONTINUOUS OPIOID DEPENDENCE (HCC): ICD-10-CM

## 2025-02-12 DIAGNOSIS — I10 HYPERTENSION, ESSENTIAL: Primary | ICD-10-CM

## 2025-02-12 DIAGNOSIS — K59.03 DRUG-INDUCED CONSTIPATION: ICD-10-CM

## 2025-02-12 PROBLEM — S06.4X0A EPIDURAL HEMORRHAGE WITHOUT LOSS OF CONSCIOUSNESS (HCC): Status: RESOLVED | Noted: 2023-11-08 | Resolved: 2025-02-12

## 2025-02-12 LAB — SL AMB POCT HEMOGLOBIN AIC: 5.8 (ref ?–6.5)

## 2025-02-12 PROCEDURE — 83036 HEMOGLOBIN GLYCOSYLATED A1C: CPT | Performed by: NURSE PRACTITIONER

## 2025-02-12 PROCEDURE — 97140 MANUAL THERAPY 1/> REGIONS: CPT

## 2025-02-12 PROCEDURE — 97112 NEUROMUSCULAR REEDUCATION: CPT

## 2025-02-12 PROCEDURE — 99214 OFFICE O/P EST MOD 30 MIN: CPT | Performed by: NURSE PRACTITIONER

## 2025-02-12 PROCEDURE — G2211 COMPLEX E/M VISIT ADD ON: HCPCS | Performed by: NURSE PRACTITIONER

## 2025-02-12 PROCEDURE — 97110 THERAPEUTIC EXERCISES: CPT

## 2025-02-12 RX ORDER — SORBITOL SOLUTION 70 %
30 SOLUTION, ORAL MISCELLANEOUS
Qty: 480 ML | Refills: 0 | Status: SHIPPED | OUTPATIENT
Start: 2025-02-12

## 2025-02-12 RX ORDER — LOSARTAN POTASSIUM 25 MG/1
25 TABLET ORAL DAILY
Qty: 30 TABLET | Refills: 11 | Status: SHIPPED | OUTPATIENT
Start: 2025-02-12

## 2025-02-12 NOTE — ASSESSMENT & PLAN NOTE
History of chronic opioid use through [place] [facility] [place] and [facility].  PDMP reviewed without red flags.  Narcan prescription up-to-date and at home.

## 2025-02-12 NOTE — ASSESSMENT & PLAN NOTE
Chronic constipation likely due to opioid use sedentary lifestyle.  Reports use of senna S along with Colace daily.  Will use Dulcolax if she has not had a BM in 3 days.  Admits she has to disimpact self at times.  She has not continued the MiraLAX nor the Movantik as she reported they were not effective.  She has been on Amitiza in the past which caused diarrhea.  She has been encouraged to follow-up with GI but unfortunately has not done so at this time.  -Abdomen soft nontender nondistended BS X4 on exam today.  Encouraged optimizing hydration and nutrition and will continue to increase physical activity as tolerated.  Will offer sorbitol to be used as needed if no BM in 3 days despite optimizing efforts.  -He is agreeable to following up with GI as previously discussed.

## 2025-02-12 NOTE — ASSESSMENT & PLAN NOTE
Lab Results   Component Value Date    HGBA1C 5.8 02/12/2025   History of type 2 diabetes well-controlled with metformin 250 mg twice daily.  Admits to poor dietary choices and emotional eating.  She has discussed this with her psychiatrist and therapist as this is a chronic issue for her.  Denies any signs symptoms of hypoglycemia.  Is up-to-date with her eye exam.  Defers diabetic foot exam today.

## 2025-02-12 NOTE — PROGRESS NOTES
"Daily Note     Today's date: 2025  Patient name: Aniya Jones  : 1955  MRN: [id number]  Referring provider: Misty Jordan P*  Dx:   Encounter Diagnosis     ICD-10-CM    1. Tear of peroneal tendon, left, subsequent encounter  S86.312D                      Subjective: Pt c/o calf mm soreness post LV.  States her knees are sore today.        Objective: See treatment diary below      Assessment: Tolerated treatment fair. Patient demonstrated fatigue post treatment and would benefit from continued PT for cont'd LE strengthening and ankle ROM.  Pt w/ slight antalgic  gait post Rx.      Plan: Continue per plan of care.  Progress treatment as tolerated.       Daily Treatment Diary     Precautions: fall risk  CO-MORBIDITIES: chronic LBP, migraines, depression, sedentary lifestyle,  HEP ACCESS CODE: [id number]  FOTO Completed On:     POC Expires Reeval for Medicare to be completed  Unit Limit Auth Expiration Date PT/OT/STVisit Limit   25 By visit 10 N/a N/a bomn    Completed on visit                    Auth Status DATE 2/5 2/10 2/12      Approved Visit # 1 2 3       Remaining         MANUAL THERAPY         L ankle/ foot MFR/ PROM  15' 8'      Gentle joint mobs ray 1-5                                             THERAPEUTIC EXERCISE HEP 8' orange tb        Bike for ankle ROM   L0 6' L0 5'      Standing gastroc stretch on slt bd   L2 20\" 3 No bd 20\"x3      Standing HR on slt bd   L2 20 No bd 20x      4 way ankle tband    OTB 20      Seated BAPS board          Towel slide: inverson/ dane    10 ea      Ankle df 1/2 kneeling stretch on table                                                                                          NEUROMUSCULAR REEDUCATION           Sidestepping on foam   3laps 3 laps      Tandem walking on foam   3laps 3 laps      Mini squats on airex   20x 20x      Rhomberg on airex          SLS on foam L/R   20\" 3 ea 20\" 3 ea      Tandem stance L/R   20\" 3 ea 20\" 3 ea                     "                                                                  THERAPEUTIC ACTIVITY          Step ups FW          Step ups lat                              GAIT TRAINING                                                  MODALITIES         Cp post tx

## 2025-02-12 NOTE — PATIENT INSTRUCTIONS
Please schedule with GI and complete mammogram   Stop lisinopril and start losartan.  Monitor blood pressures outside of office with upper arm cuff less than 5 yrs old and bring to follow up.   Use of sorbitol every 72 hours as needed if colace/senna are not effective.

## 2025-02-12 NOTE — PROGRESS NOTES
[facility]   Samantha MIRAMONTES    Assessment/Plan:   1. Hypertension, essential  Assessment & Plan:  History of hypertension well-controlled at today's office visit however since restarting lisinopril she reports dry cough.  Would like to explore different options.  Has been on ARB in the past without side effects.  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  She will record results and bring to follow-up.  Orders:  -     losartan (COZAAR) 25 mg tablet; Take 1 tablet (25 mg total) by mouth daily  2. Type 2 diabetes mellitus with other specified complication, without long-term current use of insulin (HCC)  Assessment & Plan:    Lab Results   Component Value Date    HGBA1C 5.8 02/12/2025   History of type 2 diabetes well-controlled with metformin 250 mg twice daily.  Admits to poor dietary choices and emotional eating.  She has discussed this with her psychiatrist and therapist as this is a chronic issue for her.  Denies any signs symptoms of hypoglycemia.  Is up-to-date with her eye exam.  Defers diabetic foot exam today.  Orders:  -     POCT hemoglobin A1c  3. Continuous opioid dependence (HCC)  Assessment & Plan:  History of chronic opioid use through [facility] and [facility].  PDMP reviewed without red flags.  Narcan prescription up-to-date and at home.  4. Drug-induced constipation  Assessment & Plan:  Chronic constipation likely due to opioid use sedentary lifestyle.  Reports use of senna S along with Colace daily.  Will use Dulcolax if she has not had a BM in 3 days.  Admits she has to disimpact self at times.  She has not continued the MiraLAX nor the Movantik as she reported they were not effective.  She has been on Amitiza in the past which caused diarrhea.  She has been encouraged to follow-up with GI but unfortunately has not done so at this time.  -Abdomen  soft nontender nondistended BS X4 on exam today.  Encouraged optimizing hydration and nutrition and will continue to increase physical activity as tolerated.  Will offer sorbitol to be used as needed if no BM in 3 days despite optimizing efforts.  -He is agreeable to following up with GI as previously discussed.  Orders:  -     sorbitol 70 % solution; Take 30 mL by mouth every third day as needed (for constipation for 3 days despite use of senna-S)      Please schedule with GI and complete mammogram   Stop lisinopril and start losartan.  Monitor blood pressures outside of office with upper arm cuff less than 5 yrs old and bring to follow up.   Use of sorbitol every 72 hours as needed if colace/senna are not effective.  Return in about 3 months (around 5/12/2025).  Patient may call or return to office with any questions or concerns.     ______________________________________________________________________  Subjective:     Patient ID: Aniya Jones is a 69 y.o. female.  Aniya Jones  Chief Complaint   Patient presents with    Follow-up     Here for follow-up.  Started therapy for her left peroneal tear.  Reports she got gel injections to her knees yesterday through [facility] [employer] [facility].  Chronic constipation without improvement despite exhausting resources.  Does admit to poor dietary choices and could hydrate better.  Unfortunately has not followed up with GI encouraged to do so at today's office visit.            Falls Plan of Care: balance, strength, and gait training instructions were provided. Recommended assistive device to help with gait and balance. Medications that increase falls were reviewed. Is currently in PT    ASCVD Risk Management:  The 10-year ASCVD risk score (Telford DK, et al., 2019) is: 18.6%    Patent does not have underlying ASCVD. Their ASCVD Risk is high (>= 20%).    Preventive services discussed: diabetes self management training, diet & exercise, weight management and  baby aspirin use.      The following portions of the patient's history were reviewed and updated as appropriate: allergies, current medications, past family history, past medical history, past social history, past surgical history, and problem list.    Review of Systems   Constitutional: Negative.  Negative for activity change, appetite change, chills, fatigue, fever and unexpected weight change.   HENT: Negative.  Negative for congestion, ear pain, postnasal drip and sinus pain.    Eyes: Negative.    Respiratory: Negative.  Negative for cough and shortness of breath.    Cardiovascular: Negative.  Negative for chest pain and leg swelling.   Gastrointestinal:  Positive for constipation. Negative for diarrhea.   Endocrine: Negative.    Genitourinary: Negative.  Negative for dysuria.   Musculoskeletal:  Positive for arthralgias and gait problem.   Skin: Negative.    Allergic/Immunologic: Negative.  Negative for immunocompromised state.   Neurological:  Negative for dizziness and light-headedness.   Hematological: Negative.    Psychiatric/Behavioral:  Positive for dysphoric mood.          Objective:      Vitals:    02/12/25 1312   BP: 120/76   Pulse: 93   SpO2: 96%      Physical Exam  Vitals and nursing note reviewed.   Constitutional:       General: She is awake. She is not in acute distress.     Appearance: Normal appearance. She is not ill-appearing.   HENT:      Head: Normocephalic and atraumatic.      Right Ear: Tympanic membrane, ear canal and external ear normal.      Left Ear: Tympanic membrane, ear canal and external ear normal.      Nose: Nose normal.      Mouth/Throat:      Mouth: Mucous membranes are moist.      Pharynx: Oropharynx is clear.   Eyes:      Extraocular Movements: Extraocular movements intact.      Conjunctiva/sclera: Conjunctivae normal.      Pupils: Pupils are equal, round, and reactive to light.   Cardiovascular:      Rate and Rhythm: Normal rate and regular rhythm.      Pulses: Normal  "pulses.      Heart sounds: Normal heart sounds.   Pulmonary:      Effort: Pulmonary effort is normal.      Breath sounds: Normal breath sounds.   Abdominal:      General: Bowel sounds are normal. There is no distension.      Palpations: Abdomen is soft.      Tenderness: There is no abdominal tenderness.   Musculoskeletal:         General: Normal range of motion.      Cervical back: Normal range of motion and neck supple.   Skin:     General: Skin is warm and dry.   Neurological:      General: No focal deficit present.      Mental Status: She is alert and oriented to person, place, and time.      Gait: Gait abnormal.      Comments: Ambulating with SPC   Psychiatric:         Mood and Affect: Mood normal.         Behavior: Behavior normal. Behavior is cooperative.         Thought Content: Thought content normal.         Judgment: Judgment normal.           Portions of the record may have been created with voice recognition software. Occasional wrong word or \"sound alike\" substitutions may have occurred due to the inherent limitations of voice recognition software. Please review the chart carefully and recognize, using context, where substitutions/typographical errors may have occurred.       "

## 2025-02-12 NOTE — ASSESSMENT & PLAN NOTE
History of hypertension well-controlled at today's office visit however since restarting lisinopril she reports dry cough.  Would like to explore different options.  Has been on ARB in the past without side effects.  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  She will record results and bring to follow-up.

## 2025-02-17 ENCOUNTER — APPOINTMENT (OUTPATIENT)
Dept: PHYSICAL THERAPY | Facility: CLINIC | Age: 70
End: 2025-02-17
Payer: MEDICARE

## 2025-02-19 ENCOUNTER — TELEPHONE (OUTPATIENT)
Dept: PSYCHIATRY | Facility: CLINIC | Age: 70
End: 2025-02-19

## 2025-02-19 NOTE — TELEPHONE ENCOUNTER
Writer called client to check on the status of her ability to access the MyChart. Client has spoken to the helpdesk and has a code to reset it, but will have to do it closer to time of appointment.    Client is aware there are forms required to be seen on Friday.

## 2025-02-20 ENCOUNTER — TELEPHONE (OUTPATIENT)
Dept: PSYCHIATRY | Facility: CLINIC | Age: 70
End: 2025-02-20

## 2025-02-20 ENCOUNTER — TELEPHONE (OUTPATIENT)
Age: 70
End: 2025-02-20

## 2025-02-20 ENCOUNTER — APPOINTMENT (OUTPATIENT)
Dept: PHYSICAL THERAPY | Facility: CLINIC | Age: 70
End: 2025-02-20
Payer: MEDICARE

## 2025-02-20 NOTE — TELEPHONE ENCOUNTER
Client called back regarding paperwork. Client is having issues with the MyChart and is concerned she will not hear back calling them again as they responded after 4 hours yesterday and suggested cancelling tomorrow's appointment as she reported being very anxious about it.    Writer at first got two person verbal for VC, but realized client has access to her email and tried pushing out the [facility] NP and VC consent forms again which did prompt an email.    The email allowed client to sign the forms despite the [employer] login issues.    Client will still try to fix the MyChart for the next appointment; however, a link will be sent to her phone/email for this one as all forms are successfully signed.

## 2025-02-20 NOTE — TELEPHONE ENCOUNTER
Patient called in and stated she's having problems accessing her my chart. Writer was able to provide the my chart help line for her to get assistance in getting into her my chart portal. Patient then asked to be transferred to the office to speak with staff in regards to forms she needed to fill out. Writer was able to warm transfer to the office.

## 2025-02-21 ENCOUNTER — TELEMEDICINE (OUTPATIENT)
Dept: PSYCHIATRY | Facility: CLINIC | Age: 70
End: 2025-02-21
Payer: MEDICARE

## 2025-02-21 DIAGNOSIS — F33.9 MAJOR DEPRESSION, RECURRENT, CHRONIC (HCC): ICD-10-CM

## 2025-02-21 DIAGNOSIS — F41.1 GAD (GENERALIZED ANXIETY DISORDER): ICD-10-CM

## 2025-02-21 PROCEDURE — 99214 OFFICE O/P EST MOD 30 MIN: CPT

## 2025-02-21 RX ORDER — BUSPIRONE HYDROCHLORIDE 10 MG/1
10 TABLET ORAL 2 TIMES DAILY
Qty: 180 TABLET | Refills: 0 | Status: SHIPPED | OUTPATIENT
Start: 2025-02-21

## 2025-02-21 RX ORDER — BUPROPION HYDROCHLORIDE 150 MG/1
150 TABLET ORAL DAILY
Qty: 90 TABLET | Refills: 0 | Status: SHIPPED | OUTPATIENT
Start: 2025-02-21

## 2025-02-21 RX ORDER — DULOXETIN HYDROCHLORIDE 30 MG/1
30 CAPSULE, DELAYED RELEASE ORAL
Qty: 90 CAPSULE | Refills: 0 | Status: SHIPPED | OUTPATIENT
Start: 2025-02-21

## 2025-02-21 RX ORDER — DULOXETIN HYDROCHLORIDE 60 MG/1
60 CAPSULE, DELAYED RELEASE ORAL
Qty: 90 CAPSULE | Refills: 0 | Status: SHIPPED | OUTPATIENT
Start: 2025-02-21

## 2025-02-21 RX ORDER — CLONAZEPAM 0.5 MG/1
0.5 TABLET ORAL 3 TIMES DAILY
Qty: 90 TABLET | Refills: 2 | Status: SHIPPED | OUTPATIENT
Start: 2025-02-21
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
## 2025-02-24 ENCOUNTER — APPOINTMENT (OUTPATIENT)
Dept: PHYSICAL THERAPY | Facility: CLINIC | Age: 70
End: 2025-02-24
Payer: MEDICARE

## 2025-02-27 ENCOUNTER — OFFICE VISIT (OUTPATIENT)
Dept: PHYSICAL THERAPY | Facility: CLINIC | Age: 70
End: 2025-02-27
Payer: MEDICARE

## 2025-02-27 DIAGNOSIS — S86.312D TEAR OF PERONEAL TENDON, LEFT, SUBSEQUENT ENCOUNTER: Primary | ICD-10-CM

## 2025-02-27 PROCEDURE — 97140 MANUAL THERAPY 1/> REGIONS: CPT | Performed by: PHYSICAL THERAPIST

## 2025-02-27 PROCEDURE — 97112 NEUROMUSCULAR REEDUCATION: CPT | Performed by: PHYSICAL THERAPIST

## 2025-02-27 PROCEDURE — 97110 THERAPEUTIC EXERCISES: CPT | Performed by: PHYSICAL THERAPIST

## 2025-02-27 NOTE — PROGRESS NOTES
"Daily Note     Today's date: 2025  Patient name: Aniya Jones  : 1955  MRN: [id number]  Referring provider: Misty Jordan P*  Dx:   Encounter Diagnosis     ICD-10-CM    1. Tear of peroneal tendon, left, subsequent encounter  S86.312D                      Subjective: Pt reports increased pain in her 2nd MT region. Pt reports her ankle is \"bad\". Pt has the most and constant pain when walking in her MT region. Pt is using CBD cream on her knees an foot. 6/10 pain levels upon arrival to therapy. Pt is compliant in HEP, but it causes more pain the next day. Pt missed she some apts in PT 2* migraines and increased LBP.      Objective: See treatment diary below      Assessment: Tolerated treatment fair. Patient exhibited good technique with therapeutic exercises. Pt had increased pain with standing HR. Pt completed balance ex well. Pt tolerated step ups well.      Plan: Continue per plan of care. HATTIE PACHECO.     Daily Treatment Diary     Precautions: fall risk  CO-MORBIDITIES: chronic LBP, migraines, depression, sedentary lifestyle,  HEP ACCESS CODE: [id number]  FOTO Completed On:     POC Expires Reeval for Medicare to be completed  Unit Limit Auth Expiration Date PT/OT/STVisit Limit   25 By visit 10 N/a N/a bomn    Completed on visit                    Auth Status DATE 2/5 2/10 2/12 2/27     Approved Visit # 1 2 3 4      Remaining         MANUAL THERAPY         L ankle/ foot MFR/ PROM  15' 8' 8'     Gentle joint mobs ray 1-5    2'                                         THERAPEUTIC EXERCISE HEP 8' orange tb        Bike for ankle ROM   L0 6' L0 5' 5' L0     Standing gastroc stretch on slt bd   L2 20\" 3 No bd 20\"x3 20\"x3 floor     Standing HR on slt bd   L2 20 No bd 20x 20 floor     4 way ankle tband    OTB 20      Seated BAPS board          Towel slide: inverson/ dane    10 ea 3 ea     Ankle df 1/2 kneeling stretch on table     10x5\"                                                                       " "              NEUROMUSCULAR REEDUCATION           Sidestepping on foam   3laps 3 laps 2 laps     Tandem walking on foam   3laps 3 laps 2 laps     Mini squats on airex   20x 20x 10x5\"     Rhomberg on airex          SLS on foam L/R   20\" 3 ea 20\" 3 ea 20\"x3 gr tf     Tandem stance L/R   20\" 3 ea 20\" 3 ea 20\"x3 ea                                   THERAPEUTIC ACTIVITY          Step ups FW     4\" x10 L     Step ups lat                              GAIT TRAINING                                                  MODALITIES         Cp post tx                                  "

## 2025-03-05 ENCOUNTER — APPOINTMENT (OUTPATIENT)
Dept: PHYSICAL THERAPY | Facility: CLINIC | Age: 70
End: 2025-03-05
Payer: MEDICARE

## 2025-03-06 ENCOUNTER — TELEMEDICINE (OUTPATIENT)
Dept: PSYCHIATRY | Facility: CLINIC | Age: 70
End: 2025-03-06
Payer: MEDICARE

## 2025-03-06 DIAGNOSIS — F50.811 MODERATE BINGE-EATING DISORDER: ICD-10-CM

## 2025-03-06 DIAGNOSIS — F41.8 DEPRESSION WITH ANXIETY: ICD-10-CM

## 2025-03-06 DIAGNOSIS — F41.1 GAD (GENERALIZED ANXIETY DISORDER): ICD-10-CM

## 2025-03-06 DIAGNOSIS — F33.9 MAJOR DEPRESSION, RECURRENT, CHRONIC (HCC): Primary | ICD-10-CM

## 2025-03-06 PROCEDURE — 90834 PSYTX W PT 45 MINUTES: CPT

## 2025-03-06 NOTE — PSYCH
Virtual Regular Visit    Verification of patient location:    Patient is located at Home in the following state in which I hold an active license [place]      Assessment/Plan:    Problem List Items Addressed This Visit     Depression with anxiety    Major depression, recurrent, chronic (HCC) - Primary    Eating disorder    SY (generalized anxiety disorder)       Goals addressed in session: Goal 1     Depression Follow-up Plan Completed: Yes    Reason for visit is No chief complaint on file.       Encounter provider Lady Soto LCSW      Recent Visits  No visits were found meeting these conditions.  Showing recent visits within past 7 days and meeting all other requirements  Today's Visits  Date Type Provider Dept   03/06/25 Telemedicine Lady Soto LCSW [facility]   Showing today's visits and meeting all other requirements  Future Appointments  No visits were found meeting these conditions.  Showing future appointments within next 150 days and meeting all other requirements       The patient was identified by name and date of birth. Aniya Jones was informed that this is a telemedicine visit and that the visit is being conducted throughthe Epic Embedded platform. She agrees to proceed..  My office door was closed. No one else was in the room.  She acknowledged consent and understanding of privacy and security of the video platform. The patient has agreed to participate and understands they can discontinue the visit at any time.    Patient is aware this is a billable service.     Subjective  Aniya Jones is a 69 y.o. female  .      HPI     Past Medical History:   Diagnosis Date   • Anxiety    • Arthritis    • Cancer (HCC) 2018    Skin   • Depression    • Diabetes (HCC)    • GERD (gastroesophageal reflux disease)    • Head injury 11-4-23   • Hyperlipidemia    • Hypertension    • Liver disease 2021    Fatty liver   • Migraine        Past Surgical History:   Procedure Laterality Date    • ANKLE SURGERY  2007   • CARPAL TUNNEL RELEASE Right    • ROTATOR CUFF REPAIR         Current Outpatient Medications   Medication Sig Dispense Refill   • aspirin 81 mg chewable tablet Chew 81 mg daily     • B COMPLEX VITAMINS PO Take 1 tablet by mouth daily     • bisacodyl (DULCOLAX) 5 mg EC tablet Take 5 mg by mouth daily as needed for constipation     • buPROPion (Wellbutrin XL) 150 mg 24 hr tablet Take 1 tablet (150 mg total) by mouth daily 90 tablet 0   • busPIRone (BUSPAR) 10 mg tablet Take 1 tablet (10 mg total) by mouth 2 (two) times a day 180 tablet 0   • calcium carbonate (OS-MIKE) 600 MG tablet Take 600 mg by mouth 2 (two) times a day with meals     • clonazePAM (KlonoPIN) 0.5 mg tablet Take 1 tablet (0.5 mg total) by mouth 3 (three) times a day 90 tablet 2   • DULoxetine (CYMBALTA) 30 mg delayed release capsule Take 1 capsule (30 mg total) by mouth daily at bedtime With 60 mg 90 capsule 0   • DULoxetine (CYMBALTA) 60 mg delayed release capsule Take 1 capsule (60 mg total) by mouth daily at bedtime 90 capsule 0   • esomeprazole (NexIUM) 20 mg capsule Take 20 mg by mouth daily in the early morning      • Galcanezumab-gnlm (Emgality) 120 MG/ML SOAJ Inject 120 mg under the skin every 28 days 1 mL 11   • ibuprofen (MOTRIN) 600 mg tablet Take 600 mg by mouth every 8 (eight) hours as needed for mild pain     • Lidocaine-Menthol 4-1 % CREA Apply 1 Dose topically if needed Patch 5%     • losartan (COZAAR) 25 mg tablet Take 1 tablet (25 mg total) by mouth daily 30 tablet 11   • Magnesium 400 MG TABS Take 400 mg by mouth 2 (two) times a day     • metFORMIN (GLUCOPHAGE) 500 mg tablet TAKE 0.5 TABLETS BY MOUTH TWO TIMES A DAY WITH MEALS 60 tablet 3   • multivitamin (THERAGRAN) TABS Take 1 tablet by mouth daily     • OnabotulinumtoxinA (BOTOX IM) Inject into a muscle     • oxyCODONE (ROXICODONE) 10 MG TABS Take 1 tablet (10 mg total) by mouth every 8 (eight) hours as needed for moderate pain Max Daily Amount: 30 mg  10 tablet 0   • polyethylene glycol (MIRALAX) 17 g packet Take 17 g by mouth daily at bedtime     • Riboflavin 100 MG TABS Take by mouth     • rosuvastatin (CRESTOR) 20 MG tablet Take 1 tablet (20 mg total) by mouth daily 90 tablet 1   • senna-docusate sodium (SENOKOT S) 8.6-50 mg per tablet Take 1 tablet by mouth daily at bedtime     • sorbitol 70 % solution Take 30 mL by mouth every third day as needed (for constipation for 3 days despite use of senna-S) 480 mL 0   • Ubrogepant (Ubrelvy) 100 MG tablet Take 1 tablet (100 mg total) by mouth if needed (migraine) Take 1 tablet (100 mg) one time as needed for migraine. May repeat one additional tablet (100 mg) at least two hours after the first dose. Do not use more than two doses per day 48 tablet 1     Current Facility-Administered Medications   Medication Dose Route Frequency Provider Last Rate Last Admin   • Botulinum Toxin Type A SOLR 200 Units  200 Units Infiltration Q3 Months    200 Units at 09/10/24 1452        Allergies   Allergen Reactions   • Amoxicillin Dermatitis, Hives, Itching and Rash       Review of Systems    Video Exam    There were no vitals filed for this visit.    Physical Exam   Behavioral Health Psychotherapy Progress Note    Psychotherapy Provided: Individual Psychotherapy     1. Major depression, recurrent, chronic (HCC)        2. Moderate binge-eating disorder        3. Depression with anxiety        4. SY (generalized anxiety disorder)            Goals addressed in session: Goal 1     DATA: Marcelina shared she has had migraines last month which was difficult.  She said she's been doing some things for her physical issues that are helping her including her botox treatment.  She has been getting out of the house for PT and has been doing some shopping as well which has been enjoyable.  She talked about feeling more depressed lately and is not sure why and suspects it's possibly a pain medication she is on that is affecting her mood.  She  "talked about her son wanting an answer from her about where she is going to end up living because he wants to live near her.  She is happy about this but also feels pressure to make a decision.  She talked about how her physical pain affects her mentally and emotionally and that it might contribute to depression.  She shared she does want to be more active and that it would be good for her depression.  Therapist encouraed her to take it a step at a time in becoming more active. She said she would do so.  During this session, this clinician used the following therapeutic modalities: Client-centered Therapy and Supportive Psychotherapy    Substance Abuse was not addressed during this session. If the client is diagnosed with a co-occurring substance use disorder, please indicate any changes in the frequency or amount of use: . Stage of change for addressing substance use diagnoses: No substance use/Not applicable    ASSESSMENT:  Art Jones presents with a Euthymic/ normal mood.     her affect is Normal range and intensity, which is congruent, with her mood and the content of the session. The client has made progress on their goals.     Art Jones presents with a none risk of suicide, none risk of self-harm, and none risk of harm to others.    For any risk assessment that surpasses a \"low\" rating, a safety plan must be developed.    A safety plan was indicated: no  If yes, describe in detail     PLAN: Between sessions, Art Jones will get out of the house, take baby steps in cleaning out her house for behavioral activation. At the next session, the therapist will use Client-centered Therapy and Supportive Psychotherapy to address depression.    Behavioral Health Treatment Plan and Discharge Planning: Art Jones is aware of and agrees to continue to work on their treatment plan. They have identified and are working toward their discharge goals. yes    Depression Follow-up Plan Completed: Yes see " today's interventions and treatment plan    Visit start and stop times:    03/06/25  Start Time: 1400  Stop Time: 1452  Total Visit Time: 52 minutes

## 2025-03-07 ENCOUNTER — OFFICE VISIT (OUTPATIENT)
Dept: PHYSICAL THERAPY | Facility: CLINIC | Age: 70
End: 2025-03-07
Payer: MEDICARE

## 2025-03-07 DIAGNOSIS — S86.312D TEAR OF PERONEAL TENDON, LEFT, SUBSEQUENT ENCOUNTER: Primary | ICD-10-CM

## 2025-03-07 PROCEDURE — 97110 THERAPEUTIC EXERCISES: CPT

## 2025-03-07 PROCEDURE — 97140 MANUAL THERAPY 1/> REGIONS: CPT

## 2025-03-07 PROCEDURE — 97112 NEUROMUSCULAR REEDUCATION: CPT

## 2025-03-07 NOTE — PROGRESS NOTES
"Daily Note     Today's date: 3/7/2025  Patient name: Aniya Jones  : 1955  MRN: [id number]  Referring provider: Misty Jordan P*  Dx:   Encounter Diagnosis     ICD-10-CM    1. Tear of peroneal tendon, left, subsequent encounter  S86.312D                      Subjective: Pt reports her knees and feet hurt her more than anything else.     Objective: See treatment diary below      Assessment: Tolerated treatment fair. Patient exhibited good technique with therapeutic exercises. Pt required vcs to maintain good form to avoid going pass the point of pain.       Plan: Continue per plan of care. RA NV.     Daily Treatment Diary     Precautions: fall risk  CO-MORBIDITIES: chronic LBP, migraines, depression, sedentary lifestyle,  HEP ACCESS CODE: [id number]  FOTO Completed On:     POC Expires Reeval for Medicare to be completed  Unit Limit Auth Expiration Date PT/OT/STVisit Limit   25 By visit 10 N/a N/a bomn    Completed on visit                    Auth Status DATE 2/5 2/10 2/12 2/27 3/7    Approved Visit # 1 2 3 4 5     Remaining         MANUAL THERAPY         L ankle/ foot MFR/ PROM  15' 8' 8' 8'    Gentle joint mobs ray 1-5    2'                                         THERAPEUTIC EXERCISE HEP 8' orange tb        Bike for ankle ROM   L0 6' L0 5' 5' L0 L0 5'    Standing gastroc stretch on slt bd   L2 20\" 3 No bd 20\"x3 20\"x3 floor 20\" 3 floor    Standing HR on slt bd   L2 20 No bd 20x 20 floor 20 floor    4 way ankle tband    OTB 20      Seated BAPS board          Towel slide: inverson/ dane    10 ea 3 ea 3 ea    Ankle df 1/2 kneeling stretch on table     10x5\" np                                                                                    NEUROMUSCULAR REEDUCATION           Sidestepping on foam   3laps 3 laps 2 laps 6ft 6laps    Tandem walking on foam   3laps 3 laps 2 laps 6ft 6laps    Mini squats on airex   20x 20x 10x5\" 20x    Rhomberg on airex          SLS on foam L/R   20\" 3 ea 20\" 3 ea " "20\"x3 gr tf 20\" 3    Tandem stance L/R   20\" 3 ea 20\" 3 ea 20\"x3 ea 20\" 3 ea                                  THERAPEUTIC ACTIVITY          Step ups FW     4\" x10 L 4\" 10    Step ups lat                              GAIT TRAINING                                                  MODALITIES         Cp post tx                                  "

## 2025-03-21 ENCOUNTER — OFFICE VISIT (OUTPATIENT)
Dept: OBGYN CLINIC | Facility: CLINIC | Age: 70
End: 2025-03-21
Payer: MEDICARE

## 2025-03-21 VITALS — BODY MASS INDEX: 27.99 KG/M2 | HEIGHT: 65 IN | WEIGHT: 168 LBS

## 2025-03-21 DIAGNOSIS — S86.312D TEAR OF PERONEAL TENDON, LEFT, SUBSEQUENT ENCOUNTER: Primary | ICD-10-CM

## 2025-03-21 PROCEDURE — 99213 OFFICE O/P EST LOW 20 MIN: CPT | Performed by: ORTHOPAEDIC SURGERY

## 2025-03-21 NOTE — PROGRESS NOTES
James R Lachman, M.D.  Attending, Orthopaedic Surgery  Foot and Ankle  [facility]      ORTHOPAEDIC FOOT AND ANKLE CLINIC VISIT     Assessment and Plan     Assessment & Plan  Tear of peroneal tendon, left, subsequent encounter  Patient has a split tear of her peroneus brevis tendon of her left lower extremity. She has been treating in formal PT/HEP with minimal benefit. Both operative and non operative treatments were discussed with the patient today in the office.   She is not yet ready to perform a surgical procedure and wished to continue with formal PT/HEP until she is ready to have a surgery performed  As for her 2nd, 3rd and 4th metatarsal pain from previous fracutres, this will continue to improve as her fractures have completed healed on x rays  Continue rest, ice and elevation for pain/swelling control  Return to clinic when she is ready to sign up for a peroneal tendon repair surgery.         History of Present Illness:   Chief Complaint:   Chief Complaint   Patient presents with    Follow-up     Follow up of the left foot. Walking cane. Went for PT.      Aniya Jones is a 69 y.o. female who is being seen in follow-up for left ankle. When we last saw she we recommended PT/HEP.  Pain has not improved. Residual pain is localized at posterolateral ankle with minimal radiating and described as sharp and severe. She also mentions pain about her metatarsals. She has a history of fractures of her 2nd, 3rd and 4th metatarsals.      Pain/symptom timing:  Worse during the day when active  Pain/symptom context:  Worse with activites and work  Pain/symptom modifying factors:  Rest makes better, activities make worse  Pain/symptom associated signs/symptoms: none    Prior treatment   NSAIDsYes   Injections No   Bracing/Orthotics Yes    Physical Therapy Yes     Orthopedic Surgical History:   See below    Past Medical, Surgical and Social History:  Past Medical History:  has a past medical  history of Anxiety, Arthritis, Cancer ([facility]) (2018), Depression, Diabetes (HCC), GERD (gastroesophageal reflux disease), Head injury (11-4-23), Hyperlipidemia, Hypertension, Liver disease (2021), and Migraine.  Problem List: does not have any pertinent problems on file.  Past Surgical History:  has a past surgical history that includes Ankle surgery (2007); Carpal tunnel release (Right); and Rotator cuff repair.  Family History: family history includes Allergies in her mother; Anxiety disorder in her mother; Asthma in her paternal grandfather; Cancer in her father; Colon cancer in her maternal grandmother; Diabetes in her paternal grandfather and paternal grandmother; Heart attack in her maternal grandfather and paternal grandfather; Heart disease in her father; Hyperlipidemia in her brother, brother, father, and mother; Hypertension in her mother; Mental illness in her mother; Migraines in her brother, mother, sister, and son; Multiple sclerosis in her brother; Neurological problems in her brother; Osteoporosis in her mother; Peripheral vascular disease in her paternal grandmother.  Social History:  reports that she quit smoking about 39 years ago. Her smoking use included cigarettes. She has a 15 pack-year smoking history. She has been exposed to tobacco smoke. She has never used smokeless tobacco. She reports that she does not currently use alcohol. She reports that she does not use drugs.  Current Medications: has a current medication list which includes the following prescription(s): aspirin, b complex vitamins, bisacodyl, bupropion, buspirone, calcium carbonate, clonazepam, duloxetine, duloxetine, esomeprazole, emgality, ibuprofen, lidocaine-menthol, losartan, magnesium, metformin, multivitamin, onabotulinumtoxina, oxycodone, polyethylene glycol, riboflavin, rosuvastatin, senna-docusate sodium, sorbitol, and ubrogepant, and the following Facility-Administered Medications: botulinum toxin type a.  Allergies: is  "allergic to amoxicillin.     Review of Systems:  General- denies fever/chills  HEENT- denies hearing loss or sore throat  Eyes- denies eye pain or visual disturbances, denies red eyes  Respiratory- denies cough or SOB  Cardio- denies chest pain or palpitations  GI- denies abdominal pain  Endocrine- denies urinary frequency  Urinary- denies pain with urination  Musculoskeletal- Negative except noted above  Skin- denies rashes or wounds  Neurological- denies dizziness or headache  Psychiatric- denies anxiety or difficulty concentrating    Physical Exam:   Ht 5' 5\" (1.651 m)   Wt 76.2 kg (168 lb)   BMI 27.96 kg/m²   General/Constitutional: No apparent distress: well-nourished and well developed.  Eyes: normal ocular motion  Lymphatic: No appreciable lymphadenopathy  Respiratory: Non-labored breathing  Vascular: No edema, swelling or tenderness, except as noted in detailed exam.  Integumentary: No impressive skin lesions present, except as noted in detailed exam.  Neuro: No ataxia or tremors noted  Psych: Normal mood and affect, oriented to person, place and time. Appropriate affect.  Musculoskeletal: Normal, except as noted in detailed exam and in HPI.    Examination    Left    Gait Antalgic   Musculoskeletal Tender to palpation at peroneals    Skin Normal.      Nails Normal    Range of Motion  20 degrees dorsiflexion, 30 degrees plantarflexion  Subtalar motion: normal    Stability Stable    Muscle Strength 5/5 tibialis anterior  5/5 gastrocnemius-soleus  5/5 posterior tibialis  5/5 peroneal/eversion strength  5/5 EHL  5/5 FHL    Neurologic Normal    Sensation  Intact to light touch throughout sural, saphenous, superficial peroneal, deep peroneal and medial/lateral plantar nerve distributions.  [place]-Bam 5.07 filament (10g) testing  deferred.    Cardiovascular Brisk capillary refill < 2 seconds,intact DP and PT pulses    Special Tests None      Imaging Studies:   No new imaging            James R. Lachman, " MD  Foot & Ankle Surgery   Department of Orthopaedic Surgery  [facility]      I personally performed the service.    James R. Lachman, MD

## 2025-03-21 NOTE — PROGRESS NOTES
"      James R Lachman, M.D.  Attending, Orthopaedic Surgery  Foot and Ankle  [facility]      ORTHOPAEDIC FOOT AND ANKLE CLINIC VISIT     Assessment and Plan     Assessment & Plan          History of Present Illness:   Chief Complaint:   Chief Complaint   Patient presents with    Follow-up     Follow up of the left foot. Walking cane. Went for PT.      Aniya Jones is a 69 y.o. female who is being seen in follow-up for left ***. When we last saw she we recommended ***.  Pain has *** improved. Residual pain is localized at *** with minimal radiating and described as sharp and severe.      Pain/symptom timing:  Worse during the day when active  Pain/symptom context:  Worse with activites and work  Pain/symptom modifying factors:  Rest makes better, activities make worse  Pain/symptom associated signs/symptoms: none    Prior treatment   NSAIDs{ :05251::\"Yes\"}   Injections { :06066::\"Yes\"}   Bracing/Orthotics { :58115::\"Yes\"}    Physical Therapy { :98919::\"Yes\"}     Orthopedic Surgical History:   ***    Past Medical, Surgical and Social History:  Past Medical History:  has a past medical history of Anxiety, Arthritis, Cancer (HCC) (2018), Depression, Diabetes (HCC), GERD (gastroesophageal reflux disease), Head injury (11-4-23), Hyperlipidemia, Hypertension, Liver disease (2021), and Migraine.  Problem List: does not have any pertinent problems on file.  Past Surgical History:  has a past surgical history that includes Ankle surgery (2007); Carpal tunnel release (Right); and Rotator cuff repair.  Family History: family history includes Allergies in her mother; Anxiety disorder in her mother; Asthma in her paternal grandfather; Cancer in her father; Colon cancer in her maternal grandmother; Diabetes in her paternal grandfather and paternal grandmother; Heart attack in her maternal grandfather and paternal grandfather; Heart disease in her father; Hyperlipidemia in her brother, brother, father, and " "mother; Hypertension in her mother; Mental illness in her mother; Migraines in her brother, mother, sister, and son; Multiple sclerosis in her brother; Neurological problems in her brother; Osteoporosis in her mother; Peripheral vascular disease in her paternal grandmother.  Social History:  reports that she quit smoking about 39 years ago. Her smoking use included cigarettes. She has a 15 pack-year smoking history. She has been exposed to tobacco smoke. She has never used smokeless tobacco. She reports that she does not currently use alcohol. She reports that she does not use drugs.  Current Medications: has a current medication list which includes the following prescription(s): aspirin, b complex vitamins, bisacodyl, bupropion, buspirone, calcium carbonate, clonazepam, duloxetine, duloxetine, esomeprazole, emgality, ibuprofen, lidocaine-menthol, losartan, magnesium, metformin, multivitamin, onabotulinumtoxina, oxycodone, polyethylene glycol, riboflavin, rosuvastatin, senna-docusate sodium, sorbitol, and ubrogepant, and the following Facility-Administered Medications: botulinum toxin type a.  Allergies: is allergic to amoxicillin.     Review of Systems:  General- denies fever/chills  HEENT- denies hearing loss or sore throat  Eyes- denies eye pain or visual disturbances, denies red eyes  Respiratory- denies cough or SOB  Cardio- denies chest pain or palpitations  GI- denies abdominal pain  Endocrine- denies urinary frequency  Urinary- denies pain with urination  Musculoskeletal- Negative except noted above  Skin- denies rashes or wounds  Neurological- denies dizziness or headache  Psychiatric- denies anxiety or difficulty concentrating    Physical Exam:   Ht 5' 5\" (1.651 m)   Wt 76.2 kg (168 lb)   BMI 27.96 kg/m²   General/Constitutional: No apparent distress: well-nourished and well developed.  Eyes: normal ocular motion  Lymphatic: No appreciable lymphadenopathy  Respiratory: Non-labored breathing  Vascular: No " "edema, swelling or tenderness, except as noted in detailed exam.  Integumentary: No impressive skin lesions present, except as noted in detailed exam.  Neuro: No ataxia or tremors noted  Psych: Normal mood and affect, oriented to person, place and time. Appropriate affect.  Musculoskeletal: Normal, except as noted in detailed exam and in HPI.    Examination    Left    Gait { :39681::\"Normal\"}   Musculoskeletal Tender to palpation at ***    Skin Normal.  ***Well-healed incisions.    Nails Normal    Range of Motion  *** degrees dorsiflexion, *** degrees plantarflexion  Subtalar motion: ***    Stability Stable    Muscle Strength 5/5 tibialis anterior  5/5 gastrocnemius-soleus  5/5 posterior tibialis  5/5 peroneal/eversion strength  5/5 EHL  5/5 FHL    Neurologic Normal    Sensation *** Intact to light touch throughout sural, saphenous, superficial peroneal, deep peroneal and medial/lateral plantar nerve distributions.  [place]-Bam 5.07 filament (10g) testing *** deferred.    Cardiovascular Brisk capillary refill < 2 seconds,intact DP and PT pulses    Special Tests None      Imaging Studies:   No new imaging    3 views of the Left *** were obtained, reviewed and interpreted independently which demonstrate ***. Reviewed by me personally.    MRI available for review of left *** which demonstrates ***. Reviewed by me personally.    CT available for review of Left *** which shows ***. Reviewed by me personally.        James R. Lachman, MD  Foot & Ankle Surgery   Department of Orthopaedic Surgery  [facility]      I personally performed the service.    James R. Lachman, MD    "

## 2025-03-24 ENCOUNTER — EVALUATION (OUTPATIENT)
Dept: PHYSICAL THERAPY | Facility: CLINIC | Age: 70
End: 2025-03-24
Payer: MEDICARE

## 2025-03-24 DIAGNOSIS — S86.312D TEAR OF PERONEAL TENDON, LEFT, SUBSEQUENT ENCOUNTER: Primary | ICD-10-CM

## 2025-03-24 PROCEDURE — 97110 THERAPEUTIC EXERCISES: CPT | Performed by: PHYSICAL THERAPIST

## 2025-03-24 PROCEDURE — 97140 MANUAL THERAPY 1/> REGIONS: CPT | Performed by: PHYSICAL THERAPIST

## 2025-03-24 PROCEDURE — 97112 NEUROMUSCULAR REEDUCATION: CPT | Performed by: PHYSICAL THERAPIST

## 2025-03-24 NOTE — PROGRESS NOTES
PT Re-Evaluation     Today's date: 3/24/2025  Patient name: Aniya Jones  : 1955  MRN: [id number]  Referring provider: Misty Jordan P*  Dx:   Encounter Diagnosis     ICD-10-CM    1. Tear of peroneal tendon, left, subsequent encounter  S86.312D                      Assessment  Impairments: abnormal gait, abnormal or restricted ROM, activity intolerance, impaired physical strength and pain with function    Assessment details: Since starting skilled PT, pain levels are decreasing, L ankle ROM and strength are improving with gradual functional progress. Recommend pt continue skilled PT focusing on improving strength, decreasing pain and improving proprioception activities. Pt plans on returning to ortho for surgical consult in April.   Barriers to therapy: Fear of leaving the home, migraines, LBP, depression  Understanding of Dx/Px/POC: good     Prognosis: fair    Goals  STG's ( 3-4 weeks)  1. Pt will be independent in HEP- met  2. Pt will have improved df ROM by 5*-10*- met  LTG's ( 6- 8 weeks)  1. Improve FOTO score by 6-8 points- met  2. Pt will have decreased pain to 3/10 at worst- not met  3. Pt will have improved L ankle strength by 1/2 grade-partial met  4. Pt will be able to walk with a more normalized gait pattern with SPC- met    Plan  Patient would benefit from: skilled physical therapy  Planned modality interventions: cryotherapy    Planned therapy interventions: joint mobilization, manual therapy, neuromuscular re-education, balance, strengthening, stretching, therapeutic activities, therapeutic exercise, functional ROM exercises and home exercise program    Frequency: 2x week  Duration in weeks: 8  Plan of Care beginning date: 3/24/2025  Plan of Care expiration date: 2025  Treatment plan discussed with: PTA and patient        Subjective Evaluation    History of Present Illness  Date of onset: 2024  Mechanism of injury: trauma  Mechanism of injury: I.E; Patient had a fall 5 months  ago 2024 sustained left ankle injury along with fractures 2nd-4th metatarsals of left foot. Pt had increased L ankle pain and swelling in her L foot. Pt was placed in a CAM boot for several weeks, and she continued to have ankle pain. MRI testing showed a peroneal tendon.  Pt has increased pain and difficulty with walking, standing, and going up and down the steps. Pt went up the steps one at a time. Pt has increase pain when sleeping at night. Pt has numbness in her L great toe since her injury. 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    3/24/25; Pt is compliant in HEP. Pt reports she is walking more normally with SPC. Pt has improved tolerance for walking in the grocery store with a cart. Pt is able to go up and down the steps reciprocally. Pt has intermittent R great toe numbness. Pt is able to do light household activities with greater ease.    Work: not working; retired  Hobbies: none  Gait: (+) mild antalgic gait, decreased weight bearing L LE w/ SPC  Patient Goals  Patient goals for therapy: decreased pain, increased strength, independence with ADLs/IADLs and increased motion  Patient goal: to return to normal; to avoid surgery  Pain  At best pain ratin  At worst pain ratin  Location: L ankle  Quality: burning, sharp, dull ache and needle-like    Social Support  Steps to enter house: yes (3)  Stairs in house: yes (1 flight to basement)   Lives in: one-story house  Lives with: alone    Employment status: not working  Stress factors comments: death of spouse 4 years ago; depression.      Diagnostic Tests  MRI studies: abnormal        Objective     Neurological Testing     Sensation     Hip   Left Hip   Intact: light touch    Right Hip   Intact: light touch    Ankle/Foot   Left Ankle/Foot   Intact: light touch    Right Ankle/Foot   Intact: light touch     Reflexes   Left   Patellar (L4): normal (2+)  Achilles (S1): normal (2+)    Right   Patellar (L4): normal (2+)  Achilles (S1):  "normal (2+)    Active Range of Motion   Left Ankle/Foot   Dorsiflexion (ke): 5 degrees   Plantar flexion: WFL  Inversion: 40 degrees   Eversion: 35 degrees     Right Ankle/Foot   Normal active range of motion    Additional Active Range of Motion Details  Mild edema L ankle  (+) TTP L peroneal tendon musculature  SLS; NT  SL HR; NT    Passive Range of Motion   Left Ankle/Foot    Dorsiflexion (ke): 5 degrees   Plantar flexion: WFL  Inversion: WFL  Eversion: WFL    Strength/Myotome Testing     Left Hip   Planes of Motion   Flexion: 4  External rotation: 4  Internal rotation: 4    Right Hip   Planes of Motion   Flexion: 4  External rotation: 4  Internal rotation: 4    Left Knee   Normal strength    Right Knee   Normal strength    Left Ankle/Foot   Dorsiflexion: 5  Plantar flexion: 3+  Inversion: 5  Eversion: 5    Right Ankle/Foot   Normal strength    Additional Strength Details  SL HR; NT          Daily Treatment Diary     Precautions: fall risk  CO-MORBIDITIES: chronic LBP, migraines, depression, sedentary lifestyle,  HEP ACCESS CODE: [id number]  FOTO Completed On: (3/24/25: 59   Predicted: 53)- pt exceeded score- do not repeat    POC Expires Reeval for Medicare to be completed  Unit Limit Auth Expiration Date PT/OT/STVisit Limit   5/19/25 By visit 16 N/a N/a bomn    Completed on visit  3/24                   Auth Status DATE 2/5 2/10 2/12 2/27 3/7 3/24   Approved Visit # 1 2 3 4 5 6    Remaining         MANUAL THERAPY         L ankle/ foot MFR/ PROM  15' 8' 8' 8' 8'   Gentle joint mobs ray 1-5    2'     Progress note      8'                              THERAPEUTIC EXERCISE HEP 8' orange tb        Bike for ankle ROM   L0 6' L0 5' 5' L0 L0 5' 5' L3   Standing gastroc stretch on slt bd   L2 20\" 3 No bd 20\"x3 20\"x3 floor 20\" 3 floor 20\"x3 floor   Standing HR on slt bd   L2 20 No bd 20x 20 floor 20 floor 10 floor   4 way ankle tband    OTB 20   OTB x10 ea   Seated BAPS board          Towel slide: inverson/ dane    10 ea " "3 ea 3 ea    Ankle df 1/2 kneeling stretch on table     10x5\" np 10x5\"                                                     NEUROMUSCULAR REEDUCATION           Sidestepping on foam   3laps 3 laps 2 laps 6ft 6laps 3 laps   Tandem walking on foam   3laps 3 laps 2 laps 6ft 6laps 3 laps   Mini squats on airex   20x 20x 10x5\" 20x 20    Rhomberg on airex          SLS on foam L/R   20\" 3 ea 20\" 3 ea 20\"x3 gr tf 20\" 3 20\"x3 gr L   Tandem stance L/R   20\" 3 ea 20\" 3 ea 20\"x3 ea 20\" 3 ea 20\"x3 bilat                                 THERAPEUTIC ACTIVITY          Step ups FW     4\" x10 L 4\" 10 6\"x10   Step ups lat       4\" x10                       GAIT TRAINING                                                  MODALITIES         Cp post tx                                "

## 2025-03-27 ENCOUNTER — APPOINTMENT (OUTPATIENT)
Dept: PHYSICAL THERAPY | Facility: CLINIC | Age: 70
End: 2025-03-27
Payer: MEDICARE

## 2025-03-28 ENCOUNTER — PROCEDURE VISIT (OUTPATIENT)
Dept: NEUROLOGY | Facility: CLINIC | Age: 70
End: 2025-03-28
Payer: MEDICARE

## 2025-03-28 VITALS — SYSTOLIC BLOOD PRESSURE: 118 MMHG | HEART RATE: 88 BPM | DIASTOLIC BLOOD PRESSURE: 58 MMHG | TEMPERATURE: 97.6 F

## 2025-03-28 DIAGNOSIS — G24.3 CERVICAL DYSTONIA: Primary | ICD-10-CM

## 2025-03-28 PROCEDURE — 64616 CHEMODENERV MUSC NECK DYSTON: CPT | Performed by: PHYSICIAN ASSISTANT

## 2025-03-28 NOTE — PROGRESS NOTES
"Universal Protocol   procedure performed by consultantConsent: Verbal consent obtained. Written consent obtained.  Risks and benefits: risks, benefits and alternatives were discussed  Consent given by: patient  Time out: Immediately prior to procedure a \"time out\" was called to verify the correct patient, procedure, equipment, support staff and site/side marked as required.  Patient understanding: patient states understanding of the procedure being performed  Patient consent: the patient's understanding of the procedure matches consent given  Procedure consent: procedure consent matches procedure scheduled  Relevant documents: relevant documents present and verified  Patient identity confirmed: verbally with patient      Chemodenervation     Date/Time  3/28/2025 1:30 PM     Performed by  Paulette Trevino PA-C   Authorized by  Paulette Trevino PA-C     Pre-procedure details      Prepped With: Alcohol     Anesthesia  (see MAR for exact dosages):     Anesthesia method:  None   Procedure details      Position:  Upright   Botox      Botox Type:  Type A    Brand:  Botox    mL's of Botulinum Toxin:  200    Final Concentration per CC:  50 units    Needle Gauge:  30 G 2.5 inch   Procedures      Botox Procedures: cervical dystonia and chronic headache      Indications: spasmodic torticollis and migraines     Injection Location      Head / Face:  L superior cervical paraspinal, R superior cervical paraspinal, L , R , L frontalis, R frontalis, L medial occipitalis, R medial occipitalis, L temporalis, R temporalis and procerus    L  injection amount:  5 unit(s)    R  injection amount:  5 unit(s)    L lateral frontalis:  5 unit(s)    R lateral frontalis:  5 unit(s)    L medial frontalis:  5 unit(s)    R medial frontalis:  5 unit(s)    L temporalis injection amount:  20 unit(s)    R temporalis injection amount:  20 unit(s)    Procerus injection amount:  5 unit(s)    L medial occipitalis injection " amount:  15 unit(s)    R medial occipitalis injection amount:  15 unit(s)    L superior cervical paraspinal injection amount:  10 unit(s)    R superior cervical paraspinal injection amount:  15 unit(s)    Cervical Dystonia / Salivary Gland:  L splenius capitis, R splenius capitis, L upper trapezius, R upper trapezius, L sternocleidomastoid and R sternocleidomastoid      L splenius capitis injection amount:  7.5 unit(s)      R splenius capitis injection amount:  7.5 unit(s)      L sternocleidomastoid injection amount:  10 unit(s)      R sternocleidomastoid injection amount:  10 unit(s)      L upper trapezius injection amount:  15 unit(s)      R upper trapezius injection amount:  15 unit(s)   Total Units      Total units used:  200    Total units discarded:  0   Post-procedure details      Chemodenervation:  Neck, excluding muscles of the larynx    Neck Muscle Location::  Bilateral neck muscle    Patient tolerance of procedure:  Tolerated well, no immediate complications   Comments       5 units temporalis  All medically necessary

## 2025-03-31 ENCOUNTER — APPOINTMENT (OUTPATIENT)
Dept: PHYSICAL THERAPY | Facility: CLINIC | Age: 70
End: 2025-03-31
Payer: MEDICARE

## 2025-04-25 ENCOUNTER — PREP FOR PROCEDURE (OUTPATIENT)
Dept: OBGYN CLINIC | Facility: CLINIC | Age: 70
End: 2025-04-25

## 2025-04-25 ENCOUNTER — OFFICE VISIT (OUTPATIENT)
Dept: OBGYN CLINIC | Facility: CLINIC | Age: 70
End: 2025-04-25
Payer: MEDICARE

## 2025-04-25 VITALS — WEIGHT: 168 LBS | HEIGHT: 65 IN | BODY MASS INDEX: 27.99 KG/M2

## 2025-04-25 DIAGNOSIS — S86.312A TEAR OF PERONEAL TENDON OF LEFT FOOT: Primary | ICD-10-CM

## 2025-04-25 PROCEDURE — 99214 OFFICE O/P EST MOD 30 MIN: CPT | Performed by: ORTHOPAEDIC SURGERY

## 2025-04-25 RX ORDER — CHLORHEXIDINE GLUCONATE 40 MG/ML
SOLUTION TOPICAL DAILY PRN
Status: CANCELLED | OUTPATIENT
Start: 2025-04-25

## 2025-04-25 RX ORDER — CHLORHEXIDINE GLUCONATE ORAL RINSE 1.2 MG/ML
15 SOLUTION DENTAL ONCE
Status: CANCELLED | OUTPATIENT
Start: 2025-04-25 | End: 2025-04-25

## 2025-04-25 RX ORDER — SODIUM CHLORIDE, SODIUM LACTATE, POTASSIUM CHLORIDE, CALCIUM CHLORIDE 600; 310; 30; 20 MG/100ML; MG/100ML; MG/100ML; MG/100ML
20 INJECTION, SOLUTION INTRAVENOUS CONTINUOUS
Status: CANCELLED | OUTPATIENT
Start: 2025-04-25

## 2025-04-25 NOTE — H&P (VIEW-ONLY)
James R Lachman, M.D.  Attending, Orthopaedic Surgery  Foot and Ankle  [facility]      ORTHOPAEDIC FOOT AND ANKLE CLINIC VISIT     Assessment and Plan     Assessment & Plan  Tear of peroneal tendon of left foot  Patient has a split tear of her peroneal brevis tendon of her left lower extremity. She continues to experience daily pain despite non operative treatments. She is interested in proceeding forward with a surgical procedure at this time  Informed the patient that this surgery is meant to improve pain about the posterolateral aspect of the ankle, which she does have symptoms/pain today. She also has some pain about the anterolateral ankle which would not relaibly be benefited from this surgery. She understood and all questions were answered  Informed consent was obtained today for a LEFT peroneal tendon exploration with repair/reconstruction/tenodesis/debridement as necessary.   Return to clinic for 3 week post operative visit.    CONSENT FOR SOFT TISSUE PROCEDURES:   Patient understands that there is no guarantee that the surgery will relieve all of their pain and also understands that there may be a prolonged course of protected weight-bearing status required which will restrict them from driving and other activities as discussed at today's visit. Patient recognizes that there are risks with surgery including bleeding, numbness, nerve irritation, wound complications, infection, continued pain, anesthetic complications, death, failure of procedure and possible need for further surgery. The patient understands that there is no guarantee that this surgery will relieve all of Her pain and symptoms.  Patient understands that there is no guarantee that they will return to full function after the procedure.  Patient has provided informed consent for the procedure.         History of Present Illness:   Chief Complaint:   Chief Complaint   Patient presents with    Follow-up     Follow up  of the left ankle. Wants to talk about surgery. Walking with a cane.      Aniya Jones is a 69 y.o. female who is being seen in follow-up for left ankle pain. When we last saw she we recommended continuing PT/HEP for peroneus brevis tendon tear.  Pain has not improved. Residual pain is localized at anterolateral and posterolateral ankle with minimal radiating and described as sharp and severe.      Pain/symptom timing:  Worse during the day when active  Pain/symptom context:  Worse with activites and work  Pain/symptom modifying factors:  Rest makes better, activities make worse  Pain/symptom associated signs/symptoms: none    Prior treatment   NSAIDsYes   Injections No   Bracing/Orthotics Yes    Physical Therapy Yes     Orthopedic Surgical History:   See below    Past Medical, Surgical and Social History:  Past Medical History:  has a past medical history of Anxiety, Arthritis, Cancer ([facility]) (2018), Depression, Diabetes (HCC), GERD (gastroesophageal reflux disease), Head injury (11-4-23), Hyperlipidemia, Hypertension, Liver disease (2021), and Migraine.  Problem List: does not have any pertinent problems on file.  Past Surgical History:  has a past surgical history that includes Ankle surgery (2007); Carpal tunnel release (Right); and Rotator cuff repair.  Family History: family history includes Allergies in her mother; Anxiety disorder in her mother; Asthma in her paternal grandfather; Cancer in her father; Colon cancer in her maternal grandmother; Diabetes in her paternal grandfather and paternal grandmother; Heart attack in her maternal grandfather and paternal grandfather; Heart disease in her father; Hyperlipidemia in her brother, brother, father, and mother; Hypertension in her mother; Mental illness in her mother; Migraines in her brother, mother, sister, and son; Multiple sclerosis in her brother; Neurological problems in her brother; Osteoporosis in her mother; Peripheral vascular disease in her paternal  "grandmother.  Social History:  reports that she quit smoking about 39 years ago. Her smoking use included cigarettes. She has a 15 pack-year smoking history. She has been exposed to tobacco smoke. She has never used smokeless tobacco. She reports that she does not currently use alcohol. She reports that she does not use drugs.  Current Medications: has a current medication list which includes the following prescription(s): aspirin, b complex vitamins, bisacodyl, bupropion, buspirone, calcium carbonate, clonazepam, duloxetine, duloxetine, esomeprazole, emgality, ibuprofen, lidocaine-menthol, losartan, magnesium, metformin, multivitamin, onabotulinumtoxina, oxycodone, polyethylene glycol, riboflavin, rosuvastatin, senna-docusate sodium, sorbitol, and ubrogepant, and the following Facility-Administered Medications: botulinum toxin type a.  Allergies: is allergic to amoxicillin.     Review of Systems:  General- denies fever/chills  HEENT- denies hearing loss or sore throat  Eyes- denies eye pain or visual disturbances, denies red eyes  Respiratory- denies cough or SOB  Cardio- denies chest pain or palpitations  GI- denies abdominal pain  Endocrine- denies urinary frequency  Urinary- denies pain with urination  Musculoskeletal- Negative except noted above  Skin- denies rashes or wounds  Neurological- denies dizziness or headache  Psychiatric- denies anxiety or difficulty concentrating    Physical Exam:   Ht 5' 5\" (1.651 m)   Wt 76.2 kg (168 lb)   BMI 27.96 kg/m²   General/Constitutional: No apparent distress: well-nourished and well developed.  Eyes: normal ocular motion  Lymphatic: No appreciable lymphadenopathy  Respiratory: Non-labored breathing  Vascular: No edema, swelling or tenderness, except as noted in detailed exam.  Integumentary: No impressive skin lesions present, except as noted in detailed exam.  Neuro: No ataxia or tremors noted  Psych: Normal mood and affect, oriented to person, place and time. " Appropriate affect.  Musculoskeletal: Normal, except as noted in detailed exam and in HPI.    Examination    Left    Gait Normal   Musculoskeletal Tender to palpation at peroneal tendons and ATFL    Skin Normal.    Nails Normal    Range of Motion  20 degrees dorsiflexion, 30 degrees plantarflexion  Subtalar motion: normal    Stability Stable    Muscle Strength 5/5 tibialis anterior  5/5 gastrocnemius-soleus  5/5 posterior tibialis  4/5 peroneal/eversion strength  5/5 EHL  5/5 FHL    Neurologic Normal    Sensation Intact to light touch throughout sural, saphenous, superficial peroneal, deep peroneal and medial/lateral plantar nerve distributions.  Trevorton-Bam 5.07 filament (10g) testing deferred.    Cardiovascular Brisk capillary refill < 2 seconds,intact DP and PT pulses    Special Tests None      Imaging Studies:   No new imaging        James R. Lachman, MD  Foot & Ankle Surgery   Department of Orthopaedic Surgery  [facility]      I personally performed the service.    James R. Lachman, MD    Scribe Attestation      I,:  Pepe López am acting as a scribe while in the presence of the attending physician.:       I,:  James R Lachman, MD personally performed the services described in this documentation    as scribed in my presence.:

## 2025-04-25 NOTE — PROGRESS NOTES
James R Lachman, M.D.  Attending, Orthopaedic Surgery  Foot and Ankle  [facility]      ORTHOPAEDIC FOOT AND ANKLE CLINIC VISIT     Assessment and Plan     Assessment & Plan  Tear of peroneal tendon of left foot  Patient has a split tear of her peroneal brevis tendon of her left lower extremity. She continues to experience daily pain despite non operative treatments. She is interested in proceeding forward with a surgical procedure at this time  Informed the patient that this surgery is meant to improve pain about the posterolateral aspect of the ankle, which she does have symptoms/pain today. She also has some pain about the anterolateral ankle which would not relaibly be benefited from this surgery. She understood and all questions were answered  Informed consent was obtained today for a LEFT peroneal tendon exploration with repair/reconstruction/tenodesis/debridement as necessary.   Return to clinic for 3 week post operative visit.    CONSENT FOR SOFT TISSUE PROCEDURES:   Patient understands that there is no guarantee that the surgery will relieve all of their pain and also understands that there may be a prolonged course of protected weight-bearing status required which will restrict them from driving and other activities as discussed at today's visit. Patient recognizes that there are risks with surgery including bleeding, numbness, nerve irritation, wound complications, infection, continued pain, anesthetic complications, death, failure of procedure and possible need for further surgery. The patient understands that there is no guarantee that this surgery will relieve all of Her pain and symptoms.  Patient understands that there is no guarantee that they will return to full function after the procedure.  Patient has provided informed consent for the procedure.         History of Present Illness:   Chief Complaint:   Chief Complaint   Patient presents with    Follow-up     Follow up  of the left ankle. Wants to talk about surgery. Walking with a cane.      Aniya Jones is a 69 y.o. female who is being seen in follow-up for left ankle pain. When we last saw she we recommended continuing PT/HEP for peroneus brevis tendon tear.  Pain has not improved. Residual pain is localized at anterolateral and posterolateral ankle with minimal radiating and described as sharp and severe.      Pain/symptom timing:  Worse during the day when active  Pain/symptom context:  Worse with activites and work  Pain/symptom modifying factors:  Rest makes better, activities make worse  Pain/symptom associated signs/symptoms: none    Prior treatment   NSAIDsYes   Injections No   Bracing/Orthotics Yes    Physical Therapy Yes     Orthopedic Surgical History:   See below    Past Medical, Surgical and Social History:  Past Medical History:  has a past medical history of Anxiety, Arthritis, Cancer ([facility]) (2018), Depression, Diabetes (HCC), GERD (gastroesophageal reflux disease), Head injury (11-4-23), Hyperlipidemia, Hypertension, Liver disease (2021), and Migraine.  Problem List: does not have any pertinent problems on file.  Past Surgical History:  has a past surgical history that includes Ankle surgery (2007); Carpal tunnel release (Right); and Rotator cuff repair.  Family History: family history includes Allergies in her mother; Anxiety disorder in her mother; Asthma in her paternal grandfather; Cancer in her father; Colon cancer in her maternal grandmother; Diabetes in her paternal grandfather and paternal grandmother; Heart attack in her maternal grandfather and paternal grandfather; Heart disease in her father; Hyperlipidemia in her brother, brother, father, and mother; Hypertension in her mother; Mental illness in her mother; Migraines in her brother, mother, sister, and son; Multiple sclerosis in her brother; Neurological problems in her brother; Osteoporosis in her mother; Peripheral vascular disease in her paternal  "grandmother.  Social History:  reports that she quit smoking about 39 years ago. Her smoking use included cigarettes. She has a 15 pack-year smoking history. She has been exposed to tobacco smoke. She has never used smokeless tobacco. She reports that she does not currently use alcohol. She reports that she does not use drugs.  Current Medications: has a current medication list which includes the following prescription(s): aspirin, b complex vitamins, bisacodyl, bupropion, buspirone, calcium carbonate, clonazepam, duloxetine, duloxetine, esomeprazole, emgality, ibuprofen, lidocaine-menthol, losartan, magnesium, metformin, multivitamin, onabotulinumtoxina, oxycodone, polyethylene glycol, riboflavin, rosuvastatin, senna-docusate sodium, sorbitol, and ubrogepant, and the following Facility-Administered Medications: botulinum toxin type a.  Allergies: is allergic to amoxicillin.     Review of Systems:  General- denies fever/chills  HEENT- denies hearing loss or sore throat  Eyes- denies eye pain or visual disturbances, denies red eyes  Respiratory- denies cough or SOB  Cardio- denies chest pain or palpitations  GI- denies abdominal pain  Endocrine- denies urinary frequency  Urinary- denies pain with urination  Musculoskeletal- Negative except noted above  Skin- denies rashes or wounds  Neurological- denies dizziness or headache  Psychiatric- denies anxiety or difficulty concentrating    Physical Exam:   Ht 5' 5\" (1.651 m)   Wt 76.2 kg (168 lb)   BMI 27.96 kg/m²   General/Constitutional: No apparent distress: well-nourished and well developed.  Eyes: normal ocular motion  Lymphatic: No appreciable lymphadenopathy  Respiratory: Non-labored breathing  Vascular: No edema, swelling or tenderness, except as noted in detailed exam.  Integumentary: No impressive skin lesions present, except as noted in detailed exam.  Neuro: No ataxia or tremors noted  Psych: Normal mood and affect, oriented to person, place and time. " Appropriate affect.  Musculoskeletal: Normal, except as noted in detailed exam and in HPI.    Examination    Left    Gait Normal   Musculoskeletal Tender to palpation at peroneal tendons and ATFL    Skin Normal.    Nails Normal    Range of Motion  20 degrees dorsiflexion, 30 degrees plantarflexion  Subtalar motion: normal    Stability Stable    Muscle Strength 5/5 tibialis anterior  5/5 gastrocnemius-soleus  5/5 posterior tibialis  4/5 peroneal/eversion strength  5/5 EHL  5/5 FHL    Neurologic Normal    Sensation Intact to light touch throughout sural, saphenous, superficial peroneal, deep peroneal and medial/lateral plantar nerve distributions.  [place]-Bam 5.07 filament (10g) testing deferred.    Cardiovascular Brisk capillary refill < 2 seconds,intact DP and PT pulses    Special Tests None      Imaging Studies:   No new imaging        James R. Lachman, MD  Foot & Ankle Surgery   Department of Orthopaedic Surgery  [facility]      I personally performed the service.    James R. Lachman, MD    Scribe Attestation      I,:  Pepe López am acting as a scribe while in the presence of the attending physician.:       I,:  James R Lachman, MD personally performed the services described in this documentation    as scribed in my presence.:

## 2025-04-25 NOTE — PATIENT INSTRUCTIONS
James R Lachman, M.D.  Attending, Orthopaedic Surgery  [facility]  [place] Office Phone: [phone] ? Fax: [phone]  [place] Office Phone: [phone] ? Fax:[phone]    : Ana Rosas MA     Surgery Coordinators [place]: Tameka Parra, [phone]  Emilia Elkins, [phone]  Surgery Coordinator [place]:  Misty Alarcon, [phone]                                                       Latha Philippe, [phone]                                                            [email or web address]   PRE-OPERATIVE AND POST-OPERATIVE INSTRUCTIONS    General Information:  Your surgery is with Dr. Lachman.  Dates can change (although rare) depending on emergencies.  Typical post operative visits are at the following intervals:  3 weeks post surgery(except 1 week for bunions and wound monitoring), 6 weeks post surgery, 3 months post surgery, 6 months post surgery, and then on a yearly basis.  However, this may change based on Dr. Lachmans’ recommendation.  #1 post-operative rule for foot/ankle surgery:  ONCE YOU ARE OUT OF YOUR CAST AND/OR REMOVABLE BOOT, SWELLING MAY PERSIST FOR MANY MONTHS.  YOU MIGHT ALSO EXPERIENCE A BLUISH DISCOLORATION OF YOUR LEG.  THIS IS NORMAL AND PART OF THE USUAL POSTOPERATIVE EXPERIENCE.    SMOKING:  Smoking results in incomplete healing of fractures (broken bones) and joints that my have been fused.  Smoking and nicotine also prevents the growth of bone into ankle replacements and bone healing.  It also slows the healing of muscles and skin (soft tissue).  Therefore, please do not have surgery if you continue to smoke.  We reserve the right to cancel your surgery if we suspect that you are smoking.  DO NOT use nicorette gum or other patches.  Please find an alternative method to quit smoking before your surgery.    Pre-Operative Information:  Surgery date and preoperative visits:  If you have  medical problems, such as an abnormal EKG, history of BLOOD CLOT, ANEURYSM, and any other heart condition, please inform us so that we can get your medical clearance several weeks before the surgery.  Please bring any important medical information, such as an EKG, chest x-ray, or echocardiogram, with you to ensure that your surgery will not be delayed.  If needed, you will receive your preoperative appointments in the mail or by phone from our scheduling office.  The location of the preoperative appointment will be given to you also.  You may not eat after midnight the night before surgery.  If you do, your surgery will be cancelled.   You will receive a phone call from your surgery center the day before your surgery (if your surgery is on a Monday, you will get a call the Friday before).   If you do not hear from someone by 4pm the day before your surgery, please call the Surgical coordinator (number above) to notify us.  Start taking Vitamin D3 4000 units per day and Calcium 1200mg per day immediately. You will continue this until your 3 month post-op visit. These are over the counter and available at all pharmacies and supermarkets.  FOR THOSE HAVING SURGERY AT [facility]- IF YOU WILL NEED CRUTCHES OR A ROLLING WALKER AFTER SURGERY, ASK FOR A PRESCRIPTION FOR THIS FROM OUR OFFICE TODAY.  THIS CANNOT BE HANDLED THE DAY OF SURGERY AS [facility] DOES NOT STOCK THESE.    Because bacterial can often enter any defect in the skin, it is important to avoid any cuts before surgery.  Any breaks in the skin on the leg will often result in your surgery being postponed.  Please avoid going on a very long walk the day prior to surgery, or doing other activities that could lead to irritation of the skin, including yard work, extra athletic activity, or shaving.   This could result in surgery cancellation.  You MUST be fasting the day of your surgery.  Therefore, please do not consume any foot or beverage  after midnight the night before surgery.  The morning of surgery you may take your usual medications with a sip of water.  It is important not to take anti-inflammatory medication like Ibuprofen, Motrin, Naproxen (Aleve), or Aspirin 7-10 days before surgery because they will make you bleed more than usual.  Vitamin, E, Plavix and Coumadin also have the same effect.  Stop Aspirin and Vitamin E two weeks before surgery.  YOUR MEDICAL DOCTOR SHOULD TELL YOU WHEN TO STOP COUMADIN OR PLAVIX.  If your surgery involves any bone healing, please do not take anti-inflammatories for at least 6 weeks after surgery.  This can impede bone healing (ibuprofen, Aleve, Relafen, iodine).  Tylenol is fine to take.    PREOPERATIVE BATHING INSTRUCTIONS:    Before your surgery, bathe with Hibiclens (4% Chlorhexidene) as instructed below.  This skin cleanser will help reduce the bacteria on your skin before surgery.  To avoid irritating your eyes, do not apply Hibiclens above the level of your neck.  On the evening before AND the morning of surgery, bathe your entire body except the face and scalp, then rinse freely.  DO NOT apply to your face or scalp, as Hibiclens can irritate your eyes.  Purchasing information:   Hibiclens is available without a prescription at most retail pharmacies.     ADDITIONAL INSTRUCTIONS:  PATIENTS HAVING FOOT/ANKLE SURGERY     In preparation for your upcoming surgery, we kindly request and advise the following:  Notify our office if you are taking any of the following:  Coumadin (warfarin):  Persantine (dipyridamole); Pletal (cilostazol); Plavix (clopidogrel); Ticlid (ticlopidine); Agrylin (anagrelide); Aggrenox (dipyridamole and aspirin) or other blood thinners,.  In addition, stop taking Vitamin E and herbal supplements.  Do not schedule any elective dental work for at least 6 months after surgery.  If you had an ankle replacement, you will need to take antibiotics before any future dental procedures. Your  dentist or our office can prescribe these for you.  1000mg of Amoxicillin 1 hour prior to any dental procedure is the recommended dosing.      THREE RULES:    After surgery you will most likely be given the instructions “KEEP YOUR TOES ABOVE YOUR NOSE.”  This means that you MUST have your feet elevated higher than your heart.  Keeping your toes above your nose helps to heal the muscles and skin (soft tissues) by reducing swelling in your leg.  This position also helps to prevent infection, and is very important in avoiding deep venous thrombosis (blood clots).    In order to keep the blood circulating in your legs and in order to avoid deep vein   thrombosis (blood clots), we ask patients to GET UP ONCE AN HOUR during the day.  This means you should at least cross the room and come back.  It does not mean you have to be up for long periods of time.  In most cases we will not have people immediately put any weight on their operated part.  This is important to prevent loosening of metal or other devices holding the bones together.  It also prevents irritation of the soft tissues which can lead to prolonged healing.  When we say get up once an hour, please walk, hop or move with an assisted device.  This is important!    Do not do any excessive walking during the first few days after surgery.  Recovering from surgery is a full-time task for the patient.  Postoperative care is important to avoid irritating the skin incision, which can lead to infection.  Please do not plan activities or go out of town for several weeks after surgery.  If you are unsure about your future activities, please schedule surgery only when you know it is acceptable for you.  Scheduling surgery and then canceling the date, prevents other people from having surgery on that date as it takes time to line everything up effectively.  If you cancel your surgery the week of your planned surgery, we reserve the right to cancel all future surgical  procedures.    THE DAY OF SURGERY:    Arrival to the hospital or outpatient surgical center on time is imperative.  If you arrive late, then your surgery will be cancelled.  You MUST have a family member/friend bring you, stay with you throughout the DURATION of your surgery, and drive you home.  You MUST be fasting the day of your surgery.  Therefore, do not consume any food or beverage after midnight the night before surgery.  At your pre-operative visit with the anesthesia staff, or during your phone screen, a nurse will instruct you what medications you will need to take the day of surgery.  MAKE SURE THAT THE PHARMACY LISTED IN THE ELECTRONIC MEDICAL RECORD (EPIC) IS YOUR PREFERRED PHARMACY. For example, if you are staying with family or a friend, and will not be near your preferred pharmacy, YOU MUST, tell the nurses checking you in the day of surgery so that this can be changed in the system.  If your prescriptions are sent to a pharmacy, this cannot be changed.      AFTER YOUR SURGERY:  Bleeding through the bandage almost always occurs.  Do not let this alarm you.  Simply add more gauze or a towel, call us, and come in for a dressing change.   If you think it is excessive, contact us immediately or go to the local emergency room.    Do not get the bandage wet.  Showering is possible with plastic protectors.   Be very careful, as the bathroom can be wet and slippery.  If you do get your dressing wet, it should be changed immediately.  Please contact us.      ONCE YOUR ARE OUT OF YOUR CAST AND/OR REMOVABLE BOOT, SWELLING MAY PERSIST FOR MANY MONTHS.  YOU MIGHT ALSO EXPERIENCE A BLUISH DISCOLORATION OF YOUR LEG.  THIS IS NORMAL AND PART OF THE USUAL POSTOPERATIVE EXPERIENCE.  WEARING COMPRESSION HOSE (ELASTIC STOCKINGS) CAN HELP AVOID SOME OF THIS SWELLING.      DRESSING:   The purpose of the surgical dressing is to keep your wound and the surgical site protected from the environment.  Most dressings contain  splints, which help to hold your foot and ankle in a corrected position, and also allow the surgical site to heal properly. Dressings will remain in place and undisturbed until the first postop visit.   If you have a drain in place, this will need to be removed in 1-3 days after surgery.  The time for the drain to be pulled will be written on your discharge instruction sheet.    CAST  INSTRUCTIONS:  You may or may not get a cast following surgery.  If you do, pay close attention to the following:    After application of a splint or cast, it is very important to elevate your leg for 24 to 72 hours.   The injured area should be elevated well above the heart.   Remember “Toes above your Nose”.  Rest and elevation greatly reduce pain and speed the healing process by minimizing early swelling.    CALL YOUR DOCTORS OFFICE OR VISIT LOCATION EMERGENCY ROOM IF YOU HAVE ANY OF THE FOLLOWING:    Significant increased pain, which may be caused by swelling, and the feeling that the splint or cast is too tight  Numbness and tingling in your hand or foot, which may be caused by too much pressure on the nerves  Burning and stinging, which may be caused by too much pressure on the skin  Excessive swelling below the cast, which may mean the cast is slowing your blood circulation  Loss of active movement of toes, which request an urgent evaluation  Loss of “capillary refill”.  Pinch the tip of toes and merced the skin.  Release pressure and if the skin does not return pink then call the office immediately.      DO NOT GET YOUR CAST WET.   Bacteria thrive in moist dark areas.  We do not want this.   If your cast becomes wet, return to the office and we will apply another one.    PAIN AFTER SURGERY:  Narcotic pain medication can and will depress your respiratory system if taken in excess.  The goal of pain management with narcotics is to be comfortable not pain free.  If you take enough narcotics to be pain free then you run the risk of  stopping breathing.  If this happens, call 911 immediately!  Pain in the heel is often  caused by pressure from the weight of your foot on the bed.  Make sure your heel is suspended off the bed by keeping a pillow underneath your calf not your knee.    Medications:  You will be given narcotic pain medication. Do NOT drive while taking narcotic medications. Medications such as Darvocet, Percocet, Vicoden or Tylenol #3, also contain acetaminophen (Tylenol). Do not take acetaminophen or Tylenol from home when taking theses medications. When you fill your prescription, you may ask the pharmacist if your pain medication has acetaminophen/Tylenol in it. It is okay to take Tylenol with Oxycontin/Oxycodone. Should you have pain after taking your prescription medication, ibuprophen (Motrin, Advil, and Alleve) is a common over the counter preparation and may often be taken with the prescription pain medication as long as you take them with food. These medications can irritate the stomach lining.   Unless you are allergic to aspirin or currently taking a blood thinner, Dr. Lachmans’ patients are requested to take one 81 mg aspirin every 12 hours until you are back to walking normally after surgery (This can be up to 6 weeks).  Narcotic medications commonly cause nausea. Taking them with food will decrease this side effect. If you are having extreme nausea, please contact us for an alternative medication or for something that can be taken with this medication to decrease the nausea.   Also, narcotic medications frequently cause constipation. An increase of fiber, fruits and vegetables in your diet may alleviate this problem, or if necessary, you may use an over-the-counter medication such as senekot, colace, or Fibercon for constipation problems.   You should resume all medications you were taking prior to the surgery unless otherwise specified.     Activity:   Because of your recent foot surgery, your activity level will  decrease. You will need to elevate your foot ABOVE the level of your heart for a minimum of four days. The length of time necessary for the swelling to go down, and for your wounds to heal properly depends greatly on your efforts here. Elevation is extremely important to avoid compromising the blood supply to your foot. Remember when your foot is down it will swell, which will increase pain and slow healing. Wiggle your toes frequently if possible.   If you go home with a regional block, (a type of anesthesia) the foot and leg will be numb. Think of ways to get into your house and around the house until the block wears off.   Keep in mind that it may be a legal issue if you drive while in a cast or splint, especially when the splint is on the right foot. You may call the Department of [employer] to schedule a road test if you have adaptive equipment applied to your car.   The amount of weight you are allowed to bear on your foot will be written on your discharge sheet filled out at the time of surgery. The following is an explanation of the possibilities:       COVID-19 Policies  [facility] has the following policies when it comes to ELECTIVE surgery  No elective surgery requiring anesthesia until 7 weeks after a patient tested positive for COVID-19   No elective surgery requiring anesthesia until 3 months after a patient was hospitalized for COVID-19      Non-weight bearing:   You are to put NO weight whatsoever on your foot. When using crutches or a walker, your foot should not touch the ground, except when you are standing. Then, it may rest on the ground. If you are to be non-weight bearing, and you are not compliant, you could compromise the surgery.     Some of our patients have been requesting prescriptions for a roll-a-bout knee scooter. [employer] and other insurances have been denying these claims, and you may either have to rent one or pay out of pocket to purchase one.  THIS SHOULD BE  PURCHASED PRIOR TO THE SURGERY AND YOU SHOULD BRING IT WITH YOU THE DAY OF THE SURGERY TO AIDE IN GETTING FROM THE CAR INTO THE HOUSE AFTER SURGERY.

## 2025-04-29 ENCOUNTER — ANESTHESIA (OUTPATIENT)
Dept: ANESTHESIOLOGY | Facility: HOSPITAL | Age: 70
End: 2025-04-29

## 2025-04-29 ENCOUNTER — ANESTHESIA EVENT (OUTPATIENT)
Dept: ANESTHESIOLOGY | Facility: HOSPITAL | Age: 70
End: 2025-04-29

## 2025-04-30 ENCOUNTER — TELEPHONE (OUTPATIENT)
Dept: NEUROLOGY | Facility: CLINIC | Age: 70
End: 2025-04-30

## 2025-04-30 ENCOUNTER — ANESTHESIA EVENT (OUTPATIENT)
Dept: PERIOP | Facility: AMBULARY SURGERY CENTER | Age: 70
End: 2025-04-30
Payer: MEDICARE

## 2025-04-30 NOTE — PRE-PROCEDURE INSTRUCTIONS
Pre-Surgery Instructions:   Medication Instructions    B COMPLEX VITAMINS PO Hold day of surgery.    Biotin 2.5 MG TABS Hold day of surgery.    bisacodyl (DULCOLAX) 5 mg EC tablet Hold day of surgery.    buPROPion (Wellbutrin XL) 150 mg 24 hr tablet Take night before surgery    calcium carbonate (OS-MIKE) 600 MG tablet Hold day of surgery.    clonazePAM (KlonoPIN) 0.5 mg tablet Take day of surgery.    DULoxetine (CYMBALTA) 30 mg delayed release capsule Take night before surgery    DULoxetine (CYMBALTA) 60 mg delayed release capsule Take night before surgery    esomeprazole (NexIUM) 20 mg capsule Take day of surgery.    Galcanezumab-gnlm (Emgality) 120 MG/ML SOAJ Take normal schedule    losartan (COZAAR) 25 mg tablet Hold day of surgery.    Magnesium 400 MG TABS Hold day of surgery.    metFORMIN (GLUCOPHAGE) 500 mg tablet Hold day of surgery.    multivitamin (THERAGRAN) TABS Hold day of surgery.    oxyCODONE (ROXICODONE) 10 MG TABS Take day of surgery.    polyethylene glycol (MIRALAX) 17 g packet Hold day of surgery.    Riboflavin 100 MG TABS Hold day of surgery.    rosuvastatin (CRESTOR) 20 MG tablet Take night before surgery    senna-docusate sodium (SENOKOT S) 8.6-50 mg per tablet Hold day of surgery.    sorbitol 70 % solution Hold day of surgery.    Ubrogepant (Ubrelvy) 100 MG tablet Uses PRN- OK to take day of surgery      Medication instructions for day of surgery reviewed. Please take all instructed medications with only a sip of water.       You will receive a call one business day prior to surgery with an arrival time and hospital directions. If your surgery is scheduled on a Monday, the hospital will be calling you on the Friday prior to your surgery. If you have not heard from anyone by 8pm, please call the hospital supervisor through the hospital  at [phone]. ([place] [phone] or [place] [phone]).    Do not eat or drink anything after midnight the night before your surgery,  including candy, mints, lifesavers, or chewing gum. Do not drink alcohol 24hrs before your surgery. Try not to smoke at least 24hrs before your surgery.       Follow the pre surgery showering instructions as listed in the “My Surgical Experience Booklet” or otherwise provided by your surgeon's office. Do not use a blade to shave the surgical area 1 week before surgery. It is okay to use a clean electric clippers up to 24 hours before surgery. Do not apply any lotions, creams, including makeup, cologne, deodorant, or perfumes after showering on the day of your surgery. Do not use dry shampoo, hair spray, hair gel, or any type of hair products.     No contact lenses, eye make-up, or artificial eyelashes. Remove nail polish, including gel polish, and any artificial, gel, or acrylic nails if possible. Remove all jewelry including rings and body piercing jewelry.     Wear causal clothing that is easy to take on and off. Consider your type of surgery.    Keep any valuables, jewelry, piercings at home. Please bring any specially ordered equipment (sling, braces) if indicated.    Arrange for a responsible person to drive you to and from the hospital on the day of your surgery. Please confirm the visitor policy for the day of your procedure when you receive your phone call with an arrival time.     Call the surgeon's office with any new illnesses, exposures, or additional questions prior to surgery.    Please reference your “My Surgical Experience Booklet” for additional information to prepare for your upcoming surgery.

## 2025-04-30 NOTE — TELEPHONE ENCOUNTER
Received via [employer] request for prior auth for Ubrelvy.  Request scanned into [employer].

## 2025-05-01 ENCOUNTER — HOSPITAL ENCOUNTER (OUTPATIENT)
Facility: AMBULARY SURGERY CENTER | Age: 70
Setting detail: OUTPATIENT SURGERY
Discharge: HOME | End: 2025-05-01
Attending: ORTHOPAEDIC SURGERY | Admitting: ORTHOPAEDIC SURGERY
Payer: MEDICARE

## 2025-05-01 ENCOUNTER — ANESTHESIA (OUTPATIENT)
Dept: PERIOP | Facility: AMBULARY SURGERY CENTER | Age: 70
End: 2025-05-01
Payer: MEDICARE

## 2025-05-01 VITALS
HEART RATE: 89 BPM | TEMPERATURE: 97.9 F | HEIGHT: 65 IN | BODY MASS INDEX: 27.32 KG/M2 | RESPIRATION RATE: 16 BRPM | DIASTOLIC BLOOD PRESSURE: 58 MMHG | WEIGHT: 164 LBS | OXYGEN SATURATION: 94 % | SYSTOLIC BLOOD PRESSURE: 112 MMHG

## 2025-05-01 DIAGNOSIS — S86.312A TEAR OF PERONEAL TENDON OF LEFT FOOT: Primary | ICD-10-CM

## 2025-05-01 LAB
GLUCOSE SERPL-MCNC: 107 MG/DL (ref 65–140)
GLUCOSE SERPL-MCNC: 93 MG/DL (ref 65–140)

## 2025-05-01 PROCEDURE — 28200 REPAIR OF FOOT TENDON: CPT | Performed by: ORTHOPAEDIC SURGERY

## 2025-05-01 PROCEDURE — 82948 REAGENT STRIP/BLOOD GLUCOSE: CPT

## 2025-05-01 RX ORDER — PROPOFOL 10 MG/ML
INJECTION, EMULSION INTRAVENOUS AS NEEDED
Status: DISCONTINUED | OUTPATIENT
Start: 2025-05-01 | End: 2025-05-01

## 2025-05-01 RX ORDER — FENTANYL CITRATE 50 UG/ML
INJECTION, SOLUTION INTRAMUSCULAR; INTRAVENOUS AS NEEDED
Status: DISCONTINUED | OUTPATIENT
Start: 2025-05-01 | End: 2025-05-01

## 2025-05-01 RX ORDER — SODIUM CHLORIDE, SODIUM LACTATE, POTASSIUM CHLORIDE, CALCIUM CHLORIDE 600; 310; 30; 20 MG/100ML; MG/100ML; MG/100ML; MG/100ML
20 INJECTION, SOLUTION INTRAVENOUS CONTINUOUS
Status: DISCONTINUED | OUTPATIENT
Start: 2025-05-01 | End: 2025-05-01 | Stop reason: HOSPADM

## 2025-05-01 RX ORDER — ONDANSETRON 2 MG/ML
INJECTION INTRAMUSCULAR; INTRAVENOUS AS NEEDED
Status: DISCONTINUED | OUTPATIENT
Start: 2025-05-01 | End: 2025-05-01

## 2025-05-01 RX ORDER — OXYCODONE HYDROCHLORIDE 5 MG/1
5 TABLET ORAL ONCE AS NEEDED
Status: DISCONTINUED | OUTPATIENT
Start: 2025-05-01 | End: 2025-05-01 | Stop reason: HOSPADM

## 2025-05-01 RX ORDER — SODIUM CHLORIDE, SODIUM LACTATE, POTASSIUM CHLORIDE, CALCIUM CHLORIDE 600; 310; 30; 20 MG/100ML; MG/100ML; MG/100ML; MG/100ML
INJECTION, SOLUTION INTRAVENOUS CONTINUOUS PRN
Status: DISCONTINUED | OUTPATIENT
Start: 2025-05-01 | End: 2025-05-01

## 2025-05-01 RX ORDER — ASPIRIN 81 MG/1
81 TABLET ORAL 2 TIMES DAILY
Qty: 84 TABLET | Refills: 0 | Status: SHIPPED | OUTPATIENT
Start: 2025-05-01 | End: 2025-06-12

## 2025-05-01 RX ORDER — ONDANSETRON 4 MG/1
4 TABLET, FILM COATED ORAL EVERY 8 HOURS PRN
Qty: 10 TABLET | Refills: 0 | Status: SHIPPED | OUTPATIENT
Start: 2025-05-01

## 2025-05-01 RX ORDER — OXYCODONE HYDROCHLORIDE 5 MG/1
5 TABLET ORAL EVERY 4 HOURS PRN
Qty: 30 TABLET | Refills: 0 | Status: SHIPPED | OUTPATIENT
Start: 2025-05-01

## 2025-05-01 RX ORDER — PROPOFOL 10 MG/ML
INJECTION, EMULSION INTRAVENOUS CONTINUOUS PRN
Status: DISCONTINUED | OUTPATIENT
Start: 2025-05-01 | End: 2025-05-01

## 2025-05-01 RX ORDER — CEFAZOLIN SODIUM 2 G/50ML
2000 SOLUTION INTRAVENOUS ONCE
Status: DISCONTINUED | OUTPATIENT
Start: 2025-05-01 | End: 2025-05-01 | Stop reason: HOSPADM

## 2025-05-01 RX ORDER — SODIUM CHLORIDE, SODIUM LACTATE, POTASSIUM CHLORIDE, CALCIUM CHLORIDE 600; 310; 30; 20 MG/100ML; MG/100ML; MG/100ML; MG/100ML
125 INJECTION, SOLUTION INTRAVENOUS CONTINUOUS
Status: DISCONTINUED | OUTPATIENT
Start: 2025-05-01 | End: 2025-05-01 | Stop reason: HOSPADM

## 2025-05-01 RX ORDER — CHLORHEXIDINE GLUCONATE ORAL RINSE 1.2 MG/ML
15 SOLUTION DENTAL ONCE
Status: COMPLETED | OUTPATIENT
Start: 2025-05-01 | End: 2025-05-01

## 2025-05-01 RX ORDER — DEXAMETHASONE SODIUM PHOSPHATE 10 MG/ML
INJECTION, SOLUTION INTRAMUSCULAR; INTRAVENOUS AS NEEDED
Status: DISCONTINUED | OUTPATIENT
Start: 2025-05-01 | End: 2025-05-01

## 2025-05-01 RX ORDER — MAGNESIUM HYDROXIDE 1200 MG/15ML
LIQUID ORAL AS NEEDED
Status: DISCONTINUED | OUTPATIENT
Start: 2025-05-01 | End: 2025-05-01 | Stop reason: HOSPADM

## 2025-05-01 RX ORDER — MIDAZOLAM HYDROCHLORIDE 2 MG/2ML
INJECTION, SOLUTION INTRAMUSCULAR; INTRAVENOUS AS NEEDED
Status: DISCONTINUED | OUTPATIENT
Start: 2025-05-01 | End: 2025-05-01

## 2025-05-01 RX ORDER — BUPIVACAINE HYDROCHLORIDE 5 MG/ML
INJECTION, SOLUTION EPIDURAL; INTRACAUDAL; PERINEURAL
Status: COMPLETED | OUTPATIENT
Start: 2025-05-01 | End: 2025-05-01

## 2025-05-01 RX ORDER — VANCOMYCIN HYDROCHLORIDE 1 G/20ML
INJECTION, POWDER, LYOPHILIZED, FOR SOLUTION INTRAVENOUS AS NEEDED
Status: DISCONTINUED | OUTPATIENT
Start: 2025-05-01 | End: 2025-05-01 | Stop reason: HOSPADM

## 2025-05-01 RX ORDER — LIDOCAINE HYDROCHLORIDE 10 MG/ML
INJECTION, SOLUTION EPIDURAL; INFILTRATION; INTRACAUDAL; PERINEURAL AS NEEDED
Status: DISCONTINUED | OUTPATIENT
Start: 2025-05-01 | End: 2025-05-01

## 2025-05-01 RX ORDER — CHLORHEXIDINE GLUCONATE 40 MG/ML
SOLUTION TOPICAL DAILY PRN
Status: DISCONTINUED | OUTPATIENT
Start: 2025-05-01 | End: 2025-05-01 | Stop reason: HOSPADM

## 2025-05-01 RX ORDER — ONDANSETRON 2 MG/ML
4 INJECTION INTRAMUSCULAR; INTRAVENOUS ONCE AS NEEDED
Status: DISCONTINUED | OUTPATIENT
Start: 2025-05-01 | End: 2025-05-01 | Stop reason: HOSPADM

## 2025-05-01 RX ORDER — FENTANYL CITRATE/PF 50 MCG/ML
25 SYRINGE (ML) INJECTION
Status: DISCONTINUED | OUTPATIENT
Start: 2025-05-01 | End: 2025-05-01 | Stop reason: HOSPADM

## 2025-05-01 RX ADMIN — LIDOCAINE HYDROCHLORIDE 50 MG: 10 INJECTION, SOLUTION EPIDURAL; INFILTRATION; INTRACAUDAL at 11:52

## 2025-05-01 RX ADMIN — PROPOFOL 50 MCG/KG/MIN: 10 INJECTION, EMULSION INTRAVENOUS at 11:52

## 2025-05-01 RX ADMIN — DEXAMETHASONE SODIUM PHOSPHATE 10 MG: 10 INJECTION INTRAMUSCULAR; INTRAVENOUS at 11:55

## 2025-05-01 RX ADMIN — BUPIVACAINE 20 ML: 13.3 INJECTION, SUSPENSION, LIPOSOMAL INFILTRATION at 10:22

## 2025-05-01 RX ADMIN — PROPOFOL 150 MG: 10 INJECTION, EMULSION INTRAVENOUS at 11:52

## 2025-05-01 RX ADMIN — SODIUM CHLORIDE, SODIUM LACTATE, POTASSIUM CHLORIDE, AND CALCIUM CHLORIDE: .6; .31; .03; .02 INJECTION, SOLUTION INTRAVENOUS at 09:29

## 2025-05-01 RX ADMIN — CHLORHEXIDINE GLUCONATE 15 ML: 1.2 SOLUTION ORAL at 09:18

## 2025-05-01 RX ADMIN — ONDANSETRON 4 MG: 2 INJECTION INTRAMUSCULAR; INTRAVENOUS at 11:55

## 2025-05-01 RX ADMIN — BUPIVACAINE HYDROCHLORIDE 10 ML: 5 INJECTION, SOLUTION EPIDURAL; INTRACAUDAL; PERINEURAL at 10:22

## 2025-05-01 RX ADMIN — FENTANYL CITRATE 50 MCG: 50 INJECTION INTRAMUSCULAR; INTRAVENOUS at 12:21

## 2025-05-01 RX ADMIN — FENTANYL CITRATE 50 MCG: 50 INJECTION INTRAMUSCULAR; INTRAVENOUS at 10:20

## 2025-05-01 RX ADMIN — PHENYLEPHRINE HYDROCHLORIDE 70 MCG/MIN: 50 INJECTION INTRAVENOUS at 11:52

## 2025-05-01 RX ADMIN — MIDAZOLAM 2 MG: 1 INJECTION INTRAMUSCULAR; INTRAVENOUS at 10:20

## 2025-05-01 NOTE — INTERVAL H&P NOTE
H&P reviewed. After examining the patient I find no changes in the patients condition since the H&P had been written.    Vitals:    05/01/25 0924   BP: 121/72   Pulse: 99   Resp: 20   Temp: (!) 97.2 °F (36.2 °C)   SpO2: 95%     Left peroneal tendon exploration with repair/debridement/tenodesis/reconstruction as necessary

## 2025-05-01 NOTE — TELEPHONE ENCOUNTER
Ubrelvy PA initiated on CMM. Key [id number]    Awaiting determination    [facility] has requested that NO attachments be included for Medicare Part D ePA cases. For questions, please contact [facility] at [phone].

## 2025-05-01 NOTE — ANESTHESIA PROCEDURE NOTES
Peripheral Block    Patient location during procedure: holding area  Start time: 5/1/2025 10:22 AM  Reason for block: at surgeon's request and post-op pain management  Staffing  Performed by: Gracie Panda MD  Authorized by: Gracie Panda MD    Preanesthetic Checklist  Completed: patient identified, IV checked, site marked, risks and benefits discussed, surgical consent, monitors and equipment checked, pre-op evaluation and timeout performed  Peripheral Block  Patient position: supine  Prep: ChloraPrep  Patient monitoring: frequent blood pressure checks, continuous pulse oximetry and heart rate  Block type: Popliteal  Laterality: left  Injection technique: single-shot  Procedures: ultrasound guided, Ultrasound guidance required for the procedure to increase accuracy and safety of medication placement and decrease risk of complications.  Ultrasound permanent image saved  bupivacaine liposomal (EXPAREL) 1.3 % injection 20 mL - Perineural   20 mL - 5/1/2025 10:22:00 AM  bupivacaine (PF) (MARCAINE) 0.5 % injection 20 mL - Perineural   10 mL - 5/1/2025 10:22:00 AM  Needle  Needle type: Stimuplex   Needle gauge: 20 G  Needle length: 4 in  Needle localization: anatomical landmarks and ultrasound guidance  Assessment  Injection assessment: incremental injection, frequent aspiration, injected with ease, negative aspiration, negative for heart rate change, no paresthesia on injection, no symptoms of intraneural/intravenous injection and needle tip visualized at all times  Paresthesia pain: none  Post-procedure:  site cleaned  patient tolerated the procedure well with no immediate complications

## 2025-05-01 NOTE — OP NOTE
OPERATIVE REPORT  PATIENT NAME: Aniya Jones    :  1955  MRN: [id number]  Pt Location: [facility] OR ROOM 06    SURGERY DATE: 2025    Surgeons and Role:     * James R Lachman, MD - Primary     * Misty Jordan PA-C - Assisting     * Denis Diop MD - Assisting    Preop Diagnosis:  Tear of peroneal tendon of left foot [S86.312A]    Post-Op Diagnosis Codes:     * Tear of peroneal tendon of left foot [S86.312A]    Procedure(s):  Left - Peroneal tendon exploration. possible repair/ debridement/tenodesis/ reconstruction as necessary    Specimen(s):  * No specimens in log *    Estimated Blood Loss:   Minimal    Drains:  * No LDAs found *    Anesthesia Type:   Choice    Operative Indications:  Tear of peroneal tendon of left foot [S86.312A]      Operative Findings:  Consistent with diagnosis      Complications:   None    Procedure and Technique:  Repair of peroneus brevis split tear  Excision of low lying peroneus brevis muscle belly  Excision of peroneus quartus muscle  Synovectomy peroneal tendon sheath  Placement into short leg nonweightbearing plaster splint.    A curvilinear incision was made along the lateral ankle centered over the peroneal tendons just posterior to the fibula.  Upon incising the skin, the peroneal retinaculum was noted and then split longitudinally to expose the tendons. We were careful to avoid the sural nerve and we left a 1mm cuff off of the posterior fibula to ease in repair of the retinaculum.     The peroneal tendons were evaluated and the peroneus longus was found to be intact.  The peroneus brevis was found to have a longitudinal split tear with the split  50% of the tendon from the other 50%.  Both limbs were in good shape however and so the decision was made to repair them. There were some adhesions, the tendons were tenolysed. The tendons easily reduced into the groove but we retracted them anteriorly to continue the inspection.      After concluding there  were no other tendon injuries, we noted a distal lying peroneus brevis muscle belly 2.5cm distal to the tip of the fibula. There was also a peroneus quartus which appeared frayed that we excised. We minimally debrided this muscle and then debrided the frayed fibers from the peroneus brevis tear.    We used a 2-0 ethibond in a running fashion to repair the split tear ensuring the knots were not within the peroneal groove behind the fibula.     The tendons were reduced and glided well with ankle ranging.        The retinaculum and the periosteal flap were reinforced with Ethibond suture. The foot was then taken through an aggressive range of motion to ensure the strength of the repair.     The wound was copiously irrigated. Vancomycin powder was spread in the wound bed.  We then closed the wound in a layers fashion with 2-0 vicryl deep, 4-0 vicryl superficially, and then 3-0 nylon in the skin.     The ankle was splinted in neutral dorsiflexion and slight hindfoot eversion.     I was present for the entire procedure.    Patient Disposition:  PACU              SIGNATURE: James R Lachman, MD  DATE: May 1, 2025  TIME: 11:43 AM

## 2025-05-01 NOTE — ANESTHESIA PREPROCEDURE EVALUATION
Procedure:  Peroneal tendon exploration, possible repair/ debridement/tenodesis/ reconstruction as necessary (Left: Foot)    Relevant Problems   CARDIO   (+) Headache, chronic migraine without aura   (+) Hypercholesterolemia   (+) Hypertension, essential   (+) Migraine without status migrainosus, not intractable      ENDO   (+) Type 2 diabetes mellitus with other specified complication, without long-term current use of insulin (HCC)      GI/HEPATIC   (+) Chronic GERD   (+) Fatty liver disease, nonalcoholic   (+) Hiatal hernia      MUSCULOSKELETAL   (+) Chronic midline low back pain with bilateral sciatica   (+) Facet arthritis of lumbosacral region   (+) Fibromyalgia   (+) Hiatal hernia   (+) Primary localized osteoarthritis of right knee      NEURO/PSYCH   (+) Bilateral occipital neuralgia   (+) Chronic midline low back pain with bilateral sciatica   (+) Chronic neck pain   (+) Continuous opioid dependence (HCC)   (+) Depression with anxiety   (+) Fibromyalgia   (+) SY (generalized anxiety disorder)   (+) Headache, chronic migraine without aura   (+) Major depression, recurrent, chronic (HCC)   (+) Medication overuse headache   (+) Migraine without status migrainosus, not intractable   (+) Uncontrolled morning headache   (+) Worsening headaches      PULMONARY   (+) JAMES (obstructive sleep apnea)      Plan on L popliteal block for post op pain control per surgeon request     Risk of regional block discussed including infection, bleeding, damage to underlying structures - including nerves which can lead to permanent numbness or paralysis.       Physical Exam    Airway    Mallampati score: II  TM Distance: >3 FB  Neck ROM: full     Dental   No notable dental hx     Cardiovascular      Pulmonary      Other Findings  post-pubertal.      Anesthesia Plan  ASA Score- 3     Anesthesia Type- general with ASA Monitors.         Additional Monitors:     Airway Plan: LMA.           Plan Factors-    Chart reviewed.    Patient  summary reviewed.    Patient is not a current smoker.      Obstructive sleep apnea risk education given perioperatively.        Induction- intravenous.    Postoperative Plan-     Perioperative Resuscitation Plan - Level 1 - Full Code.       Informed Consent- Anesthetic plan and risks discussed with patient.  I personally reviewed this patient with the CRNA. Discussed and agreed on the Anesthesia Plan with the CRNA..      NPO Status:  Vitals Value Taken Time   Date of last liquid 04/30/25 05/01/25 0930   Time of last liquid 2230 05/01/25 0930   Date of last solid 04/30/25 05/01/25 0930   Time of last solid 2130 05/01/25 0930

## 2025-05-01 NOTE — ANESTHESIA POSTPROCEDURE EVALUATION
Post-Op Assessment Note    CV Status:  Stable    Pain management: satisfactory to patient       Mental Status:  Awake   Hydration Status:  Stable   PONV Controlled:  None   Airway Patency:  Patent     Post Op Vitals Reviewed: Yes    No anethesia notable event occurred.    Staff: Anesthesiologist           Last Filed PACU Vitals:  Vitals Value Taken Time   Temp 97.7 °F (36.5 °C) 05/01/25 1255   Pulse 83 05/01/25 1255   /57 05/01/25 1255   Resp 12 05/01/25 1255   SpO2 95 % 05/01/25 1255       Modified Myra:     Vitals Value Taken Time   Activity 2 05/01/25 1255   Respiration 2 05/01/25 1255   Circulation 2 05/01/25 1255   Consciousness 2 05/01/25 1255   Oxygen Saturation 2 05/01/25 1255     Modified Myra Score: 10

## 2025-05-01 NOTE — DISCHARGE INSTR - AVS FIRST PAGE
James R Lachman, M.D.  Attending, Orthopaedic Surgery  [facility]  [place] Office Phone: [phone] ? Fax: [phone]  [place] Office Phone: [phone] ? Fax:[phone]    : Ana Rosas MA    Surgery Coordinators [place]: Tameka Parra, [phone]  Emilia Severo, [phone]  Surgery Coordinator [place]:  Misty Alarcon, [phone]                                                        Latha Philippe, [phone]                                                                                                                      [email or web address]   POST-OPERATIVE INSTRUCTIONS    General Information:  Typical post operative visits are at the following intervals:  2-3 weeks post surgery, 6 weeks post surgery, 3 months post surgery, 6 months post surgery, and then on a yearly basis.  However, this may change based on Dr. Lachmans’ recommendation.  #1 post-operative rule for foot/ankle surgery:  ONCE YOU ARE OUT OF YOUR CAST AND/OR REMOVABLE BOOT, SWELLING MAY PERSIST FOR MANY MONTHS.  YOU MIGHT ALSO EXPERIENCE A BLUISH DISCOLORATION OF YOUR LEG.  THIS IS NORMAL AND PART OF THE USUAL POSTOPERATIVE EXPERIENCE.  DO NOT WAIT UNTIL YOUR BLOCK WEARS OFF TO TAKE YOUR PAIN MEDICATION.  IT TAKES A FEW DOSES OF THE PAIN MEDICATION TO REACH A THERAPEUTIC LEVEL.  TAKE A TABLET PROACTIVELY BEFORE YOU HAVE ANY PAIN AND AGAIN 4 HOURS LATER SO WHEN THE BLOCK WEARS OFF, YOU ARE NOT CAUGHT OFF GUARD.    SMOKING:  Smoking results in incomplete healing of fractures (broken bones) and joints that my have been fused.  Smoking and nicotine also prevents the growth of bone into ankle replacements and bone healing.  It also slows the healing of muscles and skin (soft tissue).  Therefore, please do not have surgery if you continue to smoke.  We reserve the right to cancel your surgery if we suspect that you are smoking.  DO NOT use  nicorette gum or other patches.  Please find an alternative method to quit smoking before your surgery and do not restart after surgery to allow for healing.      THREE RULES:    After surgery you will most likely be given the instructions “KEEP YOUR TOES ABOVE YOUR NOSE.”  This means that you MUST have your feet elevated higher than your heart.  Keeping your toes above your nose helps to heal the muscles and skin (soft tissues) by reducing swelling in your leg.  This position also helps to prevent infection, and is very important in avoiding deep venous thrombosis (blood clots).    In order to keep the blood circulating in your legs and in order to avoid deep vein   thrombosis (blood clots), we ask patients to GET UP ONCE AN HOUR during the day.  This means you should at least cross the room and come back.  It does not mean you have to be up for long periods of time.  In most cases we will not have people immediately put any weight on their operated part.  This is important to prevent loosening of metal or other devices holding the bones together.  It also prevents irritation of the soft tissues which can lead to prolonged healing.  When we say get up once an hour, please walk, hop or move with an assisted device.  This is important!    Do not do any excessive walking during the first few days after surgery.  Recovering from surgery is a full-time task for the patient.  Postoperative care is important to avoid irritating the skin incision, which can lead to infection.  Please do not plan activities or go out of town for several weeks after surgery.        AFTER YOUR SURGERY:  Bleeding through the bandage almost always occurs.  Do not let this alarm you.  Simply overwrap with an ABD pad and Ace bandage (The nurses discharging your from the day of surgery will provide this.)   If you think it is excessive, you can come in early for a dressing change (at around 1 week instead of 3 weeks postop.)    Do not get the  bandage wet.  Showering is possible with plastic protectors.   Be very careful, as the bathroom can be wet and slippery.  If you do get your dressing wet, it should be changed immediately.  Please contact us.      ONCE YOUR ARE OUT OF YOUR CAST AND/OR REMOVABLE BOOT, SWELLING MAY PERSIST FOR MANY MONTHS.  THERE WILL ALSO BE A BLUISH DISCOLORATION OF YOUR LEG FOR MONTHS.  THIS IS NORMAL AND PART OF THE USUAL POSTOPERATIVE EXPERIENCE.  WEARING COMPRESSION HOSE (ELASTIC STOCKINGS) CAN HELP AVOID SOME OF THIS SWELLING.    Ice the area 20 minutes every hour once the nerve block wears off. If you are in a cast or a splint, you may need to leave the ice on longer than 20 minutes in order to feel any benefits.       DRESSING:   The purpose of the surgical dressing is to keep your wound and the surgical site protected from the environment.  Most dressings contain splints, which help to hold your foot and ankle in a corrected position, and also allow the surgical site to heal properly. IF YOU GO TO THE EMERGENCY ROOM FOR ANY REASON, AND THEY REMOVE YOUR DRESSING OR SPLINT, YOU MUST BE SEEN IN DR. LACHMANS CLINIC TO HAVE IT REPLACED) AS SOON AS POSSIBLE (IDEALLY THE NEXT DAY).   If you have a drain in place, this will need to be removed in 1 day after surgery.  The time for the drain to be pulled will be written on your discharge instruction sheet.    CAST  INSTRUCTIONS:  You may or may not get a cast following surgery.  If you do, pay close attention to the following:    After application of a splint or cast, it is very important to elevate your leg for 24 to 72 hours.   The injured area should be elevated well above the heart.   Remember “Toes above your Nose”.  Rest and elevation greatly reduce pain and speed the healing process by minimizing early swelling.    CALL YOUR DOCTORS OFFICE OR VISIT LOCATION EMERGENCY ROOM IF YOU HAVE ANY OF THE FOLLOWING:    Significant increased pain, which may be caused by swelling (Strict  elevation will alleviate this)  Numbness and tingling in your hand or foot, which may be caused by too much pressure on the nerves (There is always numbness after surgery due to nerve blocks)  Burning and stinging, which may be caused by too much pressure on the skin  Excessive swelling below the cast, which may mean the cast is slowing your blood circulation  Loss of active movement of toes, which request an urgent evaluation (if you have had a nerve block, this is an expected thing and totally normal)  Loss of “capillary refill”.  Pinch the tip of toes and merced the skin.  Release pressure and if the skin does not return pink then call the office immediately.      DO NOT GET YOUR CAST WET.   Bacteria thrive in moist dark areas.  We do not want this.   If your cast becomes wet, return to the office and we will apply another one.    PAIN AFTER SURGERY:  Narcotic pain medication can and will depress your respiratory system if taken in excess.  The goal of pain management with narcotics is to be comfortable not pain free.  If you take enough narcotics to be pain free then you run the risk of stopping breathing.  If this happens, call 911 immediately!  Pain in the heel is often  caused by pressure from the weight of your foot on the bed.  Make sure your heel is suspended off the bed by keeping a pillow underneath your calf not your knee.    Medications:  You will be given narcotic pain medication. Do NOT drive while taking narcotic medications. Medications such as Darvocet, Percocet, Vicoden or Tylenol #3, also contain acetaminophen (Tylenol). Do not take acetaminophen or Tylenol from home when taking theses medications. When you fill your prescription, you may ask the pharmacist if your pain medication has acetaminophen/Tylenol in it. It is okay to take Tylenol with Oxycontin/Oxycodone.   Unless you are allergic to aspirin or currently taking a blood thinner, Dr. Lachmans’ patients are requested to take one 81 mg  aspirin every 12 hours until you are back to walking normally after surgery (This can be up to 6 weeks). Ecotrin (Enteric-coated aspirin) is more sensitive to the stomach and we recommend purchasing this instead of regular aspirin to minimize the risk of stomach irritation.  Narcotic medications commonly cause nausea. Taking them with food will decrease this side effect. If you are having extreme nausea, please contact us for an alternative medication or for something that can be taken with this medication to decrease the nausea.   Also, narcotic medications frequently cause constipation. An increase of fiber, fruits and vegetables in your diet may alleviate this problem, or if necessary, you may use an over-the-counter medication such as senekot, colace, or Fibercon for constipation problems.   You should resume all medications you were taking prior to the surgery unless otherwise specified.   If you had fracture surgery, bony surgery like an osteotomy or fusion, or a surgery that requires bone healing, you are advised to take Vitamin D and Calcium to improve healing potential.  Vitamin D3 4000 units/day and Calcium 1200mg/day. These are over the counter medications so please pick them up at the pharmacy when you are picking up your prescriptions.    Activity:   Because of your recent foot surgery, your activity level will decrease. You will need to elevate your foot ABOVE the level of your heart for a minimum of four days. The length of time necessary for the swelling to go down, and for your wounds to heal properly depends greatly on your efforts here. Elevation is extremely important to avoid compromising the blood supply to your foot. Remember when your foot is down it will swell, which will increase pain and slow healing. Wiggle your toes frequently if possible.   If you go home with a regional block, (a type of anesthesia) the foot and leg will be numb. Think of ways to get into your house and around the  house until the block wears off.   Keep in mind that it may be a legal issue if you drive while in a cast or splint, especially when the splint is on the right foot. You may call the Department of Motor Vehicles to schedule a road test if you have adaptive equipment applied to your car.   The amount of weight you are allowed to bear on your foot will be written on your discharge sheet filled out at the time of surgery. The following is an explanation of the possibilities:     Non-weight bearing:   You are to put NO weight whatsoever on your foot. When using crutches or a walker, your foot should not touch the ground, except when you are standing. Then, it may rest on the ground. If you are to be non-weight bearing, and you are not compliant, you could compromise the surgery.     Some of our patients have been requesting prescriptions for a roll-a-bout knee scooter. [employer] and other insurances have been denying these claims, and you may either have to rent one or pay out of pocket to purchase one.

## 2025-05-05 NOTE — TELEPHONE ENCOUNTER
Ubrelvy PA approved through 5/1/26    Approval letter faxed to [facility] at  [phone]

## 2025-05-09 ENCOUNTER — TELEPHONE (OUTPATIENT)
Age: 70
End: 2025-05-09

## 2025-05-09 NOTE — TELEPHONE ENCOUNTER
Patient is calling regarding cancelling an appointment.    Date/Time: 5/23 @ 3:30    Reason: patient    Patient was rescheduled: YES [x] NO []  If yes, when was Patient reschedule for: 6/6 @ 1:30    Patient requesting call back to reschedule: YES [] NO [x]

## 2025-05-15 ENCOUNTER — TELEPHONE (OUTPATIENT)
Age: 70
End: 2025-05-15

## 2025-05-15 NOTE — TELEPHONE ENCOUNTER
Patient called to cancel this afternoon's virtual appointment.  Unable to cancel since marked as arrived.  Please cancel.  Patient will call back to reschedule.

## 2025-05-18 DIAGNOSIS — E78.00 HYPERCHOLESTEROLEMIA: ICD-10-CM

## 2025-05-18 RX ORDER — ROSUVASTATIN CALCIUM 20 MG/1
20 TABLET, COATED ORAL DAILY
Qty: 90 TABLET | Refills: 1 | Status: SHIPPED | OUTPATIENT
Start: 2025-05-18

## 2025-05-23 ENCOUNTER — OFFICE VISIT (OUTPATIENT)
Dept: OBGYN CLINIC | Facility: HOSPITAL | Age: 70
End: 2025-05-23

## 2025-05-23 VITALS — BODY MASS INDEX: 27.32 KG/M2 | HEIGHT: 65 IN | WEIGHT: 164 LBS

## 2025-05-23 DIAGNOSIS — S86.312A TEAR OF PERONEAL TENDON OF LEFT FOOT: Primary | ICD-10-CM

## 2025-05-23 NOTE — PROGRESS NOTES
"      James R Lachman, M.D.  Attending, Orthopaedic Surgery  Foot and Ankle  [facility]      ORTHOPAEDIC FOOT AND ANKLE POST-OP VISIT     Procedure:     Left peroneus brevis tendon repair, Excision of peroneus quartus       Date of surgery:   5/1/25      PLAN  1. Weightbearing Status- PWB operative extremity  2. DVT prophylaxis- ASA 81mg BID  3. Begin PT  4. Pain control- OTC pain medication  5. RTC in 3 week(s)  6. Xrays needed next visit -  No    History of Present Illness:     Chief Complaint:   Chief Complaint   Patient presents with    Post-op     Post op 3 weeks. Splint off and stiches out.   Peroneal Tendon Exploration And Repair - Left     Aniya Jones is a 69 y.o. female who is being seen for post-operative visit for the above procedure. Pain is well controlled and the patient has successfully transitioned to OTC pain medicines.  she is taking ASA 81mg BID for DVT prophylaxis. Patient has been NWB in a Splint.      Review of Systems:  General- denies fever/chills  Respiratory- denies cough or SOB  Cardio- denies chest pain or palpitations  GI- denies abdominal pain  Musculoskeletal- Negative except noted above  Skin- denies rashes or wounds    Physical Exam:   Ht 5' 5\" (1.651 m)   Wt 74.4 kg (164 lb)   BMI 27.29 kg/m²   General/Constitutional: No apparent distress: well-nourished and well developed.  Eyes: normal ocular motion  Lymphatic: No appreciable lymphadenopathy  Respiratory: Non-labored breathing  Vascular: No edema, swelling or tenderness, except as noted in detailed exam.  Integumentary: No impressive skin lesions present, except as noted in detailed exam.  Neuro: No ataxia or tremors noted  Psych: Normal mood and affect, oriented to person, place and time. Appropriate affect.  Musculoskeletal: Normal, except as noted in detailed exam and in HPI.    Examination    left        Incision Clean, dry, intact  Sutures Removed this visit    Ecchymosis none    Swelling Mild  "   Sensation Intact to light touch throughout sural, saphenous, superficial peroneal, deep peroneal and medial/lateral plantar nerve distributions.  [place]-Bam 5.07 filament (10g) testing deferred.    Cardiovascular Brisk capillary refill < 2 seconds,intact DP and PT pulses    Special Tests None      Imaging Studies:   No new imaging         James R. Lachman, MD  Foot & Ankle Surgery   Department of Orthopaedic Surgery  [facility]      I personally performed the service.    James R. Lachman, MD

## 2025-05-23 NOTE — PATIENT INSTRUCTIONS
7 days of partial weight bearing 50%  Then, 7 days of partial weight bearing 75%  Then, 7 days of partial weight bearing 90%  Then full weight bearing in the boot and wean your crutches/rolling walker.        Continue aspirin/lovenox for blood clot prevention  May shower, do not soak in a tub/pool/ocean/etc for another 4 weeks.  Begin PT  Scar massage- pea sized amount of lotion, massage into scar for 5 minutes each day.  Compression stocking (Knee high, 20-30mm Hg) to be worn at all times while awake.      Wear the boot at all times except when showering and in PT, even to sleep at night.

## 2025-05-27 DIAGNOSIS — F41.1 GAD (GENERALIZED ANXIETY DISORDER): ICD-10-CM

## 2025-05-27 RX ORDER — BUSPIRONE HYDROCHLORIDE 10 MG/1
10 TABLET ORAL 2 TIMES DAILY
Qty: 180 TABLET | Refills: 0 | Status: SHIPPED | OUTPATIENT
Start: 2025-05-27

## 2025-05-27 NOTE — TELEPHONE ENCOUNTER
Reason for call:   [x] Refill   [] Prior Auth  [] Other:     Office:   [] PCP/Provider -   [x] Specialty/Provider - Prosper Orozco     Medication: busPIRone (BUSPAR) 10 mg tablet     Dose/Frequency: Take 1 tablet (10 mg total) by mouth 2 (two) times a day     Quantity: 180    Pharmacy: [facility]   Does the patient have enough for 3 days?   [] Yes   [x] No - Send as HP to POD

## 2025-06-04 ENCOUNTER — TELEPHONE (OUTPATIENT)
Dept: PSYCHIATRY | Facility: CLINIC | Age: 70
End: 2025-06-04

## 2025-06-04 ENCOUNTER — DOCUMENTATION (OUTPATIENT)
Dept: PSYCHIATRY | Facility: CLINIC | Age: 70
End: 2025-06-04

## 2025-06-04 NOTE — PROGRESS NOTES
Psychotherapy Discharge Summary    Preferred Name: Art Jones  YOB: 1955    Admission date to psychotherapy: 12/14/2023    Referred by: Ariana Muir    Presenting Problem: depression, grief    Course of treatment included : individual therapy     Progress/Outcome of Treatment Goals (brief summary of course of treatment) individual therapy    Treatment Complications (if any): none    Treatment Progress: made progress in behavioral activation for depression    Current [facility] Provider: FE Verma    Discharge Medications include: wellbutrin, buspar, clonazepam, cymbalta    Discharge Date: 6/4/2025    Discharge Diagnosis: No diagnosis found.    Criteria for Discharge: completed treatment goals and objectives and is no longer in need of services    Patient is cleared to return to [facility] for continued treatment.    Rationale: if needed for further intervention for depression    Aftercare recommendations include (include specific referral names and phone numbers, if appropriate): return to OP if needed    Prognosis: good

## 2025-06-06 ENCOUNTER — TELEPHONE (OUTPATIENT)
Dept: PSYCHIATRY | Facility: CLINIC | Age: 70
End: 2025-06-06

## 2025-06-06 ENCOUNTER — TELEMEDICINE (OUTPATIENT)
Dept: PSYCHIATRY | Facility: CLINIC | Age: 70
End: 2025-06-06
Payer: MEDICARE

## 2025-06-06 DIAGNOSIS — F33.9 MAJOR DEPRESSION, RECURRENT, CHRONIC (HCC): ICD-10-CM

## 2025-06-06 DIAGNOSIS — F41.1 GAD (GENERALIZED ANXIETY DISORDER): ICD-10-CM

## 2025-06-06 PROCEDURE — 99214 OFFICE O/P EST MOD 30 MIN: CPT

## 2025-06-06 RX ORDER — CLONAZEPAM 0.5 MG/1
0.5 TABLET ORAL 3 TIMES DAILY
Qty: 90 TABLET | Refills: 2 | Status: SHIPPED | OUTPATIENT
Start: 2025-06-06

## 2025-06-06 RX ORDER — BUPROPION HYDROCHLORIDE 150 MG/1
150 TABLET ORAL DAILY
Qty: 90 TABLET | Refills: 0 | Status: SHIPPED | OUTPATIENT
Start: 2025-06-06

## 2025-06-06 NOTE — TELEPHONE ENCOUNTER
Writer called client after seeing her connect to missed 1:30 appointment and scheduled her in opening for 3:30 pm with Prosper Orozco.     MSPQ completed.

## 2025-06-12 ENCOUNTER — EVALUATION (OUTPATIENT)
Dept: PHYSICAL THERAPY | Facility: CLINIC | Age: 70
End: 2025-06-12
Payer: MEDICARE

## 2025-06-12 DIAGNOSIS — S86.312D TEAR OF PERONEAL TENDON, LEFT, SUBSEQUENT ENCOUNTER: Primary | ICD-10-CM

## 2025-06-12 PROCEDURE — 97161 PT EVAL LOW COMPLEX 20 MIN: CPT

## 2025-06-12 PROCEDURE — 97110 THERAPEUTIC EXERCISES: CPT

## 2025-06-12 NOTE — HOME EXERCISE EDUCATION
Program_ID:[id number]   Access Code: [email or web address]  URL: [email or web address]  Date: 06-  Prepared By: Veena Rogers    Program Notes      Exercises      - Long Sitting Calf Stretch with Strap - 2-3 x daily - 7 x weekly - 3 sets -  reps - 20 second hold      - Supine Ankle Dorsiflexion and Plantarflexion AROM - 2-3 x daily - 7 x weekly - 1-2 sets - 10 reps      - Supine Ankle Inversion and Eversion AROM - 2-3 x daily - 7 x weekly - 1-2 sets - 10 reps      - Seated Toe Curl - 2-3 x daily - 7 x weekly - 1-2 sets - 10 reps      - Toe Spreading - 2-3 x daily - 7 x weekly - 1-2 sets - 10 reps      - Seated Heel Raise - 2-3 x daily - 7 x weekly - 1-2 sets - 10 reps      - Seated Toe Raise - 2-3 x daily - 7 x weekly - 1-2 sets - 10 reps

## 2025-06-12 NOTE — PROGRESS NOTES
PT Evaluation     Today's date: 2025  Patient name: Aniya Jones  : 1955  MRN: [id number]  Referring provider: Lachman, James R, MD  Dx:   Encounter Diagnosis     ICD-10-CM    1. Tear of peroneal tendon, left, subsequent encounter  S86.312D                      Assessment  Impairments: abnormal gait, abnormal or restricted ROM, activity intolerance, impaired balance, impaired physical strength, lacks appropriate home exercise program, pain with function and weight-bearing intolerance    Assessment details: Aniya Jnoes is a pleasant 69 y.o. female who presents with L ankle pain after a L peroneus brevis tendon repair that was done on 25. She is ambulating with a front wheeled walker with a CAM boot PWB at about 90%. She is able to progress to FWB tomorrow and to wean from crutch per surgeon. She has decreased L ankle ROM in all planes and decreased strength in all planes as expected. These impairments are limiting her with ADLs and household chores and walking. These signs and symptoms are consistent with Tear of peroneal tendon, left, subsequent encounter  (primary encounter diagnosis). She will benefit from skilled PT to address impairments and return to PLOF. No referral necessary at this time        Prognosis details: Positive prognostic indicators include positive attitude toward recovery, motivated to improve, high self-efficacy, good understanding of condition, realistic expectations.  Negative prognostic indicators include chronicity of symptoms, high symptom irritability    Goals  STG to be achieved in 6 weeks  Patient will have improved L ankle DF ROM to 8 degrees.  L ankle inversion ROM to 20 degrees  L ankle eversion ROM to 20 degrees  L ankle PF ROM to 60 degrees  Patient will have improved L ankle strength to 4+/5  Able to dress and bathe with little to no difficulty  Patient will be independent with HEP.    LTG to be achieved in 12 weeks  Patient will be able to walk in a sneaker with  SPC for at least household distances  Patient will be able to do light household chores with little difficulty related to her ankle  Patient will be able to do all ADLs      Plan  Patient would benefit from: skilled physical therapy  Planned modality interventions: cryotherapy and thermotherapy: hydrocollator packs    Planned therapy interventions: balance, home exercise program, gait training, functional ROM exercises, body mechanics training, joint mobilization, manual therapy, neuromuscular re-education, therapeutic exercise, therapeutic activities, stretching, strengthening, flexibility and patient/caregiver education    Frequency: 1-2x week  Duration in weeks: 12  Plan of Care beginning date: 2025  Plan of Care expiration date: 2025  Treatment plan discussed with: patient and PTA        Subjective Evaluation    History of Present Illness  Mechanism of injury: Patient reports initial L ankle injury in 2024 when she tripped in her home over the trim. She tried PT at that time. She had an MRI in 2024 which revealed a peroneal tendon tear.  DOS: 25  Surgery: L peroneus brevis tendon repair and excision of peroneus quartus  Easing factors: ice, rest, medication  Is currently walking with a walker but prior to injury was using a cane. Lives by herself. Has someone come 2 days a week for a few hours to help with cleaning, etc.   Occupation: n/a   Symptoms: lateral lower leg pain/numbness; L ankle pain; swelling  Notes not ready to stop using the walker because of the pain    Patient Goals  Patient goal: wants to get back to doing basic ADLs and things around the house  Pain  Current pain ratin  At best pain ratin  At worst pain ratin          Objective     Active Range of Motion   Left Ankle/Foot   Dorsiflexion (ke): 0 degrees   Plantar flexion: 42 degrees   Inversion: 10 degrees   Eversion: 10 degrees     Right Ankle/Foot   Normal active range of motion    Strength/Myotome  Testing     Left Ankle/Foot   Dorsiflexion: 3+  Inversion: 3+  Eversion: 3+    Right Ankle/Foot   Normal strength    Ambulation   Weight-Bearing Status   Weight-Bearing Status (Left): partial weight-bearing   Assistive device used: front-wheeled walker            Daily Treatment Diary     Precautions: 90% WB in CAM boot until 6/13/25 then able to progress to FWB in CAM boot and wean from walker  DOS: 5/1/25  Surgery: L peroneus brevis tendon repair   Functional Limitations: walking, ADLs, household chores  Impairments: L ankle ROM and strength  POC expiration: 9/4/25  FOTO intake: 35  FOTO status:   FOTO predicted by visit 16: 49      POC Expires Reeval for Medicare to be completed  Unit Limit Auth Expiration Date PT/OT/STVisit Limit   9/4/25 By visit 10 N/a N/a BOMN    Completed on visit                    Auth Status DATE 6/12        NA Visit # 1         Remaining         MANUAL THERAPY         L ankle PROM nv        L ankle iso all planes                                             THERAPEUTIC EXERCISE HEP         Long sit DF stretch w/ strap 6/12 nv        Ankle AROM DF, PF, inv, ev 6/12 nv        TB ankle 4 way          Toe curl 6/12 nv        Toe spreading 6/12 nv        Seated HR 6/12 nv        Seated TR 6/12 nv        Standing HR                                                                                NEUROMUSCULAR REEDUCATION           Weight shifts fwd/back and lat  nv        Tandem stance          SLS          Sidestep foam          Tandem walk foam                                                                                                    THERAPEUTIC ACTIVITY          Fwd step up          Fwd step down                              GAIT TRAINING                                                  MODALITIES

## 2025-06-17 ENCOUNTER — OFFICE VISIT (OUTPATIENT)
Dept: OBGYN CLINIC | Facility: CLINIC | Age: 70
End: 2025-06-17

## 2025-06-17 VITALS — BODY MASS INDEX: 27.32 KG/M2 | WEIGHT: 164 LBS | HEIGHT: 65 IN

## 2025-06-17 DIAGNOSIS — S86.312A TEAR OF PERONEAL TENDON OF LEFT FOOT: Primary | ICD-10-CM

## 2025-06-17 PROCEDURE — 99024 POSTOP FOLLOW-UP VISIT: CPT | Performed by: ORTHOPAEDIC SURGERY

## 2025-06-17 NOTE — PROGRESS NOTES
"      James R Lachman, M.D.  Attending, Orthopaedic Surgery  Foot and Ankle  [facility]      ORTHOPAEDIC FOOT AND ANKLE POST-OP VISIT     Procedure:     Left peroneus brevis tendon repair with excision of peroneus quartus       Date of surgery:   5/1/25      PLAN  1. Weightbearing Status- WBAT operative extremity  2. DVT prophylaxis- Completed  3. Continue to elevate 23hrs/day getting up 1x per hour to prevent a blood clot  4. Pain control- OTC pain medication  5. RTC in 6 weeks  6. Xrays needed next visit - no     History of Present Illness:   Chief Complaint:   Chief Complaint   Patient presents with    Post-op     Post op 6 weeks. In cam boot and walking. Walking with cane. It hurts her having burning.   Peroneal Tendon Exploration And Repair - Left     Aniya Jones is a 70 y.o. female who is being seen for post-operative visit for the above procedure. Pain is well controlled and the patient has successfully transitioned to OTC pain medicines.  she is taking ASA 81mg BID for DVT prophylaxis. Patient has been WBAT in a CAM boot.      Review of Systems:  General- denies fever/chills  Respiratory- denies cough or SOB  Cardio- denies chest pain or palpitations  GI- denies abdominal pain  Musculoskeletal- Negative except noted above  Skin- denies rashes or wounds    Physical Exam:   Ht 5' 5\" (1.651 m)   Wt 74.4 kg (164 lb)   BMI 27.29 kg/m²   General/Constitutional: No apparent distress: well-nourished and well developed.  Eyes: normal ocular motion  Lymphatic: No appreciable lymphadenopathy  Respiratory: Non-labored breathing  Vascular: No edema, swelling or tenderness, except as noted in detailed exam.  Integumentary: No impressive skin lesions present, except as noted in detailed exam.  Neuro: No ataxia or tremors noted  Psych: Normal mood and affect, oriented to person, place and time. Appropriate affect.  Musculoskeletal: Normal, except as noted in detailed exam and in HPI.    Examination "    left        Incision Clean, dry, intact  Sutures Previously removed.    Ecchymosis none    Swelling Mild    Sensation Intact to light touch throughout sural, saphenous, superficial peroneal, deep peroneal and medial/lateral plantar nerve distributions.  [place]-Bam 5.07 filament (10g) testing deferred.    Cardiovascular Brisk capillary refill < 2 seconds,intact DP and PT pulses    Special Tests None      Imaging Studies:   No new imaging        James R. Lachman, MD  Foot & Ankle Surgery   Department of Orthopaedic Surgery  [facility]      I personally performed the service.    James R. Lachman, MD  Scribe Attestation      I,:  Pepe Lpóez am acting as a scribe while in the presence of the attending physician.:       I,:  James R Lachman, MD personally performed the services described in this documentation    as scribed in my presence.:

## 2025-06-17 NOTE — PATIENT INSTRUCTIONS
2 more weeks of weight bearing as tolerated in the CAM boot    You may begin weaning your boot and transitioning to a sneaker (7/1). It is important to do this gradually to avoid aggravating the healing process.    7/1, you may come out of the boot into a sneaker for 2 hours.  2. 7/2, you may come out of the boot into a sneaker for 4 hours,  3. The next day, you may come out of the boot into a sneaker for 6 hours.  4. Continue this (adding 2 hours per day) as you tolerate. For example, if you do 6 hours out of the boot into a sneaker and your foot swells more than usual at night and it is difficult to control the discomfort, do not advance to 8 hours the next day, stay at 6 hours until you are able to tolerate it.    It is essential to follow this protocol and not modify this.  No matter when you begin weaning the boot, it will be difficult and there will be swelling and soreness.  If you spend more time than is recommended in the boot, this process becomes longer and more painful.    Elevation, Ice and tylenol and staying off of it at night will be important to aide in this transition out of the boot. Swelling and soreness are normal as you begin to do more with the injured leg.    May DC aspirin/lovenox, no longer needed.  May shower  Continue PT  Scar massage- pea sized amount of lotion, massage into scar for 5 minutes each day.  Compression stocking (Knee high, 20-30mm Hg) to be worn at all times while awake.

## 2025-06-19 ENCOUNTER — APPOINTMENT (OUTPATIENT)
Dept: PHYSICAL THERAPY | Facility: CLINIC | Age: 70
End: 2025-06-19
Payer: MEDICARE

## 2025-06-24 ENCOUNTER — APPOINTMENT (OUTPATIENT)
Dept: PHYSICAL THERAPY | Facility: CLINIC | Age: 70
End: 2025-06-24
Payer: MEDICARE

## 2025-06-26 ENCOUNTER — APPOINTMENT (OUTPATIENT)
Dept: PHYSICAL THERAPY | Facility: CLINIC | Age: 70
End: 2025-06-26
Payer: MEDICARE

## 2025-06-30 ENCOUNTER — NURSE TRIAGE (OUTPATIENT)
Age: 70
End: 2025-06-30

## 2025-06-30 ENCOUNTER — APPOINTMENT (OUTPATIENT)
Dept: PHYSICAL THERAPY | Facility: CLINIC | Age: 70
End: 2025-06-30
Attending: ORTHOPAEDIC SURGERY
Payer: MEDICARE

## 2025-06-30 NOTE — TELEPHONE ENCOUNTER
"REASON FOR CONVERSATION: Patient fall 06/24/2025    SYMPTOMS: Patient has bruising on her face, head and right hand. Abrasion on right knee. Patient's neck and right shoulder feel sore.     OTHER HEALTH INFORMATION: Patient lives alone. Her brother has MS and her sister also has trouble standing. Her son lives in [place]. Patient was accidentally taking natalie seltzer with aspirin for three days. Patient had a bleed before and is an \"ex nurse\" so she \"knows she should go to the hospital but did not want to.\" Patient takes baby Asprin for clot prevention. Patient did answer orientation questions appropriately. Patient said she has had feelings of harm to herself for years but has never acted on it. Patient said it is against her [employer] to commit suicide. Patient said it was hardest when her  had passed away. Patient clarified she does not have thoughts currently or plans to harm herself. Patient's son lives out of state but calls every Saturday.       PROTOCOL DISPOSITION: Go to ED Now    CARE ADVICE PROVIDED: CALL BACK IF:   * You have additional questions.   * You become worse.    PRACTICE FOLLOW-UP: Patient was clear she was not calling to report fall but was only cancelling her Botox appointment because she feels \"lousy.\" Patient said she will consider going to the the ED tomorrow at 1 pm when her caregiver comes over. Patient son lives in another state and her sister and brother have health issues that prevent them from helping her. Patient refuses CTS calling EMS/911 for her. Patient did not wish to speak to a provider on call. Patient invited to call back if she changes her mind. Patient encouraged to make her other providers aware of fall.     Teams/Paulette IRWIN: Please be aware of fall and CTS advisement. Thank you!    Reason for Disposition   Sounds like a serious injury to the triager    Answer Assessment - Initial Assessment Questions  1. MECHANISM: \"How did the fall happen?\"      Patient had " "surgery on her tendon on left foot 05/01/2025 and is still wearing a cast. Patient's surgeon told her starting 07/01/2025 she can start going without a walker and use a cast shoe for 2 hours a day. Her hallway is a \"deathtrap\" and she tried walking without the walker and she tipped the cast boot and she fell into a corner of wood frame of the bathroom. Patient states she did not black out and put ice on it all day.    2. DOMESTIC VIOLENCE AND ELDER ABUSE SCREENING: \"Did you fall because someone pushed you or tried to hurt you?\" If Yes, ask: \"Are you safe now?\"      Patient denied anyone lives in her house. Patient said she feels safe.     3. ONSET: \"When did the fall happen?\" (e.g., minutes, hours, or days ago)      06/24/2025, Tuesday.     4. LOCATION: \"What part of the body hit the ground?\" (e.g., back, buttocks, head, hips, knees, hands, head, stomach)      She hit her head and right hand first on the wood door frame corner.    5. INJURY: \"Did you hurt (injure) yourself when you fell?\" If Yes, ask: \"What did you injure? Tell me more about this?\" (e.g., body area; type of injury; pain severity)\"      Right knee rug burn/abrasion. Bruise above her eyebrow. Neck pain and right shoulder pain. Patient said she had an \"enormous hematoma on her forehead\" and it is going down. Patient said she was applying ice to it the whole day. Patient said she still has a bump on her head. Patient said she has \"racoon eyes\" that are getting lighter in color. Patient thinks she fell on the veins in her forehead and right hand. Her hand and palm were black and blue. She can bend them so she knows there is nothing broken. Paitent was achy all week. Patient has pain in her left ankle and is questioning if she re injured it.      6. PAIN: \"Is there any pain?\" If Yes, ask: \"How bad is the pain?\" (e.g., Scale 0-10; or none, mild,       Left foot/ankle hurts, neck and right shoulder hurts. Patient states her sinus pain is what is bothering " "her the most, she rates it a  6 out of 10 during the day and 8 out of 10 at night. Patient said she has increased pain in her ears and nose.     7. OTHER SYMPTOMS: \"Do you have any other symptoms?\" (e.g., dizziness, fever, weakness; new-onset or worsening).       Saturday she wakes up with a migraine, normal for her to get before her Botox is due. Patient has sinus issues in the front of her head. Patient said the sinus pain is not like her typical pain she has. Patient said its not the same pain as when she had a bleed in her head before. Patient denied dizziness, fever, weakness; new-onset or worsening. Patient denied any visual issues.     8. CAUSE: \"What do you think caused the fall (or falling)?\" (e.g., dizzy spell, tripped)        Patient said she falls easily and has spinal stenosis.    Protocols used: Falls and Falling-Adult-[place]    "

## 2025-07-01 ENCOUNTER — APPOINTMENT (EMERGENCY)
Dept: RADIOLOGY | Facility: HOSPITAL | Age: 70
End: 2025-07-01
Payer: MEDICARE

## 2025-07-01 ENCOUNTER — APPOINTMENT (EMERGENCY)
Dept: CT IMAGING | Facility: HOSPITAL | Age: 70
End: 2025-07-01
Payer: MEDICARE

## 2025-07-01 ENCOUNTER — HOSPITAL ENCOUNTER (EMERGENCY)
Facility: HOSPITAL | Age: 70
Discharge: HOME | End: 2025-07-01
Attending: EMERGENCY MEDICINE | Admitting: EMERGENCY MEDICINE
Payer: MEDICARE

## 2025-07-01 VITALS
TEMPERATURE: 97.1 F | OXYGEN SATURATION: 96 % | DIASTOLIC BLOOD PRESSURE: 67 MMHG | BODY MASS INDEX: 28.79 KG/M2 | WEIGHT: 173 LBS | HEART RATE: 86 BPM | RESPIRATION RATE: 18 BRPM | SYSTOLIC BLOOD PRESSURE: 131 MMHG

## 2025-07-01 DIAGNOSIS — S16.1XXA CERVICAL STRAIN, ACUTE: ICD-10-CM

## 2025-07-01 DIAGNOSIS — R93.0 ABNORMAL CT SCAN OF HEAD: ICD-10-CM

## 2025-07-01 DIAGNOSIS — S60.221A CONTUSION OF RIGHT HAND: ICD-10-CM
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
## 2025-07-01 DIAGNOSIS — S09.90XA HEAD INJURY: ICD-10-CM

## 2025-07-01 PROCEDURE — 70450 CT HEAD/BRAIN W/O DYE: CPT

## 2025-07-01 PROCEDURE — 99285 EMERGENCY DEPT VISIT HI MDM: CPT | Performed by: PHYSICIAN ASSISTANT

## 2025-07-01 PROCEDURE — 99284 EMERGENCY DEPT VISIT MOD MDM: CPT

## 2025-07-01 PROCEDURE — 73130 X-RAY EXAM OF HAND: CPT

## 2025-07-01 PROCEDURE — 72125 CT NECK SPINE W/O DYE: CPT

## 2025-07-01 NOTE — DISCHARGE INSTRUCTIONS
Rest, increase fluids.  Tylenol for discomfort.  Take coricidin for sinus disease.  Follow up with PCP for recheck in 3-5 days.  Return to ER if symptoms worsen or change in behavior.    PAIN/RASH/SCALY PATCHES ON SKIN/REDNESS/ITCHING

## 2025-07-01 NOTE — ED PROVIDER NOTES
Emergency Department Trauma Note  Aniya Jones 70 y.o. female MRN: [id number]  Unit/Bed#: ED 06/ED 06 Encounter: [id number]      Trauma Alert: Trauma Acuity: Trauma Evaluation  Model of Arrival: Mode of Arrival: Direct from scene via    Trauma Team: Current Providers  Attending Provider: Ashutosh Martinez MD  Physician Assistant: Dina Sheikh PA-C  Registered Nurse: Liyah Cook RN  Consultants:     None      History of Present Illness     Chief Complaint:   Chief Complaint   Patient presents with    Fall     Pt c/o HA and sinus pain s/p fall last Tuesday, + headstrike, - LOC, - thinners     HPI:  Aniya Jones is a 70 y.o. female who presents with head injury after a fall 1 week ago.  Patient has a boot on left foot due to injury and states she was not using her walker and she tripped and fell forward, hitting her head on the moulding of the door.  She states she also hit her right hand on the door frame.  She denies LOC.  She has had a headache and sinus pressure.  She has a history of migraines and is due to botox injection today.  She is alert and oriented x 3.  She takes aspirin daily, her last dose was 2 days ago.  No vomiting or vision changes.  No other injuries.    Mechanism:Details of Incident: fall from standing Injury Date: 06/24/25 Injury Time: 1000 Injury Occurence Location - Specify County: [facility]  Associated symptoms: headaches    Associated symptoms: no nausea and no vomiting      Review of Systems   Constitutional:  Negative for chills and fever.   HENT:  Positive for sinus pressure.    Eyes:  Negative for visual disturbance.   Respiratory:  Negative for cough and shortness of breath.    Gastrointestinal:  Negative for nausea and vomiting.   Musculoskeletal:         Right hand pain   Skin:  Negative for wound.   Neurological:  Positive for headaches. Negative for dizziness, weakness and numbness.   Psychiatric/Behavioral:  Negative for confusion and decreased concentration.     All other systems reviewed and are negative.      Historical Information     Immunizations:   Immunization History   Administered Date(s) Administered    COVID-19 PFIZER VACCINE 0.3 ML IM 04/20/2021, 05/11/2021, 01/26/2022    Fluzone Split Quad 0.5 mL 12/15/2017, 09/14/2018    INFLUENZA 12/15/2017, 09/14/2018, 12/27/2019, 10/07/2021, 10/17/2023    Influenza Split High Dose Preservative Free IM 10/04/2020    Influenza, Seasonal Vaccine, Quadrivalent, Adjuvanted, .5e 10/07/2021    Influenza, high dose seasonal 0.7 mL 10/04/2020    Pneumococcal Conjugate 13-Valent 06/21/2019    Pneumococcal Polysaccharide PPV23 08/31/2020    Respiratory Syncytial Virus Vaccine (Recombinant) 10/17/2023    Tdap 01/04/2016    influenza, trivalent, adjuvanted 11/12/2024       Past Medical History[1]  Family History[2]  Past Surgical History[3]  Social History[4]  E-Cigarette/Vaping    E-Cigarette Use Never User      E-Cigarette/Vaping Substances    Nicotine No     THC No     CBD No     Flavoring No     Other No     Unknown No        Family History: non-contributory    Meds/Allergies   Prior to Admission Medications   Prescriptions Last Dose Informant Patient Reported? Taking?   B COMPLEX VITAMINS PO  Self Yes No   Sig: Take 1 tablet by mouth in the morning.   Biotin 2.5 MG TABS   Yes No   Sig: Take by mouth daily at bedtime   DULoxetine (CYMBALTA) 30 mg delayed release capsule   No No   Sig: Take 1 capsule (30 mg total) by mouth daily at bedtime With 60 mg   DULoxetine (CYMBALTA) 60 mg delayed release capsule   No No   Sig: Take 1 capsule (60 mg total) by mouth daily at bedtime   Galcanezumab-gnlm (Emgality) 120 MG/ML SOAJ  Self No No   Sig: Inject 120 mg under the skin every 28 days   Lidocaine-Menthol 4-1 % CREA  Self Yes No   Sig: Apply 1 Dose topically if needed Patch 5%   Magnesium 400 MG TABS  Self Yes No   Sig: Take 400 mg by mouth in the morning and 400 mg in the evening.   OnabotulinumtoxinA (BOTOX IM)  Self Yes No   Sig: Inject  into a muscle   Riboflavin 100 MG TABS  Self Yes No   Sig: Take by mouth   Ubrogepant (Ubrelvy) 100 MG tablet   No No   Sig: Take 1 tablet (100 mg total) by mouth if needed (migraine) Take 1 tablet (100 mg) one time as needed for migraine. May repeat one additional tablet (100 mg) at least two hours after the first dose. Do not use more than two doses per day   aspirin (ECOTRIN LOW STRENGTH) 81 mg EC tablet   No No   Sig: Take 1 tablet (81 mg total) by mouth 2 (two) times a day   bisacodyl (DULCOLAX) 5 mg EC tablet  Self Yes No   Sig: Take 5 mg by mouth daily as needed for constipation   buPROPion (Wellbutrin XL) 150 mg 24 hr tablet   No No   Sig: Take 1 tablet (150 mg total) by mouth daily   busPIRone (BUSPAR) 10 mg tablet   No No   Sig: Take 1 tablet (10 mg total) by mouth 2 (two) times a day   calcium carbonate (OS-MIKE) 600 MG tablet  Self Yes No   Sig: Take 600 mg by mouth in the morning and 600 mg in the evening. Take with meals.   clonazePAM (KlonoPIN) 0.5 mg tablet   No No   Sig: Take 1 tablet (0.5 mg total) by mouth 3 (three) times a day   esomeprazole (NexIUM) 20 mg capsule  Self Yes No   Sig: Take 20 mg by mouth daily in the early morning   losartan (COZAAR) 25 mg tablet   No No   Sig: Take 1 tablet (25 mg total) by mouth daily   metFORMIN (GLUCOPHAGE) 500 mg tablet   No No   Sig: TAKE 0.5 TABLETS BY MOUTH TWO TIMES A DAY WITH MEALS   multivitamin (THERAGRAN) TABS  Self Yes No   Sig: Take 1 tablet by mouth in the morning.   naloxone (NARCAN) 4 mg/0.1 mL nasal spray   No No   Sig: Administer 1 spray into a nostril. If no response after 2-3 minutes, give another dose in the other nostril using a new spray.   ondansetron (ZOFRAN) 4 mg tablet   No No   Sig: Take 1 tablet (4 mg total) by mouth every 8 (eight) hours as needed for nausea or vomiting   oxyCODONE (ROXICODONE) 10 MG TABS  Self No No   Sig: Take 1 tablet (10 mg total) by mouth every 8 (eight) hours as needed for moderate pain Max Daily Amount: 30 mg    oxyCODONE (Roxicodone) 5 immediate release tablet   No No   Sig: Take 1 tablet (5 mg total) by mouth every 4 (four) hours as needed for severe pain for up to 30 doses Max Daily Amount: 30 mg   polyethylene glycol (MIRALAX) 17 g packet  Self Yes No   Sig: Take 17 g by mouth daily at bedtime   rosuvastatin (CRESTOR) 20 MG tablet   No No   Sig: TAKE 1 TABLET BY MOUTH DAILY   senna-docusate sodium (SENOKOT S) 8.6-50 mg per tablet  Self Yes No   Sig: Take 1 tablet by mouth daily at bedtime   sorbitol 70 % solution   No No   Sig: Take 30 mL by mouth every third day as needed (for constipation for 3 days despite use of senna-S)      Facility-Administered Medications Last Administration Doses Remaining   Botulinum Toxin Type A SOLR 200 Units 9/10/2024  2:52 PM           Allergies[5]    PHYSICAL EXAM    PE limited by: nothing    Objective   Vitals:   First set: Temperature: (!) 97.1 °F (36.2 °C) (07/01/25 1445)  Pulse: 92 (07/01/25 1445)  Respirations: 18 (07/01/25 1445)  Blood Pressure: 132/58 (07/01/25 1445)  SpO2: 97 % (07/01/25 1445)    Primary Survey:   (A) Airway: patent  (B) Breathing: normal  (C) Circulation: Pulses:   normal  (D) Disabliity:  GCS Total:  15  (E) Expose:  Completed    Secondary Survey: (Click on Physical Exam tab above)  Physical Exam  Vitals and nursing note reviewed.   Constitutional:       General: She is not in acute distress.     Appearance: Normal appearance. She is well-developed and well-groomed. She is not ill-appearing or diaphoretic.   HENT:      Head: Normocephalic.      Comments: Bruising to left forehead and under b/l eyes.      Right Ear: Hearing normal.      Left Ear: Hearing normal.      Nose: Nose normal.      Mouth/Throat:      Mouth: Mucous membranes are moist.      Pharynx: Oropharynx is clear.     Eyes:      Conjunctiva/sclera: Conjunctivae normal.      Pupils: Pupils are equal, round, and reactive to light.       Cardiovascular:      Rate and Rhythm: Normal rate and regular  rhythm.      Heart sounds: Normal heart sounds.   Pulmonary:      Effort: Pulmonary effort is normal.      Breath sounds: Normal breath sounds.     Musculoskeletal:         General: Normal range of motion.      Right shoulder: No swelling.      Right upper arm: Normal.      Right elbow: Normal.      Right forearm: Normal.      Right wrist: Normal.      Right hand: Swelling and bony tenderness (over the 3rd MCP right hand) present. No deformity. Normal range of motion. Normal strength. Normal sensation. Normal capillary refill. Normal pulse.      Cervical back: Normal range of motion. Muscular tenderness present. No spinous process tenderness.      Right lower leg: No edema.      Left lower leg: No edema.     Skin:     General: Skin is warm and dry.      Findings: Bruising (forehead, dorsal right hand.) present. No erythema, rash or wound.     Neurological:      Mental Status: She is alert and oriented to person, place, and time.      GCS: GCS eye subscore is 4. GCS verbal subscore is 5. GCS motor subscore is 6.      Sensory: Sensation is intact.      Motor: Motor function is intact.     Psychiatric:         Behavior: Behavior is cooperative.         Cervical spine cleared by clinical criteria? No (imaging required)      Invasive Devices       None                   Lab Results:   Results Reviewed       None                   Imaging Studies:   Direct to CT: No  TRAUMA - CT head wo contrast   Final Result by Alvarez Koenig MD (07/01 1927)      No acute intracranial abnormality.                  Workstation performed: [id number]         TRAUMA - CT spine cervical wo contrast   Final Result by Alvarez Koenig MD (07/01 6497)      No cervical spine fracture or traumatic malalignment.                  Workstation performed: [id number]         XR hand 3+ views RIGHT   ED Interpretation by Dina Sheikh PA-C (07/01 5926)   No acute abnormalities.             Procedures  Procedures         ED Course            Medical Decision Making  Patient with head injury, neck pain, on aspirin will order CT head and neck to r/o hemorrhage or fracture.  Patient with right hand pain, will xray to r/o fracture.  No acute abnormalities on imaging, patient given head injury instructions with return precautions.  Patient with sinus disease, advised coricidin and f/u with PCP for recheck.    Amount and/or Complexity of Data Reviewed  Radiology: ordered and independent interpretation performed.                Disposition  [facility] Transfer: No  Final diagnoses:   Fall   Head injury   Cervical strain, acute   Contusion of right hand   Abnormal CT scan of head - Opacification sphenoid sinus     Time reflects when diagnosis was documented in both MDM as applicable and the Disposition within this note       Time User Action Codes Description Comment    7/1/2025  4:36 PM Dina Sheikh Add [W19.XXXA] Fall     7/1/2025  4:36 PM Dina Sheikh Add [S09.90XA] Head injury     7/1/2025  4:44 PM Dina Sheikh Add [S16.1XXA] Cervical strain, acute     7/1/2025  4:44 PM Dina Sheikh Add [S60.221A] Contusion of right hand     7/1/2025  4:44 PM Dina Sheikh Add [R93.0] Abnormal CT scan of head     7/1/2025  4:44 PM Dina Sheikh Modify [R93.0] Abnormal CT scan of head Opacification sphenoid sinus          ED Disposition       ED Disposition   Discharge    Condition   Stable    Date/Time   Tue Jul 1, 2025  4:36 PM    Comment   Aniya Jones discharge to home/self care.                   Follow-up Information       Follow up With Specialties Details Why Contact Info    [facility] Schedule an appointment as soon as possible for a visit in 1 week For recheck [address] [facility]  [address] [place] [id number]  [phone]            Discharge Medication List as of 7/1/2025  4:46 PM        CONTINUE these medications which have NOT CHANGED    Details   aspirin (ECOTRIN LOW STRENGTH) 81 mg EC tablet  Take 1 tablet (81 mg total) by mouth 2 (two) times a day, Starting Thu 5/1/2025, Until Thu 6/12/2025, Normal      B COMPLEX VITAMINS PO Take 1 tablet by mouth in the morning., Historical Med      Biotin 2.5 MG TABS Take by mouth daily at bedtime, Historical Med      bisacodyl (DULCOLAX) 5 mg EC tablet Take 5 mg by mouth daily as needed for constipation, Historical Med      buPROPion (Wellbutrin XL) 150 mg 24 hr tablet Take 1 tablet (150 mg total) by mouth daily, Starting Fri 6/6/2025, Normal      busPIRone (BUSPAR) 10 mg tablet Take 1 tablet (10 mg total) by mouth 2 (two) times a day, Starting Tue 5/27/2025, Normal      calcium carbonate (OS-MIKE) 600 MG tablet Take 600 mg by mouth in the morning and 600 mg in the evening. Take with meals., Historical Med      clonazePAM (KlonoPIN) 0.5 mg tablet Take 1 tablet (0.5 mg total) by mouth 3 (three) times a day, Starting Fri 6/6/2025, Normal      !! DULoxetine (CYMBALTA) 30 mg delayed release capsule Take 1 capsule (30 mg total) by mouth daily at bedtime With 60 mg, Starting Fri 2/21/2025, Normal      !! DULoxetine (CYMBALTA) 60 mg delayed release capsule Take 1 capsule (60 mg total) by mouth daily at bedtime, Starting Fri 2/21/2025, Normal      esomeprazole (NexIUM) 20 mg capsule Take 20 mg by mouth daily in the early morning, Historical Med      Galcanezumab-gnlm (Emgality) 120 MG/ML SOAJ Inject 120 mg under the skin every 28 days, Starting Mon 9/30/2024, Normal      Lidocaine-Menthol 4-1 % CREA Apply 1 Dose topically if needed Patch 5%, Historical Med      losartan (COZAAR) 25 mg tablet Take 1 tablet (25 mg total) by mouth daily, Starting Wed 2/12/2025, Normal      Magnesium 400 MG TABS Take 400 mg by mouth in the morning and 400 mg in the evening., Historical Med      metFORMIN (GLUCOPHAGE) 500 mg tablet TAKE 0.5 TABLETS BY MOUTH TWO TIMES A DAY WITH MEALS, Normal      multivitamin (THERAGRAN) TABS Take 1 tablet by mouth in the morning., Historical Med      naloxone  (NARCAN) 4 mg/0.1 mL nasal spray Administer 1 spray into a nostril. If no response after 2-3 minutes, give another dose in the other nostril using a new spray., Normal      OnabotulinumtoxinA (BOTOX IM) Inject into a muscle, Historical Med      ondansetron (ZOFRAN) 4 mg tablet Take 1 tablet (4 mg total) by mouth every 8 (eight) hours as needed for nausea or vomiting, Starting Thu 5/1/2025, Normal      !! oxyCODONE (ROXICODONE) 10 MG TABS Take 1 tablet (10 mg total) by mouth every 8 (eight) hours as needed for moderate pain Max Daily Amount: 30 mg, Starting Sun 8/25/2024, Print      !! oxyCODONE (Roxicodone) 5 immediate release tablet Take 1 tablet (5 mg total) by mouth every 4 (four) hours as needed for severe pain for up to 30 doses Max Daily Amount: 30 mg, Starting Thu 5/1/2025, Normal      polyethylene glycol (MIRALAX) 17 g packet Take 17 g by mouth daily at bedtime, Starting Wed 1/31/2018, Historical Med      Riboflavin 100 MG TABS Take by mouth, Historical Med      rosuvastatin (CRESTOR) 20 MG tablet TAKE 1 TABLET BY MOUTH DAILY, Starting Sun 5/18/2025, Normal      senna-docusate sodium (SENOKOT S) 8.6-50 mg per tablet Take 1 tablet by mouth daily at bedtime, Historical Med      sorbitol 70 % solution Take 30 mL by mouth every third day as needed (for constipation for 3 days despite use of senna-S), Starting Wed 2/12/2025, Normal      Ubrogepant (Ubrelvy) 100 MG tablet Take 1 tablet (100 mg total) by mouth if needed (migraine) Take 1 tablet (100 mg) one time as needed for migraine. May repeat one additional tablet (100 mg) at least two hours after the first dose. Do not use more than two doses per day, Starting Tue 1/2 1/2025, Normal       !! - Potential duplicate medications found. Please discuss with provider.        No discharge procedures on file.    PDMP Review         Value Time User    PDMP Reviewed  Yes 6/6/2025  3:44 PM FE Verma            ED Provider  Electronically Signed by             [1]    Past Medical History:  Diagnosis Date    Anxiety     Arthritis     Cancer (HCC) 2018    Skin    Depression     Diabetes (HCC)     GERD (gastroesophageal reflux disease)     Head injury 11-4-23    Hyperlipidemia     Hypertension     Liver disease 2021    Fatty liver    Migraine    [2]   Family History  Problem Relation Name Age of Onset    Mental illness Mother Lucie     Hyperlipidemia Mother Lucie     Hypertension Mother Lucie     Allergies Mother Lucie     Migraines Mother Lucie     Anxiety disorder Mother Lucie     Osteoporosis Mother Lucie     Hyperlipidemia Father Miguelangel Tracy     Heart disease Father Miguelangel Tracy     Cancer Father Miguelangel Tracy         Bladder ca in situ    Migraines Sister      Hyperlipidemia Brother Lonnie Tracy     Multiple sclerosis Brother Lonnie Tracy     Neurological problems Brother Lonnie Molina         MS.with dementia    Hyperlipidemia Brother      Migraines Brother          Rare    Migraines Son          Rare    Colon cancer Maternal Grandmother      Heart attack Maternal Grandfather      Peripheral vascular disease Paternal Grandmother Lizbeth Tracy     Diabetes Paternal Grandmother Lizbeth Tracy     Asthma Paternal Grandfather Heart disease     Diabetes Paternal Grandfather Heart disease     Heart attack Paternal Grandfather Heart disease    [3]   Past Surgical History:  Procedure Laterality Date    ANKLE SURGERY Right 2007    CARPAL TUNNEL RELEASE Right     CHOLECYSTECTOMY      COLONOSCOPY      DILATION AND CURETTAGE OF UTERUS      DE RPR FLEXOR TENDON LEG SECONDARY W/O GRAFT EACH Left 5/1/2025    Procedure: PERONEAL TENDON EXPLORATION AND REPAIR;  Surgeon: James R Lachman, MD;  Location: [facility] MAIN OR;  Service: Orthopedics    ROTATOR CUFF REPAIR     [4]   Social History  Tobacco Use    Smoking status: Former     Current packs/day: 0.00     Average packs/day: 1 pack/day for 15.0 years (15.0 ttl pk-yrs)     Types: Cigarettes     Quit  date:      Years since quittin.5     Passive exposure: Past    Smokeless tobacco: Never   Vaping Use    Vaping status: Never Used   Substance Use Topics    Alcohol use: Not Currently    Drug use: Never   [5]   Allergies  Allergen Reactions    Amoxicillin Dermatitis, Hives, Itching and Rash        Dina Sheikh PA-C  [phone]

## 2025-07-03 ENCOUNTER — OFFICE VISIT (OUTPATIENT)
Dept: PHYSICAL THERAPY | Facility: CLINIC | Age: 70
End: 2025-07-03
Attending: PHYSICIAN ASSISTANT
Payer: MEDICARE

## 2025-07-03 DIAGNOSIS — S86.312D TEAR OF PERONEAL TENDON, LEFT, SUBSEQUENT ENCOUNTER: Primary | ICD-10-CM

## 2025-07-03 PROCEDURE — 97112 NEUROMUSCULAR REEDUCATION: CPT

## 2025-07-03 PROCEDURE — 97110 THERAPEUTIC EXERCISES: CPT

## 2025-07-03 PROCEDURE — 97140 MANUAL THERAPY 1/> REGIONS: CPT

## 2025-07-03 NOTE — PROGRESS NOTES
"Daily Note     Today's date: 7/3/2025  Patient name: Aniya Jones  : 1955  MRN: [id number]  Referring provider: Misty Jordan P*  Dx:   Encounter Diagnosis     ICD-10-CM    1. Tear of peroneal tendon, left, subsequent encounter  S86.312D                      Subjective: Patient reports doing better since her fall last week and that she was in the emergency room the other day to get checked out. Notes she has started working with wearing the sneaker a few hours each day. Notes ankle is sore      Objective: See treatment diary below      Assessment: Patient tolerated treatment well overall. She has significantly improved L ankle ROM in all planes since IE - limited mainly with L eversion and DF. Seems to have most difficulty with balance activities today due to R ankle from prior injury rather than L ankle. She will benefit from skilled PT to address impairments and return to PLOF      Plan: progress WB exercises and balance; possibly add theraband ankle exercises     Daily Treatment Diary     Precautions: 90% WB in CAM boot until 25 then able to progress to FWB in CAM boot and wean from walker  DOS: 25  Surgery: L peroneus brevis tendon repair   Functional Limitations: walking, ADLs, household chores  Impairments: L ankle ROM and strength  POC expiration: 25  FOTO intake: 35  FOTO status:   FOTO predicted by visit 16: 49      POC Expires Reeval for Medicare to be completed  Unit Limit Auth Expiration Date PT/OT/STVisit Limit   25 By visit 10 N/a N/a BOMN    Completed on visit                    Auth Status DATE  7/3       NA Visit # 1 2        Remaining         MANUAL THERAPY         L ankle PROM nv 15'       L ankle iso all planes                                             THERAPEUTIC EXERCISE HEP         Long sit DF stretch w/ strap  nv 20\"x3       Ankle AROM DF, PF, inv, ev  nv 10ea       TB ankle 4 way   OTB 10ea       Toe curl  nv        Toe spreading  nv     " "   Seated HR 6/12 nv 20       Seated TR 6/12 nv 20       Standing HR                                                                                NEUROMUSCULAR REEDUCATION           Weight shifts fwd/back and lat  nv        Tandem stance   15\" ea       Standing on airex normal TONY   15\"x2       SLS          Sidestep foam          Tandem walk foam                                                  THERAPEUTIC ACTIVITY          Fwd step up - up w/ left   4\"x10        Fwd step down                              GAIT TRAINING                                                  MODALITIES                                        "

## 2025-07-07 ENCOUNTER — APPOINTMENT (OUTPATIENT)
Dept: PHYSICAL THERAPY | Facility: CLINIC | Age: 70
End: 2025-07-07
Payer: MEDICARE

## 2025-07-08 DIAGNOSIS — F33.9 MAJOR DEPRESSION, RECURRENT, CHRONIC (HCC): ICD-10-CM

## 2025-07-08 DIAGNOSIS — F41.1 GAD (GENERALIZED ANXIETY DISORDER): ICD-10-CM

## 2025-07-09 RX ORDER — DULOXETIN HYDROCHLORIDE 30 MG/1
CAPSULE, DELAYED RELEASE ORAL
Qty: 90 CAPSULE | Refills: 0 | Status: SHIPPED | OUTPATIENT
Start: 2025-07-09

## 2025-07-09 RX ORDER — DULOXETIN HYDROCHLORIDE 60 MG/1
60 CAPSULE, DELAYED RELEASE ORAL
Qty: 90 CAPSULE | Refills: 0 | Status: SHIPPED | OUTPATIENT
Start: 2025-07-09

## 2025-07-10 ENCOUNTER — OFFICE VISIT (OUTPATIENT)
Dept: PHYSICAL THERAPY | Facility: CLINIC | Age: 70
End: 2025-07-10
Attending: PHYSICIAN ASSISTANT
Payer: MEDICARE

## 2025-07-10 DIAGNOSIS — S86.312D TEAR OF PERONEAL TENDON, LEFT, SUBSEQUENT ENCOUNTER: Primary | ICD-10-CM

## 2025-07-10 PROCEDURE — 97112 NEUROMUSCULAR REEDUCATION: CPT

## 2025-07-10 PROCEDURE — 97140 MANUAL THERAPY 1/> REGIONS: CPT

## 2025-07-10 PROCEDURE — 97110 THERAPEUTIC EXERCISES: CPT

## 2025-07-10 NOTE — PROGRESS NOTES
"Daily Note     Today's date: 7/10/2025  Patient name: Aniya Jones  : 1955  MRN: [id number]  Referring provider: Misty Jordan P*  Dx:   Encounter Diagnosis     ICD-10-CM    1. Tear of peroneal tendon, left, subsequent encounter  S86.312D                      Subjective: Patient reports she is doing a little better than she was earlier on in the week    Objective: See treatment diary below      Assessment: Patient tolerated treatment fairly well. Her ankle mobility is moving fairly well. She has some difficulty with the balance activities. Worked on ascending and descending step.  She will benefit from skilled PT to address impairments and return to PLOF      Plan: progress WB exercises and balance; possibly add theraband ankle exercises     Daily Treatment Diary     Precautions: 90% WB in CAM boot until 25 then able to progress to FWB in CAM boot and wean from walker  DOS: 25  Surgery: L peroneus brevis tendon repair   Functional Limitations: walking, ADLs, household chores  Impairments: L ankle ROM and strength  POC expiration: 25  FOTO intake: 35  FOTO status:   FOTO predicted by visit 16: 49      POC Expires Reeval for Medicare to be completed  Unit Limit Auth Expiration Date PT/OT/STVisit Limit   25 By visit 10 N/a N/a BOMN    Completed on visit                    Auth Status DATE 6/12 7/3 7/10      NA Visit # 1 2 3       Remaining         MANUAL THERAPY         L ankle PROM nv 15' 15'      L ankle iso all planes                                             THERAPEUTIC EXERCISE HEP         Long sit DF stretch w/ strap / nv 20\"x3 20\"x3      Ankle AROM DF, PF, inv, ev 6/12 nv 10ea X10 ea      TB ankle 4 way   OTB 10ea OTB  X15 ea      Toe curl 6/12 nv        Toe spreading 6/12 nv        Seated HR 6/12 nv 20 20      Seated TR 6/12 nv 20 20      Standing HR                                                                                NEUROMUSCULAR REEDUCATION           Weight " "shifts fwd/back and lat  nv        Tandem stance   15\" ea 15\"x3      Standing on airex normal TONY   15\"x2       SLS          Sidestep foam          Tandem walk foam                                                  THERAPEUTIC ACTIVITY          Fwd step up - up w/ left   4\"x10  4\"x10      Fwd step down    4\"x10                          GAIT TRAINING                                                  MODALITIES                                        "

## 2025-07-15 ENCOUNTER — OFFICE VISIT (OUTPATIENT)
Dept: PHYSICAL THERAPY | Facility: CLINIC | Age: 70
End: 2025-07-15
Attending: PHYSICIAN ASSISTANT
Payer: MEDICARE

## 2025-07-15 DIAGNOSIS — S86.312D TEAR OF PERONEAL TENDON, LEFT, SUBSEQUENT ENCOUNTER: Primary | ICD-10-CM

## 2025-07-15 PROCEDURE — 97140 MANUAL THERAPY 1/> REGIONS: CPT

## 2025-07-15 PROCEDURE — 97110 THERAPEUTIC EXERCISES: CPT

## 2025-07-15 NOTE — PROGRESS NOTES
"Daily Note     Today's date: 7/15/2025  Patient name: Aniya Jones  : 1955  MRN: [id number]  Referring provider: Misty Jordan P*  Dx:   Encounter Diagnosis     ICD-10-CM    1. Tear of peroneal tendon, left, subsequent encounter  S86.312D                      Subjective: Patient reports she is still having a lot of tenderness and soreness when walking of the muscle up the lateral leg.     Objective: See treatment diary below      Assessment: Patient tolerated treatment  well. Focused more on pain management and soft tissue work today. Worked on the anterior tib muscle with IASTM and STM as well as PROM of the ankle. With some light self stretching and ROM exercises. She noted that she felt much better ambulating post session compared to when she came in.  She will benefit from skilled PT to address impairments and return to PLOF      Plan: progress WB exercises and balance; possibly add theraband ankle exercises     Daily Treatment Diary     Precautions: 90% WB in CAM boot until 25 then able to progress to FWB in CAM boot and wean from walker  DOS: 25  Surgery: L peroneus brevis tendon repair   Functional Limitations: walking, ADLs, household chores  Impairments: L ankle ROM and strength  POC expiration: 25  FOTO intake: 35  FOTO status:   FOTO predicted by visit 16: 49      POC Expires Reeval for Medicare to be completed  Unit Limit Auth Expiration Date PT/OT/STVisit Limit   25 By visit 10 N/a N/a BOMN    Completed on visit                    Auth Status DATE 6/12 7/3 7/10 7/15     NA Visit # 1 2 3 4      Remaining         MANUAL THERAPY         L ankle PROM nv 15' 15' 15'     L ankle iso all planes    5\"x10 ea     IASTM to anterior tib    5'     STM to ankle and anterior tib    5'                       THERAPEUTIC EXERCISE HEP         Long sit DF stretch w/ strap  nv 20\"x3 20\"x3 20\"x3     Ankle AROM DF, PF, inv, ev  nv 10ea X10 ea X10 ea     TB ankle 4 way   OTB 10ea " "OTB  X15 ea OTB  X15 ea     Toe curl 6/12 nv        Toe spreading 6/12 nv        Seated HR 6/12 nv 20 20      Seated TR 6/12 nv 20 20      Standing HR                                                                                NEUROMUSCULAR REEDUCATION           Weight shifts fwd/back and lat  nv        Tandem stance   15\" ea 15\"x3      Standing on airex normal TONY   15\"x2       SLS          Sidestep foam          Tandem walk foam                                                  THERAPEUTIC ACTIVITY          Fwd step up - up w/ left   4\"x10  4\"x10      Fwd step down    4\"x10                          GAIT TRAINING                                                  MODALITIES                                        "

## 2025-07-17 ENCOUNTER — APPOINTMENT (OUTPATIENT)
Dept: PHYSICAL THERAPY | Facility: CLINIC | Age: 70
End: 2025-07-17
Attending: PHYSICIAN ASSISTANT
Payer: MEDICARE

## 2025-07-18 ENCOUNTER — PROCEDURE VISIT (OUTPATIENT)
Dept: NEUROLOGY | Facility: CLINIC | Age: 70
End: 2025-07-18
Payer: MEDICARE

## 2025-07-18 VITALS — HEART RATE: 84 BPM | SYSTOLIC BLOOD PRESSURE: 128 MMHG | DIASTOLIC BLOOD PRESSURE: 68 MMHG | TEMPERATURE: 98.1 F

## 2025-07-18 DIAGNOSIS — G43.709 CHRONIC MIGRAINE WITHOUT AURA WITHOUT STATUS MIGRAINOSUS, NOT INTRACTABLE: Primary | ICD-10-CM

## 2025-07-18 PROCEDURE — 64616 CHEMODENERV MUSC NECK DYSTON: CPT | Performed by: PHYSICIAN ASSISTANT

## 2025-07-18 NOTE — PROGRESS NOTES
"Universal Protocol   procedure performed by consultantConsent: Verbal consent obtained. Written consent obtained  Risks and benefits: risks, benefits and alternatives were discussed  Consent given by: patient  Time out: Immediately prior to procedure a \"time out\" was called to verify the correct patient, procedure, equipment, support staff and site/side marked as required.  Patient understanding: patient states understanding of the procedure being performed  Patient consent: the patient's understanding of the procedure matches consent given  Procedure consent: procedure consent matches procedure scheduled  Relevant documents: relevant documents present and verified  Patient identity confirmed: verbally with patient      Chemodenervation     Date/Time  7/18/2025 2:30 PM     Performed by  Paulette Trevino PA-C   Authorized by  Paulette Trevino PA-C     Pre-procedure details      Prepped With: Alcohol     Anesthesia  (see MAR for exact dosages):     Anesthesia method:  None   Procedure details      Position:  Upright   Botox      Botox Type:  Type A    Brand:  Botox    mL's of Botulinum Toxin:  200    Final Concentration per CC:  50 units    Needle Gauge:  30 G 2.5 inch   Procedures      Botox Procedures: cervical dystonia and chronic headache      Indications: spasmodic torticollis and migraines     Injection Location      Head / Face:  L superior cervical paraspinal, R superior cervical paraspinal, L , R , L frontalis, R frontalis, L medial occipitalis, R medial occipitalis, L temporalis, R temporalis and procerus    L  injection amount:  5 unit(s)    R  injection amount:  5 unit(s)    L lateral frontalis:  5 unit(s)    R lateral frontalis:  5 unit(s)    L medial frontalis:  5 unit(s)    R medial frontalis:  5 unit(s)    L temporalis injection amount:  20 unit(s)    R temporalis injection amount:  20 unit(s)    Procerus injection amount:  5 unit(s)    L medial occipitalis injection " amount:  15 unit(s)    R medial occipitalis injection amount:  15 unit(s)    L superior cervical paraspinal injection amount:  10 unit(s)    R superior cervical paraspinal injection amount:  15 unit(s)    Cervical Dystonia / Salivary Gland:  L splenius capitis, R splenius capitis, L upper trapezius, R upper trapezius, L sternocleidomastoid and R sternocleidomastoid      L splenius capitis injection amount:  7.5 unit(s)      R splenius capitis injection amount:  7.5 unit(s)      L sternocleidomastoid injection amount:  10 unit(s)      R sternocleidomastoid injection amount:  10 unit(s)      L upper trapezius injection amount:  15 unit(s)      R upper trapezius injection amount:  15 unit(s)   Total Units      Total units used:  200    Total units discarded:  0   Post-procedure details      Chemodenervation:  Neck, excluding muscles of the larynx    Neck Muscle Location::  Bilateral neck muscle    Patient tolerance of procedure:  Tolerated well, no immediate complications   Comments       5 units temporalis  All medically necessary

## 2025-07-21 ENCOUNTER — APPOINTMENT (OUTPATIENT)
Dept: PHYSICAL THERAPY | Facility: CLINIC | Age: 70
End: 2025-07-21
Attending: PHYSICIAN ASSISTANT
Payer: MEDICARE

## 2025-07-24 ENCOUNTER — OFFICE VISIT (OUTPATIENT)
Dept: PHYSICAL THERAPY | Facility: CLINIC | Age: 70
End: 2025-07-24
Attending: PHYSICIAN ASSISTANT
Payer: MEDICARE

## 2025-07-24 DIAGNOSIS — S86.312D TEAR OF PERONEAL TENDON, LEFT, SUBSEQUENT ENCOUNTER: Primary | ICD-10-CM

## 2025-07-24 PROCEDURE — 97140 MANUAL THERAPY 1/> REGIONS: CPT

## 2025-07-24 PROCEDURE — 97110 THERAPEUTIC EXERCISES: CPT

## 2025-07-24 NOTE — PROGRESS NOTES
"Daily Note     Today's date: 2025  Patient name: Aniya Jones  : 1955  MRN: [id number]  Referring provider: Misty Jordan P*  Dx:   Encounter Diagnosis     ICD-10-CM    1. Tear of peroneal tendon, left, subsequent encounter  S86.312D                      Subjective: Patient reports she is doing a little better after being really sore the days following her last PT session.     Objective: See treatment diary below      Assessment: Patient tolerated treatment fair. Pt still has a lot of ankle and knee discomfort with any resistive exercises. Session kept light and focused more on manual therapy. She was feeling decent upon leaving session.   She will benefit from skilled PT to address impairments and return to PLOF      Plan: progress WB exercises and balance; possibly add theraband ankle exercises     Daily Treatment Diary     Precautions: 90% WB in CAM boot until 25 then able to progress to FWB in CAM boot and wean from walker  DOS: 25  Surgery: L peroneus brevis tendon repair   Functional Limitations: walking, ADLs, household chores  Impairments: L ankle ROM and strength  POC expiration: 25  FOTO intake: 35  FOTO status:   FOTO predicted by visit 16: 49      POC Expires Reeval for Medicare to be completed  Unit Limit Auth Expiration Date PT/OT/STVisit Limit   25 By visit 10 N/a N/a BOMN    Completed on visit                    Auth Status DATE 6/12 7/3 7/10 7/15 7/24    NA Visit # 1 2 3 4 5     Remaining         MANUAL THERAPY         L ankle PROM nv 15' 15' 15' 15'    L ankle iso all planes    5\"x10 ea HOLD    IASTM to anterior tib    5'     STM to ankle and anterior tib    5' 5'                      THERAPEUTIC EXERCISE HEP         Long sit DF stretch w/ strap  nv 20\"x3 20\"x3 20\"x3     Ankle AROM DF, PF, inv, ev  nv 10ea X10 ea X10 ea X10 ea    TB ankle 4 way   OTB 10ea OTB  X15 ea OTB  X15 ea OTB  X10 ea    Toe curl  nv        Toe spreading 12 nv        Seated " "HR 6/12 nv 20 20      Seated TR 6/12 nv 20 20      Standing HR                                                                                NEUROMUSCULAR REEDUCATION           Weight shifts fwd/back and lat  nv    X10 ea    Tandem stance   15\" ea 15\"x3  15\"x3 ea    Standing on airex normal TONY   15\"x2       SLS          Sidestep foam          Tandem walk foam          Step lunge      x10                                  THERAPEUTIC ACTIVITY          Fwd step up - up w/ left   4\"x10  4\"x10      Fwd step down    4\"x10                          GAIT TRAINING                                                  MODALITIES                                        "

## 2025-07-25 ENCOUNTER — OFFICE VISIT (OUTPATIENT)
Dept: OBGYN CLINIC | Facility: CLINIC | Age: 70
End: 2025-07-25

## 2025-07-25 VITALS — BODY MASS INDEX: 28.82 KG/M2 | WEIGHT: 173 LBS | HEIGHT: 65 IN

## 2025-07-25 DIAGNOSIS — S86.312D TEAR OF PERONEAL TENDON, LEFT, SUBSEQUENT ENCOUNTER: Primary | ICD-10-CM

## 2025-07-25 PROCEDURE — 99024 POSTOP FOLLOW-UP VISIT: CPT | Performed by: ORTHOPAEDIC SURGERY

## 2025-07-25 NOTE — PROGRESS NOTES
"      James R Lachman, M.D.  Attending, Orthopaedic Surgery  Foot and Ankle  [facility]      ORTHOPAEDIC FOOT AND ANKLE POST-OP VISIT     Procedure:     Left peroneus brevis tendon repair with excision of peroneus quartus       Date of surgery:   5/1/25      PLAN  1. Weightbearing Status- WBAT operative extremity  2. DVT prophylaxis- Completed  3. Continue to elevate 23hrs/day getting up 1x per hour to prevent a blood clot  4. Pain control- OTC pain medication  5. RTC in 3 month(s)  6. Xrays needed next visit - no     History of Present Illness:   Chief Complaint:   Chief Complaint   Patient presents with    Post-op     Post op 3 months. Only 25% better. Pain all the time.      Aniya Jones is a 70 y.o. female who is being seen for post-operative visit for the above procedure. Pain is well controlled and the patient has successfully transitioned to OTC pain medicines.  she has completed ASA 81mg BID for DVT prophylaxis. Patient has been WBAT in a Sneaker.      Review of Systems:  General- denies fever/chills  Respiratory- denies cough or SOB  Cardio- denies chest pain or palpitations  GI- denies abdominal pain  Musculoskeletal- Negative except noted above  Skin- denies rashes or wounds    Physical Exam:   Ht 5' 5\" (1.651 m)   Wt 78.5 kg (173 lb)   BMI 28.79 kg/m²   General/Constitutional: No apparent distress: well-nourished and well developed.  Eyes: normal ocular motion  Lymphatic: No appreciable lymphadenopathy  Respiratory: Non-labored breathing  Vascular: No edema, swelling or tenderness, except as noted in detailed exam.  Integumentary: No impressive skin lesions present, except as noted in detailed exam.  Neuro: No ataxia or tremors noted  Psych: Normal mood and affect, oriented to person, place and time. Appropriate affect.  Musculoskeletal: Normal, except as noted in detailed exam and in HPI.    Examination    left        Incision Clean, dry, intact  Sutures Previously removed.  "   Ecchymosis none    Swelling Mild    Sensation Intact to light touch throughout sural, saphenous, superficial peroneal, deep peroneal and medial/lateral plantar nerve distributions.  [place]-Bam 5.07 filament (10g) testing deferred.    Cardiovascular Brisk capillary refill < 2 seconds,intact DP and PT pulses    Special Tests None      Imaging Studies:   No new imaging        James R. Lachman, MD  Foot & Ankle Surgery   Department of Orthopaedic Surgery  [facility]      I personally performed the service.    James R. Lachman, MD    Scribe Attestation      I,:  Pepe López am acting as a scribe while in the presence of the attending physician.:       I,:  James R Lachman, MD personally performed the services described in this documentation    as scribed in my presence.:

## 2025-07-29 ENCOUNTER — OFFICE VISIT (OUTPATIENT)
Dept: PHYSICAL THERAPY | Facility: CLINIC | Age: 70
End: 2025-07-29
Attending: PHYSICIAN ASSISTANT
Payer: MEDICARE

## 2025-07-29 DIAGNOSIS — S86.312D TEAR OF PERONEAL TENDON, LEFT, SUBSEQUENT ENCOUNTER: Primary | ICD-10-CM

## 2025-07-29 PROCEDURE — 97110 THERAPEUTIC EXERCISES: CPT

## 2025-07-29 PROCEDURE — 97112 NEUROMUSCULAR REEDUCATION: CPT

## 2025-07-29 PROCEDURE — 97140 MANUAL THERAPY 1/> REGIONS: CPT

## 2025-07-31 ENCOUNTER — OFFICE VISIT (OUTPATIENT)
Dept: PHYSICAL THERAPY | Facility: CLINIC | Age: 70
End: 2025-07-31
Attending: PHYSICIAN ASSISTANT
Payer: MEDICARE

## 2025-07-31 DIAGNOSIS — S86.312D TEAR OF PERONEAL TENDON, LEFT, SUBSEQUENT ENCOUNTER: Primary | ICD-10-CM

## 2025-07-31 PROCEDURE — 97110 THERAPEUTIC EXERCISES: CPT

## 2025-07-31 PROCEDURE — 97140 MANUAL THERAPY 1/> REGIONS: CPT

## 2025-07-31 PROCEDURE — 97112 NEUROMUSCULAR REEDUCATION: CPT

## 2025-08-20 DIAGNOSIS — E11.69 TYPE 2 DIABETES MELLITUS WITH OTHER SPECIFIED COMPLICATION, WITHOUT LONG-TERM CURRENT USE OF INSULIN (HCC): ICD-10-CM

## 2025-08-20 DIAGNOSIS — F41.1 GAD (GENERALIZED ANXIETY DISORDER): ICD-10-CM

## 2025-08-20 RX ORDER — BUSPIRONE HYDROCHLORIDE 10 MG/1
10 TABLET ORAL 2 TIMES DAILY
Qty: 180 TABLET | Refills: 0 | Status: SHIPPED | OUTPATIENT
Start: 2025-08-20

## (undated) DEVICE — 3M™ MICROFOAM™ SURGICAL TAPE 4 ROLLS/CARTON 6 CARTONS/CASE 1528-3: Brand: 3M™ MICROFOAM™

## (undated) DEVICE — GLOVE INDICATOR PI UNDERGLOVE SZ 8 BLUE

## (undated) DEVICE — ACE WRAP 6 IN UNSTERILE

## (undated) DEVICE — PADDING CAST 4 IN  COTTON STRL

## (undated) DEVICE — GAUZE SPONGES,16 PLY: Brand: CURITY

## (undated) DEVICE — OCCLUSIVE GAUZE STRIP,3% BISMUTH TRIBROMOPHENATE IN PETROLATUM BLEND: Brand: XEROFORM

## (undated) DEVICE — LAPAROTOMY SPONGE - RF AND X-RAY DETECTABLE PRE-WASHED: Brand: SITUATE

## (undated) DEVICE — GLOVE SRG BIOGEL 8

## (undated) DEVICE — BETHLEHEM UNIVERSAL  MIONR EXT: Brand: CARDINAL HEALTH

## (undated) DEVICE — BRUSH EZ SCRUB PCMX W/NAIL CLEANER

## (undated) DEVICE — PENCILETTE PUSH BUTTON COATED

## (undated) DEVICE — ABDOMINAL PAD: Brand: DERMACEA

## (undated) DEVICE — SUT ETHILON 3-0 PS-1 18 IN 1663G

## (undated) DEVICE — 3M™ DURAPORE™ SURGICAL TAPE 1538-1, 1 INCH X 10 YARD (2,5CM X 9,1M), 12 ROLLS/BOX: Brand: 3M™ DURAPORE™

## (undated) DEVICE — SUT VICRYL 2-0 CT-2 18 IN J726D

## (undated) DEVICE — INTENDED FOR TISSUE SEPARATION, AND OTHER PROCEDURES THAT REQUIRE A SHARP SURGICAL BLADE TO PUNCTURE OR CUT.: Brand: BARD-PARKER ® CARBON RIB-BACK BLADES

## (undated) DEVICE — CHLORAPREP HI-LITE 26ML ORANGE

## (undated) DEVICE — GLOVE SRG BIOGEL 7.5

## (undated) DEVICE — DRAPE SHEET THREE QUARTER

## (undated) DEVICE — ANTIBACTERIAL UNDYED BRAIDED (POLYGLACTIN 910), SYNTHETIC ABSORBABLE SUTURE: Brand: COATED VICRYL